# Patient Record
Sex: FEMALE | Race: WHITE | Employment: OTHER | URBAN - METROPOLITAN AREA
[De-identification: names, ages, dates, MRNs, and addresses within clinical notes are randomized per-mention and may not be internally consistent; named-entity substitution may affect disease eponyms.]

---

## 2017-01-03 ENCOUNTER — ANESTHESIA EVENT (OUTPATIENT)
Dept: SURGERY | Age: 82
End: 2017-01-03
Payer: MEDICARE

## 2017-01-04 ENCOUNTER — ANESTHESIA (OUTPATIENT)
Dept: SURGERY | Age: 82
End: 2017-01-04
Payer: MEDICARE

## 2017-01-04 ENCOUNTER — SURGERY (OUTPATIENT)
Age: 82
End: 2017-01-04

## 2017-01-04 PROBLEM — H40.1111 PRIMARY OPEN ANGLE GLAUCOMA OF RIGHT EYE, MILD STAGE: Status: ACTIVE | Noted: 2017-01-04

## 2017-01-04 PROBLEM — H25.811 COMBINED FORMS OF AGE-RELATED CATARACT OF RIGHT EYE: Status: ACTIVE | Noted: 2017-01-04

## 2017-01-04 RX ORDER — MIDAZOLAM HYDROCHLORIDE 1 MG/ML
INJECTION, SOLUTION INTRAMUSCULAR; INTRAVENOUS AS NEEDED
Status: DISCONTINUED | OUTPATIENT
Start: 2017-01-04 | End: 2017-01-04 | Stop reason: HOSPADM

## 2017-01-04 RX ADMIN — GENTAMICIN SULFATE 0.5 INCH: 3 OINTMENT OPHTHALMIC at 09:28

## 2017-01-04 RX ADMIN — LIDOCAINE HYDROCHLORIDE 3 ML: 40 INJECTION, SOLUTION RETROBULBAR; TOPICAL at 09:28

## 2017-01-04 RX ADMIN — VANCOMYCIN HYDROCHLORIDE: 1 INJECTION, POWDER, LYOPHILIZED, FOR SOLUTION INTRAVENOUS at 09:29

## 2017-01-04 RX ADMIN — APRACLONIDINE HYDROCHLORIDE 1 DROP: 10 SOLUTION/ DROPS OPHTHALMIC at 09:28

## 2017-01-04 RX ADMIN — MIDAZOLAM HYDROCHLORIDE 0.5 MG: 1 INJECTION, SOLUTION INTRAMUSCULAR; INTRAVENOUS at 09:20

## 2017-01-04 RX ADMIN — MIDAZOLAM HYDROCHLORIDE 0.5 MG: 1 INJECTION, SOLUTION INTRAMUSCULAR; INTRAVENOUS at 09:18

## 2017-01-04 RX ADMIN — BALANCED SALT SOLUTION 1 BOTTLE: 6.4; .75; .48; .3; 3.9; 1.7 SOLUTION OPHTHALMIC at 09:28

## 2017-01-04 RX ADMIN — ACETYLCHOLINE CHLORIDE 20 MG: KIT at 09:28

## 2017-01-04 RX ADMIN — SODIUM HYALURONATE 0.85 ML: 10 INJECTION INTRAOCULAR at 09:28

## 2017-01-04 RX ADMIN — Medication 1 ML: at 09:28

## 2017-01-04 RX ADMIN — LIDOCAINE HYDROCHLORIDE 0.5 ML: 10 INJECTION, SOLUTION EPIDURAL; INFILTRATION; INTRACAUDAL; PERINEURAL at 09:28

## 2017-01-04 RX ADMIN — PREDNISOLONE ACETATE 1 DROP: 10 SUSPENSION/ DROPS OPHTHALMIC at 09:28

## 2017-01-04 NOTE — ANESTHESIA PREPROCEDURE EVALUATION
Anesthetic History     PONV          Review of Systems / Medical History  Patient summary reviewed, nursing notes reviewed and pertinent labs reviewed    Pulmonary            Asthma (occasional wheezing) : well controlled       Neuro/Psych         Psychiatric history (anxiety)     Cardiovascular    Hypertension              Exercise tolerance: >4 METS     GI/Hepatic/Renal     GERD: well controlled      Hiatal hernia     Endo/Other        Arthritis     Other Findings   Comments: fibromyalgia           Physical Exam    Airway  Mallampati: I  TM Distance: > 6 cm  Neck ROM: normal range of motion   Mouth opening: Normal     Cardiovascular    Rhythm: regular  Rate: normal      Pertinent negatives: No murmur   Dental    Dentition: Full upper dentures and Lower partial plate     Pulmonary  Breath sounds clear to auscultation               Abdominal  GI exam deferred       Other Findings            Anesthetic Plan    ASA: 2  Anesthesia type: MAC          Induction: Intravenous  Anesthetic plan and risks discussed with: Patient

## 2017-01-04 NOTE — ANESTHESIA POSTPROCEDURE EVALUATION
Post-Anesthesia Evaluation and Assessment    Patient: Williams Curling MRN: [id number]  SSN: [id number]    YOB: 1935  Age: 80 y.o. Sex: female       Cardiovascular Function/Vital Signs  Visit Vitals    /68    Pulse 63    Temp 36.3 °C (97.4 °F)    Resp 17    Ht 5' 2\" (1.575 m)    Wt 65 kg (143 lb 3.2 oz)    SpO2 98%    BMI 26.19 kg/m2       Patient is status post MAC anesthesia for Procedure(s):  CATARACT EXTRACTION WITH INTRA OCULAR LENS IMPLANT RIGHT EYE, I-STENT, COMPLEX WITH IRIS HOOKS. Nausea/Vomiting: None    Postoperative hydration reviewed and adequate. Pain:  Pain Scale 1: Numeric (0 - 10) (01/04/17 0957)  Pain Intensity 1: 0 (01/04/17 0957)   Managed    Neurological Status:   Neuro (WDL): Within Defined Limits (01/04/17 0957)   At baseline    Mental Status and Level of Consciousness: Arousable    Pulmonary Status:   O2 Device: Room air (01/04/17 0958)   Adequate oxygenation and airway patent    Complications related to anesthesia: None    Post-anesthesia assessment completed.  No concerns    Signed By: Juan Manuel Elias MD     January 4, 2017

## 2017-01-06 PROBLEM — H25.812 COMBINED FORMS OF AGE-RELATED CATARACT OF LEFT EYE: Status: ACTIVE | Noted: 2017-01-06

## 2017-01-06 PROBLEM — H40.1121 PRIMARY OPEN ANGLE GLAUCOMA OF LEFT EYE, MILD STAGE: Status: ACTIVE | Noted: 2017-01-06

## 2017-01-06 PROBLEM — H25.811 COMBINED FORMS OF AGE-RELATED CATARACT OF RIGHT EYE: Status: RESOLVED | Noted: 2017-01-04 | Resolved: 2017-01-06

## 2017-01-06 PROBLEM — H40.1111 PRIMARY OPEN ANGLE GLAUCOMA OF RIGHT EYE, MILD STAGE: Status: RESOLVED | Noted: 2017-01-04 | Resolved: 2017-01-06

## 2017-01-09 RX ORDER — PREDNISOLONE ACETATE 10 MG/ML
1 SUSPENSION/ DROPS OPHTHALMIC 4 TIMES DAILY
COMMUNITY
End: 2018-01-15

## 2017-01-09 RX ORDER — DICLOFENAC SODIUM 1 MG/ML
1 SOLUTION/ DROPS OPHTHALMIC 4 TIMES DAILY
COMMUNITY
End: 2018-01-15

## 2017-01-09 RX ORDER — MOXIFLOXACIN 5 MG/ML
1 SOLUTION/ DROPS OPHTHALMIC 4 TIMES DAILY
COMMUNITY
Start: 2017-01-08 | End: 2018-01-15

## 2017-01-10 ENCOUNTER — ANESTHESIA EVENT (OUTPATIENT)
Dept: SURGERY | Age: 82
End: 2017-01-10
Payer: MEDICARE

## 2017-01-11 ENCOUNTER — HOSPITAL ENCOUNTER (OUTPATIENT)
Age: 82
Setting detail: OUTPATIENT SURGERY
Discharge: HOME | End: 2017-01-11
Attending: OPHTHALMOLOGY | Admitting: OPHTHALMOLOGY
Payer: MEDICARE

## 2017-01-11 ENCOUNTER — ANESTHESIA (OUTPATIENT)
Dept: SURGERY | Age: 82
End: 2017-01-11
Payer: MEDICARE

## 2017-01-11 VITALS
OXYGEN SATURATION: 97 % | TEMPERATURE: 98.5 F | DIASTOLIC BLOOD PRESSURE: 95 MMHG | WEIGHT: 145 LBS | RESPIRATION RATE: 19 BRPM | HEIGHT: 62 IN | HEART RATE: 62 BPM | SYSTOLIC BLOOD PRESSURE: 122 MMHG | BODY MASS INDEX: 26.68 KG/M2

## 2017-01-11 PROBLEM — H57.03 PERSISTENT MIOSIS: Status: ACTIVE | Noted: 2017-01-11

## 2017-01-11 PROCEDURE — 74011000250 HC RX REV CODE- 250: Performed by: OPHTHALMOLOGY

## 2017-01-11 PROCEDURE — V2632 POST CHMBR INTRAOCULAR LENS: HCPCS | Performed by: OPHTHALMOLOGY

## 2017-01-11 PROCEDURE — 77030018846 HC SOL IRR STRL H20 ICUM -A: Performed by: OPHTHALMOLOGY

## 2017-01-11 PROCEDURE — 76030000000 HC AMB SURG OR TIME 0.5 TO 1: Performed by: OPHTHALMOLOGY

## 2017-01-11 PROCEDURE — L8699 PROSTHETIC IMPLANT NOS: HCPCS | Performed by: OPHTHALMOLOGY

## 2017-01-11 PROCEDURE — 74011250636 HC RX REV CODE- 250/636

## 2017-01-11 PROCEDURE — 76060000061 HC AMB SURG ANES 0.5 TO 1 HR: Performed by: OPHTHALMOLOGY

## 2017-01-11 PROCEDURE — 74011250636 HC RX REV CODE- 250/636: Performed by: OPHTHALMOLOGY

## 2017-01-11 PROCEDURE — 76210000046 HC AMBSU PH II REC FIRST 0.5 HR: Performed by: OPHTHALMOLOGY

## 2017-01-11 DEVICE — LENS IOL POST 1-PC 6X13 23.5 -- ACRYSOF: Type: IMPLANTABLE DEVICE | Site: EYE | Status: FUNCTIONAL

## 2017-01-11 DEVICE — TRABECULAR MICRO-BYPASS STENT SYSTEM - LEFT
Type: IMPLANTABLE DEVICE | Site: EYE | Status: FUNCTIONAL
Brand: ISTENT

## 2017-01-11 RX ORDER — SODIUM CHLORIDE 0.9 % (FLUSH) 0.9 %
5-10 SYRINGE (ML) INJECTION EVERY 8 HOURS
Status: DISCONTINUED | OUTPATIENT
Start: 2017-01-11 | End: 2017-01-11 | Stop reason: HOSPADM

## 2017-01-11 RX ORDER — MIDAZOLAM HYDROCHLORIDE 1 MG/ML
INJECTION, SOLUTION INTRAMUSCULAR; INTRAVENOUS AS NEEDED
Status: DISCONTINUED | OUTPATIENT
Start: 2017-01-11 | End: 2017-01-11 | Stop reason: HOSPADM

## 2017-01-11 RX ORDER — DIPHENHYDRAMINE HYDROCHLORIDE 50 MG/ML
12.5 INJECTION, SOLUTION INTRAMUSCULAR; INTRAVENOUS AS NEEDED
Status: DISCONTINUED | OUTPATIENT
Start: 2017-01-11 | End: 2017-01-11 | Stop reason: HOSPADM

## 2017-01-11 RX ORDER — LIDOCAINE HYDROCHLORIDE 10 MG/ML
0.1 INJECTION, SOLUTION EPIDURAL; INFILTRATION; INTRACAUDAL; PERINEURAL AS NEEDED
Status: DISCONTINUED | OUTPATIENT
Start: 2017-01-11 | End: 2017-01-11 | Stop reason: HOSPADM

## 2017-01-11 RX ORDER — CYCLOPENTOLATE HYDROCHLORIDE 20 MG/ML
1 SOLUTION/ DROPS OPHTHALMIC
Status: COMPLETED | OUTPATIENT
Start: 2017-01-11 | End: 2017-01-11

## 2017-01-11 RX ORDER — PREDNISOLONE ACETATE 10 MG/ML
1 SUSPENSION/ DROPS OPHTHALMIC 2 TIMES DAILY
Status: DISCONTINUED | OUTPATIENT
Start: 2017-01-11 | End: 2017-01-11 | Stop reason: HOSPADM

## 2017-01-11 RX ORDER — GENTAMICIN SULFATE 0.3 %
0.5 OINTMENT (GRAM) OPHTHALMIC (EYE) 3 TIMES DAILY
Status: DISCONTINUED | OUTPATIENT
Start: 2017-01-11 | End: 2017-01-11 | Stop reason: HOSPADM

## 2017-01-11 RX ORDER — SODIUM CHLORIDE 9 MG/ML
25 INJECTION, SOLUTION INTRAVENOUS CONTINUOUS
Status: DISCONTINUED | OUTPATIENT
Start: 2017-01-11 | End: 2017-01-11 | Stop reason: HOSPADM

## 2017-01-11 RX ORDER — SODIUM CHLORIDE 0.9 % (FLUSH) 0.9 %
5-10 SYRINGE (ML) INJECTION AS NEEDED
Status: DISCONTINUED | OUTPATIENT
Start: 2017-01-11 | End: 2017-01-11 | Stop reason: HOSPADM

## 2017-01-11 RX ORDER — FENTANYL CITRATE 50 UG/ML
25 INJECTION, SOLUTION INTRAMUSCULAR; INTRAVENOUS
Status: DISCONTINUED | OUTPATIENT
Start: 2017-01-11 | End: 2017-01-11 | Stop reason: HOSPADM

## 2017-01-11 RX ORDER — SODIUM CHLORIDE, SODIUM LACTATE, POTASSIUM CHLORIDE, CALCIUM CHLORIDE 600; 310; 30; 20 MG/100ML; MG/100ML; MG/100ML; MG/100ML
25 INJECTION, SOLUTION INTRAVENOUS CONTINUOUS
Status: DISCONTINUED | OUTPATIENT
Start: 2017-01-11 | End: 2017-01-11 | Stop reason: HOSPADM

## 2017-01-11 RX ORDER — PHENYLEPHRINE HYDROCHLORIDE 25 MG/ML
1 SOLUTION/ DROPS OPHTHALMIC
Status: COMPLETED | OUTPATIENT
Start: 2017-01-11 | End: 2017-01-11

## 2017-01-11 RX ORDER — LIDOCAINE HYDROCHLORIDE 40 MG/ML
3 INJECTION, SOLUTION RETROBULBAR; TOPICAL ONCE
Status: DISCONTINUED | OUTPATIENT
Start: 2017-01-11 | End: 2017-01-11 | Stop reason: HOSPADM

## 2017-01-11 RX ORDER — PROPARACAINE HYDROCHLORIDE 5 MG/ML
1 SOLUTION/ DROPS OPHTHALMIC
Status: COMPLETED | OUTPATIENT
Start: 2017-01-11 | End: 2017-01-11

## 2017-01-11 RX ORDER — LIDOCAINE HYDROCHLORIDE 10 MG/ML
0.5 INJECTION, SOLUTION EPIDURAL; INFILTRATION; INTRACAUDAL; PERINEURAL ONCE
Status: DISCONTINUED | OUTPATIENT
Start: 2017-01-11 | End: 2017-01-11 | Stop reason: HOSPADM

## 2017-01-11 RX ORDER — DICLOFENAC SODIUM 1 MG/ML
1 SOLUTION/ DROPS OPHTHALMIC
Status: COMPLETED | OUTPATIENT
Start: 2017-01-11 | End: 2017-01-11

## 2017-01-11 RX ORDER — LIDOCAINE HYDROCHLORIDE 10 MG/ML
0.5 INJECTION, SOLUTION EPIDURAL; INFILTRATION; INTRACAUDAL; PERINEURAL ONCE
Status: COMPLETED | OUTPATIENT
Start: 2017-01-11 | End: 2017-01-11

## 2017-01-11 RX ORDER — LIDOCAINE HYDROCHLORIDE 40 MG/ML
3 INJECTION, SOLUTION RETROBULBAR; TOPICAL ONCE
Status: COMPLETED | OUTPATIENT
Start: 2017-01-11 | End: 2017-01-11

## 2017-01-11 RX ORDER — ONDANSETRON 2 MG/ML
4 INJECTION INTRAMUSCULAR; INTRAVENOUS AS NEEDED
Status: DISCONTINUED | OUTPATIENT
Start: 2017-01-11 | End: 2017-01-11 | Stop reason: HOSPADM

## 2017-01-11 RX ADMIN — PROPARACAINE HYDROCHLORIDE 1 DROP: 5 SOLUTION/ DROPS OPHTHALMIC at 09:05

## 2017-01-11 RX ADMIN — SODIUM CHLORIDE 25 ML/HR: 900 INJECTION, SOLUTION INTRAVENOUS at 09:07

## 2017-01-11 RX ADMIN — DICLOFENAC SODIUM 1 DROP: 1 SOLUTION/ DROPS OPHTHALMIC at 09:05

## 2017-01-11 RX ADMIN — DICLOFENAC SODIUM 1 DROP: 1 SOLUTION/ DROPS OPHTHALMIC at 08:59

## 2017-01-11 RX ADMIN — PROPARACAINE HYDROCHLORIDE 1 DROP: 5 SOLUTION/ DROPS OPHTHALMIC at 09:22

## 2017-01-11 RX ADMIN — PHENYLEPHRINE HYDROCHLORIDE 1 DROP: 25 SOLUTION/ DROPS OPHTHALMIC at 08:59

## 2017-01-11 RX ADMIN — CYCLOPENTOLATE HYDROCHLORIDE 1 DROP: 20 SOLUTION/ DROPS OPHTHALMIC at 08:59

## 2017-01-11 RX ADMIN — DICLOFENAC SODIUM 1 DROP: 1 SOLUTION/ DROPS OPHTHALMIC at 09:22

## 2017-01-11 RX ADMIN — DICLOFENAC SODIUM 1 DROP: 1 SOLUTION/ DROPS OPHTHALMIC at 09:17

## 2017-01-11 RX ADMIN — PROPARACAINE HYDROCHLORIDE 1 DROP: 5 SOLUTION/ DROPS OPHTHALMIC at 08:59

## 2017-01-11 RX ADMIN — MIDAZOLAM HYDROCHLORIDE 0.5 MG: 1 INJECTION, SOLUTION INTRAMUSCULAR; INTRAVENOUS at 10:50

## 2017-01-11 RX ADMIN — PHENYLEPHRINE HYDROCHLORIDE 1 DROP: 25 SOLUTION/ DROPS OPHTHALMIC at 09:22

## 2017-01-11 RX ADMIN — CYCLOPENTOLATE HYDROCHLORIDE 1 DROP: 20 SOLUTION/ DROPS OPHTHALMIC at 09:05

## 2017-01-11 RX ADMIN — PROPARACAINE HYDROCHLORIDE 1 DROP: 5 SOLUTION/ DROPS OPHTHALMIC at 09:17

## 2017-01-11 RX ADMIN — CYCLOPENTOLATE HYDROCHLORIDE 1 DROP: 20 SOLUTION/ DROPS OPHTHALMIC at 09:22

## 2017-01-11 RX ADMIN — CYCLOPENTOLATE HYDROCHLORIDE 1 DROP: 20 SOLUTION/ DROPS OPHTHALMIC at 09:17

## 2017-01-11 RX ADMIN — PHENYLEPHRINE HYDROCHLORIDE 1 DROP: 25 SOLUTION/ DROPS OPHTHALMIC at 09:17

## 2017-01-11 RX ADMIN — PROPARACAINE HYDROCHLORIDE 1 DROP: 5 SOLUTION/ DROPS OPHTHALMIC at 09:28

## 2017-01-11 RX ADMIN — PHENYLEPHRINE HYDROCHLORIDE 1 DROP: 25 SOLUTION/ DROPS OPHTHALMIC at 09:05

## 2017-01-11 RX ADMIN — MIDAZOLAM HYDROCHLORIDE 0.5 MG: 1 INJECTION, SOLUTION INTRAMUSCULAR; INTRAVENOUS at 10:21

## 2017-01-11 NOTE — OP NOTES
PREOPERATIVE DIAGNOSES:   1. Primary open angle glaucoma, uncontrolled, left  2. 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 3. Visually impairing combined cataract, left    POSTOPERATIVE DIAGNOSES:   1. Primary open angle glaucoma, uncontrolled, left  2. 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 3. Visually impairing combined cataract, left    OPERATION: I Stent insertion with Complex Kelman phacoemulsification with use of intraoperative iris hooks. Procedure(s):   I STENT / PHACO WITH IOL IMPLANT (COMPLEX/IRIS HOOKS)left   ANESTHESIA: Topical with intravenous sedation    TYPE OF LENS IMPLANT USED:   Implant Name Type Inv. Item Serial No.  Lot No. LRB No. Used Action   LENS DIOPTER [id number] 23.5 --  - [id number]  LENS DIOPTER [id number] 23.5 --  [id number] Actions  Left 1 Implanted   STENT EYE TRABECLAR LALY INVAS -- ISTENT - [id number]   STENT EYE TRABECLAR LALY INVAS -- ISTENT [id number] [employer] [id number] Left 1 Implanted       PHACO TIME: 53 seconds at an average power of 19.2%. Estimated blood loss: None   Complications: None   Specimen removed: None     DESCRIPTION OF PROCEDURE: The patient was brought to the holding area where an IV was begun at a keep open rate. The patient received several instillations of Cyclogyl 2% and Douglas-Synephrine 2.5% and proparacaine 0.5% until best pupillary dilatation was achieved. The patient was connected to cardiovascular monitoring. The patient was then brought back to the operating suite and then prepped and draped in the usual manner for ocular surgery. Cardiovascular monitoring was reestablished. The patient received several instillations of Betadine in the inferior conjunctival cul-de-sac along with 4% Xylocaine. The operating microscope was brought into position and the lid speculum was set into position. Viscoelastic was placed on the cornea and 2 drops of 4% Xylocaine were placed on the eye. A paracentesis was created and 1% preservative-free Xylocaine was instilled into the anterior chamber and Viscoelastic was instilled into the anterior chamber. Four paracentesis sites were created at the 2, 4, 8 and 10 oclock positions of the corneoscleral limbus, where the iris hooks were placed strategically to facilitate pupillary mydriasis. The 2.4mm keratome was utilized to create an incision. The capsulorrhexsis was performed in a circular fashion. The hard nucleus of the lens was hydrodissected away from the capsule using BSS solution. viscoelastic was then instilled into the anterior chamber to protect the corneal endothelium. Phacoemulsification was then carried out followed by irrigation and aspiration. Following this, the sterile lens was removed from the sterile container and inserted into the lens injector. It was inserted into the eye without complications and positioned appropriately on the visual /astigmatic axis . The iris hooks were removed without complications. The Viscoelastic was then removed from the posterior chamber as well as the anterior chamber of the eye, and Miochol was instilled posterior to the iris plane to facilitate pupillary miosis. Viscoelastic was then reinstilled to facilitate visualization of the angle. The microscope was angled to 45 degrees and the patient's head positioned to maximize visibility of the angle. The gonioprism was coupled to the cornea with viscoelastic. The I-stent was carefully inserted into schlemm's canal and checked to be sure it was securely positioned. Once positioning was confirmed the remaining viscoelastic was removed from the eye. The incision site was checked for any leaks. After finding none, the patient received several instillations of Iopidine, pred forte and then followed by instillation of gentamycin ophthalmic ointment.  The lid speculum was removed and the patient was patched with a semi-pressure dressing and shield and was brought to the recovery room in alert and stable and satisfactory postoperative condition. Instructions were given to the patients family for their immediate postoperative care, and the patient is to follow up with me in the office tomorrow.       Danial Sena DO  1/11/2017

## 2017-01-11 NOTE — IP AVS SNAPSHOT
[address] [facility] 
[phone] Patient: Ange Canchola MRN: [id number] OW:[id number] You are allergic to the following Allergen Reactions Cymbalta (Duloxetine) Nausea Only Other (comments) \"within the hour I had a severe headache, and vomiting\" Demerol (Meperidine) Nausea and Vomiting Morphine Nausea and Vomiting Penicillins Other (comments)  
 rash Percocet (Oxycodone-Acetaminophen) Nausea and Vomiting Recent Documentation Height Weight BMI OB Status Smoking Status 1.575 m 64.9 kg 26.16 kg/m2 Hysterectomy Never Smoker Emergency Contacts Name Discharge Info Relation Home Work Mobile Summer Pierce DISCHARGE CAREGIVER [3] Child [2] 21  About your hospitalization You were admitted on:  January 11, 2017 You last received care in the:  [facility] You were discharged on:  January 11, 2017 Unit phone number:  [phone] Why you were hospitalized Your primary diagnosis was:  Primary Open Angle Glaucoma Of Left Eye, Mild Stage Your diagnoses also included:  Combined Forms Of Age-Related Cataract Of Left Eye  
  
  
 
  
  
Providers Seen During Your Hospitalizations Provider Role Specialty Primary office phone Delphine Méndez DO Attending Provider Ophthalmology [phone] Your Primary Care Physician (PCP) Primary Care Physician Office Phone Office Fax OTHER, PHYS ** None ** ** None ** Follow-up Information Follow up With Details Comments Contact Info Tai Lind MD   Patient can only remember the practice name and not the physician Your Appointments Wednesday January 11, 2017 CATARACT EXTRACTION WITH INTRA OCULAR LENS IMPLANT with Delphine Méndez DO  
[facility] AMB SURGERY UNIT (RI OR PRE ASSESSMENT) [facility]  
[phone] Current Discharge Medication List  
  
ASK your doctor about these medications Dose & Instructions Dispensing Information Comments Morning Noon Evening Bedtime  
 albuterol 90 mcg/actuation inhaler Commonly known as:  PROVENTIL HFA, VENTOLIN HFA, PROAIR HFA Your next dose is: Today, Tomorrow Other:  _________ Take  by inhalation as needed for Wheezing. Refills:  0  
     
   
   
   
  
 aspirin delayed-release 81 mg tablet Your next dose is: Today, Tomorrow Other:  _________ Take  by mouth daily. Refills:  0  
     
   
   
   
  
 bimatoprost 0.01 % ophthalmic drops Commonly known as:  LUMIGAN Your next dose is: Today, Tomorrow Other:  _________ Dose:  1 Drop Administer 1 Drop to both eyes every evening. Refills:  0 CeleBREX 200 mg capsule Generic drug:  celecoxib Your next dose is: Today, Tomorrow Other:  _________ Take  by mouth every morning. Refills:  0  
     
   
   
   
  
 CO Q-10 100 mg capsule Generic drug:  co-enzyme Q-10 Your next dose is: Today, Tomorrow Other:  _________ Dose:  100 mg Take 100 mg by mouth daily. Refills:  0  
     
   
   
   
  
 diclofenac 0.1 % ophthalmic solution Commonly known as:  VOLTAREN Your next dose is: Today, Tomorrow Other:  _________ Dose:  1 Drop Administer 1 Drop to right eye four (4) times daily. Refills:  0 DULERA 100-5 mcg/actuation HFA inhaler Generic drug:  mometasone-formoterol Your next dose is: Today, Tomorrow Other:  _________ Dose:  2 Puff Take 2 Puffs by inhalation two (2) times a day. Refills:  0  
     
   
   
   
  
 indapamide 1.25 mg tablet Commonly known as:  Ángel Broach Your next dose is: Today, Tomorrow Other:  _________  Dose:  1.25 mg  
 Take 1.25 mg by mouth daily. Refills:  0 LORazepam 0.5 mg tablet Commonly known as:  ATIVAN Your next dose is: Today, Tomorrow Other:  _________ Dose:  1 mg Take 1 mg by mouth nightly. Refills:  0  
     
   
   
   
  
 meclizine 25 mg tablet Commonly known as:  ANTIVERT Your next dose is: Today, Tomorrow Other:  _________ Dose:  25 mg Take 1 Tab by mouth three (3) times daily as needed for Dizziness. Quantity:  20 Tab Refills:  0  
     
   
   
   
  
 multivitamin tablet Commonly known as:  ONE A DAY Your next dose is: Today, Tomorrow Other:  _________ Dose:  1 Tab Take 1 Tab by mouth daily. Refills:  0  
     
   
   
   
  
 pantoprazole 40 mg tablet Commonly known as:  PROTONIX Your next dose is: Today, Tomorrow Other:  _________ Dose:  40 mg Take 40 mg by mouth nightly. Refills:  0  
     
   
   
   
  
 prednisoLONE acetate 1 % ophthalmic suspension Commonly known as:  PRED FORTE Your next dose is: Today, Tomorrow Other:  _________ Dose:  1 Drop Administer 1 Drop to right eye four (4) times daily. Refills:  0 PROBIOTIC 4X 10-15 mg Tbec Generic drug:  B.infantis-B.ani-B.long-B.bifi Your next dose is: Today, Tomorrow Other:  _________ Take  by mouth daily. Refills:  0  
     
   
   
   
  
 * VIGAMOX 0.5 % ophthalmic solution Generic drug:  moxifloxacin Your next dose is: Today, Tomorrow Other:  _________ Dose:  1 Drop Administer 1 Drop to right eye four (4) times daily. Refills:  0  
     
   
   
   
  
 * VIGAMOX 0.5 % ophthalmic solution Generic drug:  moxifloxacin Your next dose is: Today, Tomorrow Other:  _________ Dose:  1 Drop Administer 1 Drop to left eye four (4) times daily. Refills:  0 * Notice: This list has 2 medication(s) that are the same as other medications prescribed for you. Read the directions carefully, and ask your doctor or other care provider to review them with you. Discharge Instructions Radha Garcia D.O., FORREST 
[facility]. 
[phone] Post-Operative Instructions for I Stent - Cataract Surgery ? Remove your eye shield and bandage at 12 noon the same day as surgery and start your eye drops. THROW AWAY THE GAUZE UNDER THE SHIELD. ? Place the blue eye shield back on for one week while asleep. Use the tape included in your bag. 
       
 
? DO NOT RUB YOUR EYE EVER! DO NOT WEAR EYE MAKEUP FOR 1 WEEK! 
 
 
? You may take a bath today and you can shower starting tomorrow. ? You may resume your previous diet. ? If you use glaucoma drops in the operative eye, stop using them. May continue in non-operative eye as directed by Dr Clarisse Kay. ? Common symptoms after surgery include a scratchy feeling, slight headache, red eye, and/or blurred vision. You may use Advil or Tylenol for any discomfort. ? Avoid strenuous activities and driving until you see Dr. Clarisse Kay tomorrow. Please use care when walking, your depth perception may be altered. ? Bring your bag with your drops to your follow up appointment. Below are Instructions On How To Use Your Eye Drops. ON THE DAY OF SURGERY: 
 
? Use all three eye drops at 12 noon, 4pm, and 8pm.  Wait 10 minutes between drops. THE DAY AFTER SURGERY: 
 
? You will use the drops 4 times a day at 8am, 12 noon, 4pm, and 8pm. 
? Use the drops every day until bottles are empty. Vigamox (tan top) Put 1 drop in at 8am, 12 noon, 4pm, and 8pm. 
 
Pred Acetate (pink top) Put 1 drop in at 8:10am, 12:10pm, 4:10pm, and 8:10pm. Shake before using. Diclofenac Put 1 drop in at 8:20am, 12:20pm, 4:20pm, 8:20pm. 
 
CONTINUE DROPS UNTIL ALL BOTTLES ARE EMPTY! Follow-up appointment tomorrow at Dr. Baldev Alvarado office:___________8:30_________ Please call the office at [phone] if you experience severe pain or nausea. DISCHARGE SUMMARY from Nurse The following personal items collected during your admission are returned to you:  
Dental Appliance: Dental Appliances: None Vision: Visual Aid: None Hearing Aid:   
Jewelry:   
Clothing:   
Other Valuables:   
Valuables sent to safe:   
 
 
PATIENT INSTRUCTIONS: 
 
After general anesthesia or intravenous sedation, for 24 hours or while taking prescription Narcotics: · Someone should be with you for the next 24 hours. · For your own safety, a responsible adult must drive you home. · Limit your activities · Recommended activity: Rest today and no driving until after your appointment with Dr. Georgiana Luong tomorrow. · Do not drive and operate hazardous machinery · Do not make important personal or business decisions · Do  not drink alcoholic beverages · If you have not urinated within 8 hours after discharge, please contact your surgeon on call. Report the following to your surgeon: 
· Excessive pain, swelling, redness or odor of or around the surgical area · Temperature over 100.5 · Any nausea and vomiting ·  
· You will receive a Post Operative Call from one of the Recovery Room Nurses on the day after your surgery to check on you. It is very important for us to know how you are recovering after your surgery. ·  
· You may receive an e-mail or letter in the mail from [place] regarding your experience with us in the Ambulatory Surgery Unit. Your feedback is valuable to us and we appreciate your participation in the survey. ·  
 
·  
· If the above instructions are not adequate, please contact Antoni Hamlin RN, [facility] anesthesia Nurse Manager or our Anesthesiologist, at [phone]. ·  
· We wish you a speedy recovery ? What to do at Home: *  Please give a list of your current medications to your Primary Care Provider. *  Please update this list whenever your medications are discontinued, doses are 
    changed, or new medications (including over-the-counter products) are added. *  Please carry medication information at all times in case of emergency situations. These are general instructions for a healthy lifestyle: No smoking/ No tobacco products/ Avoid exposure to second hand smoke Surgeon General's Warning:  Quitting smoking now greatly reduces serious risk to your health. Obesity, smoking, and sedentary lifestyle greatly increases your risk for illness A healthy diet, regular physical exercise & weight monitoring are important for maintaining a healthy lifestyle You may be retaining fluid if you have a history of heart failure or if you experience any of the following symptoms:  Weight gain of 3 pounds or more overnight or 5 pounds in a week, increased swelling in our hands or feet or shortness of breath while lying flat in bed. Please call your doctor as soon as you notice any of these symptoms; do not wait until your next office visit. Recognize signs and symptoms of STROKE: 
 
F-face looks uneven A-arms unable to move or move even S-speech slurred or non-existent T-time-call 911 as soon as signs and symptoms begin-DO NOT go Back to bed or wait to see if you get better-TIME IS BRAIN. If you have not had your influenza or pneumococcal vaccines, please follow up with your primary care physician. The discharge information has been reviewed with the patient and caregiver. The patient and caregiver verbalized understanding. Discharge Orders None Introducing [facility]! Flores Ornelas introduces [employer] patient portal. Now you can access parts of your medical record, email your doctor's office, and request medication refills online.    
 
1. In your internet browser, go to [email or web address] 2. Click on the First Time User? Click Here link in the Sign In box. You will see the New Member Sign Up page. 3. Enter your [employer] Access Code exactly as it appears below. You will not need to use this code after youve completed the sign-up process. If you do not sign up before the expiration date, you must request a new code. · [employer] Access Code: [email or web address] Expires: 3/29/2017  5:29 AM 
 
4. Enter the last four digits of your Social Security Number (xxxx) and Date of Birth (mm/dd/yyyy) as indicated and click Submit. You will be taken to the next sign-up page. 5. Create a [employer] ID. This will be your [employer] login ID and cannot be changed, so think of one that is secure and easy to remember. 6. Create a [employer] password. You can change your password at any time. 7. Enter your Password Reset Question and Answer. This can be used at a later time if you forget your password. 8. Enter your e-mail address. You will receive e-mail notification when new information is available in [address] 9. Click Sign Up. You can now view and download portions of your medical record. 10. Click the Download Summary menu link to download a portable copy of your medical information. If you have questions, please visit the Frequently Asked Questions section of the [employer] website. Remember, [employer] is NOT to be used for urgent needs. For medical emergencies, dial 911. Now available from your iPhone and Android! General Information Please provide this summary of care documentation to your next provider. Patient Signature:  ____________________________________________________________ Date:  ____________________________________________________________  
  
Orbie Sosa Provider Signature:  ____________________________________________________________ Date:  ____________________________________________________________

## 2017-01-11 NOTE — DISCHARGE INSTRUCTIONS
Nancy Chong D.O., P.CAmbrocio  [facility].  [phone]    Post-Operative Instructions for I Stent - Cataract Surgery     Remove your eye shield and bandage at 12 noon the same day as surgery and start your eye drops. THROW AWAY THE GAUZE UNDER THE SHIELD.  Place the blue eye shield back on for one week while asleep. Use the tape included in your bag.  DO NOT RUB YOUR EYE EVER! DO NOT WEAR EYE MAKEUP FOR 1 WEEK!  You may take a bath today and you can shower starting tomorrow.  You may resume your previous diet.  If you use glaucoma drops in the operative eye, stop using them. May continue in non-operative eye as directed by Dr Lizet Alvarez.  Common symptoms after surgery include a scratchy feeling, slight headache, red eye, and/or blurred vision. You may use Advil or Tylenol for any discomfort.  Avoid strenuous activities and driving until you see Dr. Lizet Alvarez tomorrow. Please use care when walking, your depth perception may be altered.  Bring your bag with your drops to your follow up appointment. Below are Instructions On How To Use Your Eye Drops. ON THE DAY OF SURGERY:     Use all three eye drops at 12 noon, 4pm, and 8pm.  Wait 10 minutes between drops. THE DAY AFTER SURGERY:     You will use the drops 4 times a day at 8am, 12 noon, 4pm, and 8pm.   Use the drops every day until bottles are empty. Vigamox (tan top) Put 1 drop in at 8am, 12 noon, 4pm, and 8pm.    Pred Acetate (pink top) Put 1 drop in at 8:10am, 12:10pm, 4:10pm, and 8:10pm. Shake before using. Diclofenac Put 1 drop in at 8:20am, 12:20pm, 4:20pm, 8:20pm.    CONTINUE DROPS UNTIL ALL BOTTLES ARE EMPTY! Follow-up appointment tomorrow at Dr. Sushila Hennessy office:___________8:30_________    Please call the office at [phone] if you experience severe pain or nausea.        DISCHARGE SUMMARY from Nurse    The following personal items collected during your admission are returned to you:   Dental Appliance: Dental Appliances: None  Vision: Visual Aid: None  Hearing Aid:    Jewelry:    Clothing:    Other Valuables:    Valuables sent to safe:        PATIENT INSTRUCTIONS:    After general anesthesia or intravenous sedation, for 24 hours or while taking prescription Narcotics:  · Someone should be with you for the next 24 hours. · For your own safety, a responsible adult must drive you home. · Limit your activities  · Recommended activity: Rest today and no driving until after your appointment with Dr. Polo Huffman tomorrow. · Do not drive and operate hazardous machinery  · Do not make important personal or business decisions  · Do  not drink alcoholic beverages  · If you have not urinated within 8 hours after discharge, please contact your surgeon on call. Report the following to your surgeon:  · Excessive pain, swelling, redness or odor of or around the surgical area  · Temperature over 100.5  · Any nausea and vomiting   ·   · You will receive a Post Operative Call from one of the Recovery Room Nurses on the day after your surgery to check on you. It is very important for us to know how you are recovering after your surgery. ·   · You may receive an e-mail or letter in the mail from [place] regarding your experience with us in the Ambulatory Surgery Unit. Your feedback is valuable to us and we appreciate your participation in the survey. ·     ·   · If the above instructions are not adequate, please contact Valerie Contreras RN, [facility] Nurse Manager or our Anesthesiologist, at [phone]. ·   · We wish you a speedy recovery ? What to do at Home:      *  Please give a list of your current medications to your Primary Care Provider. *  Please update this list whenever your medications are discontinued, doses are      changed, or new medications (including over-the-counter products) are added.     *  Please carry medication information at all times in case of emergency situations. These are general instructions for a healthy lifestyle:    No smoking/ No tobacco products/ Avoid exposure to second hand smoke    Surgeon General's Warning:  Quitting smoking now greatly reduces serious risk to your health. Obesity, smoking, and sedentary lifestyle greatly increases your risk for illness    A healthy diet, regular physical exercise & weight monitoring are important for maintaining a healthy lifestyle    You may be retaining fluid if you have a history of heart failure or if you experience any of the following symptoms:  Weight gain of 3 pounds or more overnight or 5 pounds in a week, increased swelling in our hands or feet or shortness of breath while lying flat in bed. Please call your doctor as soon as you notice any of these symptoms; do not wait until your next office visit. Recognize signs and symptoms of STROKE:    F-face looks uneven    A-arms unable to move or move even    S-speech slurred or non-existent    T-time-call 911 as soon as signs and symptoms begin-DO NOT go       Back to bed or wait to see if you get better-TIME IS BRAIN. If you have not had your influenza or pneumococcal vaccines, please follow up with your primary care physician. The discharge information has been reviewed with the patient and caregiver. The patient and caregiver verbalized understanding.

## 2017-01-11 NOTE — IP AVS SNAPSHOT
Summary of Care Report The Summary of Care report has been created to help improve care coordination. Users with access to [employer] or [address] (Web-based application) may access additional patient information including the Discharge Summary. If you are not currently a [address] user and need more information, please call the number listed below in the [place] [address] section and ask to be connected with Medical Records. Facility Information Name Address Phone [address] [address] [phone] Patient Information Patient Name Sex NELSON Samuel ([id number]) Female 1935 Discharge Information Admitting Provider Service Area Unit [address] [place] [address],  / [phone] 508 [facility] / [phone] Discharge Provider Discharge Date/Time Discharge Disposition Destination (none) (none) (none) (none) Patient Language Language ENGLISH [13] Problem List as of 2017  Date Reviewed: 1/10/2017 Codes Priority Class Noted - Resolved Abdominal mass ICD-10-CM: R19.00 ICD-9-CM: 789.30   3/15/2012 - Present Essential hypertension, benign (Chronic) ICD-10-CM: I10 
ICD-9-CM: 401.1   3/15/2012 - Present Peritoneal cyst ICD-10-CM: K66.8 ICD-9-CM: 568.89   3/16/2012 - Present Neuropathy ICD-10-CM: G62.9 ICD-9-CM: 355.9   2013 - Present Restless leg syndrome ICD-10-CM: G25.81 ICD-9-CM: 333.94   2013 - Present RESOLVED: Primary open angle glaucoma of right eye, mild stage ICD-10-CM: H40.1111 ICD-9-CM: 365.11, 365.71   2017 - 2017 Overview Signed 2017  8:25 AM by [address] [place] [address],   
   i-stent insertion RESOLVED: Combined forms of age-related cataract of right eye ICD-10-CM: H25.811 ICD-9-CM: 366.19   2017 - 2017 Overview Signed 1/4/2017  8:26 AM by Vicky Woods DO Kpe/iol OD * (Principal)Primary open angle glaucoma of left eye, mild stage ICD-10-CM: X05.2567 ICD-9-CM: 365.11, 365.71   1/6/2017 - Present Overview Signed 1/6/2017 11:57 AM by Vicky Woods DO Patient struggles with compliancy therefore we will insert an Istent at time of KPE IOL OS. Combined forms of age-related cataract of left eye ICD-10-CM: H25.812 ICD-9-CM: 366.19   1/6/2017 - Present Overview Signed 1/6/2017 11:57 AM by Vicky Woods DO  
  KPE IOL OS with Istent You are allergic to the following Allergen Reactions Cymbalta (Duloxetine) Nausea Only Other (comments) \"within the hour I had a severe headache, and vomiting\" Demerol (Meperidine) Nausea and Vomiting Morphine Nausea and Vomiting Penicillins Other (comments)  
 rash Percocet (Oxycodone-Acetaminophen) Nausea and Vomiting Current Discharge Medication List  
  
ASK your doctor about these medications Dose & Instructions Dispensing Information Comments  
 albuterol 90 mcg/actuation inhaler Commonly known as:  PROVENTIL HFA, VENTOLIN HFA, PROAIR HFA Take  by inhalation as needed for Wheezing. Refills:  0  
   
 aspirin delayed-release 81 mg tablet Take  by mouth daily. Refills:  0  
   
 bimatoprost 0.01 % ophthalmic drops Commonly known as:  LUMIGAN Dose:  1 Drop Administer 1 Drop to both eyes every evening. Refills:  0 CeleBREX 200 mg capsule Generic drug:  celecoxib Take  by mouth every morning. Refills:  0  
   
 CO Q-10 100 mg capsule Generic drug:  co-enzyme Q-10 Dose:  100 mg Take 100 mg by mouth daily. Refills:  0  
   
 diclofenac 0.1 % ophthalmic solution Commonly known as:  VOLTAREN Dose:  1 Drop Administer 1 Drop to right eye four (4) times daily. Refills:  0 DULERA 100-5 mcg/actuation HFA inhaler Generic drug:  mometasone-formoterol Dose:  2 Puff Take 2 Puffs by inhalation two (2) times a day. Refills:  0  
   
 indapamide 1.25 mg tablet Commonly known as:  Arch Sniff Dose:  1.25 mg Take 1.25 mg by mouth daily. Refills:  0 LORazepam 0.5 mg tablet Commonly known as:  ATIVAN Dose:  1 mg Take 1 mg by mouth nightly. Refills:  0  
   
 meclizine 25 mg tablet Commonly known as:  ANTIVERT Dose:  25 mg Take 1 Tab by mouth three (3) times daily as needed for Dizziness. Quantity:  20 Tab Refills:  0  
   
 multivitamin tablet Commonly known as:  ONE A DAY Dose:  1 Tab Take 1 Tab by mouth daily. Refills:  0  
   
 pantoprazole 40 mg tablet Commonly known as:  PROTONIX Dose:  40 mg Take 40 mg by mouth nightly. Refills:  0  
   
 prednisoLONE acetate 1 % ophthalmic suspension Commonly known as:  PRED FORTE Dose:  1 Drop Administer 1 Drop to right eye four (4) times daily. Refills:  0 PROBIOTIC 4X 10-15 mg Tbec Generic drug:  B.infantis-B.ani-B.long-B.bifi Take  by mouth daily. Refills:  0  
   
 * VIGAMOX 0.5 % ophthalmic solution Generic drug:  moxifloxacin Dose:  1 Drop Administer 1 Drop to right eye four (4) times daily. Refills:  0  
   
 * VIGAMOX 0.5 % ophthalmic solution Generic drug:  moxifloxacin Dose:  1 Drop Administer 1 Drop to left eye four (4) times daily. Refills:  0  
   
 * Notice: This list has 2 medication(s) that are the same as other medications prescribed for you. Read the directions carefully, and ask your doctor or other care provider to review them with you. Surgery Information ID Date/Time Status Primary Surgeon All Procedures Location [id number] 1/11/2017 1130 Unposted Yoselin Torres, DO CATARACT EXTRACTION WITH INTRA OCULAR LENS IMPLANT LEFT EYE, I-STENT [facility] Follow-up Information Follow up With Details Comments Contact Info Phys Lelo, MD   Patient can only remember the practice name and not the physician Discharge Instructions Robe Juarez D.O., FORREST 
[place] [address]. 
[phone] Post-Operative Instructions for I Stent - Cataract Surgery ? Remove your eye shield and bandage at 12 noon the same day as surgery and start your eye drops. THROW AWAY THE GAUZE UNDER THE SHIELD. ? Place the blue eye shield back on for one week while asleep. Use the tape included in your bag. 
       
 
? DO NOT RUB YOUR EYE EVER! DO NOT WEAR EYE MAKEUP FOR 1 WEEK! 
 
 
? You may take a bath today and you can shower starting tomorrow. ? You may resume your previous diet. ? If you use glaucoma drops in the operative eye, stop using them. May continue in non-operative eye as directed by Dr Flaco Wadsworth. ? Common symptoms after surgery include a scratchy feeling, slight headache, red eye, and/or blurred vision. You may use Advil or Tylenol for any discomfort. ? Avoid strenuous activities and driving until you see Dr. Flaco Wadsworth tomorrow. Please use care when walking, your depth perception may be altered. ? Bring your bag with your drops to your follow up appointment. Below are Instructions On How To Use Your Eye Drops. ON THE DAY OF SURGERY: 
 
? Use all three eye drops at 12 noon, 4pm, and 8pm.  Wait 10 minutes between drops. THE DAY AFTER SURGERY: 
 
? You will use the drops 4 times a day at 8am, 12 noon, 4pm, and 8pm. 
? Use the drops every day until bottles are empty. Vigamox (tan top) Put 1 drop in at 8am, 12 noon, 4pm, and 8pm. 
 
Pred Acetate (pink top) Put 1 drop in at 8:10am, 12:10pm, 4:10pm, and 8:10pm. Shake before using. Diclofenac Put 1 drop in at 8:20am, 12:20pm, 4:20pm, 8:20pm. 
 
CONTINUE DROPS UNTIL ALL BOTTLES ARE EMPTY! Follow-up appointment tomorrow at Dr. Balderas [place] office:___________8:30_________ Please call the office at [phone] if you experience severe pain or nausea. DISCHARGE SUMMARY from Nurse The following personal items collected during your admission are returned to you:  
Dental Appliance: Dental Appliances: None Vision: Visual Aid: None Hearing Aid:   
Jewelry:   
Clothing:   
Other Valuables:   
Valuables sent to safe:   
 
 
PATIENT INSTRUCTIONS: 
 
After general anesthesia or intravenous sedation, for 24 hours or while taking prescription Narcotics: · Someone should be with you for the next 24 hours. · For your own safety, a responsible adult must drive you home. · Limit your activities · Recommended activity: Rest today and no driving until after your appointment with Dr. Augustina Nvaa tomorrow. · Do not drive and operate hazardous machinery · Do not make important personal or business decisions · Do  not drink alcoholic beverages · If you have not urinated within 8 hours after discharge, please contact your surgeon on call. Report the following to your surgeon: 
· Excessive pain, swelling, redness or odor of or around the surgical area · Temperature over 100.5 · Any nausea and vomiting ·  
· You will receive a Post Operative Call from one of the Recovery Room Nurses on the day after your surgery to check on you. It is very important for us to know how you are recovering after your surgery. ·  
· You may receive an e-mail or letter in the mail from [place] regarding your experience with us in the Ambulatory Surgery Unit. Your feedback is valuable to us and we appreciate your participation in the survey. ·  
 
·  
· If the above instructions are not adequate, please contact Jose L Costello RN, [facility] anesthesia Nurse Manager or our Anesthesiologist, at [phone]. ·  
· We wish you a speedy recovery ? What to do at Home: *  Please give a list of your current medications to your Primary Care Provider. *  Please update this list whenever your medications are discontinued, doses are 
    changed, or new medications (including over-the-counter products) are added. *  Please carry medication information at all times in case of emergency situations. These are general instructions for a healthy lifestyle: No smoking/ No tobacco products/ Avoid exposure to second hand smoke Surgeon General's Warning:  Quitting smoking now greatly reduces serious risk to your health. Obesity, smoking, and sedentary lifestyle greatly increases your risk for illness A healthy diet, regular physical exercise & weight monitoring are important for maintaining a healthy lifestyle You may be retaining fluid if you have a history of heart failure or if you experience any of the following symptoms:  Weight gain of 3 pounds or more overnight or 5 pounds in a week, increased swelling in our hands or feet or shortness of breath while lying flat in bed. Please call your doctor as soon as you notice any of these symptoms; do not wait until your next office visit. Recognize signs and symptoms of STROKE: 
 
F-face looks uneven A-arms unable to move or move even S-speech slurred or non-existent T-time-call 911 as soon as signs and symptoms begin-DO NOT go Back to bed or wait to see if you get better-TIME IS BRAIN. If you have not had your influenza or pneumococcal vaccines, please follow up with your primary care physician. The discharge information has been reviewed with the patient and caregiver. The patient and caregiver verbalized understanding. Chart Review Routing History Recipient Method Report Sent By Aparna Mitchell MD  
Fax: [phone] Phone: [phone] Fax Provider Comm Report Alysia Francisco [[id number]] 7/9/2012  8:47 AM 03/16/2012 César Fabian DO Fax: [phone] Phone: [phone] Fax Notes Report Mahnaz Ramos RN [[id number]] 1/4/2017 10:43 AM 1/4/2017

## 2017-01-11 NOTE — ANESTHESIA POSTPROCEDURE EVALUATION
Post-Anesthesia Evaluation and Assessment    Patient: Rufus Galdamez MRN: [id number]  SSN: [id number]    YOB: 1935  Age: 80 y.o. Sex: female       Cardiovascular Function/Vital Signs  Visit Vitals    BP (!) 122/95 (BP 1 Location: Left arm, BP Patient Position: At rest)    Pulse 62    Temp 36.9 °C (98.5 °F)    Resp 19    Ht 5' 2\" (1.575 m)    Wt 65.8 kg (145 lb)    SpO2 97%    BMI 26.52 kg/m2       Patient is status post MAC anesthesia for Procedure(s):  CATARACT EXTRACTION WITH INTRA OCULAR LENS IMPLANT LEFT EYE, I-STENT, complex with iris hooks. Nausea/Vomiting: None    Postoperative hydration reviewed and adequate. Pain:  Pain Scale 1: Numeric (0 - 10) (01/11/17 1057)  Pain Intensity 1: 0 (01/11/17 1057)   Managed    Neurological Status:   Neuro (WDL): Within Defined Limits (01/11/17 0846)   At baseline    Mental Status and Level of Consciousness: Arousable    Pulmonary Status:   O2 Device: Room air (01/11/17 1057)   Adequate oxygenation and airway patent    Complications related to anesthesia: None    Post-anesthesia assessment completed.  No concerns    Signed By: Светлана Bowman MD     January 11, 2017

## 2017-01-11 NOTE — PERIOP NOTES
Pt discharged via wheelchair, accompanied by RN. Pt discharged awake and alert, respirations equal and unlabored, skin warm, dry, and intact. Pt and family members' questions and concerns addressed prior to discharge.

## 2017-08-02 ENCOUNTER — HOSPITAL ENCOUNTER (EMERGENCY)
Age: 82
Discharge: HOME | End: 2017-08-02
Attending: EMERGENCY MEDICINE
Payer: MEDICARE

## 2017-08-02 ENCOUNTER — APPOINTMENT (OUTPATIENT)
Dept: GENERAL RADIOLOGY | Age: 82
End: 2017-08-02
Attending: PHYSICIAN ASSISTANT
Payer: MEDICARE

## 2017-08-02 VITALS
SYSTOLIC BLOOD PRESSURE: 151 MMHG | TEMPERATURE: 98.1 F | HEART RATE: 86 BPM | HEIGHT: 63 IN | BODY MASS INDEX: 25.27 KG/M2 | RESPIRATION RATE: 17 BRPM | WEIGHT: 142.64 LBS | OXYGEN SATURATION: 100 % | DIASTOLIC BLOOD PRESSURE: 88 MMHG

## 2017-08-02 DIAGNOSIS — M17.0 PRIMARY OSTEOARTHRITIS OF BOTH KNEES: ICD-10-CM

## 2017-08-02 DIAGNOSIS — M25.561 ACUTE PAIN OF BOTH KNEES: Primary | ICD-10-CM

## 2017-08-02 DIAGNOSIS — M25.562 ACUTE PAIN OF BOTH KNEES: Primary | ICD-10-CM

## 2017-08-02 LAB
APPEARANCE UR: CLEAR
BACTERIA URNS QL MICRO: NEGATIVE /HPF
BILIRUB UR QL: NEGATIVE
COLOR UR: ABNORMAL
EPITH CASTS URNS QL MICRO: ABNORMAL /LPF
GLUCOSE UR STRIP.AUTO-MCNC: NEGATIVE MG/DL
HGB UR QL STRIP: NEGATIVE
HYALINE CASTS URNS QL MICRO: ABNORMAL /LPF (ref 0–5)
KETONES UR QL STRIP.AUTO: NEGATIVE MG/DL
LEUKOCYTE ESTERASE UR QL STRIP.AUTO: ABNORMAL
NITRITE UR QL STRIP.AUTO: NEGATIVE
PH UR STRIP: 7 [PH] (ref 5–8)
PROT UR STRIP-MCNC: NEGATIVE MG/DL
RBC #/AREA URNS HPF: ABNORMAL /HPF (ref 0–5)
SP GR UR REFRACTOMETRY: 1.01 (ref 1–1.03)
UA: UC IF INDICATED,UAUC: ABNORMAL
UROBILINOGEN UR QL STRIP.AUTO: 0.2 EU/DL (ref 0.2–1)
WBC URNS QL MICRO: ABNORMAL /HPF (ref 0–4)

## 2017-08-02 PROCEDURE — 99283 EMERGENCY DEPT VISIT LOW MDM: CPT

## 2017-08-02 PROCEDURE — 73562 X-RAY EXAM OF KNEE 3: CPT

## 2017-08-02 PROCEDURE — 81001 URINALYSIS AUTO W/SCOPE: CPT | Performed by: PHYSICIAN ASSISTANT

## 2017-08-02 RX ORDER — GABAPENTIN 100 MG/1
100 CAPSULE ORAL
COMMUNITY
End: 2021-05-05 | Stop reason: CLARIF

## 2017-08-02 NOTE — ED PROVIDER NOTES
HPI Comments: Jena Hayward is a 80 y.o. female with PMhx significant for Fibromyalgia and Arthritis who presents ambulatory to the ED with c/o sharp, left knee pain that radiates up the thigh upon standing x earlier today. She notes associated nausea and dizziness and that it was difficult to put pressure on the knee after the episode. Pt states she was diagnosed with Fibromyalgia x 2 months ago and has arthritis in both knees. Pt states her PCP has recommended her to she an arthritis specialist, but she has not followed up. She notes compliance with Meloxicam for her arthritis, but this pain is different from her Fibromyalgia. Of note, she reports her right knee is not bothering her at the moment. Pt denies fever, chills, vomiting or diarrhea. She denies thyroid, liver, or kidney disease. PCP: Joce Park MD    Social history significant for: - Tobacco, - EtOH, - Illicit Drug Use  PMHx: HTN, Hypercholesteremia, GERD  There are no other complaints, changes, or physical findings at this time. The history is provided by the patient. No  was used. Past Medical History:   Diagnosis Date    Adverse effect of anesthesia     combative waking up    Anxiety     Arthritis     Asthma     as of 12/30/16 pt states under control    Diverticulitis Dx 11/2016    Fibromyalgia     GERD (gastroesophageal reflux disease)     Glaucoma     Hiatal hernia     Hypercholesteremia     Hypertension     Ill-defined condition     neurapthy in ble    Nausea & vomiting        Past Surgical History:   Procedure Laterality Date    HX CATARACT REMOVAL Right 01/04/2017    HX HYSTERECTOMY      HX ROTATOR CUFF REPAIR Left 2008    HX TUBAL LIGATION      KS COLONOSCOPY FLX DX W/COLLJ SPEC WHEN PFRMD  11/9/2011         KS EGD TRANSORAL BIOPSY SINGLE/MULTIPLE  11/9/2011              History reviewed. No pertinent family history.     Social History     Social History    Marital status:  Spouse name: N/A    Number of children: N/A    Years of education: N/A     Occupational History    Not on file. Social History Main Topics    Smoking status: Never Smoker    Smokeless tobacco: Never Used    Alcohol use No    Drug use: No    Sexual activity: Not on file     Other Topics Concern    Not on file     Social History Narrative         ALLERGIES: Cymbalta [duloxetine]; Demerol [meperidine]; Morphine; Penicillins; and Percocet [oxycodone-acetaminophen]    Review of Systems   Constitutional: Negative. Negative for chills and fever. HENT: Negative. Negative for congestion, ear pain, rhinorrhea and sore throat. Eyes: Negative. Respiratory: Negative. Negative for cough and shortness of breath. Cardiovascular: Negative. Negative for chest pain, palpitations and leg swelling. Gastrointestinal: Positive for nausea. Negative for abdominal pain, constipation, diarrhea and vomiting. No melena  No hematochezia   Endocrine: Negative for polyuria. Denies thyroid disease   Genitourinary: Negative. Negative for dysuria, frequency and hematuria. Denies kidney disease   Musculoskeletal: Positive for arthralgias (BL knees). Skin: Negative. Neurological: Negative. Negative for dizziness, weakness, light-headedness, numbness and headaches. No tingling   All other systems reviewed and are negative. Vitals:    08/02/17 1536   BP: 151/88   Pulse: 86   Resp: 17   Temp: 98.1 °F (36.7 °C)   SpO2: 100%   Weight: 64.7 kg (142 lb 10.2 oz)   Height: 5' 3\" (1.6 m)            Physical Exam   Constitutional: She is oriented to person, place, and time. She appears well-developed and well-nourished. No distress. HENT:   Head: Normocephalic and atraumatic. Right Ear: External ear normal.   Left Ear: External ear normal.   Nose: Nose normal.   Mouth/Throat: Oropharynx is clear and moist. No oropharyngeal exudate.    Eyes: Conjunctivae and EOM are normal. Pupils are equal, round, and reactive to light. Right eye exhibits no discharge. Left eye exhibits no discharge. No scleral icterus. Neck: Normal range of motion. Neck supple. No tracheal deviation present. Cardiovascular: Normal rate, regular rhythm, normal heart sounds and intact distal pulses. Exam reveals no gallop and no friction rub. No murmur heard. Pulmonary/Chest: Effort normal and breath sounds normal. No respiratory distress. She has no wheezes. She has no rales. She exhibits no tenderness. Abdominal: Soft. Bowel sounds are normal. She exhibits no distension and no mass. There is no tenderness. There is no rebound and no guarding. Musculoskeletal: She exhibits no edema or tenderness. No contusions, no thigh tenderness, no bony tenderness to the hips. Lymphadenopathy:     She has no cervical adenopathy. Neurological: She is alert and oriented to person, place, and time. No cranial nerve deficit. Neurovascularly intact    Skin: Skin is warm and dry. No rash noted. No erythema. Psychiatric: She has a normal mood and affect. Her behavior is normal.   Nursing note and vitals reviewed. MDM  Number of Diagnoses or Management Options  Acute pain of both knees:   Primary osteoarthritis of both knees:   Diagnosis management comments: DDx: Baker's cyst, intrusion, UTI, strain, sprain, arthritis        Amount and/or Complexity of Data Reviewed  Clinical lab tests: ordered and reviewed  Tests in the radiology section of CPT®: ordered and reviewed  Review and summarize past medical records: yes    Patient Progress  Patient progress: stable    ED Course       Procedures     Progress Note:  6:32 PM  The X-rays were reviewed. Pt denies requiring any pains medications at this time and will follow-up with PCP. Pt will receive exercises to do and will continue normal medications.   Written by ASHLIE Banuelos, as dictated by Kandi Saini PA-C.      LABORATORY TESTS:  Recent Results (from the past 12 hour(s))   URINALYSIS W/ REFLEX CULTURE    Collection Time: 08/02/17  4:40 PM   Result Value Ref Range    Color YELLOW/STRAW      Appearance CLEAR CLEAR      Specific gravity 1.012 1.003 - 1.030      pH (UA) 7.0 5.0 - 8.0      Protein NEGATIVE  NEG mg/dL    Glucose NEGATIVE  NEG mg/dL    Ketone NEGATIVE  NEG mg/dL    Bilirubin NEGATIVE  NEG      Blood NEGATIVE  NEG      Urobilinogen 0.2 0.2 - 1.0 EU/dL    Nitrites NEGATIVE  NEG      Leukocyte Esterase SMALL (A) NEG      WBC 0-4 0 - 4 /hpf    RBC 0-5 0 - 5 /hpf    Epithelial cells FEW FEW /lpf    Bacteria NEGATIVE  NEG /hpf    UA:UC IF INDICATED CULTURE NOT INDICATED BY UA RESULT CNI      Hyaline cast 2-5 0 - 5 /lpf       IMAGING RESULTS:  XR KNEE LT 3 V   Final Result   EXAM:  XR KNEE LT 3 V     INDICATION:   pain - no injury.     COMPARISON: None.     FINDINGS: Three views of the left knee demonstrate no fracture or other acute  osseous or articular abnormality. There is no effusion. There are slight  degenerative changes medial compartment.     IMPRESSION:  No acute abnormality.      XR KNEE RT 3 V   Final Result   EXAM:  XR KNEE RT 3 V     INDICATION:   pain - no injury.     COMPARISON: None.     FINDINGS: Three views of the right knee demonstrate no fracture or other acute  osseous or articular abnormality. There is no effusion. There are slight  degenerative changes medial compartment.     IMPRESSION:  No acute abnormality. MEDICATIONS GIVEN:  Medications - No data to display    IMPRESSION:  1. Acute pain of both knees    2. Primary osteoarthritis of both knees        PLAN:  1. Discharge Medication List as of 8/2/2017  6:30 PM        2.    Follow-up Information     Follow up With Details [address] Harish Cottrell MD   [facility]  [phone]      [facility] [place] [facility]  If symptoms worsen [address] [facility]  [phone]        Return to ED if worse     Discharge Note:  6:31 PM  The pt is ready for discharge. The pt's signs, symptoms, diagnosis, and discharge instructions have been discussed and pt has conveyed their understanding. The pt is to follow up as recommended or return to ER should their symptoms worsen. Plan has been discussed and pt is in agreement. This note is prepared by Lennox Fulling, acting as a Scribe for [employer]. Imelda Enamorado PA-C: The scribe's documentation has been prepared under my direction and personally reviewed by me in its entirety. I confirm that the notes above accurately reflects all work, treatment, procedures, and medical decision making performed by me.

## 2017-08-02 NOTE — DISCHARGE INSTRUCTIONS
Knee Arthritis: Care Instructions  Your Care Instructions  Knee arthritis is a breakdown of the cartilage that cushions your knee joint. When the cartilage wears down, your bones rub against each other. This causes pain and stiffness. Knee arthritis tends to get worse with time. Treatment for knee arthritis involves reducing pain, making the leg muscles stronger, and staying at a healthy body weight. The treatment usually does not improve the health of the cartilage, but it can reduce pain and improve how well your knee works. You can take simple measures to protect your knee joints, ease your pain, and help you stay active. Follow-up care is a key part of your treatment and safety. Be sure to make and go to all appointments, and call your doctor if you are having problems. It's also a good idea to know your test results and keep a list of the medicines you take. How can you care for yourself at home? · Know that knee arthritis will cause more pain on some days than on others. · Stay at a healthy weight. Lose weight if you are overweight. When you stand up, the pressure on your knees from every pound of body weight is multiplied four times. So if you lose 10 pounds, you will reduce the pressure on your knees by 40 pounds. · Talk to your doctor or physical therapist about exercises that will help ease joint pain. ¨ Stretch to help prevent stiffness and to prevent injury before you exercise. You may enjoy gentle forms of yoga to help keep your knee joints and muscles flexible. ¨ Walk instead of jog. ¨ Ride a bike. This makes your thigh muscles stronger and takes pressure off your knee. ¨ Wear well-fitting and comfortable shoes. ¨ Exercise in chest-deep water. This can help you exercise longer with less pain. ¨ Avoid exercises that include squatting or kneeling. They can put a lot of strain on your knees.   ¨ Talk to your doctor to make sure that the exercise you do is not making the arthritis worse.  · Do not sit for long periods of time. Try to walk once in a while to keep your knee from getting stiff. · Ask your doctor or physical therapist whether shoe inserts may reduce your arthritis pain. · If you can afford it, get new athletic shoes at least every year. This can help reduce the strain on your knees. · Use a device to help you do everyday activities. ¨ A cane or walking stick can help you keep your balance when you walk. Hold the cane or walking stick in the hand opposite the painful knee. ¨ If you feel like you may fall when you walk, try using crutches or a front-wheeled walker. These can prevent falls that could cause more damage to your knee. ¨ A knee brace may help keep your knee stable and prevent pain. ¨ You also can use other things to make life easier, such as a higher toilet seat and handrails in the bathtub or shower. · Take pain medicines exactly as directed. ¨ Do not wait until you are in severe pain. You will get better results if you take it sooner. ¨ If you are not taking a prescription pain medicine, take an over-the-counter medicine such as acetaminophen (Tylenol), ibuprofen (Advil, Motrin), or naproxen (Aleve). Read and follow all instructions on the label. ¨ Do not take two or more pain medicines at the same time unless the doctor told you to. Many pain medicines have acetaminophen, which is Tylenol. Too much acetaminophen (Tylenol) can be harmful. ¨ Tell your doctor if you take a blood thinner, have diabetes, or have allergies to shellfish. · Ask your doctor if you might benefit from a shot of steroid medicine into your knee. This may provide pain relief for several months. · Many people take the supplements glucosamine and chondroitin for osteoarthritis. Some people feel they help, but the medical research does not show that they work. Talk to your doctor before you take these supplements. When should you call for help?   Call your doctor now or seek immediate medical care if:  · You have sudden swelling, warmth, or pain in your knee. · You have knee pain and a fever or rash. · You have such bad pain that you cannot use your knee. Watch closely for changes in your health, and be sure to contact your doctor if you have any problems. Where can you learn more? Go to http://addison-mary.info/. Enter K278 in the search box to learn more about \"Knee Arthritis: Care Instructions. \"  Current as of: October 31, 2016  Content Version: 11.3  © 1415-0240 [employer]. Care instructions adapted under license by [employer] (which disclaims liability or warranty for this information). If you have questions about a medical condition or this instruction, always ask your healthcare professional. [address] any warranty or liability for your use of this information. Knee Arthritis: Exercises  Your Care Instructions  Here are some examples of exercises for knee arthritis. Start each exercise slowly. Ease off the exercise if you start to have pain. Your doctor or physical therapist will tell you when you can start these exercises and which ones will work best for you. How to do the exercises  Knee flexion with heel slide    1. Lie on your back with your knees bent. 2. Slide your heel back by bending your affected knee as far as you can. Then hook your other foot around your ankle to help pull your heel even farther back. 3. Hold for about 6 seconds, then rest for up to 10 seconds. 4. Repeat 8 to 12 times. 5. Switch legs and repeat steps 1 through 4, even if only one knee is sore. Quad sets    1. Sit with your affected leg straight and supported on the floor or a firm bed. Place a small, rolled-up towel under your knee. Your other leg should be bent, with that foot flat on the floor. 2. Tighten the thigh muscles of your affected leg by pressing the back of your knee down into the towel.   3. Hold for about 6 seconds, then rest for up to 10 seconds. 4. Repeat 8 to 12 times. 5. Switch legs and repeat steps 1 through 4, even if only one knee is sore. Straight-leg raises to the front    1. Lie on your back with your good knee bent so that your foot rests flat on the floor. Your affected leg should be straight. Make sure that your low back has a normal curve. You should be able to slip your hand in between the floor and the small of your back, with your palm touching the floor and your back touching the back of your hand. 2. Tighten the thigh muscles in your affected leg by pressing the back of your knee flat down to the floor. Hold your knee straight. 3. Keeping the thigh muscles tight and your leg straight, lift your affected leg up so that your heel is about 12 inches off the floor. Hold for about 6 seconds, then lower slowly. 4. Relax for up to 10 seconds between repetitions. 5. Repeat 8 to 12 times. 6. Switch legs and repeat steps 1 through 5, even if only one knee is sore. Active knee flexion    1. Lie on your stomach with your knees straight. If your kneecap is uncomfortable, roll up a washcloth and put it under your leg just above your kneecap. 2. Lift the foot of your affected leg by bending the knee so that you bring the foot up toward your buttock. If this motion hurts, try it without bending your knee quite as far. This may help you avoid any painful motion. 3. Slowly move your leg up and down. 4. Repeat 8 to 12 times. 5. Switch legs and repeat steps 1 through 4, even if only one knee is sore. Quadriceps stretch (facedown)    1. Lie flat on your stomach, and rest your face on the floor. 2. Wrap a towel or belt strap around the lower part of your affected leg. Then use the towel or belt strap to slowly pull your heel toward your buttock until you feel a stretch. 3. Hold for about 15 to 30 seconds, then relax your leg against the towel or belt strap. 4. Repeat 2 to 4 times.   5. Switch legs and repeat steps 1 through 4, even if only one knee is sore. Stationary exercise bike    If you do not have a stationary exercise bike at home, you can find one to ride at your local health club or community center. 1. Adjust the height of the bike seat so that your knee is slightly bent when your leg is extended downward. If your knee hurts when the pedal reaches the top, you can raise the seat so that your knee does not bend as much. 2. Start slowly. At first, try to do 5 to 10 minutes of cycling with little to no resistance. Then increase your time and the resistance bit by bit until you can do 20 to 30 minutes without pain. 3. If you start to have pain, rest your knee until your pain gets back to the level that is normal for you. Or cycle for less time or with less effort. Follow-up care is a key part of your treatment and safety. Be sure to make and go to all appointments, and call your doctor if you are having problems. It's also a good idea to know your test results and keep a list of the medicines you take. Where can you learn more? Go to http://addison-mary.info/. Enter C159 in the search box to learn more about \"Knee Arthritis: Exercises. \"  Current as of: March 21, 2017  Content Version: 11.3  © 5937-3314 [employer]. Care instructions adapted under license by [employer] (which disclaims liability or warranty for this information). If you have questions about a medical condition or this instruction, always ask your healthcare professional. [place] [address] any warranty or liability for your use of this information.

## 2017-08-02 NOTE — ED NOTES
Discharge instructions reviewed with patient, copy given by Baron Dhillon. Pt is accomponied by family, denies use of wheelchair.

## 2017-11-07 ENCOUNTER — HOSPITAL ENCOUNTER (OUTPATIENT)
Dept: CT IMAGING | Age: 82
Discharge: HOME | End: 2017-11-07
Attending: FAMILY MEDICINE
Payer: MEDICARE

## 2017-11-07 DIAGNOSIS — R10.9 FLANK PAIN: ICD-10-CM

## 2017-11-07 PROCEDURE — 74176 CT ABD & PELVIS W/O CONTRAST: CPT

## 2017-11-13 ENCOUNTER — HOSPITAL ENCOUNTER (OUTPATIENT)
Dept: MAMMOGRAPHY | Age: 82
Discharge: HOME | End: 2017-11-13
Attending: OBSTETRICS & GYNECOLOGY
Payer: MEDICARE

## 2017-11-13 DIAGNOSIS — Z12.31 VISIT FOR SCREENING MAMMOGRAM: ICD-10-CM

## 2017-11-13 PROCEDURE — 77067 SCR MAMMO BI INCL CAD: CPT

## 2017-11-26 ENCOUNTER — HOSPITAL ENCOUNTER (EMERGENCY)
Age: 82
Discharge: HOME | End: 2017-11-26
Attending: EMERGENCY MEDICINE
Payer: MEDICARE

## 2017-11-26 VITALS
TEMPERATURE: 98 F | RESPIRATION RATE: 16 BRPM | WEIGHT: 143.52 LBS | HEIGHT: 63 IN | BODY MASS INDEX: 25.43 KG/M2 | OXYGEN SATURATION: 99 % | DIASTOLIC BLOOD PRESSURE: 77 MMHG | SYSTOLIC BLOOD PRESSURE: 172 MMHG | HEART RATE: 74 BPM

## 2017-11-26 DIAGNOSIS — N30.00 ACUTE CYSTITIS WITHOUT HEMATURIA: Primary | ICD-10-CM

## 2017-11-26 DIAGNOSIS — R52 BODY ACHES: ICD-10-CM

## 2017-11-26 LAB
ALBUMIN SERPL-MCNC: 3.8 G/DL (ref 3.5–5)
ALBUMIN/GLOB SERPL: 1 {RATIO} (ref 1.1–2.2)
ALP SERPL-CCNC: 92 U/L (ref 45–117)
ALT SERPL-CCNC: 25 U/L (ref 12–78)
ANION GAP SERPL CALC-SCNC: 6 MMOL/L (ref 5–15)
APPEARANCE UR: CLEAR
AST SERPL-CCNC: 23 U/L (ref 15–37)
BACTERIA URNS QL MICRO: NEGATIVE /HPF
BASOPHILS # BLD: 0.1 K/UL (ref 0–0.1)
BASOPHILS NFR BLD: 1 % (ref 0–1)
BILIRUB SERPL-MCNC: 0.3 MG/DL (ref 0.2–1)
BILIRUB UR QL: NEGATIVE
BUN SERPL-MCNC: 14 MG/DL (ref 6–20)
BUN/CREAT SERPL: 15 (ref 12–20)
CALCIUM SERPL-MCNC: 9.2 MG/DL (ref 8.5–10.1)
CHLORIDE SERPL-SCNC: 103 MMOL/L (ref 97–108)
CO2 SERPL-SCNC: 30 MMOL/L (ref 21–32)
COLOR UR: ABNORMAL
CREAT SERPL-MCNC: 0.96 MG/DL (ref 0.55–1.02)
EOSINOPHIL # BLD: 0.3 K/UL (ref 0–0.4)
EOSINOPHIL NFR BLD: 4 % (ref 0–7)
EPITH CASTS URNS QL MICRO: ABNORMAL /LPF
ERYTHROCYTE [DISTWIDTH] IN BLOOD BY AUTOMATED COUNT: 13.4 % (ref 11.5–14.5)
ERYTHROCYTE [SEDIMENTATION RATE] IN BLOOD: 20 MM/HR (ref 0–30)
GLOBULIN SER CALC-MCNC: 3.8 G/DL (ref 2–4)
GLUCOSE SERPL-MCNC: 82 MG/DL (ref 65–100)
GLUCOSE UR STRIP.AUTO-MCNC: NEGATIVE MG/DL
HCT VFR BLD AUTO: 39.3 % (ref 35–47)
HGB BLD-MCNC: 12.7 G/DL (ref 11.5–16)
HGB UR QL STRIP: NEGATIVE
HYALINE CASTS URNS QL MICRO: ABNORMAL /LPF (ref 0–5)
KETONES UR QL STRIP.AUTO: NEGATIVE MG/DL
LEUKOCYTE ESTERASE UR QL STRIP.AUTO: ABNORMAL
LYMPHOCYTES # BLD: 2.4 K/UL (ref 0.8–3.5)
LYMPHOCYTES NFR BLD: 30 % (ref 12–49)
MCH RBC QN AUTO: 28.3 PG (ref 26–34)
MCHC RBC AUTO-ENTMCNC: 32.3 G/DL (ref 30–36.5)
MCV RBC AUTO: 87.7 FL (ref 80–99)
MONOCYTES # BLD: 1 K/UL (ref 0–1)
MONOCYTES NFR BLD: 12 % (ref 5–13)
NEUTS SEG # BLD: 4.4 K/UL (ref 1.8–8)
NEUTS SEG NFR BLD: 53 % (ref 32–75)
NITRITE UR QL STRIP.AUTO: NEGATIVE
PH UR STRIP: 6 [PH] (ref 5–8)
PLATELET # BLD AUTO: 272 K/UL (ref 150–400)
POTASSIUM SERPL-SCNC: 4.1 MMOL/L (ref 3.5–5.1)
PROT SERPL-MCNC: 7.6 G/DL (ref 6.4–8.2)
PROT UR STRIP-MCNC: NEGATIVE MG/DL
RBC # BLD AUTO: 4.48 M/UL (ref 3.8–5.2)
RBC #/AREA URNS HPF: ABNORMAL /HPF (ref 0–5)
RHEUMATOID FACT SERPL-ACNC: <10 IU/ML
SODIUM SERPL-SCNC: 139 MMOL/L (ref 136–145)
SP GR UR REFRACTOMETRY: 1.02 (ref 1–1.03)
UA: UC IF INDICATED,UAUC: ABNORMAL
UROBILINOGEN UR QL STRIP.AUTO: 0.2 EU/DL (ref 0.2–1)
WBC # BLD AUTO: 8.2 K/UL (ref 3.6–11)
WBC URNS QL MICRO: ABNORMAL /HPF (ref 0–4)

## 2017-11-26 PROCEDURE — 86431 RHEUMATOID FACTOR QUANT: CPT | Performed by: PHYSICIAN ASSISTANT

## 2017-11-26 PROCEDURE — 85652 RBC SED RATE AUTOMATED: CPT | Performed by: PHYSICIAN ASSISTANT

## 2017-11-26 PROCEDURE — 80053 COMPREHEN METABOLIC PANEL: CPT | Performed by: PHYSICIAN ASSISTANT

## 2017-11-26 PROCEDURE — 81001 URINALYSIS AUTO W/SCOPE: CPT | Performed by: PHYSICIAN ASSISTANT

## 2017-11-26 PROCEDURE — 86757 RICKETTSIA ANTIBODY: CPT | Performed by: PHYSICIAN ASSISTANT

## 2017-11-26 PROCEDURE — 99283 EMERGENCY DEPT VISIT LOW MDM: CPT

## 2017-11-26 PROCEDURE — 86618 LYME DISEASE ANTIBODY: CPT | Performed by: PHYSICIAN ASSISTANT

## 2017-11-26 PROCEDURE — 85025 COMPLETE CBC W/AUTO DIFF WBC: CPT | Performed by: PHYSICIAN ASSISTANT

## 2017-11-26 PROCEDURE — 87086 URINE CULTURE/COLONY COUNT: CPT | Performed by: PHYSICIAN ASSISTANT

## 2017-11-26 PROCEDURE — 36415 COLL VENOUS BLD VENIPUNCTURE: CPT | Performed by: PHYSICIAN ASSISTANT

## 2017-11-26 RX ORDER — ONDANSETRON 4 MG/1
4 TABLET, FILM COATED ORAL
Qty: 20 TAB | Refills: 0 | Status: SHIPPED | OUTPATIENT
Start: 2017-11-26 | End: 2017-11-26

## 2017-11-26 RX ORDER — CEPHALEXIN 500 MG/1
500 CAPSULE ORAL 3 TIMES DAILY
Qty: 21 CAP | Refills: 0 | Status: SHIPPED | OUTPATIENT
Start: 2017-11-26 | End: 2017-11-26 | Stop reason: CLARIF

## 2017-11-26 RX ORDER — ONDANSETRON 4 MG/1
4 TABLET, FILM COATED ORAL
Qty: 20 TAB | Refills: 0 | Status: SHIPPED | OUTPATIENT
Start: 2017-11-26 | End: 2018-01-15

## 2017-11-26 RX ORDER — CEPHALEXIN 500 MG/1
500 CAPSULE ORAL 3 TIMES DAILY
Qty: 21 CAP | Refills: 0 | Status: SHIPPED | OUTPATIENT
Start: 2017-11-26 | End: 2017-12-03

## 2017-11-26 RX ORDER — OXYCODONE AND ACETAMINOPHEN 5; 325 MG/1; MG/1
1 TABLET ORAL
Qty: 12 TAB | Refills: 0 | Status: SHIPPED | OUTPATIENT
Start: 2017-11-26 | End: 2018-01-15

## 2017-11-26 NOTE — DISCHARGE INSTRUCTIONS

## 2017-11-28 LAB
B BURGDOR IGG+IGM SER-ACNC: <0.91 ISR (ref 0–0.9)
BACTERIA SPEC CULT: NORMAL
CC UR VC: NORMAL
R RICKETTSI IGG SER QL IA: NEGATIVE
SERVICE CMNT-IMP: NORMAL

## 2017-11-30 ENCOUNTER — OFFICE VISIT (OUTPATIENT)
Dept: SURGERY | Age: 82
End: 2017-11-30

## 2017-11-30 VITALS
OXYGEN SATURATION: 98 % | HEIGHT: 63 IN | SYSTOLIC BLOOD PRESSURE: 135 MMHG | WEIGHT: 143 LBS | HEART RATE: 68 BPM | DIASTOLIC BLOOD PRESSURE: 83 MMHG | BODY MASS INDEX: 25.34 KG/M2

## 2017-11-30 DIAGNOSIS — K80.20 GALLSTONES: Primary | ICD-10-CM

## 2017-11-30 NOTE — MR AVS SNAPSHOT
Visit Information Date & Time Provider Department Dept. Phone Encounter #  
 11/30/2017  2:20 PM Lanning Krabbe, MD [facility] [address] [phone] [id number] Upcoming Health Maintenance Date Due DTaP/Tdap/Td series (1 - Tdap) 3/25/1956 ZOSTER VACCINE AGE 60> 1/25/1995 GLAUCOMA SCREENING Q2Y 3/25/2000 OSTEOPOROSIS SCREENING (DEXA) 3/25/2000 Pneumococcal 65+ Low/Medium Risk (1 of 2 - PCV13) 3/25/2000 MEDICARE YEARLY EXAM 3/25/2000 Influenza Age 5 to Adult 8/1/2017 Allergies as of 11/30/2017  Review Complete On: 11/30/2017 By: Marcelino Figueredo LPN Severity Noted Reaction Type Reactions Cymbalta [Duloxetine]  01/04/2017    Nausea Only, Other (comments) \"within the hour I had a severe headache, and vomiting\" Demerol [Meperidine]  03/07/2012   Systemic Nausea and Vomiting Morphine  03/07/2012   Systemic Nausea and Vomiting Penicillins  11/08/2011    Other (comments)  
 rash Percocet [Oxycodone-acetaminophen]  03/07/2012   Systemic Nausea and Vomiting Current Immunizations  Never Reviewed No immunizations on file. Not reviewed this visit Vitals BP Pulse Height(growth percentile) Weight(growth percentile) SpO2 BMI  
 135/83 (BP 1 Location: Right arm, BP Patient Position: Sitting) 68 5' 3\" (1.6 m) 143 lb (64.9 kg) 98% 25.33 kg/m2 OB Status Smoking Status Hysterectomy Never Smoker Vitals History BMI and BSA Data Body Mass Index Body Surface Area  
 25.33 kg/m 2 1.7 m 2 Preferred Pharmacy Pharmacy Name Phone [facility] [address], [address] [place] [address] Avenue [phone] Your Updated Medication List  
  
   
This list is accurate as of: 11/30/17  3:51 PM.  Always use your most recent med list.  
  
  
  
  
 albuterol 90 mcg/actuation inhaler Commonly known as:  PROVENTIL HFA, VENTOLIN HFA, PROAIR HFA  
 Take  by inhalation as needed for Wheezing. aspirin delayed-release 81 mg tablet Take  by mouth daily. bimatoprost 0.01 % ophthalmic drops Commonly known as:  LUMIGAN Administer 1 Drop to both eyes every evening. CeleBREX 200 mg capsule Generic drug:  celecoxib Take  by mouth every morning. cephALEXin 500 mg capsule Commonly known as:  Delwyn Pay Take 1 Cap by mouth three (3) times daily for 7 days. CO Q-10 100 mg capsule Generic drug:  co-enzyme Q-10 Take 100 mg by mouth daily. diclofenac 0.1 % ophthalmic solution Commonly known as:  VOLTAREN Administer 1 Drop to right eye four (4) times daily. DULERA 100-5 mcg/actuation HFA inhaler Generic drug:  mometasone-formoterol Take 2 Puffs by inhalation two (2) times a day.  
  
 gabapentin 100 mg capsule Commonly known as:  NEURONTIN Take 100 mg by mouth three (3) times daily. indapamide 1.25 mg tablet Commonly known as:  Lova Side Take 1.25 mg by mouth daily. LORazepam 0.5 mg tablet Commonly known as:  ATIVAN Take 1 mg by mouth nightly. meclizine 25 mg tablet Commonly known as:  ANTIVERT Take 1 Tab by mouth three (3) times daily as needed for Dizziness. multivitamin tablet Commonly known as:  ONE A DAY Take 1 Tab by mouth daily. * ondansetron hcl 4 mg tablet Commonly known as:  ZOFRAN (AS HYDROCHLORIDE) Take 1 Tab by mouth every eight (8) hours as needed for Nausea. * ondansetron hcl 4 mg tablet Commonly known as:  ZOFRAN (AS HYDROCHLORIDE) Take 1 Tab by mouth every eight (8) hours as needed for Nausea. oxyCODONE-acetaminophen 5-325 mg per tablet Commonly known as:  PERCOCET Take 1 Tab by mouth every six (6) hours as needed for Pain. Max Daily Amount: 4 Tabs. pantoprazole 40 mg tablet Commonly known as:  PROTONIX Take 40 mg by mouth nightly. prednisoLONE acetate 1 % ophthalmic suspension Commonly known as:  PRED FORTE Administer 1 Drop to right eye four (4) times daily. PROBIOTIC 4X 10-15 mg Tbec Generic drug:  B.infantis-B.ani-B.long-B.bifi Take  by mouth daily. * VIGAMOX 0.5 % ophthalmic solution Generic drug:  moxifloxacin Administer 1 Drop to right eye four (4) times daily. * VIGAMOX 0.5 % ophthalmic solution Generic drug:  moxifloxacin Administer 1 Drop to left eye four (4) times daily. * Notice: This list has 4 medication(s) that are the same as other medications prescribed for you. Read the directions carefully, and ask your doctor or other care provider to review them with you. Introducing [facility]! [employer] introduces [email or web address] patient portal. Now you can access parts of your medical record, email your doctor's office, and request medication refills online. 1. In your internet browser, go to [email or web address] 2. Click on the First Time User? Click Here link in the Sign In box. You will see the New Member Sign Up page. 3. Enter your [email or web address] Access Code exactly as it appears below. You will not need to use this code after youve completed the sign-up process. If you do not sign up before the expiration date, you must request a new code. · [email or web address] Access Code: [email or web address] Expires: 2/4/2018  4:02 PM 
 
4. Enter the last four digits of your Social Security Number (xxxx) and Date of Birth (mm/dd/yyyy) as indicated and click Submit. You will be taken to the next sign-up page. 5. Create a [employer] ID. This will be your [email or web address] login ID and cannot be changed, so think of one that is secure and easy to remember. 6. Create a [email or web address] password. You can change your password at any time. 7. Enter your Password Reset Question and Answer. This can be used at a later time if you forget your password. 8. Enter your e-mail address. You will receive e-mail notification when new information is available in [address] 9. Click Sign Up. You can now view and download portions of your medical record. 10. Click the Download Summary menu link to download a portable copy of your medical information. If you have questions, please visit the Frequently Asked Questions section of the [employer] website. Remember, [employer] is NOT to be used for urgent needs. For medical emergencies, dial 911. Now available from your iPhone and Android! Please provide this summary of care documentation to your next provider. Your primary care clinician is listed as Mariella Romeo. If you have any questions after today's visit, please call [phone].

## 2017-12-04 PROBLEM — K80.20 GALLSTONES: Status: ACTIVE | Noted: 2017-12-04

## 2018-01-15 ENCOUNTER — APPOINTMENT (OUTPATIENT)
Dept: CT IMAGING | Age: 83
End: 2018-01-15
Attending: EMERGENCY MEDICINE
Payer: MEDICARE

## 2018-01-15 ENCOUNTER — APPOINTMENT (OUTPATIENT)
Dept: GENERAL RADIOLOGY | Age: 83
End: 2018-01-15
Attending: INTERNAL MEDICINE
Payer: MEDICARE

## 2018-01-15 ENCOUNTER — HOSPITAL ENCOUNTER (OUTPATIENT)
Age: 83
Setting detail: OBSERVATION
Discharge: HOME | End: 2018-01-16
Attending: EMERGENCY MEDICINE | Admitting: INTERNAL MEDICINE
Payer: MEDICARE

## 2018-01-15 ENCOUNTER — APPOINTMENT (OUTPATIENT)
Dept: MRI IMAGING | Age: 83
End: 2018-01-15
Attending: INTERNAL MEDICINE
Payer: MEDICARE

## 2018-01-15 DIAGNOSIS — H40.1121 PRIMARY OPEN ANGLE GLAUCOMA OF LEFT EYE, MILD STAGE: ICD-10-CM

## 2018-01-15 DIAGNOSIS — G45.8 OTHER SPECIFIED TRANSIENT CEREBRAL ISCHEMIAS: ICD-10-CM

## 2018-01-15 DIAGNOSIS — I95.1 ORTHOSTATIC HYPOTENSION: ICD-10-CM

## 2018-01-15 DIAGNOSIS — G45.1 HEMISPHERIC CAROTID ARTERY SYNDROME: ICD-10-CM

## 2018-01-15 DIAGNOSIS — I10 ESSENTIAL HYPERTENSION, BENIGN: Chronic | ICD-10-CM

## 2018-01-15 DIAGNOSIS — R42 DIZZINESS: Primary | ICD-10-CM

## 2018-01-15 DIAGNOSIS — R55 NEAR SYNCOPE: ICD-10-CM

## 2018-01-15 DIAGNOSIS — K57.92 DIVERTICULITIS OF INTESTINE WITHOUT PERFORATION OR ABSCESS WITHOUT BLEEDING, UNSPECIFIED PART OF INTESTINAL TRACT: ICD-10-CM

## 2018-01-15 DIAGNOSIS — R20.2 PARESTHESIA OF RIGHT ARM AND LEG: ICD-10-CM

## 2018-01-15 PROBLEM — R53.1 WEAKNESS: Status: ACTIVE | Noted: 2018-01-15

## 2018-01-15 PROBLEM — G45.9 TIA (TRANSIENT ISCHEMIC ATTACK): Status: ACTIVE | Noted: 2018-01-15

## 2018-01-15 LAB
ALBUMIN SERPL-MCNC: 3.2 G/DL (ref 3.5–5)
ALBUMIN/GLOB SERPL: 1 {RATIO} (ref 1.1–2.2)
ALP SERPL-CCNC: 64 U/L (ref 45–117)
ALT SERPL-CCNC: 26 U/L (ref 12–78)
ANION GAP SERPL CALC-SCNC: 5 MMOL/L (ref 5–15)
APPEARANCE UR: CLEAR
AST SERPL-CCNC: 27 U/L (ref 15–37)
ATRIAL RATE: 59 BPM
BACTERIA URNS QL MICRO: NEGATIVE /HPF
BASOPHILS # BLD: 0 K/UL (ref 0–0.1)
BASOPHILS NFR BLD: 1 % (ref 0–1)
BILIRUB SERPL-MCNC: 0.3 MG/DL (ref 0.2–1)
BILIRUB UR QL: NEGATIVE
BUN SERPL-MCNC: 13 MG/DL (ref 6–20)
BUN/CREAT SERPL: 14 (ref 12–20)
CALCIUM SERPL-MCNC: 8.6 MG/DL (ref 8.5–10.1)
CALCULATED R AXIS, ECG10: 14 DEGREES
CALCULATED T AXIS, ECG11: 34 DEGREES
CHLORIDE SERPL-SCNC: 103 MMOL/L (ref 97–108)
CK SERPL-CCNC: 99 U/L (ref 26–192)
CO2 SERPL-SCNC: 31 MMOL/L (ref 21–32)
COLOR UR: NORMAL
CREAT SERPL-MCNC: 0.91 MG/DL (ref 0.55–1.02)
DIAGNOSIS, 93000: NORMAL
EOSINOPHIL # BLD: 0.2 K/UL (ref 0–0.4)
EOSINOPHIL NFR BLD: 2 % (ref 0–7)
EPITH CASTS URNS QL MICRO: NORMAL /LPF
ERYTHROCYTE [DISTWIDTH] IN BLOOD BY AUTOMATED COUNT: 13.7 % (ref 11.5–14.5)
GLOBULIN SER CALC-MCNC: 3.3 G/DL (ref 2–4)
GLUCOSE SERPL-MCNC: 100 MG/DL (ref 65–100)
GLUCOSE UR STRIP.AUTO-MCNC: NEGATIVE MG/DL
HCT VFR BLD AUTO: 39.2 % (ref 35–47)
HGB BLD-MCNC: 12.3 G/DL (ref 11.5–16)
HGB UR QL STRIP: NEGATIVE
HYALINE CASTS URNS QL MICRO: NORMAL /LPF (ref 0–5)
KETONES UR QL STRIP.AUTO: NEGATIVE MG/DL
LEUKOCYTE ESTERASE UR QL STRIP.AUTO: NEGATIVE
LYMPHOCYTES # BLD: 2.7 K/UL (ref 0.8–3.5)
LYMPHOCYTES NFR BLD: 37 % (ref 12–49)
MCH RBC QN AUTO: 27.3 PG (ref 26–34)
MCHC RBC AUTO-ENTMCNC: 31.4 G/DL (ref 30–36.5)
MCV RBC AUTO: 86.9 FL (ref 80–99)
MONOCYTES # BLD: 0.9 K/UL (ref 0–1)
MONOCYTES NFR BLD: 12 % (ref 5–13)
NEUTS SEG # BLD: 3.6 K/UL (ref 1.8–8)
NEUTS SEG NFR BLD: 48 % (ref 32–75)
NITRITE UR QL STRIP.AUTO: NEGATIVE
PH UR STRIP: 6 [PH] (ref 5–8)
PLATELET # BLD AUTO: 247 K/UL (ref 150–400)
POTASSIUM SERPL-SCNC: 3.5 MMOL/L (ref 3.5–5.1)
PROT SERPL-MCNC: 6.5 G/DL (ref 6.4–8.2)
PROT UR STRIP-MCNC: NEGATIVE MG/DL
Q-T INTERVAL, ECG07: 400 MS
QRS DURATION, ECG06: 78 MS
QTC CALCULATION (BEZET), ECG08: 392 MS
RBC # BLD AUTO: 4.51 M/UL (ref 3.8–5.2)
RBC #/AREA URNS HPF: NORMAL /HPF (ref 0–5)
SODIUM SERPL-SCNC: 139 MMOL/L (ref 136–145)
SP GR UR REFRACTOMETRY: 1.02 (ref 1–1.03)
TROPONIN I SERPL-MCNC: <0.04 NG/ML
TSH SERPL DL<=0.05 MIU/L-ACNC: 1.96 UIU/ML (ref 0.36–3.74)
UA: UC IF INDICATED,UAUC: NORMAL
UROBILINOGEN UR QL STRIP.AUTO: 0.2 EU/DL (ref 0.2–1)
VENTRICULAR RATE, ECG03: 58 BPM
WBC # BLD AUTO: 7.3 K/UL (ref 3.6–11)
WBC URNS QL MICRO: NORMAL /HPF (ref 0–4)

## 2018-01-15 PROCEDURE — 84443 ASSAY THYROID STIM HORMONE: CPT | Performed by: EMERGENCY MEDICINE

## 2018-01-15 PROCEDURE — G8996 SWALLOW CURRENT STATUS: HCPCS | Performed by: PHYSICAL THERAPIST

## 2018-01-15 PROCEDURE — 85025 COMPLETE CBC W/AUTO DIFF WBC: CPT | Performed by: EMERGENCY MEDICINE

## 2018-01-15 PROCEDURE — 74011250636 HC RX REV CODE- 250/636: Performed by: EMERGENCY MEDICINE

## 2018-01-15 PROCEDURE — 70544 MR ANGIOGRAPHY HEAD W/O DYE: CPT

## 2018-01-15 PROCEDURE — 74011250637 HC RX REV CODE- 250/637: Performed by: GENERAL ACUTE CARE HOSPITAL

## 2018-01-15 PROCEDURE — 92610 EVALUATE SWALLOWING FUNCTION: CPT

## 2018-01-15 PROCEDURE — 70551 MRI BRAIN STEM W/O DYE: CPT

## 2018-01-15 PROCEDURE — 74011250636 HC RX REV CODE- 250/636: Performed by: INTERNAL MEDICINE

## 2018-01-15 PROCEDURE — 65660000000 HC RM CCU STEPDOWN

## 2018-01-15 PROCEDURE — 36415 COLL VENOUS BLD VENIPUNCTURE: CPT | Performed by: EMERGENCY MEDICINE

## 2018-01-15 PROCEDURE — G8997 SWALLOW GOAL STATUS: HCPCS | Performed by: PHYSICAL THERAPIST

## 2018-01-15 PROCEDURE — 70450 CT HEAD/BRAIN W/O DYE: CPT

## 2018-01-15 PROCEDURE — 84484 ASSAY OF TROPONIN QUANT: CPT | Performed by: EMERGENCY MEDICINE

## 2018-01-15 PROCEDURE — 74011250637 HC RX REV CODE- 250/637: Performed by: INTERNAL MEDICINE

## 2018-01-15 PROCEDURE — 70547 MR ANGIOGRAPHY NECK W/O DYE: CPT

## 2018-01-15 PROCEDURE — 93306 TTE W/DOPPLER COMPLETE: CPT

## 2018-01-15 PROCEDURE — 71045 X-RAY EXAM CHEST 1 VIEW: CPT

## 2018-01-15 PROCEDURE — 96374 THER/PROPH/DIAG INJ IV PUSH: CPT

## 2018-01-15 PROCEDURE — 80053 COMPREHEN METABOLIC PANEL: CPT | Performed by: EMERGENCY MEDICINE

## 2018-01-15 PROCEDURE — 93005 ELECTROCARDIOGRAM TRACING: CPT

## 2018-01-15 PROCEDURE — 82550 ASSAY OF CK (CPK): CPT | Performed by: EMERGENCY MEDICINE

## 2018-01-15 PROCEDURE — 96361 HYDRATE IV INFUSION ADD-ON: CPT

## 2018-01-15 PROCEDURE — 96372 THER/PROPH/DIAG INJ SC/IM: CPT

## 2018-01-15 PROCEDURE — G8998 SWALLOW D/C STATUS: HCPCS | Performed by: PHYSICAL THERAPIST

## 2018-01-15 PROCEDURE — 99284 EMERGENCY DEPT VISIT MOD MDM: CPT

## 2018-01-15 PROCEDURE — 81001 URINALYSIS AUTO W/SCOPE: CPT | Performed by: EMERGENCY MEDICINE

## 2018-01-15 PROCEDURE — 65390000012 HC CONDITION CODE 44 OBSERVATION

## 2018-01-15 PROCEDURE — 96375 TX/PRO/DX INJ NEW DRUG ADDON: CPT

## 2018-01-15 RX ORDER — PREDNISONE 5 MG/1
5-15 TABLET ORAL SEE ADMIN INSTRUCTIONS
Status: ON HOLD | COMMUNITY
End: 2021-05-06

## 2018-01-15 RX ORDER — METRONIDAZOLE 250 MG/1
500 TABLET ORAL EVERY 8 HOURS
Status: DISCONTINUED | OUTPATIENT
Start: 2018-01-15 | End: 2018-01-16 | Stop reason: HOSPADM

## 2018-01-15 RX ORDER — ONDANSETRON 2 MG/ML
4 INJECTION INTRAMUSCULAR; INTRAVENOUS
Status: COMPLETED | OUTPATIENT
Start: 2018-01-15 | End: 2018-01-15

## 2018-01-15 RX ORDER — ONDANSETRON 2 MG/ML
4 INJECTION INTRAMUSCULAR; INTRAVENOUS
Status: DISCONTINUED | OUTPATIENT
Start: 2018-01-15 | End: 2018-01-16 | Stop reason: HOSPADM

## 2018-01-15 RX ORDER — HYDRALAZINE HYDROCHLORIDE 20 MG/ML
10 INJECTION INTRAMUSCULAR; INTRAVENOUS
Status: DISCONTINUED | OUTPATIENT
Start: 2018-01-15 | End: 2018-01-16 | Stop reason: HOSPADM

## 2018-01-15 RX ORDER — FLUTICASONE FUROATE AND VILANTEROL 100; 25 UG/1; UG/1
POWDER RESPIRATORY (INHALATION) DAILY
Status: DISCONTINUED | OUTPATIENT
Start: 2018-01-15 | End: 2018-01-16 | Stop reason: HOSPADM

## 2018-01-15 RX ORDER — ACETAMINOPHEN 650 MG/1
650 SUPPOSITORY RECTAL
Status: DISCONTINUED | OUTPATIENT
Start: 2018-01-15 | End: 2018-01-16 | Stop reason: HOSPADM

## 2018-01-15 RX ORDER — LISINOPRIL 20 MG/1
20 TABLET ORAL
COMMUNITY
End: 2021-05-05 | Stop reason: CLARIF

## 2018-01-15 RX ORDER — CIPROFLOXACIN 500 MG/1
1000 TABLET ORAL 2 TIMES DAILY
COMMUNITY
End: 2019-01-06

## 2018-01-15 RX ORDER — KETOROLAC TROMETHAMINE 30 MG/ML
15 INJECTION, SOLUTION INTRAMUSCULAR; INTRAVENOUS
Status: COMPLETED | OUTPATIENT
Start: 2018-01-15 | End: 2018-01-15

## 2018-01-15 RX ORDER — LATANOPROST 50 UG/ML
1 SOLUTION/ DROPS OPHTHALMIC
Status: DISCONTINUED | OUTPATIENT
Start: 2018-01-15 | End: 2018-01-16 | Stop reason: HOSPADM

## 2018-01-15 RX ORDER — INDAPAMIDE 2.5 MG/1
1.25 TABLET, FILM COATED ORAL DAILY
Status: DISCONTINUED | OUTPATIENT
Start: 2018-01-15 | End: 2018-01-15

## 2018-01-15 RX ORDER — ALBUTEROL SULFATE 90 UG/1
2 AEROSOL, METERED RESPIRATORY (INHALATION)
Status: DISCONTINUED | OUTPATIENT
Start: 2018-01-15 | End: 2018-01-16 | Stop reason: HOSPADM

## 2018-01-15 RX ORDER — PREDNISONE 5 MG/1
7.5 TABLET ORAL
Status: DISCONTINUED | OUTPATIENT
Start: 2018-01-15 | End: 2018-01-16 | Stop reason: HOSPADM

## 2018-01-15 RX ORDER — CIPROFLOXACIN 500 MG/1
500 TABLET ORAL EVERY 12 HOURS
Status: DISCONTINUED | OUTPATIENT
Start: 2018-01-15 | End: 2018-01-16 | Stop reason: HOSPADM

## 2018-01-15 RX ORDER — SODIUM CHLORIDE 0.9 % (FLUSH) 0.9 %
5-10 SYRINGE (ML) INJECTION EVERY 8 HOURS
Status: DISCONTINUED | OUTPATIENT
Start: 2018-01-15 | End: 2018-01-16 | Stop reason: HOSPADM

## 2018-01-15 RX ORDER — METRONIDAZOLE 500 MG/1
500 TABLET ORAL 2 TIMES DAILY
COMMUNITY
End: 2019-01-06

## 2018-01-15 RX ORDER — SODIUM CHLORIDE 0.9 % (FLUSH) 0.9 %
5-10 SYRINGE (ML) INJECTION AS NEEDED
Status: DISCONTINUED | OUTPATIENT
Start: 2018-01-15 | End: 2018-01-16 | Stop reason: HOSPADM

## 2018-01-15 RX ORDER — ACETAMINOPHEN 325 MG/1
650 TABLET ORAL
Status: DISCONTINUED | OUTPATIENT
Start: 2018-01-15 | End: 2018-01-16 | Stop reason: HOSPADM

## 2018-01-15 RX ORDER — ASPIRIN 81 MG/1
81 TABLET ORAL DAILY
Status: DISCONTINUED | OUTPATIENT
Start: 2018-01-15 | End: 2018-01-16 | Stop reason: HOSPADM

## 2018-01-15 RX ORDER — LISINOPRIL 20 MG/1
20 TABLET ORAL
Status: DISCONTINUED | OUTPATIENT
Start: 2018-01-15 | End: 2018-01-16 | Stop reason: HOSPADM

## 2018-01-15 RX ORDER — DIPHENHYDRAMINE HCL 25 MG
25 CAPSULE ORAL ONCE
Status: COMPLETED | OUTPATIENT
Start: 2018-01-15 | End: 2018-01-15

## 2018-01-15 RX ORDER — LORAZEPAM 1 MG/1
1 TABLET ORAL
Status: DISCONTINUED | OUTPATIENT
Start: 2018-01-15 | End: 2018-01-16 | Stop reason: HOSPADM

## 2018-01-15 RX ORDER — HEPARIN SODIUM 5000 [USP'U]/ML
5000 INJECTION, SOLUTION INTRAVENOUS; SUBCUTANEOUS EVERY 12 HOURS
Status: DISCONTINUED | OUTPATIENT
Start: 2018-01-15 | End: 2018-01-16 | Stop reason: HOSPADM

## 2018-01-15 RX ADMIN — CIPROFLOXACIN HYDROCHLORIDE 500 MG: 500 TABLET, FILM COATED ORAL at 12:26

## 2018-01-15 RX ADMIN — METRONIDAZOLE 500 MG: 250 TABLET ORAL at 10:09

## 2018-01-15 RX ADMIN — METRONIDAZOLE 500 MG: 250 TABLET ORAL at 20:01

## 2018-01-15 RX ADMIN — DIPHENHYDRAMINE HYDROCHLORIDE 25 MG: 25 CAPSULE ORAL at 18:21

## 2018-01-15 RX ADMIN — Medication 10 ML: at 22:12

## 2018-01-15 RX ADMIN — SODIUM CHLORIDE 1000 ML: 900 INJECTION, SOLUTION INTRAVENOUS at 08:36

## 2018-01-15 RX ADMIN — CIPROFLOXACIN HYDROCHLORIDE 500 MG: 500 TABLET, FILM COATED ORAL at 22:08

## 2018-01-15 RX ADMIN — ACETAMINOPHEN 650 MG: 325 TABLET ORAL at 18:34

## 2018-01-15 RX ADMIN — ASPIRIN 81 MG: 81 TABLET, COATED ORAL at 12:26

## 2018-01-15 RX ADMIN — ONDANSETRON HYDROCHLORIDE 4 MG: 2 INJECTION, SOLUTION INTRAMUSCULAR; INTRAVENOUS at 08:31

## 2018-01-15 RX ADMIN — HEPARIN SODIUM 5000 UNITS: 5000 INJECTION, SOLUTION INTRAVENOUS; SUBCUTANEOUS at 12:27

## 2018-01-15 RX ADMIN — LISINOPRIL 20 MG: 20 TABLET ORAL at 21:59

## 2018-01-15 RX ADMIN — LORAZEPAM 1 MG: 1 TABLET ORAL at 21:59

## 2018-01-15 RX ADMIN — KETOROLAC TROMETHAMINE 15 MG: 30 INJECTION, SOLUTION INTRAMUSCULAR at 08:31

## 2018-01-15 NOTE — ED NOTES
Assumed care from EMS. Patient states that she woke up this morning around 6 am feeling like that she was going to pass out. Patient states she did not pass out, but has a really bad headache and feels very nauseous and wants to vomit.

## 2018-01-15 NOTE — CONSULTS
IP CONSULT TO NEUROLOGY  Consult performed by: Parveen Wallace  Consult ordered by: Doug Esposito CONSULT    NAME Elzbieta Elise AGE 80 y.o. MRN [id number]  1935     REQUESTING PHYSICIAN: Keli Peres MD      CHIEF COMPLAINT: numbness     This is a 80 y.o. female who lives alone. She is admitted after waking up this morning with light headiness and sensory loss of the right leg that have mostly resolve. She has no blurred vision or other cranial nerve finding and has no weakness or difficulty with ambulation. She continues to complain of a sense of unwellness. MRI was reviewed and discussed with her. ASSESSMENT AND PLAN   1. Transient ischemic attack   Risk stratification  Lipids, carotid dopplers, A1C, cardiac echo and TSH   Continue aspirin    2. Glaucoma  Continue lumigan    3. Hypertension  Continue lisinopril      ALLERGIES:  Cymbalta [duloxetine]; Demerol [meperidine]; Morphine; Penicillins; and Percocet [oxycodone-acetaminophen]     Current Facility-Administered Medications   Medication Dose Route Frequency    aspirin delayed-release tablet 81 mg  81 mg Oral DAILY    fluticasone-vilanterol (BREO ELLIPTA) 100mcg-25mcg/puff   Inhalation DAILY    albuterol (PROVENTIL HFA, VENTOLIN HFA, PROAIR HFA) inhaler 2 Puff  2 Puff Inhalation Q4H PRN    multivitamin, tx-iron-ca-min (THERA-M w/ IRON) tablet 1 Tab  1 Tab Oral DAILY    LORazepam (ATIVAN) tablet 1 mg  1 mg Oral QHS    indapamide (LOZOL) tablet 1.25 mg  1.25 mg Oral DAILY    predniSONE (DELTASONE) tablet 7.5 mg  7.5 mg Oral DAILY WITH BREAKFAST    ciprofloxacin HCl (CIPRO) tablet 500 mg  500 mg Oral Q12H    metroNIDAZOLE (FLAGYL) tablet 500 mg  500 mg Oral Q8H    . PHARMACY TO SUBSTITUTE PER PROTOCOL    Per Protocol    hydrALAZINE (APRESOLINE) 20 mg/mL injection 10 mg  10 mg IntraVENous Q6H PRN    sodium chloride (NS) flush 5-10 mL  5-10 mL IntraVENous Q8H    sodium chloride (NS) flush 5-10 mL  5-10 mL IntraVENous PRN    acetaminophen (TYLENOL) tablet 650 mg  650 mg Oral Q4H PRN    Or    acetaminophen (TYLENOL) solution 650 mg  650 mg Per NG tube Q4H PRN    Or    acetaminophen (TYLENOL) suppository 650 mg  650 mg Rectal Q4H PRN    ondansetron (ZOFRAN) injection 4 mg  4 mg IntraVENous Q6H PRN    heparin (porcine) injection 5,000 Units  5,000 Units SubCUTAneous Q12H     Current Outpatient Prescriptions   Medication Sig    ciprofloxacin HCl (CIPRO) 500 mg tablet Take 1,000 mg by mouth two (2) times a day.  metroNIDAZOLE (FLAGYL) 500 mg tablet Take 500 mg by mouth two (2) times a day.  lisinopril (PRINIVIL, ZESTRIL) 20 mg tablet Take 20 mg by mouth nightly.  predniSONE (STERAPRED) 5 mg dose pack Take 5-15 mg by mouth See Admin Instructions. See administration instruction per 5mg dose pack    CHOLECALCIFEROL, VITAMIN D3, PO Take 1 Tab by mouth daily.  ARNICA EX by Apply Externally route daily as needed (Lower Extremity Cramps).  gabapentin (NEURONTIN) 100 mg capsule Take 100 mg by mouth nightly.  bimatoprost (LUMIGAN) 0.01 % ophthalmic drops Administer 1 Drop to right eye every evening.  multivitamin (ONE A DAY) tablet Take 1 Tab by mouth daily.  aspirin delayed-release 81 mg tablet Take 81 mg by mouth daily.  lorazepam (ATIVAN) 0.5 mg tablet Take 0.5 mg by mouth nightly.  co-enzyme Q-10 (CO Q-10) 100 mg capsule Take 100 mg by mouth daily.  mometasone-formoterol (DULERA) 100-5 mcg/Actuation HFAA HFA inhaler Take 2 Puffs by inhalation two (2) times a day.  albuterol (PROVENTIL HFA, VENTOLIN HFA, PROAIR HFA) 90 mcg/actuation inhaler Take 1 Puff by inhalation as needed for Wheezing.        Past Medical History:   Diagnosis Date    Adverse effect of anesthesia     combative waking up    Anxiety     Arthritis     Asthma     as of 12/30/16 pt states under control    Diverticulitis Dx 11/2016    Fibromyalgia     GERD (gastroesophageal reflux disease)     Glaucoma     Hiatal hernia     Hypercholesteremia     Hypertension     Ill-defined condition     neurapthy in ble    Nausea & vomiting        Social History   Substance Use Topics    Smoking status: Never Smoker    Smokeless tobacco: Never Used    Alcohol use No     Family History  No stroke  No seizure        Visit Vitals    /65    Pulse 65    Temp 97.8 °F (36.6 °C)    Resp 16    Ht 5' 3\" (1.6 m)    Wt 138 lb (62.6 kg)    SpO2 97%    BMI 24.45 kg/m2      General: Well developed, well nourished. Patient in no apparent distress   Head: Normocephalic, atraumatic, anicteric sclera   Neck Normal ROM, No thyromegally   Lungs:  Clear to auscultation bilaterally, No wheezes or rubs   Cardiac: Regular rate and rhythm with no murmurs. Abd: Bowel sounds were audible. No tenderness on palpation   Ext: No pedal edema   Skin: Supple no rash     NeurologicExam:  Mental Status: Alert and oriented to person place and time   Speech: Fluent no aphasia or dysarthria. Cranial Nerves:  II - XII Inact   Motor:  Full and symmetric strength of upper and lower proximal and distal muscles. Normal bulk and tone. Reflexes:   Deep tendon reflexes 2+/4 and symmetric. Sensory:   Symmetric and intact with no perceived deficits modalities involving small or large fibers. Gait:  Gait is balanced and fluid with normal arm swing. Tremor:   No tremor noted. Cerebellar:  Coordination intact. Neurovascular: No carotid bruits. No JVD       REVIEWED IMAGING:    MRI :    Results from Hospital Encounter encounter on 06/16/16   MRI IAC W WO CONT   Narrative **Final Report**      ICD Codes / Adm. Diagnosis: 389.16  780.4 / Sensorineural hearing loss, as    Dizziness and giddiness  Examination:  MR Jason Kaplan CON  - [id number] - Jun 16 2016  5:38PM  Accession No:  [id number]  Reason:  Asymmetric SNHL (sensorineural hearing loss), Dizziness      REPORT:  EXAM:  MR IACS W WO CON    INDICATION:  Asymmetric SNHL (sensorineural hearing loss), Dizziness    COMPARISON STUDY: CT head of 4/1/2016    STUDY PARAMETERS:     Sagittal and axial T1-weighted; axial FLAIR, T2-weighted, diffusion   weighted, and gradient echo images; multiplanar postcontrast T1 images of   the brain and posterior fossa, including thin section images of the   posterior fossa following the IV injection of 6 cc Gadavist,  axial   gradient-echo images of the IACs, high resolution    FINDINGS:    The ventricles are normal in size and are midline. Moderate scattered and   confluent foci of T2 hyperintensity in the cerebral white matter are likely   due to intracranial microangiopathy. There are a few chronic lacunar   infarcts in the left frontal white matter. . There is no acute infarction or   hemorrhage. No areas of abnormal enhancement are identified. The internal auditory canals and adjacent structures appear normal. Normal   flow-voids are seen in the vessels at the skull base. There is mucosal   thickening in the ethmoid air cells bilaterally, with subtotal   opacification. A few small mucus retention cysts are seen in the right   maxillary antrum. The paranasal sinuses are otherwise clear. IMPRESSION:  1. 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 2. No abnormalities of the posterior fossa or IACs. Kelsie Polanco            Signing/Reading Doctor: Rosamaria Leiva ([id number])    Approved: Rosamaria Leiva ([id number])  Jun 17 2016  8:41AM                                   CT:    Results from Hospital Encounter encounter on 01/15/18   CT HEAD WO CONT   Narrative EXAM:  CT HEAD WO CONT  INDICATION:  Headache, patient awoke at 0 600 with feeling \"like I was going to  pass out\" and now has headache. Brought by EMS to the ED. Nauseated. COMPARISON: CTA head 4/1/16 and 6/16/16  FINDINGS:  The ventricular size and configuration are within normal limits.    Multifocal and some confluent decreased attenuation cerebral white matter  consistent with chronic small vessel type ischemic changes as previously noted. No evidence of intracranial hemorrhage, acute infarct, mass or abnormal  extra-axial fluid collections. Minimal mucosal thickening ethmoid sinuses. Impression IMPRESSION:   1. Chronic small vessel ischemic changes. 2. No acute intracranial abnormality demonstrated.           REVIEWED LABS:  Lab Results   Component Value Date/Time    WBC 7.3 01/15/2018 07:58 AM    HCT 39.2 01/15/2018 07:58 AM    HGB 12.3 01/15/2018 07:58 AM    PLATELET 429 74/39/0965 07:58 AM     Lab Results   Component Value Date/Time    Sodium 139 01/15/2018 07:58 AM    Potassium 3.5 01/15/2018 07:58 AM    Chloride 103 01/15/2018 07:58 AM    CO2 31 01/15/2018 07:58 AM    Glucose 100 01/15/2018 07:58 AM    BUN 13 01/15/2018 07:58 AM    Creatinine 0.91 01/15/2018 07:58 AM    Calcium 8.6 01/15/2018 07:58 AM     Lab Results   Component Value Date/Time    Vitamin B12 1352 08/30/2013 12:11 PM    Folate >19.9 08/30/2013 12:11 PM       Lab Results   Component Value Date/Time    Hemoglobin A1c 5.8 08/30/2013 12:11 PM

## 2018-01-15 NOTE — IP AVS SNAPSHOT
[address] [facility] [address] [facility] 
[phone] Patient: Anamika Ravi MRN: [id number] FSF:[id number] About your hospitalization You were admitted on:  January 15, 2018 You last received care in the:  [facility] 3 NEUROSCIENCE TELEMETRY You were discharged on:  January 16, 2018 Why you were hospitalized Your primary diagnosis was:  Not on File Your diagnoses also included:  Tia (Transient Ischemic Attack), Weakness Follow-up Information Follow up With Details Comments Contact Info Gerber Jarquin MD  Cardiology, As needed [address] [facility] 
[phone] Roda Schlatter, NP On 3/22/2018 9;00am  Neurology follow-up  Please arrive 15min early and bring a photo ID, insurance card and a list of medications [address] [facility] [address] [facility] 
[phone] Del Spurling, MD On 1/26/2018 11;30am  hospital follow-up [address] [facility] 
[phone] Discharge Orders None A check lily indicates which time of day the medication should be taken. My Medications START taking these medications Instructions Each Dose to Equal  
 Morning Noon Evening Bedtime  
 pravastatin 10 mg tablet Commonly known as:  PRAVACHOL Your last dose was: Your next dose is: Take 1 Tab by mouth nightly. 10 mg CHANGE how you take these medications Instructions Each Dose to Equal  
 Morning Noon Evening Bedtime  
 aspirin delayed-release 81 mg tablet What changed:  how much to take Your last dose was: Your next dose is: Take 2 Tabs by mouth daily. 162 mg CONTINUE taking these medications Instructions Each Dose to Equal  
 Morning Noon Evening Bedtime  
 albuterol 90 mcg/actuation inhaler Commonly known as:  PROVENTIL HFA, VENTOLIN HFA, PROAIR HFA Your last dose was: Your next dose is: Take 1 Puff by inhalation as needed for Wheezing. 1 Puff Ameena Schwab Your last dose was: Your next dose is:    
   
   
 by Apply Externally route daily as needed (Lower Extremity Cramps). bimatoprost 0.01 % ophthalmic drops Commonly known as:  LUMIGAN Your last dose was: Your next dose is:    
   
   
 Administer 1 Drop to right eye every evening. 1 Drop CHOLECALCIFEROL (VITAMIN D3) PO Your last dose was: Your next dose is: Take 1 Tab by mouth daily. 1 Tab  
    
   
   
   
  
 ciprofloxacin HCl 500 mg tablet Commonly known as:  CIPRO Your last dose was: Your next dose is: Take 1,000 mg by mouth two (2) times a day. 1000 mg  
    
   
   
   
  
 CO Q-10 100 mg capsule Generic drug:  co-enzyme Q-10 Your last dose was: Your next dose is: Take 100 mg by mouth daily. 100 mg DULERA 100-5 mcg/actuation HFA inhaler Generic drug:  mometasone-formoterol Your last dose was: Your next dose is: Take 2 Puffs by inhalation two (2) times a day. 2 Puff  
    
   
   
   
  
 gabapentin 100 mg capsule Commonly known as:  NEURONTIN Your last dose was: Your next dose is: Take 100 mg by mouth nightly. 100 mg  
    
   
   
   
  
 lisinopril 20 mg tablet Commonly known as:  Dellis Rumble Your last dose was: Your next dose is: Take 20 mg by mouth nightly. 20 mg LORazepam 0.5 mg tablet Commonly known as:  ATIVAN Your last dose was: Your next dose is: Take 0.5 mg by mouth nightly. 0.5 mg  
    
   
   
   
  
 metroNIDAZOLE 500 mg tablet Commonly known as:  FLAGYL Your last dose was: Your next dose is: Take 500 mg by mouth two (2) times a day. 500 mg  
    
   
   
   
  
 multivitamin tablet Commonly known as:  ONE A DAY Your last dose was: Your next dose is: Take 1 Tab by mouth daily. 1 Tab  
    
   
   
   
  
 predniSONE 5 mg dose pack Commonly known as:  STERAPRED Your last dose was: Your next dose is: Take 5-15 mg by mouth See Admin Instructions. See administration instruction per 5mg dose pack 5-15 mg Where to Get Your Medications Information on where to get these meds will be given to you by the nurse or doctor. ! Ask your nurse or doctor about these medications  
  aspirin delayed-release 81 mg tablet  
 pravastatin 10 mg tablet Discharge Instructions HOSPITALIST DISCHARGE INSTRUCTIONS 
 
NAME: Héctor Pendleton :  1935 MRN:  [id number] Date/Time:  2018 9:47 AM 
 
ADMIT DATE: 1/15/2018 DISCHARGE DATE: 2018 DISCHARGE DIAGNOSIS: 
Pre-Syncope MEDICATIONS: 
· It is important that you take the medication exactly as they are prescribed. · Keep your medication in the bottles provided by the pharmacist and keep a list of the medication names, dosages, and times to be taken in your wallet. · Do not take other medications without consulting your doctor. Pain Management: per above medications What to do at [facility] Recommended diet:  Low fat, Low cholesterol Recommended activity: Activity as tolerated If you have questions regarding the hospital related prescriptions or hospital related issues please call Jesus Haddad at . If you experience any of the following symptoms then please call your primary care physician or return to the emergency room if you cannot get hold of your doctor: Fever, chills, nausea, vomiting, diarrhea, change in mentation, falling, bleeding, shortness of breath Information obtained by : 
I understand that if any problems occur once I am at home I am to contact my physician. I understand and acknowledge receipt of the instructions indicated above. Physician's or R.N.'s Signature                                                                  Date/Time Patient or Representative Signature                                                          Date/Time Team Robot Announcement We are excited to announce that we are making your provider's discharge notes available to you in Team Robot. You will see these notes when they are completed and signed by the physician that discharged you from your recent hospital stay. If you have any questions or concerns about any information you see in Team Robot, please call the Health Information Department where you were seen or reach out to your Primary Care Provider for more information about your plan of care. Introducing [address]! [facility] introduces Team Robot patient portal. Now you can access parts of your medical record, email your doctor's office, and request medication refills online. 1. In your internet browser, go to [email or web address] 2. Click on the First Time User? Click Here link in the Sign In box. You will see the New Member Sign Up page. 3. Enter your Team Robot Access Code exactly as it appears below. You will not need to use this code after youve completed the sign-up process. If you do not sign up before the expiration date, you must request a new code. · Tapestry Access Code: [email or web address] Expires: 2/4/2018  4:02 PM 
 
4. Enter the last four digits of your Social Security Number (xxxx) and Date of Birth (mm/dd/yyyy) as indicated and click Submit. You will be taken to the next sign-up page. 5. Create a [employer] ID. This will be your Tapestry login ID and cannot be changed, so think of one that is secure and easy to remember. 6. Create a Tapestry password. You can change your password at any time. 7. Enter your Password Reset Question and Answer. This can be used at a later time if you forget your password. 8. Enter your e-mail address. You will receive e-mail notification when new information is available in [address] 9. Click Sign Up. You can now view and download portions of your medical record. 10. Click the Download Summary menu link to download a portable copy of your medical information. If you have questions, please visit the Frequently Asked Questions section of the Tapestry website. Remember, Tapestry is NOT to be used for urgent needs. For medical emergencies, dial 911. Now available from your iPhone and Android! Providers Seen During Your Hospitalization Provider Specialty Primary office phone Aline Fleischer, MD Emergency Medicine [phone] Tara Aguilar MD Internal Medicine [phone] Your Primary Care Physician (PCP) Primary Care Physician Office Phone Office Fax Edmar Flor [phone] You are allergic to the following Allergen Reactions Cymbalta (Duloxetine) Nausea Only Other (comments) \"within the hour I had a severe headache, and vomiting\" Demerol (Meperidine) Nausea and Vomiting Morphine Nausea and Vomiting Penicillins Other (comments)  
 rash Percocet (Oxycodone-Acetaminophen) Nausea and Vomiting Recent Documentation Height Weight Breastfeeding? BMI OB Status Smoking Status 1.6 m 62.6 kg Unknown 24.45 kg/m2 Hysterectomy Never Smoker Emergency Contacts Name Discharge Info Relation Home Work Mobile Summer Pierce DISCHARGE CAREGIVER [3] Child [2] 21  Patient Belongings The following personal items are in your possession at time of discharge: 
  Dental Appliances: Uppers  Visual Aid: Glasses      Home Medications: None   Jewelry: None  Clothing: Pajamas, Other (comment), Shirt, Socks, Robe (boots)    Other Valuables: Cell Phone  Personal Items Sent to Safe: none Please provide this summary of care documentation to your next provider. Signatures-by signing, you are acknowledging that this After Visit Summary has been reviewed with you and you have received a copy. Patient Signature:  ____________________________________________________________ Date:  ____________________________________________________________  
  
[facility] Provider Signature:  ____________________________________________________________ Date:  ____________________________________________________________

## 2018-01-15 NOTE — H&P
Hospitalist Admission Note    NAME: Senia Shannon   :  1935   MRN:  [id number]     Date/Time:  1/15/2018 9:16 AM    Patient PCP: Latrice Cesar MD  ________________________________________________________________________    My assessment of this patient's clinical condition and my plan of care is as follows. Assessment / Plan:  dizziness vrs TIA (transient ischemic attack) vrs orthostatic hypotension                        Ct  Chronic small vessel ischemic changes. 2. No acute intracranial abnormality demonstrated. · Observe in tele bed   ·  asa for now  · MRI head  mra/brain neck Will await neurology input   · Echo   · Neurology consultation   · PT OT to see her           Hypertension  Cont home med  use hydralazine prn  asthma cont home inhalers   hypercholesteremia, on no meds  Check lipids     fibromyalgia, cont steroids @ 7.5 every day per pcp    Diverticulitis mild ttp left quadrant   complete cipro/flagyl   Gi to see pt want o see Dr. Kenyon Back   If worsen will get ct     glaucoma, cont eye drops           Code Status: FULL CODE  Surrogate Decision Maker:DTR      DVT Prophylaxis: Heparin  GI Prophylaxis: not indicated     Baseline: ambulatory                 Subjective:   CHIEF COMPLAINT: dizziness/weakness    HISTORY OF PRESENT ILLNESS:     Senia Shannon is a 80 y.o.  female with PMHx significant for hypercholesteremia, HTN, fibromyalgia, diverticulitis, and glaucoma, and PSHx of hysterectomy, tubal ligation, and cataract removal,who is now been treated for diverticulitis with cipro/flagyl by gi. Now  presents per EMS to the ED with cc of feeling dizzy and lightheaded since waking up this morning  No loc, no falls, no c/p or sob. +associated nausea and diffuse frontal HA . Pt notes she is on prednisone for fibromyalgia. She denies history of CVA. Pt  on baby ASA. She specifically denies vision changes, vomiting, diarrhea, CP, SOB, weakness, speech difficulty, or syncope.    no uti sx.     We were asked to admit for work up and evaluation of the above problems. Past Medical History:   Diagnosis Date    Adverse effect of anesthesia     combative waking up    Anxiety     Arthritis     Asthma     as of 12/30/16 pt states under control    Diverticulitis Dx 11/2016    Fibromyalgia     GERD (gastroesophageal reflux disease)     Glaucoma     Hiatal hernia     Hypercholesteremia     Hypertension     Ill-defined condition     neurapthy in ble    Nausea & vomiting         Past Surgical History:   Procedure Laterality Date    HX CATARACT REMOVAL Right 01/04/2017    HX HYSTERECTOMY      HX ROTATOR CUFF REPAIR Left 2008    HX TUBAL LIGATION      TN COLONOSCOPY FLX DX W/COLLJ SPEC WHEN PFRMD  11/9/2011         TN EGD TRANSORAL BIOPSY SINGLE/MULTIPLE  11/9/2011            Social History   Substance Use Topics    Smoking status: Never Smoker    Smokeless tobacco: Never Used    Alcohol use No        History reviewed. No pertinent family history. Allergies   Allergen Reactions    Cymbalta [Duloxetine] Nausea Only and Other (comments)     \"within the hour I had a severe headache, and vomiting\"      Demerol [Meperidine] Nausea and Vomiting    Morphine Nausea and Vomiting    Penicillins Other (comments)     rash    Percocet [Oxycodone-Acetaminophen] Nausea and Vomiting        Prior to Admission medications    Medication Sig Start Date End Date Taking? Authorizing Provider   oxyCODONE-acetaminophen (PERCOCET) 5-325 mg per tablet Take 1 Tab by mouth every six (6) hours as needed for Pain. Max Daily Amount: 4 Tabs. 11/26/17   KENYA Marie   ondansetron hcl (ZOFRAN, AS HYDROCHLORIDE,) 4 mg tablet Take 1 Tab by mouth every eight (8) hours as needed for Nausea. 11/26/17   KENYA Marie   ondansetron hcl (ZOFRAN, AS HYDROCHLORIDE,) 4 mg tablet Take 1 Tab by mouth every eight (8) hours as needed for Nausea.  11/26/17   KENYA Marie   gabapentin (NEURONTIN) 100 mg capsule Take 100 mg by mouth three (3) times daily. Tai Lind MD   diclofenac (VOLTAREN) 0.1 % ophthalmic solution Administer 1 Drop to right eye four (4) times daily. Historical Provider   prednisoLONE acetate (PRED FORTE) 1 % ophthalmic suspension Administer 1 Drop to right eye four (4) times daily. Historical Provider   moxifloxacin (VIGAMOX) 0.5 % ophthalmic solution Administer 1 Drop to left eye four (4) times daily. 1/8/17   Historical Provider   moxifloxacin (VIGAMOX) 0.5 % ophthalmic solution Administer 1 Drop to right eye four (4) times daily. 1/1/17   Historical Provider   bimatoprost (LUMIGAN) 0.01 % ophthalmic drops Administer 1 Drop to both eyes every evening. Historical Provider   multivitamin (ONE A DAY) tablet Take 1 Tab by mouth daily. Historical Provider   B.infantis-B.ani-B.long-B.bifi (PROBIOTIC 4X) 10-15 mg TbEC Take  by mouth daily. Historical Provider   pantoprazole (PROTONIX) 40 mg tablet Take 40 mg by mouth nightly. Historical Provider   celecoxib (CELEBREX) 200 mg capsule Take  by mouth every morning. Historical Provider   aspirin delayed-release 81 mg tablet Take  by mouth daily. Historical Provider   albuterol (PROVENTIL HFA, VENTOLIN HFA, PROAIR HFA) 90 mcg/actuation inhaler Take  by inhalation as needed for Wheezing. Historical Provider   meclizine (ANTIVERT) 25 mg tablet Take 1 Tab by mouth three (3) times daily as needed for Dizziness. 4/1/16   Nola Regan MD   indapamide (LOZOL) 1.25 mg tablet Take 1.25 mg by mouth daily. Historical Provider   lorazepam (ATIVAN) 0.5 mg tablet Take 1 mg by mouth nightly. Historical Provider   co-enzyme Q-10 (CO Q-10) 100 mg capsule Take 100 mg by mouth daily. Historical Provider   mometasone-formoterol (DULERA) 100-5 mcg/Actuation HFAA HFA inhaler Take 2 Puffs by inhalation two (2) times a day.     Historical Provider       REVIEW OF SYSTEMS:     I am not able to complete the review of systems because: The patient is intubated and sedated    The patient has altered mental status due to his acute medical problems    The patient has baseline aphasia from prior stroke(s)    The patient has baseline dementia and is not reliable historian    The patient is in acute medical distress and unable to provide information           Total of 12 systems reviewed as follows:       POSITIVE= underlined text  Negative = text not underlined  General:  fever, chills, sweats, generalized weakness, weight loss/gain,      loss of appetite   Eyes:    blurred vision, eye pain, loss of vision, double vision  ENT:    rhinorrhea, pharyngitis   Respiratory:   cough, sputum production, SOB, GALAN, wheezing, pleuritic pain   Cardiology:   chest pain, palpitations, orthopnea, PND, edema, syncope   Gastrointestinal:  abdominal pain , N/V, diarrhea, dysphagia, constipation, bleeding   Genitourinary:  frequency, urgency, dysuria, hematuria, incontinence   Muskuloskeletal :  arthralgia, myalgia, back pain  Hematology:  easy bruising, nose or gum bleeding, lymphadenopathy   Dermatological: rash, ulceration, pruritis, color change / jaundice  Endocrine:   hot flashes or polydipsia   Neurological:  headache, dizziness, confusion, focal weakness, paresthesia,     Speech difficulties, memory loss, gait difficulty  Psychological: Feelings of anxiety, depression, agitation    Objective:   VITALS:    Visit Vitals    BP (!) 183/99 (BP 1 Location: Right arm, BP Patient Position: Standing)    Pulse 80    Temp 97.8 °F (36.6 °C)    Resp 20    Ht 5' 3\" (1.6 m)    Wt 62.6 kg (138 lb)    SpO2 99%    BMI 24.45 kg/m2       PHYSICAL EXAM:    General:    Alert, cooperative, no distress, appears stated age. HEENT: Atraumatic, anicteric sclerae, pink conjunctivae     No oral ulcers, mucosa moist, throat clear, dentition fair  Neck:  Supple, symmetrical,  thyroid: non tender  Lungs:   Clear to auscultation bilaterally. No Wheezing or Rhonchi.  No rales.  Chest wall:  No tenderness  No Accessory muscle use. Heart:   Regular  rhythm,  No  murmur   No edema  Abdomen:   Soft, tender llq. No rt no guarding Not distended. Bowel sounds normal  Extremities: No cyanosis. No clubbing,      Skin turgor normal, Capillary refill normal, Radial dial pulse 2+  Skin:     Not pale. Not Jaundiced  No rashes   Psych:  Good insight. Not depressed. Not anxious or agitated. Neurologic: EOMs intact. No facial asymmetry. No aphasia or slurred speech. Symmetrical strength, Sensation grossly intact. Alert and oriented X 4.     _______________________________________________________________________  Care Plan discussed with:    Comments   Patient x    Family  x    RN     Care Manager                    Consultant:  x    _______________________________________________________________________  Expected  Disposition:   Home with Family x   HH/PT/OT/RN x   SNF/LTC x   JOHN    ________________________________________________________________________  TOTAL TIME:  61 Minutes    Critical Care Provided     Minutes non procedure based      Comments    x Reviewed previous records   >50% of visit spent in counseling and coordination of care x Discussion with patient and/or family and questions answered       ________________________________________________________________________  Signed: Shama Torres MD    Procedures: see electronic medical records for all procedures/Xrays and details which were not copied into this note but were reviewed prior to creation of Plan.     LAB DATA REVIEWED:    Recent Results (from the past 24 hour(s))   EKG, 12 LEAD, INITIAL    Collection Time: 01/15/18  7:51 AM   Result Value Ref Range    Ventricular Rate 58 BPM    Atrial Rate 59 BPM    QRS Duration 78 ms    Q-T Interval 400 ms    QTC Calculation (Bezet) 392 ms    Calculated R Axis 14 degrees    Calculated T Axis 34 degrees    Diagnosis       Junctional rhythm  Abnormal ECG  When compared with ECG of 01-APR-2016 08:46,  Junctional rhythm has replaced Sinus rhythm  Criteria for Inferior infarct are no longer present  ST elevation now present in Inferior leads  QT has shortened     CBC WITH AUTOMATED DIFF    Collection Time: 01/15/18  7:58 AM   Result Value Ref Range    WBC 7.3 3.6 - 11.0 K/uL    RBC 4.51 3.80 - 5.20 M/uL    HGB 12.3 11.5 - 16.0 g/dL    HCT 39.2 35.0 - 47.0 %    MCV 86.9 80.0 - 99.0 FL    MCH 27.3 26.0 - 34.0 PG    MCHC 31.4 30.0 - 36.5 g/dL    RDW 13.7 11.5 - 14.5 %    PLATELET 616 995 - 210 K/uL    NEUTROPHILS 48 32 - 75 %    LYMPHOCYTES 37 12 - 49 %    MONOCYTES 12 5 - 13 %    EOSINOPHILS 2 0 - 7 %    BASOPHILS 1 0 - 1 %    ABS. NEUTROPHILS 3.6 1.8 - 8.0 K/UL    ABS. LYMPHOCYTES 2.7 0.8 - 3.5 K/UL    ABS. MONOCYTES 0.9 0.0 - 1.0 K/UL    ABS. EOSINOPHILS 0.2 0.0 - 0.4 K/UL    ABS. BASOPHILS 0.0 0.0 - 0.1 K/UL   METABOLIC PANEL, COMPREHENSIVE    Collection Time: 01/15/18  7:58 AM   Result Value Ref Range    Sodium 139 136 - 145 mmol/L    Potassium 3.5 3.5 - 5.1 mmol/L    Chloride 103 97 - 108 mmol/L    CO2 31 21 - 32 mmol/L    Anion gap 5 5 - 15 mmol/L    Glucose 100 65 - 100 mg/dL    BUN 13 6 - 20 MG/DL    Creatinine 0.91 0.55 - 1.02 MG/DL    BUN/Creatinine ratio 14 12 - 20      GFR est AA >60 >60 ml/min/1.73m2    GFR est non-AA 59 (L) >60 ml/min/1.73m2    Calcium 8.6 8.5 - 10.1 MG/DL    Bilirubin, total 0.3 0.2 - 1.0 MG/DL    ALT (SGPT) 26 12 - 78 U/L    AST (SGOT) 27 15 - 37 U/L    Alk.  phosphatase 64 45 - 117 U/L    Protein, total 6.5 6.4 - 8.2 g/dL    Albumin 3.2 (L) 3.5 - 5.0 g/dL    Globulin 3.3 2.0 - 4.0 g/dL    A-G Ratio 1.0 (L) 1.1 - 2.2     CK W/ REFLX CKMB    Collection Time: 01/15/18  7:58 AM   Result Value Ref Range    CK 99 26 - 192 U/L   TROPONIN I    Collection Time: 01/15/18  7:58 AM   Result Value Ref Range    Troponin-I, Qt. <0.04 <0.05 ng/mL   URINALYSIS W/ REFLEX CULTURE    Collection Time: 01/15/18  8:37 AM   Result Value Ref Range    Color YELLOW/STRAW      Appearance CLEAR CLEAR      Specific gravity 1.017 1.003 - 1.030      pH (UA) 6.0 5.0 - 8.0      Protein NEGATIVE  NEG mg/dL    Glucose NEGATIVE  NEG mg/dL    Ketone NEGATIVE  NEG mg/dL    Bilirubin NEGATIVE  NEG      Blood NEGATIVE  NEG      Urobilinogen 0.2 0.2 - 1.0 EU/dL    Nitrites NEGATIVE  NEG      Leukocyte Esterase NEGATIVE  NEG      WBC 0-4 0 - 4 /hpf    RBC 0-5 0 - 5 /hpf    Epithelial cells FEW FEW /lpf    Bacteria NEGATIVE  NEG /hpf    UA:UC IF INDICATED CULTURE NOT INDICATED BY UA RESULT CNI      Hyaline cast 0-2 0 - 5 /lpf

## 2018-01-15 NOTE — ED NOTES
Informed Dr. Kory Jason of patient not being able to feel her right leg when Tech was placing EKG  Leads on patients right leg.

## 2018-01-15 NOTE — CONSULTS
Gastroenterology Consult   Raul Campbell  1935  [id number]    Referring Physician:    Consult Date: 1/15/2018     Subjective:     Chief Complaint: diverticulitis    History of Present Illness: Raul Campbell is a 80 y.o. female who is seen in consultation for LLQ pain seen a week ago in office and treated with Cipro/Flagyl and is much better with no abd pain,N/V/F/C/M/H. Last colonoscopy 2011. Yaakov Gerber Past Medical History:   Diagnosis Date    Adverse effect of anesthesia     combative waking up    Anxiety     Arthritis     Asthma     as of 12/30/16 pt states under control    Diverticulitis Dx 11/2016    Fibromyalgia     GERD (gastroesophageal reflux disease)     Glaucoma     Hiatal hernia     Hypercholesteremia     Hypertension     Ill-defined condition     neurapthy in ble    Nausea & vomiting      Past Surgical History:   Procedure Laterality Date    HX CATARACT REMOVAL Right 01/04/2017    HX HYSTERECTOMY      HX ROTATOR CUFF REPAIR Left 2008    HX TUBAL LIGATION      DC COLONOSCOPY FLX DX W/COLLJ SPEC WHEN PFRMD  11/9/2011         DC EGD TRANSORAL BIOPSY SINGLE/MULTIPLE  11/9/2011           History reviewed. No pertinent family history. Social History   Substance Use Topics    Smoking status: Never Smoker    Smokeless tobacco: Never Used    Alcohol use No      Allergies   Allergen Reactions    Cymbalta [Duloxetine] Nausea Only and Other (comments)     \"within the hour I had a severe headache, and vomiting\"      Demerol [Meperidine] Nausea and Vomiting    Morphine Nausea and Vomiting    Penicillins Other (comments)     rash    Percocet [Oxycodone-Acetaminophen] Nausea and Vomiting     Current Facility-Administered Medications   Medication Dose Route Frequency    aspirin delayed-release tablet 81 mg  81 mg Oral DAILY    . PHARMACY TO SUBSTITUTE PER PROTOCOL    Per Protocol    albuterol (PROVENTIL HFA, VENTOLIN HFA, PROAIR HFA) inhaler 2 Puff  2 Puff Inhalation Q4H PRN    . PHARMACY TO SUBSTITUTE PER PROTOCOL    Per Protocol    LORazepam (ATIVAN) tablet 1 mg  1 mg Oral QHS    indapamide (LOZOL) tablet 1.25 mg  1.25 mg Oral DAILY    predniSONE (DELTASONE) tablet 7.5 mg  7.5 mg Oral DAILY WITH BREAKFAST    ciprofloxacin HCl (CIPRO) tablet 500 mg  500 mg Oral Q12H    metroNIDAZOLE (FLAGYL) tablet 500 mg  500 mg Oral Q8H    . PHARMACY TO SUBSTITUTE PER PROTOCOL    Per Protocol    hydrALAZINE (APRESOLINE) 20 mg/mL injection 10 mg  10 mg IntraVENous Q6H PRN    sodium chloride (NS) flush 5-10 mL  5-10 mL IntraVENous Q8H    sodium chloride (NS) flush 5-10 mL  5-10 mL IntraVENous PRN    acetaminophen (TYLENOL) tablet 650 mg  650 mg Oral Q4H PRN    Or    acetaminophen (TYLENOL) solution 650 mg  650 mg Per NG tube Q4H PRN    Or    acetaminophen (TYLENOL) suppository 650 mg  650 mg Rectal Q4H PRN    ondansetron (ZOFRAN) injection 4 mg  4 mg IntraVENous Q6H PRN    heparin (porcine) injection 5,000 Units  5,000 Units SubCUTAneous Q12H     Current Outpatient Prescriptions   Medication Sig    ciprofloxacin HCl (CIPRO) 500 mg tablet Take 500 mg by mouth two (2) times a day.  metroNIDAZOLE (FLAGYL) 500 mg tablet Take 500 mg by mouth two (2) times a day.  lisinopril (PRINIVIL, ZESTRIL) 20 mg tablet Take 20 mg by mouth nightly.  predniSONE (STERAPRED) 5 mg dose pack Take 5-15 mg by mouth See Admin Instructions. See administration instruction per 5mg dose pack    CHOLECALCIFEROL, VITAMIN D3, PO Take 1 Tab by mouth daily.  ARNICA EX by Apply Externally route daily as needed (Lower Extremity Cramps).  gabapentin (NEURONTIN) 100 mg capsule Take 100 mg by mouth nightly.  bimatoprost (LUMIGAN) 0.01 % ophthalmic drops Administer 1 Drop to right eye every evening.  multivitamin (ONE A DAY) tablet Take 1 Tab by mouth daily.  aspirin delayed-release 81 mg tablet Take 81 mg by mouth daily.  lorazepam (ATIVAN) 0.5 mg tablet Take 0.5 mg by mouth nightly.     co-enzyme Q-10 (CO Q-10) 100 mg capsule Take 100 mg by mouth daily.  mometasone-formoterol (DULERA) 100-5 mcg/Actuation HFAA HFA inhaler Take 2 Puffs by inhalation two (2) times a day.  albuterol (PROVENTIL HFA, VENTOLIN HFA, PROAIR HFA) 90 mcg/actuation inhaler Take 1 Puff by inhalation as needed for Wheezing. Review of Systems:  A detailed 10 organ review of systems is obtained with pertinent positives as listed in the History of Present Illness and Past Medical History. All others are negative. Objective:     Physical Exam:  Visit Vitals    /77    Pulse 66    Temp 97.8 °F (36.6 °C)    Resp 16    Ht 5' 3\" (1.6 m)    Wt 62.6 kg (138 lb)    SpO2 97%    BMI 24.45 kg/m2        Skin:  Extremities and face reveal no rashes. No messina erythema. No telangiectasias on the chest wall. HEENT: Sclerae anicteric. Extra-occular muscles are intact. No oral ulcers. No abnormal pigmentation of the lips. The neck is supple. Cardiovascular: Regular rate and rhythm. No murmurs, gallops, or rubs. PMI nondisplaced. Carotids without bruits. Respiratory:  Comfortable breathing with no accessory muscle use. Clear breath sounds with no wheezes, rales, or rhonchi. GI:  Abdomen nondistended, soft, and nontender. Normal active bowel sounds. No enlargement of the liver or spleen. No masses palpable. Rectal:  Deferred  Musculoskeletal:  No pitting edema of the lower legs. Extremities have good range of motion. No costovertebral tenderness. Neurological:  Gross memory appears intact. Patient is alert and oriented. Psychiatric:  Mood appears appropriate with judgement intact. Lymphatic:  No cervical or supraclavicular adenopathy.     Laboratory:    Recent Results (from the past 24 hour(s))   EKG, 12 LEAD, INITIAL    Collection Time: 01/15/18  7:51 AM   Result Value Ref Range    Ventricular Rate 58 BPM    Atrial Rate 59 BPM    QRS Duration 78 ms    Q-T Interval 400 ms    QTC Calculation (Bezet) 392 ms    Calculated R Axis 14 degrees Calculated T Axis 34 degrees    Diagnosis       Junctional rhythm  Abnormal ECG  When compared with ECG of 01-APR-2016 08:46,  Junctional rhythm has replaced Sinus rhythm  Criteria for Inferior infarct are no longer present  ST elevation now present in Inferior leads  QT has shortened     CBC WITH AUTOMATED DIFF    Collection Time: 01/15/18  7:58 AM   Result Value Ref Range    WBC 7.3 3.6 - 11.0 K/uL    RBC 4.51 3.80 - 5.20 M/uL    HGB 12.3 11.5 - 16.0 g/dL    HCT 39.2 35.0 - 47.0 %    MCV 86.9 80.0 - 99.0 FL    MCH 27.3 26.0 - 34.0 PG    MCHC 31.4 30.0 - 36.5 g/dL    RDW 13.7 11.5 - 14.5 %    PLATELET 334 672 - 062 K/uL    NEUTROPHILS 48 32 - 75 %    LYMPHOCYTES 37 12 - 49 %    MONOCYTES 12 5 - 13 %    EOSINOPHILS 2 0 - 7 %    BASOPHILS 1 0 - 1 %    ABS. NEUTROPHILS 3.6 1.8 - 8.0 K/UL    ABS. LYMPHOCYTES 2.7 0.8 - 3.5 K/UL    ABS. MONOCYTES 0.9 0.0 - 1.0 K/UL    ABS. EOSINOPHILS 0.2 0.0 - 0.4 K/UL    ABS. BASOPHILS 0.0 0.0 - 0.1 K/UL   METABOLIC PANEL, COMPREHENSIVE    Collection Time: 01/15/18  7:58 AM   Result Value Ref Range    Sodium 139 136 - 145 mmol/L    Potassium 3.5 3.5 - 5.1 mmol/L    Chloride 103 97 - 108 mmol/L    CO2 31 21 - 32 mmol/L    Anion gap 5 5 - 15 mmol/L    Glucose 100 65 - 100 mg/dL    BUN 13 6 - 20 MG/DL    Creatinine 0.91 0.55 - 1.02 MG/DL    BUN/Creatinine ratio 14 12 - 20      GFR est AA >60 >60 ml/min/1.73m2    GFR est non-AA 59 (L) >60 ml/min/1.73m2    Calcium 8.6 8.5 - 10.1 MG/DL    Bilirubin, total 0.3 0.2 - 1.0 MG/DL    ALT (SGPT) 26 12 - 78 U/L    AST (SGOT) 27 15 - 37 U/L    Alk.  phosphatase 64 45 - 117 U/L    Protein, total 6.5 6.4 - 8.2 g/dL    Albumin 3.2 (L) 3.5 - 5.0 g/dL    Globulin 3.3 2.0 - 4.0 g/dL    A-G Ratio 1.0 (L) 1.1 - 2.2     CK W/ REFLX CKMB    Collection Time: 01/15/18  7:58 AM   Result Value Ref Range    CK 99 26 - 192 U/L   TROPONIN I    Collection Time: 01/15/18  7:58 AM   Result Value Ref Range    Troponin-I, Qt. <0.04 <0.05 ng/mL   URINALYSIS W/ REFLEX CULTURE    Collection Time: 01/15/18  8:37 AM   Result Value Ref Range    Color YELLOW/STRAW      Appearance CLEAR CLEAR      Specific gravity 1.017 1.003 - 1.030      pH (UA) 6.0 5.0 - 8.0      Protein NEGATIVE  NEG mg/dL    Glucose NEGATIVE  NEG mg/dL    Ketone NEGATIVE  NEG mg/dL    Bilirubin NEGATIVE  NEG      Blood NEGATIVE  NEG      Urobilinogen 0.2 0.2 - 1.0 EU/dL    Nitrites NEGATIVE  NEG      Leukocyte Esterase NEGATIVE  NEG      WBC 0-4 0 - 4 /hpf    RBC 0-5 0 - 5 /hpf    Epithelial cells FEW FEW /lpf    Bacteria NEGATIVE  NEG /hpf    UA:UC IF INDICATED CULTURE NOT INDICATED BY UA RESULT CNI      Hyaline cast 0-2 0 - 5 /lpf   TSH 3RD GENERATION    Collection Time: 01/15/18  8:37 AM   Result Value Ref Range    TSH 1.96 0.36 - 3.74 uIU/mL         Assessment/Plan:     Active Problems:    TIA (transient ischemic attack) (1/15/2018)      Weakness (1/15/2018)         Imp: diverticulitis  Plan: complete 3 more days cipro/Flagyl.   Thanks

## 2018-01-15 NOTE — PROGRESS NOTES
Pt is an 79 y/o  female who was admitted with a diagnosis of TIA, weakness. CM met with pt re: discharge planning. CM introduced self, role. Pt verbalized understanding. Pt verified demographic information on chart. Pt's PCP is Dr. David Saucedo who the pt last saw about a month ago. Pt lives alone in a 1 story senior apartment with no steps to enter. Pt reports she is independent in ADL/IADL needs to include driving. Pt's family available to assist with transportation at discharge. Pt has not utilized home health or SNF services prior to admission. She has access to a rolling walker and a cane. Pt has supportive family to assist as needed. Pt stated she does not have further questions or needs at this time. CM to continue to offer support, discharge planning as needed. Care Management Interventions  PCP Verified by CM: Yes  Last Visit to PCP: 12/15/17  Mode of Transport at Discharge: Other (see comment) (Pt family available to transport at discharge)  Transition of Care Consult (CM Consult): Discharge Planning (Pt is independent in ADL/IADL needs at baseline.  Pt has not utilized home health or SNF services prior to admission)  Discharge Durable Medical Equipment: No (Pt has access to a RW, cane)  Physical Therapy Consult: Yes  Occupational Therapy Consult: Yes  Speech Therapy Consult: Yes  Current Support Network: Own Home, Lives Alone (Pt lives in a 1 story senior apartment with no steps to enter)  Confirm Follow Up Transport: Self (Pt does drive, but has supportive family to assist with transportation as needed)  Plan discussed with Pt/Family/Caregiver: Yes  Discharge Location  Discharge Placement:  (TBD)    Dylan Treviño, NALINI  [address]  [phone]

## 2018-01-15 NOTE — ED NOTES
Dr. Marck Cruz notified about patients Orthostatic Vital signs. Patient was dizzy from sitting to standing.

## 2018-01-15 NOTE — PROGRESS NOTES
Pharmacy Clarification of the Prior to Admission Medication Regimen Retrospective to the Admission Medication Reconciliation    The patient was  interviewed regarding clarification of the prior to admission medication regimen and was questioned regarding use of any other inhalers, topical products, over the counter medications, herbal medications, vitamin products or ophthalmic/nasal/otic medication use. Information Obtained From: Rx Query and Patient    Recommendations/Findings: The following amendments were made to the patient's active medication list on file at [facility]:     1) Additions:    ARNICA EX   CHOLECALCIFEROL, VITAMIN D3, PO   ciprofloxacin HCl (CIPRO) 500 mg tablet   lisinopril (PRINIVIL, ZESTRIL) 20 mg tablet   metroNIDAZOLE (FLAGYL) 500 mg tablet   predniSONE (STERAPRED) 5 mg dose pack    2) Removals:    Probiotic   Celecoxib    Diclofenac 0.1% ophthalmic solution   Indapamide   Meclizine   Moxifloxacin 0.5% ophthalmic solution   Ondansetron    Oxycodone-APAP 5-325 mg   Pantoprazole   Prednisolone Acetate 1% ophthalmic suspension    3) Changes:   bimatoprost (LUMIGAN) 0.01 % ophthalmic drops (Old regimen: 1 drop to both eyes QHS /New regimen: 1 drop to right eye QHS)   albuterol (PROVENTIL HFA, VENTOLIN HFA, PROAIR HFA) 90 mcg/actuation inhaler (Old regimen: Take by inhalation PRN wheezing /New regimen: Take 1 puff  by inhalation PRN wheezing)   aspirin delayed-release 81 mg tablet (Old regimen: Take by mouth daily /New regimen: Take 81 mg by mouth daily)   gabapentin (NEURONTIN) 100 mg capsule (Old regimen: Take 100 mg TID /New regimen: Take 100 mg QHS)   lorazepam (ATIVAN) 0.5 mg tablet (Old regimen: Take 1 mg (2 tabs) QHS /New regimen: Take 0.5 mg (1 tab) QHS)    4) Pertinent Pharmacy Findings:   ciprofloxacin HCl (CIPRO) 500 mg tablet: Patient started a 7 day regimen on 01/09/2018. Patient has completed 5 days and 1 dose of therapy as of 01/14/2018.  Antibiotic Indication: Diverticulitis    metroNIDAZOLE (FLAGYL) 500 mg tablet: Patient started a 7 day regimen on 01/09/2018. Patient has completed 5 days and 1 dose of therapy as of 01/14/2018. Antibiotic Indication: Diverticulitis    predniSONE (STERAPRED) 5 mg dose pack: Patient started a 30 day regimen on 12/13/2017. Patient stated this is a taper and is currently taking 7.5 mg daily. Patient stated to decrease to 5 mg for 7 days starting 01/20/2018. PTA medication list was corrected to the following:     Prior to Admission Medications   Prescriptions Last Dose Informant Patient Reported? Taking? ARNICA EX 1/13/2018 at Unknown time Self Yes Yes   Sig: by Apply Externally route daily as needed (Lower Extremity Cramps). CHOLECALCIFEROL, VITAMIN D3, PO 1/13/2018 at Unknown time Self Yes Yes   Sig: Take 1 Tab by mouth daily. albuterol (PROVENTIL HFA, VENTOLIN HFA, PROAIR HFA) 90 mcg/actuation inhaler Not Taking at Unknown time Self Yes No   Sig: Take 1 Puff by inhalation as needed for Wheezing. aspirin delayed-release 81 mg tablet 1/14/2018 at Unknown time Self Yes Yes   Sig: Take 81 mg by mouth daily. bimatoprost (LUMIGAN) 0.01 % ophthalmic drops 1/14/2018 at Unknown time Self Yes Yes   Sig: Administer 1 Drop to right eye every evening. ciprofloxacin HCl (CIPRO) 500 mg tablet 1/14/2018 at Unknown time Self Yes Yes   Sig: Take 1000 mg by mouth two (2) times a day. co-enzyme Q-10 (CO Q-10) 100 mg capsule 1/14/2018 at Unknown time Self Yes Yes   Sig: Take 100 mg by mouth daily. gabapentin (NEURONTIN) 100 mg capsule 1/14/2018 at Unknown time Self Yes Yes   Sig: Take 100 mg by mouth nightly. lisinopril (PRINIVIL, ZESTRIL) 20 mg tablet 1/14/2018 at Unknown time Self Yes Yes   Sig: Take 20 mg by mouth nightly. lorazepam (ATIVAN) 0.5 mg tablet 1/14/2018 at Unknown time Self Yes Yes   Sig: Take 0.5 mg by mouth nightly.    metroNIDAZOLE (FLAGYL) 500 mg tablet 1/14/2018 at Unknown time Self Yes Yes   Sig: Take 500 mg by mouth two (2) times a day. mometasone-formoterol (DULERA) 100-5 mcg/Actuation HFAA HFA inhaler 1/14/2018 at Unknown time Self Yes Yes   Sig: Take 2 Puffs by inhalation two (2) times a day. multivitamin (ONE A DAY) tablet 1/11/2018 at Unknown time Self Yes Yes   Sig: Take 1 Tab by mouth daily. predniSONE (STERAPRED) 5 mg dose pack 1/14/2018 at Unknown time Self Yes Yes   Sig: Take 5-15 mg by mouth See Admin Instructions.  See administration instruction per 5mg dose pack      Facility-Administered Medications: None          Thank you,  Narayan Vogt CPhT  Medication History Pharmacy Technician

## 2018-01-15 NOTE — ROUTINE PROCESS
TRANSFER - OUT REPORT:    Verbal report given to Sabrina CONTRERAS (name) on Anamika Ravi  being transferred to Neuro Tele (unit) for routine progression of care       Report consisted of patients Situation, Background, Assessment and   Recommendations(SBAR). Information from the following report(s) SBAR, Kardex, ED Summary, Intake/Output, MAR, Recent Results and Med Rec Status was reviewed with the receiving nurse. Lines:   Peripheral IV 01/15/18 Left Antecubital (Active)   Site Assessment Clean, dry, & intact 1/15/2018  7:38 AM   Phlebitis Assessment 0 1/15/2018  7:38 AM   Infiltration Assessment 0 1/15/2018  7:38 AM   Dressing Status Clean, dry, & intact 1/15/2018  7:38 AM   Dressing Type Transparent;Tape 1/15/2018  7:38 AM        Opportunity for questions and clarification was provided.       Patient transported with:   Patient transport

## 2018-01-15 NOTE — PROGRESS NOTES
Speech Pathology bedside swallow evaluation/discharge  Patient: [place] Nina (89 y.o. female)  Date: 1/15/2018  Primary Diagnosis: TIA (transient ischemic attack)  Weakness        Precautions:        ASSESSMENT :  Based on the objective data described below, the patient presents with an intact oropharyngeal swallow. Patient with timely/complete mastication, timely swallow initiation, functional hyolaryngeal elevation/excursion, and no overt s/s aspiration observed. Patient reported globus sensation with bread and history of reflux, suspect esophageal dysphagia. Will follow for further evaluation pending MRI results. Addendum: MRI completed and patient with no acute infarct. Skilled therapy provided by a speech-language pathologist is not indicated at this time. PLAN :  Recommendations:  --Regular/thin liquid diet  --Straws ok  --Meds per patient preference  --SLP to follow for additional evaluation pending MRI results  Discharge Recommendations: To Be Determined     SUBJECTIVE:   Patient stated Oh I love her re: SLP student giving patient applesauce. Patient alert, appropriate, cooperative. Oriented x4. Loquacious.     OBJECTIVE:     Past Medical History:   Diagnosis Date    Adverse effect of anesthesia     combative waking up    Anxiety     Arthritis     Asthma     as of 12/30/16 pt states under control    Diverticulitis Dx 11/2016    Fibromyalgia     GERD (gastroesophageal reflux disease)     Glaucoma     Hiatal hernia     Hypercholesteremia     Hypertension     Ill-defined condition     neurapthy in ble    Nausea & vomiting      Past Surgical History:   Procedure Laterality Date    HX CATARACT REMOVAL Right 01/04/2017    HX HYSTERECTOMY      HX ROTATOR CUFF REPAIR Left 2008    HX TUBAL LIGATION      DC COLONOSCOPY FLX DX W/COLLJ SPEC WHEN PFRMD  11/9/2011         DC EGD TRANSORAL BIOPSY SINGLE/MULTIPLE  11/9/2011          Prior Level of Function/Home Situation:      Diet prior to admission: regular/thin  Current Diet:  Regular/thin   Cognitive and Communication Status:  Neurologic State: Alert, Appropriate for age  Orientation Level: Oriented X4  Cognition: Follows commands, Appropriate for age attention/concentration  Perception: Appears intact  Perseveration: No perseveration noted  Safety/Judgement: Awareness of environment  Oral Assessment:  Oral Assessment  Labial: No impairment  Dentition: Upper dentures; Natural;Partials (comment)  Oral Hygiene: moist oral mucosa  Lingual: No impairment  Velum: No impairment  Mandible: No impairment  P.O. Trials:  Patient Position: upright in bed  Vocal quality prior to P.O.: No impairment  Consistency Presented: Ice chips; Thin liquid;Puree; Solid  How Presented: Self-fed/presented;Cup/sip;Cup/gulp; Spoon;Straw;Successive swallows     Bolus Acceptance: No impairment  Bolus Formation/Control: No impairment     Propulsion: No impairment  Oral Residue: None  Initiation of Swallow: No impairment  Laryngeal Elevation: Functional  Aspiration Signs/Symptoms: None  Pharyngeal Phase Characteristics: No impairment, issues, or problems   Effective Modifications: None  Cues for Modifications: None       Oral Phase Severity: No impairment  Pharyngeal Phase Severity : No impairment  NOMS:   The NOMS functional outcome measure was used to quantify this patient's level of swallowing impairment. Based on the NOMS, the patient was determined to be at level 7 for swallow function     G Codes: In compliance with CMSs Claims Based Outcome Reporting, the following G-code set was chosen for this patient based the use of the NOMS functional outcome to quantify this patient's level of swallowing impairment. Using the NOMS, the patient was determined to be at level 7 for swallow function which correlates with the CH= 0% level of severity.     Based on the objective assessment provided within this note, the current, goal, and discharge g-codes are as follows:    Swallow Swallowing:   Swallow Current Status CH= 0%   Swallow Goal Status CH= 0%   Swallow D/C Status CH= 0%      NOMS Swallowing Levels:  Level 1 (CN): NPO  Level 2 (CM): NPO but takes consistency in therapy  Level 3 (CL): Takes less than 50% of nutrition p.o. and continues with nonoral feedings; and/or safe with mod cues; and/or max diet restriction  Level 4 (CK): Safe swallow but needs mod cues; and/or mod diet restriction; and/or still requires some nonoral feeding/supplements  Level 5 (CJ): Safe swallow with min diet restriction; and/or needs min cues  Level 6 (CI): Independent with p.o.; rare cues; usually self cues; may need to avoid some foods or needs extra time  Level 7 ([address] [place] [address]): Independent for all p.o.  LESLIE. (2003). National Outcomes Measurement System (NOMS): Adult Speech-Language Pathology User's Guide. Pain:  Pain Scale 1: Numeric (0 - 10)  Pain Intensity 1: 8  Pain Location 1: Head  After treatment:   [] Patient left in no apparent distress sitting up in chair  [x] Patient left in no apparent distress in bed  [x] Call bell left within reach  [x] Nursing notified  [] Caregiver present  [] Bed alarm activated    COMMUNICATION/EDUCATION:   The patients plan of care including findings, recommendations, and recommended diet changes were discussed with: Registered Nurse. Patient was educated regarding Her deficit(s) of functional swallow as this relates to Her diagnosis of ?CVA. She demonstrated Good understanding as evidenced by verbalizing understanding. [x] Patient/family have participated as able and agree with findings and recommendations. [] Patient is unable to participate in plan of care at this time.     Thank you for this referral.  Corey Elise, SLP  Time Calculation: 20 mins

## 2018-01-16 VITALS
OXYGEN SATURATION: 97 % | HEART RATE: 96 BPM | TEMPERATURE: 97.4 F | SYSTOLIC BLOOD PRESSURE: 121 MMHG | BODY MASS INDEX: 24.45 KG/M2 | DIASTOLIC BLOOD PRESSURE: 54 MMHG | HEIGHT: 63 IN | WEIGHT: 138 LBS | RESPIRATION RATE: 16 BRPM

## 2018-01-16 LAB
ANION GAP SERPL CALC-SCNC: 5 MMOL/L (ref 5–15)
BASOPHILS # BLD: 0 K/UL (ref 0–0.1)
BASOPHILS NFR BLD: 1 % (ref 0–1)
BUN SERPL-MCNC: 9 MG/DL (ref 6–20)
BUN/CREAT SERPL: 12 (ref 12–20)
CALCIUM SERPL-MCNC: 8.8 MG/DL (ref 8.5–10.1)
CHLORIDE SERPL-SCNC: 106 MMOL/L (ref 97–108)
CHOLEST SERPL-MCNC: 191 MG/DL
CO2 SERPL-SCNC: 29 MMOL/L (ref 21–32)
CREAT SERPL-MCNC: 0.77 MG/DL (ref 0.55–1.02)
EOSINOPHIL # BLD: 0.2 K/UL (ref 0–0.4)
EOSINOPHIL NFR BLD: 3 % (ref 0–7)
ERYTHROCYTE [DISTWIDTH] IN BLOOD BY AUTOMATED COUNT: 13.6 % (ref 11.5–14.5)
EST. AVERAGE GLUCOSE BLD GHB EST-MCNC: 117 MG/DL
GLUCOSE SERPL-MCNC: 87 MG/DL (ref 65–100)
HBA1C MFR BLD: 5.7 % (ref 4.2–6.3)
HCT VFR BLD AUTO: 37.1 % (ref 35–47)
HDLC SERPL-MCNC: 60 MG/DL
HDLC SERPL: 3.2 {RATIO} (ref 0–5)
HGB BLD-MCNC: 11.6 G/DL (ref 11.5–16)
LDLC SERPL CALC-MCNC: 95.6 MG/DL (ref 0–100)
LIPID PROFILE,FLP: ABNORMAL
LYMPHOCYTES # BLD: 2 K/UL (ref 0.8–3.5)
LYMPHOCYTES NFR BLD: 34 % (ref 12–49)
MCH RBC QN AUTO: 26.8 PG (ref 26–34)
MCHC RBC AUTO-ENTMCNC: 31.3 G/DL (ref 30–36.5)
MCV RBC AUTO: 85.7 FL (ref 80–99)
MONOCYTES # BLD: 0.6 K/UL (ref 0–1)
MONOCYTES NFR BLD: 11 % (ref 5–13)
NEUTS SEG # BLD: 3.1 K/UL (ref 1.8–8)
NEUTS SEG NFR BLD: 51 % (ref 32–75)
PLATELET # BLD AUTO: 240 K/UL (ref 150–400)
POTASSIUM SERPL-SCNC: 4.2 MMOL/L (ref 3.5–5.1)
RBC # BLD AUTO: 4.33 M/UL (ref 3.8–5.2)
SODIUM SERPL-SCNC: 140 MMOL/L (ref 136–145)
TRIGL SERPL-MCNC: 177 MG/DL (ref ?–150)
VIT B12 SERPL-MCNC: 744 PG/ML (ref 211–911)
VLDLC SERPL CALC-MCNC: 35.4 MG/DL
WBC # BLD AUTO: 5.9 K/UL (ref 3.6–11)

## 2018-01-16 PROCEDURE — 82607 VITAMIN B-12: CPT | Performed by: INTERNAL MEDICINE

## 2018-01-16 PROCEDURE — 83036 HEMOGLOBIN GLYCOSYLATED A1C: CPT | Performed by: INTERNAL MEDICINE

## 2018-01-16 PROCEDURE — G8989 SELF CARE D/C STATUS: HCPCS | Performed by: PHYSICAL THERAPIST

## 2018-01-16 PROCEDURE — G8987 SELF CARE CURRENT STATUS: HCPCS

## 2018-01-16 PROCEDURE — 80061 LIPID PANEL: CPT | Performed by: INTERNAL MEDICINE

## 2018-01-16 PROCEDURE — G8998 SWALLOW D/C STATUS: HCPCS | Performed by: PHYSICAL THERAPIST

## 2018-01-16 PROCEDURE — 36415 COLL VENOUS BLD VENIPUNCTURE: CPT | Performed by: INTERNAL MEDICINE

## 2018-01-16 PROCEDURE — G8997 SWALLOW GOAL STATUS: HCPCS | Performed by: PHYSICAL THERAPIST

## 2018-01-16 PROCEDURE — 97165 OT EVAL LOW COMPLEX 30 MIN: CPT

## 2018-01-16 PROCEDURE — 80048 BASIC METABOLIC PNL TOTAL CA: CPT | Performed by: INTERNAL MEDICINE

## 2018-01-16 PROCEDURE — 97530 THERAPEUTIC ACTIVITIES: CPT | Performed by: PHYSICAL THERAPIST

## 2018-01-16 PROCEDURE — 97161 PT EVAL LOW COMPLEX 20 MIN: CPT | Performed by: PHYSICAL THERAPIST

## 2018-01-16 PROCEDURE — 74011636637 HC RX REV CODE- 636/637: Performed by: INTERNAL MEDICINE

## 2018-01-16 PROCEDURE — 85025 COMPLETE CBC W/AUTO DIFF WBC: CPT | Performed by: INTERNAL MEDICINE

## 2018-01-16 PROCEDURE — 99218 HC RM OBSERVATION: CPT

## 2018-01-16 PROCEDURE — G8979 MOBILITY GOAL STATUS: HCPCS | Performed by: PHYSICAL THERAPIST

## 2018-01-16 PROCEDURE — G8988 SELF CARE GOAL STATUS: HCPCS | Performed by: PHYSICAL THERAPIST

## 2018-01-16 PROCEDURE — G8996 SWALLOW CURRENT STATUS: HCPCS | Performed by: PHYSICAL THERAPIST

## 2018-01-16 PROCEDURE — G8978 MOBILITY CURRENT STATUS: HCPCS | Performed by: PHYSICAL THERAPIST

## 2018-01-16 PROCEDURE — 65390000012 HC CONDITION CODE 44 OBSERVATION

## 2018-01-16 PROCEDURE — 74011250637 HC RX REV CODE- 250/637: Performed by: INTERNAL MEDICINE

## 2018-01-16 PROCEDURE — G8980 MOBILITY D/C STATUS: HCPCS | Performed by: PHYSICAL THERAPIST

## 2018-01-16 RX ORDER — PRAVASTATIN SODIUM 10 MG/1
10 TABLET ORAL
Qty: 30 TAB | Refills: 0 | Status: SHIPPED | OUTPATIENT
Start: 2018-01-16 | End: 2021-05-05 | Stop reason: CLARIF

## 2018-01-16 RX ORDER — ASPIRIN 81 MG/1
162 TABLET ORAL DAILY
Qty: 60 TAB | Refills: 0 | Status: SHIPPED
Start: 2018-01-16 | End: 2021-05-05 | Stop reason: CLARIF

## 2018-01-16 RX ADMIN — CIPROFLOXACIN HYDROCHLORIDE 500 MG: 500 TABLET, FILM COATED ORAL at 09:01

## 2018-01-16 RX ADMIN — PREDNISONE 7.5 MG: 5 TABLET ORAL at 08:48

## 2018-01-16 RX ADMIN — METRONIDAZOLE 500 MG: 250 TABLET ORAL at 09:01

## 2018-01-16 RX ADMIN — ASPIRIN 81 MG: 81 TABLET, COATED ORAL at 08:48

## 2018-01-16 NOTE — PROGRESS NOTES
Occupational Therapy neurological EVALUATION with discharge  Patient: Mahi Hendrix (71 y.o. female)  Date: 1/16/2018  Primary Diagnosis: TIA (transient ischemic attack)  Weakness  TIA (transient ischemic attack)  Weakness        Precautions:        ASSESSMENT:  Based on the objective data described below, the patient presents at her baseline and she was indepednet in all areas prior and driving. Orthostatics were taken on pt and her BP did drop when she stood up but as she was up, her BP increased. Pt was educated on moving slower, standing up slow, when she wakes in the am, to sit on EOb for a short while and to not jump up quick after she has been sitting. Pt understood and knew that she moves too fast and her daughter that was present in eval agreed that pt moves too quickly. Pt at this time is independent in all her transfers, ADLs and has no OT needs. Recommend that pt return home with assist from family and no further OT needed at discharge. .  Further skilled acute occupational therapy is not indicated at this time. Discharge Recommendations: None  Further Equipment Recommendations for Discharge: none     SUBJECTIVE:   Patient stated I have do need to move slower. I have never been slow at anything.     OBJECTIVE DATA SUMMARY:   HISTORY:   Past Medical History:   Diagnosis Date    Adverse effect of anesthesia     combative waking up    Anxiety     Arthritis     Asthma     as of 12/30/16 pt states under control    Diverticulitis Dx 11/2016    Fibromyalgia     GERD (gastroesophageal reflux disease)     Glaucoma     Hiatal hernia     Hypercholesteremia     Hypertension     Ill-defined condition     neurapthy in ble    Nausea & vomiting      Past Surgical History:   Procedure Laterality Date    HX CATARACT REMOVAL Right 01/04/2017    HX HYSTERECTOMY      HX ROTATOR CUFF REPAIR Left 2008    HX TUBAL LIGATION      CA COLONOSCOPY FLX DX W/COLLJ SPEC WHEN PFRMD  11/9/2011         CA EGD TRANSORAL BIOPSY SINGLE/MULTIPLE  11/9/2011            Prior Level of Function/Environment/Context: pt lives alone and was independent in all areas prior  Occupations in which the patient is/was successful, what are the barriers preventing that success: none   Expanded or extensive additional review of patient history:     Home Situation  Home Environment: Apartment  # Steps to Enter: 0  One/Two Story Residence: One story  Living Alone: Yes  Support Systems: Family member(s)  Patient Expects to be Discharged to[de-identified] Apartment  Current DME Used/Available at Home: Grab bars, Walker, rolling, Raised toilet seat  Tub or Shower Type: Tub/Shower combination  [x]  Right hand dominant   []  Left hand dominant    EXAMINATION OF PERFORMANCE DEFICITS:  Cognitive/Behavioral Status:  Neurologic State: Alert  Orientation Level: Oriented X4  Cognition: Follows commands             Skin: in good health     Edema: none     Hearing: Auditory  Auditory Impairment: None    Vision/Perceptual:                 intact                    Range of Motion:    AROM: Within functional limits                         Strength:    Strength: Within functional limits                Coordination:  Coordination: Within functional limits  Fine Motor Skills-Upper: Left Intact; Right Intact    Gross Motor Skills-Upper: Left Intact; Right Intact    Tone & Sensation:    Tone: Normal  Sensation: Intact                      Balance:  Sitting: Intact  Standing: Impaired  Standing - Static: Good; Unsupported  Standing - Dynamic : Good    Functional Mobility and Transfers for ADLs:  Bed Mobility:   independent    Transfers:  Sit to Stand: Independent  Functional Transfers  Toilet Transfer : Independent    ADL Assessment:  Feeding: Independent    Oral Facial Hygiene/Grooming: Independent    Bathing: Independent    Upper Body Dressing: Independent    Lower Body Dressing: Independent    Toileting: Independent           Functional Measure:   Barthel Index:    Bathing: 5  Bladder: 10  Bowels: 10  Groomin  Dressing: 10  Feeding: 10  Mobility: 15  Stairs: 10  Toilet Use: 10  Transfer (Bed to Chair and Back): 15  Total: 100       Barthel and G-code impairment scale:  Percentage of impairment CH  0% CI  1-19% CJ  20-39% CK  40-59% CL  60-79% CM  80-99% CN  100%   Barthel Score 0-100 100 99-80 79-60 59-40 20-39 1-19   0   Barthel Score 0-20 20 17-19 13-16 9-12 5-8 1-4 0      The Barthel ADL Index: Guidelines  1. The index should be used as a record of what a patient does, not as a record of what a patient could do. 2. The main aim is to establish degree of independence from any help, physical or verbal, however minor and for whatever reason. 3. The need for supervision renders the patient not independent. 4. A patient's performance should be established using the best available evidence. Asking the patient, friends/relatives and nurses are the usual sources, but direct observation and common sense are also important. However direct testing is not needed. 5. Usually the patient's performance over the preceding 24-48 hours is important, but occasionally longer periods will be relevant. 6. Middle categories imply that the patient supplies over 50 per cent of the effort. 7. Use of aids to be independent is allowed. Hermila Anderson., Barthel, DAmbrocioW. (8405). Functional evaluation: the Barthel Index. [facility] (14)2. JENNIE Randall, Mandy Harper., Nomi Zofia., [facility], [address] [place] [address] (). Measuring the change indisability after inpatient rehabilitation; comparison of the responsiveness of the Barthel Index and Functional Callahan Measure. Journal of Neurology, Neurosurgery, and Psychiatry, 66(4), 699-122. Lia Kay, N.J.A, Radha Rojas  W.J.M, & Levi Mckeon M.A. (2004.) Assessment of post-stroke quality of life in cost-effectiveness studies: The usefulness of the Barthel Index and the EuroQoL-5D. Quality of Life Research, 13, 338-38           G codes:   In compliance with CMSs Claims Based Outcome Reporting, the following G-code set was chosen for this patient based on their primary functional limitation being treated: The outcome measure chosen to determine the severity of the functional limitation was the Barthel with a score of 100/100 which was correlated with the impairment scale. ? Self Care:     - CURRENT STATUS: CH - 0% impaired, limited or restricted    - GOAL STATUS: CH - 0% impaired, limited or restricted    - D/C STATUS:  CH - 0% impaired, limited or restricted      Occupational Therapy Evaluation Charge Determination   History Examination Decision-Making   LOW Complexity : Brief history review  LOW Complexity : 1-3 performance deficits relating to physical, cognitive , or psychosocial skils that result in activity limitations and / or participation restrictions  LOW Complexity : No comorbidities that affect functional and no verbal or physical assistance needed to complete eval tasks       Based on the above components, the patient evaluation is determined to be of the following complexity level: LOW     Pain:  Pain Scale 1: Numeric (0 - 10)  Pain Intensity 1: 0              Activity Tolerance:   vss  Please refer to the flowsheet for vital signs taken during this treatment. After treatment:   [x]  Patient left in no apparent distress sitting up in chair  []  Patient left in no apparent distress in bed  [x]  Call bell left within reach  [x]  Nursing notified  [x]  Caregiver present  []  Bed alarm activated    COMMUNICATION/EDUCATION:   Findings and recommendations were discussed with: Physical Therapist, Registered Nurse and family    [x]      Home safety education was provided and the patient/caregiver indicated understanding. [x]      Patient/family have participated as able and agree with findings and recommendations. []      Patient is unable to participate in plan of care at this time.     Thank you for this referral.  Aleksandra Falcon Mayra Roland, OT  Time Calculation: 20 mins

## 2018-01-16 NOTE — ED NOTES
Patient asked if her face was swollen and red. Nurses assessed patient and patients face was not swollen or red. Patient was not wheezing or having any issues with swallowing. Dr. Rolan Moon informed of patient's possible allergic reaction and per Dr. Rolan Moon told to watch patient. This RN given a cold wash cloth.

## 2018-01-16 NOTE — DISCHARGE INSTRUCTIONS
HOSPITALIST DISCHARGE INSTRUCTIONS    NAME: Aiden Jimenez   :  1935   MRN:  [id number]     Date/Time:  2018 9:47 AM    ADMIT DATE: 1/15/2018     DISCHARGE DATE: 2018     DISCHARGE DIAGNOSIS:  Pre-Syncope    MEDICATIONS:  · It is important that you take the medication exactly as they are prescribed. · Keep your medication in the bottles provided by the pharmacist and keep a list of the medication names, dosages, and times to be taken in your wallet. · Do not take other medications without consulting your doctor. Pain Management: per above medications    What to do at Home    Recommended diet:  Low fat, Low cholesterol    Recommended activity: Activity as tolerated    If you have questions regarding the hospital related prescriptions or hospital related issues please call [facility] petra Schultz at . If you experience any of the following symptoms then please call your primary care physician or return to the emergency room if you cannot get hold of your doctor:  Fever, chills, nausea, vomiting, diarrhea, change in mentation, falling, bleeding, shortness of breath        Information obtained by :  I understand that if any problems occur once I am at home I am to contact my physician. I understand and acknowledge receipt of the instructions indicated above.                                                                                                                                            Physician's or R.N.'s Signature                                                                  Date/Time                                                                                                                                              Patient or Representative Signature                                                          Date/Time

## 2018-01-16 NOTE — PROGRESS NOTES
* No surgery found *  * No surgery found *  Bedside and Verbal shift change report given to [address] (oncoming nurse) by Maryjane Pablo RN (offgoing nurse). Report included the following information SBAR, Kardex, Recent Results, Med Rec Status and Cardiac Rhythm SR. Zone Phone:   [phone]      Significant changes during shift:  Admission, pt refusing some medications, does not like to take generic medications.          Patient Information    Brayan Rico  80 y.o.  1/15/2018  7:25 AM by Kushal Breaux MD. 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    Primary open angle glaucoma of left eye, mild stage 01/06/2017    Combined forms of age-related cataract of left eye 01/06/2017    Neuropathy 08/30/2013    Restless leg syndrome 08/30/2013    Peritoneal cyst 03/16/2012    Abdominal mass 03/15/2012    Essential hypertension, benign 03/15/2012     Past Medical History:   Diagnosis Date    Adverse effect of anesthesia     combative waking up    Anxiety     Arthritis     Asthma     as of 12/30/16 pt states under control    Diverticulitis Dx 11/2016    Fibromyalgia     GERD (gastroesophageal reflux disease)     Glaucoma     Hiatal hernia     Hypercholesteremia     Hypertension     Ill-defined condition     neurapthy in ble    Nausea & vomiting          Core Measures:    CVA: Yes Yes  CHF:No No  PNA:No No    Post Op Surgical (If Applicable):     Number times ambulated in hallway past shift:  0  Number of times OOB to chair past shift:   0  NG Tube: No  Incentive Spirometer: No  Drains: No   Volume  0  Dressing Present:  No  Flatus:  No    Activity Status:    OOB to Chair No  Ambulated this shift Yes   Bed Rest No    Supplemental O2: (If Applicable)    NC No  NRB No  Venti-mask No  On 0 Liters/min      LINES AND DRAINS:    Central Line? No   PICC LINE? No   Urinary Catheter? No   DVT prophylaxis:    DVT prophylaxis Med- No  DVT prophylaxis SCD or ANDREA- No     Wounds: (If Applicable)    Wounds- No    Location 0    Patient Safety:    Falls Score Total Score: 1  Safety Level_______  Bed Alarm On? Yes  Sitter?  No    Plan for upcoming shift: Safety, neurochecks        Discharge Plan: Yes when medically stable    Active Consults:  IP CONSULT TO NEUROLOGY  IP CONSULT TO NEUROLOGY  IP CONSULT TO GASTROENTEROLOGY  IP CONSULT TO HOSPITALIST

## 2018-01-16 NOTE — PHYSICIAN ADVISORY
Short Stay Review       Pt Name:  Maria M Campbell   MR#  [id number]   CSN#   [id number]   [address] [place] [address],[address]  @ [facility]   Hospitalization date  1/15/2018  7:25 AM   Current Attending Physician  Ann-Marie Posada MD     A discharge order has been placed for this episode of hospital care for Ms. Maria M Campbell; since this hospital stay is less than two midnights, I reviewed Ms. Eliza Lopez chart. Ms. Eliza Lopez healthcare insurance/benefit include:  Payor: [place] / Plan: [facility] / Product Type: Medicare /     Utilization Review related clinical summary: This pt was hospitalized and evaluated for Acute CVA; that is ruled out.    Pt remains stable clinicailly     On the basis of chart review, this patient's hospitalization status         Should be changed to [address] MD MPH FACP   Physician Advisor    [address]   Utilization Review, Care Management         CSN:  [id number]   NICOLÁS:   [id number]  Admitted on :  1/15/2018   Discharge order

## 2018-01-16 NOTE — DISCHARGE SUMMARY
Hospitalist Discharge Summary     Patient ID:  Lindsey Ohara  [id number]  9 y.o.  1935    PCP on record: Ervin Grey MD    Admit date: 1/15/2018  Discharge date and time: 1/16/2018      DISCHARGE DIAGNOSIS:  Pre-Syncope    CONSULTATIONS:  IP CONSULT TO NEUROLOGY  IP CONSULT TO NEUROLOGY  IP CONSULT TO GASTROENTEROLOGY  IP CONSULT TO HOSPITALIST    Excerpted HPI from H&P of Agustina Tapia MD:  Lindsey Ohara is a 80 y.o.  female with PMHx significant for hypercholesteremia, HTN, fibromyalgia, diverticulitis, and glaucoma, and PSHx of hysterectomy, tubal ligation, and cataract removal,who is now been treated for diverticulitis with cipro/flagyl by gi. Now  presents per [facility] the ED with cc of feeling dizzy and lightheaded since waking up this morning  No loc, no falls, no c/p or sob. +associated nausea and diffuse frontal HA . Pt notes she is on prednisone for fibromyalgia. She denies history of CVA. Pt  on baby ASA. She specifically denies vision changes, vomiting, diarrhea, CP, SOB, weakness, speech difficulty, or syncope.   no uti sx.      We were asked to admit for work up and evaluation of the above problems. ______________________________________________________________________  DISCHARGE SUMMARY/HOSPITAL COURSE:  for full details see H&P, daily progress notes, labs, consult notes. Presyncope  ASA increased to 162mg, started low dose statins  Symptoms doesn't sound like TIA, CVA workup negative  TSH ok  No arrhythmia on tele  Pt recommended event monitor if reoccurrence.  As this may be vasovagal. Will give cardiologist info upon discharge  BP stable    Hypertension  Cont home med  Bp stable    Asthma  Cont home inhalers     Fibromyalgia, cont steroids @ 7.5 every day per pcp     Diverticulitis mild ttp left quadrant   complete cipro/flagyl       glaucoma, cont eye drops          _______________________________________________________________________  Patient seen and examined by me on discharge day. Pertinent Findings:  Gen:    Not in distress  Chest: Clear lungs  CVS:   Regular rhythm. No edema  Abd:  Soft, not distended, not tender  Neuro:  Alert, non focal  _______________________________________________________________________  DISCHARGE MEDICATIONS:   Current Discharge Medication List      START taking these medications    Details   pravastatin (PRAVACHOL) 10 mg tablet Take 1 Tab by mouth nightly. Qty: 30 Tab, Refills: 0         CONTINUE these medications which have CHANGED    Details   aspirin delayed-release 81 mg tablet Take 2 Tabs by mouth daily. Qty: 60 Tab, Refills: 0         CONTINUE these medications which have NOT CHANGED    Details   ciprofloxacin HCl (CIPRO) 500 mg tablet Take 1,000 mg by mouth two (2) times a day. metroNIDAZOLE (FLAGYL) 500 mg tablet Take 500 mg by mouth two (2) times a day. lisinopril (PRINIVIL, ZESTRIL) 20 mg tablet Take 20 mg by mouth nightly. predniSONE (STERAPRED) 5 mg dose pack Take 5-15 mg by mouth See Admin Instructions. See administration instruction per 5mg dose pack      CHOLECALCIFEROL, VITAMIN D3, PO Take 1 Tab by mouth daily. ARNICA EX by Apply Externally route daily as needed (Lower Extremity Cramps). gabapentin (NEURONTIN) 100 mg capsule Take 100 mg by mouth nightly. bimatoprost (LUMIGAN) 0.01 % ophthalmic drops Administer 1 Drop to right eye every evening.      multivitamin (ONE A DAY) tablet Take 1 Tab by mouth daily. lorazepam (ATIVAN) 0.5 mg tablet Take 0.5 mg by mouth nightly. co-enzyme Q-10 (CO Q-10) 100 mg capsule Take 100 mg by mouth daily. mometasone-formoterol (DULERA) 100-5 mcg/Actuation HFAA HFA inhaler Take 2 Puffs by inhalation two (2) times a day. albuterol (PROVENTIL HFA, VENTOLIN HFA, PROAIR HFA) 90 mcg/actuation inhaler Take 1 Puff by inhalation as needed for Wheezing.              My Recommended Diet, Activity, Wound Care, and follow-up labs are listed in the patient's Discharge Insturctions which I have personally completed and reviewed.     ______________________________________________________________________    Risk of deterioration: Low    Condition at Discharge:  Stable  ______________________________________________________________________    Disposition  Home with family, no needs  ______________________________________________________________________    Care Plan discussed with:   Patient, Family, RN    ______________________________________________________________________    Code Status: Full Code  ______________________________________________________________________      Follow up with:   PCP : Heber Kevin MD  Follow-up Information     Follow up With Details Comments Contact Info    Heber Kevin MD Schedule an appointment as soon as possible for a visit in 1 week  [id number] [place] [address]      Jossy Kwok MD  Cardiology, As needed [address] [facility]  [phone]                Total time in minutes spent coordinating this discharge (includes going over instructions, follow-up, prescriptions, and preparing report for sign off to her PCP) :  35 minutes    Signed:  Candy Lerma MD

## 2018-01-16 NOTE — PROGRESS NOTES
CM received update from ELVA Hermosillo nurse that pt has been down graded to Observation. CM printed the Code 44 and provided pt with the 2 Observation letters and pt understood. F/u appointments made and documented on the discharge paperwork. Pt's daughter will transport pt home by car. Care Management Interventions  PCP Verified by CM: Yes  Last Visit to PCP: 12/15/17  Mode of Transport at Discharge:  Other (see comment) (pt's daughter can transport by car)  [facility]ospital [facility]  Discharge Durable Medical Equipment: No  Physical Therapy Consult: Yes  Occupational Therapy Consult: Yes  Speech Therapy Consult: Yes  Current Support Network: Own Home, Lives Alone (Pt lives in a 1 story senior apartment with no steps to enter)  Confirm Follow Up Transport: Family  Plan discussed with Pt/Family/Caregiver: Yes  Discharge Location  Discharge Placement: [address], [address]

## 2018-01-16 NOTE — PROGRESS NOTES
TRANSFER - IN REPORT:    Verbal report received from Aleyda(name) on Rebeca Johnson  being received from ER(unit) for routine progression of care      Report consisted of patients Situation, Background, Assessment and   Recommendations(SBAR). Information from the following report(s) SBAR, Kardex, Recent Results, Med Rec Status and Cardiac Rhythm SR was reviewed with the receiving nurse. Opportunity for questions and clarification was provided. Assessment completed upon patients arrival to unit and care assumed.

## 2018-01-16 NOTE — PROGRESS NOTES
physical Therapy EVALUATION/DISCHARGE  Patient: Jessica Ewing (27 y.o. female)  Date: 1/16/2018  Primary Diagnosis: TIA (transient ischemic attack)  Weakness  TIA (transient ischemic attack)  Weakness    ASSESSMENT :  Based on the objective data described below, the patient presents with independence with functional mobility. Gait is steady and patient demonstrates good standing balance. Patient demonstrating mild orthostatic hypotension with standing initially but BP stabilized with time. Patient with c/o mild dizziness initially but this also resolved. Patient educated on self-assessment with positional changes and slow positional changes- patient indicates understanding of discussion. Patient is at her functional baseline of independence. She has no PT needs at this. PLAN :  Discharge Recommendations: None  Further Equipment Recommendations for Discharge: has appropriate equipment     SUBJECTIVE:   Patient stated I'm feeling fine now.     OBJECTIVE DATA SUMMARY:   HISTORY:    Past Medical History:   Diagnosis Date    Adverse effect of anesthesia     combative waking up    Anxiety     Arthritis     Asthma     as of 12/30/16 pt states under control    Diverticulitis Dx 11/2016    Fibromyalgia     GERD (gastroesophageal reflux disease)     Glaucoma     Hiatal hernia     Hypercholesteremia     Hypertension     Ill-defined condition     neurapthy in ble    Nausea & vomiting      Past Surgical History:   Procedure Laterality Date    HX CATARACT REMOVAL Right 01/04/2017    HX HYSTERECTOMY      HX ROTATOR CUFF REPAIR Left 2008    HX TUBAL LIGATION      GA COLONOSCOPY FLX DX W/COLLJ SPEC WHEN PFRMD  11/9/2011         GA EGD TRANSORAL BIOPSY SINGLE/MULTIPLE  11/9/2011          Prior Level of Function/Home Situation: patient reports complete independence with all mobility and ADLs    Home Situation  Home Environment: Apartment  # Steps to Enter: 0  One/Two Story Residence: One story  Living Alone: Yes  Support Systems: Family member(s)  Patient Expects to be Discharged to[de-identified] Apartment  Current DME Used/Available at Home: Grab bars, Walker, rolling, Raised toilet seat  Tub or Shower Type: Tub/Shower combination    EXAMINATION/PRESENTATION/DECISION MAKING:   Critical Behavior:  Neurologic State: Alert  Orientation Level: Oriented X4  Cognition: Follows commands  Safety/Judgement: Awareness of environment  Hearing: Auditory  Auditory Impairment: None    Range Of Motion:  AROM: Within functional limits                       Strength:    Strength: Within functional limits                    Tone & Sensation:   Tone: Normal              Sensation: Intact               Coordination:  Coordination: Within functional limits  Vision:      Functional Mobility:  Bed Mobility:      deferred; upon arrival patient sitting in bedside chair        Transfers:  Sit to Stand: Independent  Stand to Sit: Independent                       Balance:   Sitting: Intact  Standing: Impaired  Standing - Static: Good; Unsupported  Standing - Dynamic : Good  Ambulation/Gait Training:  Distance (ft): 150 Feet (ft)  Assistive Device: Gait belt  Ambulation - Level of Assistance: Independent     Gait Description (WDL):  (WFL)  Gait Abnormalities:  (mild in-toeing noted on L)              Speed/Aspen: Pace decreased (<100 feet/min)         Gait is steady with no LOB noted                   Functional Measure:    Elder Mobility Scale    20/20         EMS and G-code impairment scale:  Percentage of impairment CH  0% CI  1-19% CJ  20-39% CK  40-59% CL  60-79% CM  80-99% CN  100%   EMS Score 0-20 20 17-19 13-16 9-12 5-8 1-4 0      Scores under 10 - generally these patients are dependent in mobility maneuvers; require help with  basic ADL, such as transfers, toileting and dressing.     Scores between 10 - 13 - generally these patients are borderline in terms of safe mobility and  independence in ADL i.e. they require some help with some mobility maneuvers. Scores over 14 - Generally these patients are able to perform mobility maneuvers alone and safely  and are independent in basic ADL. G codes: In compliance with CMSs Claims Based Outcome Reporting, the following G-code set was chosen for this patient based on their primary functional limitation being treated: The outcome measure chosen to determine the severity of the functional limitation was the Elderly Mobility scale with a score of 20/20 which was correlated with the impairment scale. ? Mobility - Walking and Moving Around:     - CURRENT STATUS: CH - 0% impaired, limited or restricted    - GOAL STATUS: CH - 0% impaired, limited or restricted    - D/C STATUS:  CH - 0% impaired, limited or restricted            Pain:  Pain Scale 1: Numeric (0 - 10)  Pain Intensity 1: 0     Activity Tolerance:   Mild, brief orthostatic hypotension in standing which resolved with time in standing  Please refer to the flowsheet for vital signs taken during this treatment. After treatment:   [x]   Patient left in no apparent distress sitting up in chair  []   Patient left in no apparent distress in bed  [x]   Call bell left within reach  [x]   Nursing notified  [x]   Caregiver present  []   Bed alarm activated    COMMUNICATION/EDUCATION:   Communication/Collaboration:  [x]   Fall prevention education was provided and the patient/caregiver indicated understanding. [x]   Patient/family have participated as able and agree with findings and recommendations. []   Patient is unable to participate in plan of care at this time.   Findings and recommendations were discussed with: Occupational Therapist, Registered Nurse and     Thank you for this referral.  Nathalia Olmos, PT   Time Calculation: 19 mins

## 2018-01-16 NOTE — PROGRESS NOTES
A Neuro f/u is scheduled with Rosie Reyna NP for March 22 at 9;00am    A PCP f/u is scheduled with Dr Concha Funez for Jan 26 at 11;30am

## 2018-01-16 NOTE — PROGRESS NOTES
[facility] Stroke Education [employer] and Stroke Education provided to patient and the following topics were discussed    1. Patients personal risk factors for stroke are hypertension    2. Warning signs of Stroke:        * Sudden numbness or weakness of the face, arm or leg, especially on one side of          The body            * Sudden confusion, trouble speaking or understanding        * Sudden trouble seeing in one or both eyes        * Sudden trouble walking, dizziness, loss of balance or coordination        * Sudden severe headache with no known cause      3. Importance of activation Emergency Medical Services ( 9-1-1 ) immediately if                       experience any warning signs of stroke. 4. Be sure and schedule a follow-up appointment with your primary care doctor or any                  specialists as instructed. 5. You must take medicine every day to treat your risk factors for stroke. Be sure to take your medicines exactly as your doctor tells you: no more, no less. Know what your medicines are for , what they do. Anti-thrombotics /anticoagulants can help prevent strokes. You are taking the following medicine(s)  aspirin     6. Smoking and second-hand smoke greatly increase your risk of stroke, cardiovascular disease and death.  Smoking history never

## 2018-01-16 NOTE — PROGRESS NOTES
Discharge instructions reviewed with patient and daughter. Follow-up appointments reviewed at this time, opportunity for questions and clarification offered as well. No questions asked at this time.

## 2018-01-16 NOTE — PROGRESS NOTES
* No surgery found *  * No surgery found *  Bedside and Verbal shift change report given to Stewart Savannah Deal (oncoming nurse) by Ava Fabian RN (offgoing nurse). Report included the following information SBAR, Kardex, Recent Results, Med Rec Status and Cardiac Rhythm SR. Zone Phone:   [phone]      Significant changes during shift:  Admission        Patient Information    Dago Toscano  80 y.o.  1/15/2018  7:25 AM by Elise Drew MD. 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    Primary open angle glaucoma of left eye, mild stage 01/06/2017    Combined forms of age-related cataract of left eye 01/06/2017    Neuropathy 08/30/2013    Restless leg syndrome 08/30/2013    Peritoneal cyst 03/16/2012    Abdominal mass 03/15/2012    Essential hypertension, benign 03/15/2012     Past Medical History:   Diagnosis Date    Adverse effect of anesthesia     combative waking up    Anxiety     Arthritis     Asthma     as of 12/30/16 pt states under control    Diverticulitis Dx 11/2016    Fibromyalgia     GERD (gastroesophageal reflux disease)     Glaucoma     Hiatal hernia     Hypercholesteremia     Hypertension     Ill-defined condition     neurapthy in ble    Nausea & vomiting          Core Measures:    CVA: Yes Yes  CHF:No No  PNA:No No    Post Op Surgical (If Applicable):     Number times ambulated in hallway past shift:  0  Number of times OOB to chair past shift:   0  NG Tube: No  Incentive Spirometer: No  Drains: No   Volume  0  Dressing Present:  No  Flatus:  No    Activity Status:    OOB to Chair No  Ambulated this shift Yes   Bed Rest No    Supplemental O2: (If Applicable)    NC No  NRB No  Venti-mask No  On 0 Liters/min      LINES AND DRAINS:    Central Line? No   PICC LINE? No   Urinary Catheter?  No   DVT prophylaxis:    DVT prophylaxis Med- No  DVT prophylaxis SCD or ANDREA- No     Wounds: (If Applicable)    Wounds- No    Location 0    Patient Safety:    Falls Score Total Score: 1  Safety Level_______  Bed Alarm On? Yes  Sitter?  No    Plan for upcoming shift: Safety, neurochecks        Discharge Plan: Yes when medically stable    Active Consults:  IP CONSULT TO NEUROLOGY  IP CONSULT TO NEUROLOGY  IP CONSULT TO GASTROENTEROLOGY  IP CONSULT TO HOSPITALIST

## 2018-02-02 ENCOUNTER — HOSPITAL ENCOUNTER (OUTPATIENT)
Dept: CT IMAGING | Age: 83
Discharge: HOME | End: 2018-02-02
Attending: INTERNAL MEDICINE
Payer: MEDICARE

## 2018-02-02 DIAGNOSIS — R10.32 PAIN, ABDOMINAL, LLQ: ICD-10-CM

## 2018-02-02 PROCEDURE — 74011000255 HC RX REV CODE- 255: Performed by: INTERNAL MEDICINE

## 2018-02-02 PROCEDURE — 74011636320 HC RX REV CODE- 636/320: Performed by: INTERNAL MEDICINE

## 2018-02-02 PROCEDURE — 74011250636 HC RX REV CODE- 250/636: Performed by: INTERNAL MEDICINE

## 2018-02-02 PROCEDURE — 74177 CT ABD & PELVIS W/CONTRAST: CPT

## 2018-02-02 RX ORDER — BARIUM SULFATE 20 MG/ML
900 SUSPENSION ORAL
Status: COMPLETED | OUTPATIENT
Start: 2018-02-02 | End: 2018-02-02

## 2018-02-02 RX ORDER — SODIUM CHLORIDE 0.9 % (FLUSH) 0.9 %
10 SYRINGE (ML) INJECTION
Status: COMPLETED | OUTPATIENT
Start: 2018-02-02 | End: 2018-02-02

## 2018-02-02 RX ORDER — SODIUM CHLORIDE 9 MG/ML
50 INJECTION, SOLUTION INTRAVENOUS
Status: COMPLETED | OUTPATIENT
Start: 2018-02-02 | End: 2018-02-02

## 2018-02-02 RX ADMIN — IOPAMIDOL 100 ML: 755 INJECTION, SOLUTION INTRAVENOUS at 13:24

## 2018-02-02 RX ADMIN — Medication 10 ML: at 13:24

## 2018-02-02 RX ADMIN — BARIUM SULFATE 900 ML: 21 SUSPENSION ORAL at 13:24

## 2018-02-02 RX ADMIN — SODIUM CHLORIDE 50 ML/HR: 900 INJECTION, SOLUTION INTRAVENOUS at 13:24

## 2018-05-14 ENCOUNTER — HOSPITAL ENCOUNTER (EMERGENCY)
Age: 83
Discharge: HOME | End: 2018-05-14
Attending: EMERGENCY MEDICINE | Admitting: EMERGENCY MEDICINE
Payer: MEDICARE

## 2018-05-14 ENCOUNTER — APPOINTMENT (OUTPATIENT)
Dept: CT IMAGING | Age: 83
End: 2018-05-14
Attending: EMERGENCY MEDICINE
Payer: MEDICARE

## 2018-05-14 VITALS
DIASTOLIC BLOOD PRESSURE: 60 MMHG | OXYGEN SATURATION: 95 % | HEART RATE: 69 BPM | WEIGHT: 143 LBS | TEMPERATURE: 98.1 F | HEIGHT: 63 IN | BODY MASS INDEX: 25.34 KG/M2 | RESPIRATION RATE: 18 BRPM | SYSTOLIC BLOOD PRESSURE: 150 MMHG

## 2018-05-14 DIAGNOSIS — K57.92 DIVERTICULITIS: Primary | ICD-10-CM

## 2018-05-14 LAB
ALBUMIN SERPL-MCNC: 3.4 G/DL (ref 3.5–5)
ALBUMIN/GLOB SERPL: 1 {RATIO} (ref 1.1–2.2)
ALP SERPL-CCNC: 66 U/L (ref 45–117)
ALT SERPL-CCNC: 26 U/L (ref 12–78)
ANION GAP SERPL CALC-SCNC: 6 MMOL/L (ref 5–15)
APPEARANCE UR: CLEAR
AST SERPL-CCNC: 25 U/L (ref 15–37)
ATRIAL RATE: 68 BPM
BACTERIA URNS QL MICRO: NEGATIVE /HPF
BASOPHILS # BLD: 0.1 K/UL (ref 0–0.1)
BASOPHILS NFR BLD: 1 % (ref 0–1)
BILIRUB SERPL-MCNC: 0.4 MG/DL (ref 0.2–1)
BILIRUB UR QL: NEGATIVE
BUN SERPL-MCNC: 15 MG/DL (ref 6–20)
BUN/CREAT SERPL: 15 (ref 12–20)
CALCIUM SERPL-MCNC: 9.1 MG/DL (ref 8.5–10.1)
CALCULATED P AXIS, ECG09: 53 DEGREES
CALCULATED R AXIS, ECG10: 14 DEGREES
CALCULATED T AXIS, ECG11: 57 DEGREES
CHLORIDE SERPL-SCNC: 103 MMOL/L (ref 97–108)
CO2 SERPL-SCNC: 30 MMOL/L (ref 21–32)
COLOR UR: ABNORMAL
CREAT SERPL-MCNC: 1 MG/DL (ref 0.55–1.02)
DIAGNOSIS, 93000: NORMAL
DIFFERENTIAL METHOD BLD: ABNORMAL
EOSINOPHIL # BLD: 0.1 K/UL (ref 0–0.4)
EOSINOPHIL NFR BLD: 1 % (ref 0–7)
EPITH CASTS URNS QL MICRO: ABNORMAL /LPF
ERYTHROCYTE [DISTWIDTH] IN BLOOD BY AUTOMATED COUNT: 12.9 % (ref 11.5–14.5)
GLOBULIN SER CALC-MCNC: 3.3 G/DL (ref 2–4)
GLUCOSE SERPL-MCNC: 114 MG/DL (ref 65–100)
GLUCOSE UR STRIP.AUTO-MCNC: NEGATIVE MG/DL
HCT VFR BLD AUTO: 38.8 % (ref 35–47)
HGB BLD-MCNC: 13 G/DL (ref 11.5–16)
HGB UR QL STRIP: NEGATIVE
IMM GRANULOCYTES # BLD: 0 K/UL (ref 0–0.04)
IMM GRANULOCYTES NFR BLD AUTO: 0 % (ref 0–0.5)
INR PPP: 1 (ref 0.9–1.1)
KETONES UR QL STRIP.AUTO: NEGATIVE MG/DL
LACTATE SERPL-SCNC: 1.9 MMOL/L (ref 0.4–2)
LEUKOCYTE ESTERASE UR QL STRIP.AUTO: ABNORMAL
LIPASE SERPL-CCNC: 138 U/L (ref 73–393)
LYMPHOCYTES # BLD: 1.7 K/UL (ref 0.8–3.5)
LYMPHOCYTES NFR BLD: 18 % (ref 12–49)
MCH RBC QN AUTO: 29 PG (ref 26–34)
MCHC RBC AUTO-ENTMCNC: 33.5 G/DL (ref 30–36.5)
MCV RBC AUTO: 86.4 FL (ref 80–99)
MONOCYTES # BLD: 1.1 K/UL (ref 0–1)
MONOCYTES NFR BLD: 11 % (ref 5–13)
NEUTS SEG # BLD: 6.7 K/UL (ref 1.8–8)
NEUTS SEG NFR BLD: 69 % (ref 32–75)
NITRITE UR QL STRIP.AUTO: NEGATIVE
NRBC # BLD: 0 K/UL (ref 0–0.01)
NRBC BLD-RTO: 0 PER 100 WBC
P-R INTERVAL, ECG05: 166 MS
PH UR STRIP: 7.5 [PH] (ref 5–8)
PLATELET # BLD AUTO: 208 K/UL (ref 150–400)
PMV BLD AUTO: 9.8 FL (ref 8.9–12.9)
POTASSIUM SERPL-SCNC: 3.2 MMOL/L (ref 3.5–5.1)
PROT SERPL-MCNC: 6.7 G/DL (ref 6.4–8.2)
PROT UR STRIP-MCNC: NEGATIVE MG/DL
PROTHROMBIN TIME: 10.2 SEC (ref 9–11.1)
Q-T INTERVAL, ECG07: 392 MS
QRS DURATION, ECG06: 92 MS
QTC CALCULATION (BEZET), ECG08: 416 MS
RBC # BLD AUTO: 4.49 M/UL (ref 3.8–5.2)
RBC #/AREA URNS HPF: ABNORMAL /HPF (ref 0–5)
SODIUM SERPL-SCNC: 139 MMOL/L (ref 136–145)
SP GR UR REFRACTOMETRY: 1.03 (ref 1–1.03)
TROPONIN I SERPL-MCNC: <0.04 NG/ML
UA: UC IF INDICATED,UAUC: ABNORMAL
UROBILINOGEN UR QL STRIP.AUTO: 0.2 EU/DL (ref 0.2–1)
VENTRICULAR RATE, ECG03: 68 BPM
WBC # BLD AUTO: 9.7 K/UL (ref 3.6–11)
WBC URNS QL MICRO: ABNORMAL /HPF (ref 0–4)

## 2018-05-14 PROCEDURE — 84484 ASSAY OF TROPONIN QUANT: CPT | Performed by: EMERGENCY MEDICINE

## 2018-05-14 PROCEDURE — 74011250636 HC RX REV CODE- 250/636: Performed by: EMERGENCY MEDICINE

## 2018-05-14 PROCEDURE — 83690 ASSAY OF LIPASE: CPT | Performed by: EMERGENCY MEDICINE

## 2018-05-14 PROCEDURE — 96374 THER/PROPH/DIAG INJ IV PUSH: CPT

## 2018-05-14 PROCEDURE — 36415 COLL VENOUS BLD VENIPUNCTURE: CPT | Performed by: EMERGENCY MEDICINE

## 2018-05-14 PROCEDURE — 74011636320 HC RX REV CODE- 636/320: Performed by: EMERGENCY MEDICINE

## 2018-05-14 PROCEDURE — 74177 CT ABD & PELVIS W/CONTRAST: CPT

## 2018-05-14 PROCEDURE — 96361 HYDRATE IV INFUSION ADD-ON: CPT

## 2018-05-14 PROCEDURE — 74011250637 HC RX REV CODE- 250/637: Performed by: EMERGENCY MEDICINE

## 2018-05-14 PROCEDURE — 93005 ELECTROCARDIOGRAM TRACING: CPT

## 2018-05-14 PROCEDURE — 81001 URINALYSIS AUTO W/SCOPE: CPT | Performed by: EMERGENCY MEDICINE

## 2018-05-14 PROCEDURE — 85025 COMPLETE CBC W/AUTO DIFF WBC: CPT | Performed by: EMERGENCY MEDICINE

## 2018-05-14 PROCEDURE — 99284 EMERGENCY DEPT VISIT MOD MDM: CPT

## 2018-05-14 PROCEDURE — 83605 ASSAY OF LACTIC ACID: CPT | Performed by: EMERGENCY MEDICINE

## 2018-05-14 PROCEDURE — 80053 COMPREHEN METABOLIC PANEL: CPT | Performed by: EMERGENCY MEDICINE

## 2018-05-14 PROCEDURE — 85610 PROTHROMBIN TIME: CPT | Performed by: EMERGENCY MEDICINE

## 2018-05-14 RX ORDER — SODIUM CHLORIDE 9 MG/ML
50 INJECTION, SOLUTION INTRAVENOUS
Status: COMPLETED | OUTPATIENT
Start: 2018-05-14 | End: 2018-05-14

## 2018-05-14 RX ORDER — ONDANSETRON 2 MG/ML
4 INJECTION INTRAMUSCULAR; INTRAVENOUS ONCE
Status: COMPLETED | OUTPATIENT
Start: 2018-05-14 | End: 2018-05-14

## 2018-05-14 RX ORDER — CIPROFLOXACIN 500 MG/1
500 TABLET ORAL EVERY 12 HOURS
Status: DISCONTINUED | OUTPATIENT
Start: 2018-05-14 | End: 2018-05-14

## 2018-05-14 RX ORDER — METRONIDAZOLE 250 MG/1
500 TABLET ORAL ONCE
Status: COMPLETED | OUTPATIENT
Start: 2018-05-14 | End: 2018-05-14

## 2018-05-14 RX ORDER — SODIUM CHLORIDE 0.9 % (FLUSH) 0.9 %
10 SYRINGE (ML) INJECTION
Status: COMPLETED | OUTPATIENT
Start: 2018-05-14 | End: 2018-05-14

## 2018-05-14 RX ORDER — CIPROFLOXACIN 500 MG/1
500 TABLET ORAL 2 TIMES DAILY
Qty: 20 TAB | Refills: 0 | Status: SHIPPED | OUTPATIENT
Start: 2018-05-14 | End: 2018-05-24

## 2018-05-14 RX ORDER — FENTANYL CITRATE 50 UG/ML
25 INJECTION, SOLUTION INTRAMUSCULAR; INTRAVENOUS ONCE
Status: DISCONTINUED | OUTPATIENT
Start: 2018-05-14 | End: 2018-05-14 | Stop reason: HOSPADM

## 2018-05-14 RX ORDER — METRONIDAZOLE 500 MG/1
500 TABLET ORAL 3 TIMES DAILY
Qty: 30 TAB | Refills: 0 | Status: SHIPPED | OUTPATIENT
Start: 2018-05-14 | End: 2018-05-24

## 2018-05-14 RX ORDER — CIPROFLOXACIN 500 MG/1
250 TABLET ORAL EVERY 12 HOURS
Status: DISCONTINUED | OUTPATIENT
Start: 2018-05-14 | End: 2018-05-14 | Stop reason: HOSPADM

## 2018-05-14 RX ADMIN — CIPROFLOXACIN HYDROCHLORIDE 250 MG: 500 TABLET, FILM COATED ORAL at 09:44

## 2018-05-14 RX ADMIN — SODIUM CHLORIDE 500 ML: 900 INJECTION, SOLUTION INTRAVENOUS at 07:53

## 2018-05-14 RX ADMIN — Medication 10 ML: at 08:50

## 2018-05-14 RX ADMIN — METRONIDAZOLE 500 MG: 250 TABLET ORAL at 09:44

## 2018-05-14 RX ADMIN — SODIUM CHLORIDE 50 ML/HR: 900 INJECTION, SOLUTION INTRAVENOUS at 08:50

## 2018-05-14 RX ADMIN — ONDANSETRON HYDROCHLORIDE 4 MG: 2 INJECTION, SOLUTION INTRAMUSCULAR; INTRAVENOUS at 07:53

## 2018-05-14 RX ADMIN — IOPAMIDOL 100 ML: 755 INJECTION, SOLUTION INTRAVENOUS at 08:50

## 2018-05-14 NOTE — DISCHARGE INSTRUCTIONS
Diverticulitis: Care Instructions  Your Care Instructions    Diverticulitis occurs when pouches form in the wall of the colon and become inflamed or infected. It can be very painful. Doctors aren't sure what causes diverticulitis. There is no proof that foods such as nuts, seeds, or berries cause it or make it worse. A low-fiber diet may cause the colon to work harder to push stool forward. Pouches may form because of this extra work. It may be hard to think about healthy eating while you're in pain. But as you recover, you might think about how you can use healthy eating for overall better health. Healthy eating may help you avoid future attacks. Follow-up care is a key part of your treatment and safety. Be sure to make and go to all appointments, and call your doctor if you are having problems. It's also a good idea to know your test results and keep a list of the medicines you take. How can you care for yourself at home? · Drink plenty of fluids, enough so that your urine is light yellow or clear like water. If you have kidney, heart, or liver disease and have to limit fluids, talk with your doctor before you increase the amount of fluids you drink. · Stick to liquids or a bland diet (plain rice, bananas, dry toast or crackers, applesauce) until you are feeling better. Then you can return to regular foods and gradually increase the amount of fiber in your diet. · Use a heating pad set on low on your belly to relieve mild cramps and pain. · Get extra rest until you are feeling better. · Be safe with medicines. Read and follow all instructions on the label. ¨ If the doctor gave you a prescription medicine for pain, take it as prescribed. ¨ If you are not taking a prescription pain medicine, ask your doctor if you can take an over-the-counter medicine. · If your doctor prescribed antibiotics, take them as directed. Do not stop taking them just because you feel better.  You need to take the full course of antibiotics. To prevent future attacks of diverticulitis  · Avoid constipation:  ¨ Include fruits, vegetables, beans, and whole grains in your diet each day. These foods are high in fiber. ¨ Drink plenty of fluids, enough so that your urine is light yellow or clear like water. If you have kidney, heart, or liver disease and have to limit fluids, talk with your doctor before you increase the amount of fluids you drink. ¨ Get some exercise every day. Build up slowly to 30 to 60 minutes a day on 5 or more days of the week. ¨ Take a fiber supplement, such as Citrucel or Metamucil, every day if needed. Read and follow all instructions on the label. ¨ Schedule time each day for a bowel movement. Having a daily routine may help. Take your time and do not strain when having a bowel movement. When should you call for help? Call your doctor now or seek immediate medical care if:  ? · You have a fever. ? · You are vomiting. ? · You have new or worse belly pain. ? · You cannot pass stools or gas. ? Watch closely for changes in your health, and be sure to contact your doctor if you have any problems. Where can you learn more? Go to http://addison-mary.info/. Enter H901 in the search box to learn more about \"Diverticulitis: Care Instructions. \"  Current as of: May 12, 2017  Content Version: 11.4  © 5295-8528 [employer]. Care instructions adapted under license by [employer] (which disclaims liability or warranty for this information). If you have questions about a medical condition or this instruction, always ask your healthcare professional. [place] [address] any warranty or liability for your use of this information.

## 2018-05-14 NOTE — ED NOTES
Report received from [facility]. LINDA, Sheldon, ED Summary, MAR and Recent Results was discussed.     Alee Burnham RN

## 2018-05-14 NOTE — ED NOTES
Patient discharged by Dr. Florecita Milian. Patient provided with discharge instructions Rx and instructions on follow up care. Patient out of ED ambulatory accompanied by family.

## 2018-05-14 NOTE — ED PROVIDER NOTES
EMERGENCY DEPARTMENT HISTORY AND PHYSICAL EXAM      Date: 5/14/2018  Patient Name: Flori De La Rosa    History of Presenting Illness     Chief Complaint   Patient presents with    Abdominal Pain       History Provided By: Patient and Patient's Daughter    HPI: Flori De La Rosa, 80 y.o. female with PMHx significant for diverticulitis, HTN, fibromyalgia, CAD, presents via private vehicle to the ED with cc of abdominal pain. Patient was feeling well last night prior to bed and awoke this morning at approximately 4 AM with sudden onset LLQ pain, vomit x 2 (non bloody/non bilious), and difficulty moving bowels (pain while bearing down). Patient states she did not take anything for pain, was initially 10/10 but now 4/10 unless being palpated which makes it much worse, non radiating, predominately in the LLQ. Had 2 BMs since awaking - while painful/difficult they were non bloody/non mucoid. Ate multiple meats (steak, shrimp, and chicken) at a bbq yesterday afternoon around 4pm but no one else in family is known to be sick at this time. Chief Complaint: abdminal pain  Duration: 4 Hours  Timing:  Acute  Location: LLQ abdomen  Quality: Stabbing  Severity: 4 out of 10  Modifying Factors: Worse with palpation  Associated Symptoms: nausea and vomiting    There are no other complaints, changes, or physical findings at this time.     PCP: Daquan Olmos MD    Current Facility-Administered Medications   Medication Dose Route Frequency Provider Last Rate Last Dose    fentaNYL citrate (PF) injection 25 mcg  25 mcg IntraVENous ONCE Steffi Green MD        0.9% sodium chloride infusion  50 mL/hr IntraVENous RENEA Agee MD        sodium chloride (NS) flush 10 mL  10 mL IntraVENous RAD Terrance Agee MD        iopamidol (ISOVUE-370) 76 % injection 100 mL  100 mL IntraVENous RAD ONCE Alma Coker MD         Current Outpatient Prescriptions   Medication Sig Dispense Refill    aspirin delayed-release 81 mg tablet Take 2 Tabs by mouth daily. 60 Tab 0    pravastatin (PRAVACHOL) 10 mg tablet Take 1 Tab by mouth nightly. 30 Tab 0    ciprofloxacin HCl (CIPRO) 500 mg tablet Take 1,000 mg by mouth two (2) times a day.  metroNIDAZOLE (FLAGYL) 500 mg tablet Take 500 mg by mouth two (2) times a day.  lisinopril (PRINIVIL, ZESTRIL) 20 mg tablet Take 20 mg by mouth nightly.  predniSONE (STERAPRED) 5 mg dose pack Take 5-15 mg by mouth See Admin Instructions. See administration instruction per 5mg dose pack      CHOLECALCIFEROL, VITAMIN D3, PO Take 1 Tab by mouth daily.  ARNICA EX by Apply Externally route daily as needed (Lower Extremity Cramps).  gabapentin (NEURONTIN) 100 mg capsule Take 100 mg by mouth nightly.  bimatoprost (LUMIGAN) 0.01 % ophthalmic drops Administer 1 Drop to right eye every evening.  multivitamin (ONE A DAY) tablet Take 1 Tab by mouth daily.  albuterol (PROVENTIL HFA, VENTOLIN HFA, PROAIR HFA) 90 mcg/actuation inhaler Take 1 Puff by inhalation as needed for Wheezing.  lorazepam (ATIVAN) 0.5 mg tablet Take 0.5 mg by mouth nightly.  co-enzyme Q-10 (CO Q-10) 100 mg capsule Take 100 mg by mouth daily.  mometasone-formoterol (DULERA) 100-5 mcg/Actuation HFAA HFA inhaler Take 2 Puffs by inhalation two (2) times a day.          Past History     Past Medical History:  Past Medical History:   Diagnosis Date    Adverse effect of anesthesia     combative waking up    Anxiety     Arthritis     Asthma     as of 12/30/16 pt states under control    Diverticulitis Dx 11/2016    Fibromyalgia     GERD (gastroesophageal reflux disease)     Glaucoma     Hiatal hernia     Hypercholesteremia     Hypertension     Ill-defined condition     neurapthy in ble    Nausea & vomiting        Past Surgical History:  Past Surgical History:   Procedure Laterality Date    HX CATARACT REMOVAL Right 01/04/2017    HX HYSTERECTOMY      HX ROTATOR CUFF REPAIR Left 2008    HX TUBAL LIGATION      DE COLONOSCOPY FLX DX W/COLLJ SPEC WHEN PFRMD  11/9/2011         DE EGD TRANSORAL BIOPSY SINGLE/MULTIPLE  11/9/2011            Family History:  No family history on file. Social History:  Social History   Substance Use Topics    Smoking status: Never Smoker    Smokeless tobacco: Never Used    Alcohol use No       Allergies: Allergies   Allergen Reactions    Cymbalta [Duloxetine] Nausea Only and Other (comments)     \"within the hour I had a severe headache, and vomiting\"      Demerol [Meperidine] Nausea and Vomiting    Morphine Nausea and Vomiting    Penicillins Other (comments)     rash    Percocet [Oxycodone-Acetaminophen] Nausea and Vomiting         Review of Systems   Review of Systems   Constitutional: Negative for activity change, chills and fever. HENT: Negative for congestion and sore throat. Eyes: Negative for pain and redness. Respiratory: Negative for cough, chest tightness and shortness of breath. Cardiovascular: Negative for chest pain, palpitations and leg swelling. Gastrointestinal: Positive for abdominal pain (Predominately LLQ), constipation, nausea and vomiting. Negative for abdominal distention, anal bleeding, blood in stool, diarrhea and rectal pain. Genitourinary: Negative for difficulty urinating, dysuria, flank pain, frequency, hematuria, urgency and vaginal bleeding. Musculoskeletal: Negative for back pain and neck pain. Skin: Negative for rash. Neurological: Negative for syncope, light-headedness and headaches. Psychiatric/Behavioral: Negative for confusion. All other systems reviewed and are negative. Physical Exam   Physical Exam   Constitutional: She is oriented to person, place, and time. She appears well-developed and well-nourished. No distress. HENT:   Head: Normocephalic. Nose: Nose normal.   Mouth/Throat: Oropharynx is clear and moist. No oropharyngeal exudate.    Eyes: Conjunctivae are normal. Pupils are equal, round, and reactive to light. No scleral icterus. Neck: Normal range of motion. Neck supple. No JVD present. No tracheal deviation present. No thyromegaly present. Cardiovascular: Normal rate, regular rhythm and intact distal pulses. Exam reveals no gallop and no friction rub. No murmur heard. Pulmonary/Chest: Effort normal and breath sounds normal. No stridor. No respiratory distress. She has no wheezes. She has no rales. Abdominal: Soft. Bowel sounds are normal. She exhibits no distension and no mass. There is tenderness (LLQ tenderness to deep palpation). There is no rebound and no guarding. Musculoskeletal: Normal range of motion. She exhibits no edema. Lymphadenopathy:     She has no cervical adenopathy. Neurological: She is alert and oriented to person, place, and time. No cranial nerve deficit. She exhibits normal muscle tone. Coordination normal.   Skin: Skin is warm and dry. No rash noted. She is not diaphoretic. No erythema. Psychiatric: She has a normal mood and affect. Her behavior is normal.   Nursing note and vitals reviewed.         Diagnostic Study Results     Labs -     Recent Results (from the past 12 hour(s))   EKG, 12 LEAD, INITIAL    Collection Time: 05/14/18  7:17 AM   Result Value Ref Range    Ventricular Rate 68 BPM    Atrial Rate 68 BPM    P-R Interval 166 ms    QRS Duration 92 ms    Q-T Interval 392 ms    QTC Calculation (Bezet) 416 ms    Calculated P Axis 53 degrees    Calculated R Axis 14 degrees    Calculated T Axis 57 degrees    Diagnosis       Normal sinus rhythm  Normal ECG  When compared with ECG of 15-LUIS FELIPE-2018 07:51,  Previous ECG has undetermined rhythm, needs review     CBC WITH AUTOMATED DIFF    Collection Time: 05/14/18  7:30 AM   Result Value Ref Range    WBC 9.7 3.6 - 11.0 K/uL    RBC 4.49 3.80 - 5.20 M/uL    HGB 13.0 11.5 - 16.0 g/dL    HCT 38.8 35.0 - 47.0 %    MCV 86.4 80.0 - 99.0 FL    MCH 29.0 26.0 - 34.0 PG    MCHC 33.5 30.0 - 36.5 g/dL    RDW 12.9 11.5 - 14.5 % PLATELET 845 025 - 755 K/uL    MPV 9.8 8.9 - 12.9 FL    NRBC 0.0 0  WBC    ABSOLUTE NRBC 0.00 0.00 - 0.01 K/uL    NEUTROPHILS 69 32 - 75 %    LYMPHOCYTES 18 12 - 49 %    MONOCYTES 11 5 - 13 %    EOSINOPHILS 1 0 - 7 %    BASOPHILS 1 0 - 1 %    IMMATURE GRANULOCYTES 0 0.0 - 0.5 %    ABS. NEUTROPHILS 6.7 1.8 - 8.0 K/UL    ABS. LYMPHOCYTES 1.7 0.8 - 3.5 K/UL    ABS. MONOCYTES 1.1 (H) 0.0 - 1.0 K/UL    ABS. EOSINOPHILS 0.1 0.0 - 0.4 K/UL    ABS. BASOPHILS 0.1 0.0 - 0.1 K/UL    ABS. IMM. GRANS. 0.0 0.00 - 0.04 K/UL    DF AUTOMATED     METABOLIC PANEL, COMPREHENSIVE    Collection Time: 05/14/18  7:30 AM   Result Value Ref Range    Sodium 139 136 - 145 mmol/L    Potassium 3.2 (L) 3.5 - 5.1 mmol/L    Chloride 103 97 - 108 mmol/L    CO2 30 21 - 32 mmol/L    Anion gap 6 5 - 15 mmol/L    Glucose 114 (H) 65 - 100 mg/dL    BUN 15 6 - 20 MG/DL    Creatinine 1.00 0.55 - 1.02 MG/DL    BUN/Creatinine ratio 15 12 - 20      GFR est AA >60 >60 ml/min/1.73m2    GFR est non-AA 53 (L) >60 ml/min/1.73m2    Calcium 9.1 8.5 - 10.1 MG/DL    Bilirubin, total 0.4 0.2 - 1.0 MG/DL    ALT (SGPT) 26 12 - 78 U/L    AST (SGOT) 25 15 - 37 U/L    Alk.  phosphatase 66 45 - 117 U/L    Protein, total 6.7 6.4 - 8.2 g/dL    Albumin 3.4 (L) 3.5 - 5.0 g/dL    Globulin 3.3 2.0 - 4.0 g/dL    A-G Ratio 1.0 (L) 1.1 - 2.2     PROTHROMBIN TIME + INR    Collection Time: 05/14/18  7:30 AM   Result Value Ref Range    INR 1.0 0.9 - 1.1      Prothrombin time 10.2 9.0 - 11.1 sec   TROPONIN I    Collection Time: 05/14/18  7:30 AM   Result Value Ref Range    Troponin-I, Qt. <0.04 <0.05 ng/mL   LACTIC ACID    Collection Time: 05/14/18  7:30 AM   Result Value Ref Range    Lactic acid 1.9 0.4 - 2.0 MMOL/L   LIPASE    Collection Time: 05/14/18  7:30 AM   Result Value Ref Range    Lipase 138 73 - 393 U/L       Radiologic Studies -   CT ABD PELV W CONT    (Results Pending)     CT Results  (Last 48 hours)    None        CXR Results  (Last 48 hours)    None Medical Decision Making   I am the first provider for this patient. I reviewed the vital signs, available nursing notes, past medical history, past surgical history, family history and social history. Vital Signs-Reviewed the patient's vital signs. Patient Vitals for the past 12 hrs:   Temp Pulse Resp BP SpO2   05/14/18 0700 98.1 °F (36.7 °C) 69 18 151/67 95 %       Pulse Oximetry Analysis - 95% on RA    Cardiac Monitor:   Rate: 70s bpm  Rhythm: Normal Sinus Rhythm      EKG interpretation: (Preliminary)  Rhythm: normal sinus rhythm; and regular . Rate (approx.): 68; Axis: normal; MN interval: normal; QRS interval: normal ; ST/T wave: normal; Other findings: normal.  Written by Cuauhtemoc Rajput    Records Reviewed: Old Medical Records, Previous Radiology Studies and Previous Laboratory Studies    Provider Notes (Medical Decision Making): This is an 80year old female with the above history and physical exam who presents to the ER with sudden onset abdominal pain. Signs and symptoms are consistent with potential diverticulitis give patient's relatively recent history of diverticulosis/litis, recent difficulty with BMs, constipation, and tenderness to LLQ. Concern at this time is potential for small perforation vs. Abscess formation thus will gain Abd/pelvis CT with IV contrast to evaluate for both. Low suspicion of ovarian torsion given the reduction in pain, TTP, and location being more LLQ higher above the pelvic brim - as well as patient with prior tubal ligation/old age. No fevers, chills, or SIRS criteria met since labs have returned thus no indication at this time for IV abx. DDx: diverticulosis, diverticulitis, SBO, percy abscess, appendicitis, Atypical ACS. ED Course:   Initial assessment performed. The patients presenting problems have been discussed, and they are in agreement with the care plan formulated and outlined with them.   I have encouraged them to ask questions as they arise throughout their visit. 8:38 AM  Labs returned only significant for minor elevation of WBC, CT abd/pelvis still pending. Patient's vital signs remain unchanged. 9:43 AM  Imaging consistent with diverticulitis without perforation or abscess. Will provide flagyl/ciproflox, FU information with GI, and discharge instructions. I have discussed with the patient at length in regards to her diagnosis, return precautions, medication and follow up information. She verbalized understanding and repeated instructions back to me    Disposition:  Discharge; diverticulitis    PLAN:  1. Current Discharge Medication List        2. Follow-up Information     None        Return to ED if worse     Diagnosis     Clinical Impression: No diagnosis found.

## 2018-05-14 NOTE — ED TRIAGE NOTES
Pt to ed via medics with c/o lower abd pain. Per medics, pt has had lower abd pain since this morning.

## 2018-08-31 ENCOUNTER — APPOINTMENT (OUTPATIENT)
Dept: CT IMAGING | Age: 83
End: 2018-08-31
Attending: EMERGENCY MEDICINE
Payer: MEDICARE

## 2018-08-31 ENCOUNTER — HOSPITAL ENCOUNTER (EMERGENCY)
Age: 83
Discharge: HOME | End: 2018-08-31
Attending: EMERGENCY MEDICINE
Payer: MEDICARE

## 2018-08-31 VITALS
TEMPERATURE: 98.2 F | OXYGEN SATURATION: 98 % | DIASTOLIC BLOOD PRESSURE: 70 MMHG | HEART RATE: 68 BPM | SYSTOLIC BLOOD PRESSURE: 151 MMHG | RESPIRATION RATE: 14 BRPM

## 2018-08-31 DIAGNOSIS — R25.1 EPISODE OF SHAKING: Primary | ICD-10-CM

## 2018-08-31 DIAGNOSIS — R61 DIAPHORESIS: ICD-10-CM

## 2018-08-31 LAB
ALBUMIN SERPL-MCNC: 3.7 G/DL (ref 3.5–5)
ALBUMIN/GLOB SERPL: 1.2 {RATIO} (ref 1.1–2.2)
ALP SERPL-CCNC: 57 U/L (ref 45–117)
ALT SERPL-CCNC: 27 U/L (ref 12–78)
ANION GAP SERPL CALC-SCNC: 7 MMOL/L (ref 5–15)
APPEARANCE UR: CLEAR
AST SERPL-CCNC: 28 U/L (ref 15–37)
BACTERIA URNS QL MICRO: NEGATIVE /HPF
BASOPHILS # BLD: 0.1 K/UL (ref 0–0.1)
BASOPHILS NFR BLD: 1 % (ref 0–1)
BILIRUB SERPL-MCNC: 0.2 MG/DL (ref 0.2–1)
BILIRUB UR QL: NEGATIVE
BUN SERPL-MCNC: 14 MG/DL (ref 6–20)
BUN/CREAT SERPL: 18 (ref 12–20)
CALCIUM SERPL-MCNC: 9.1 MG/DL (ref 8.5–10.1)
CHLORIDE SERPL-SCNC: 101 MMOL/L (ref 97–108)
CK SERPL-CCNC: 119 U/L (ref 26–192)
CO2 SERPL-SCNC: 31 MMOL/L (ref 21–32)
COLOR UR: NORMAL
CREAT SERPL-MCNC: 0.8 MG/DL (ref 0.55–1.02)
DIFFERENTIAL METHOD BLD: ABNORMAL
EOSINOPHIL # BLD: 0.1 K/UL (ref 0–0.4)
EOSINOPHIL NFR BLD: 1 % (ref 0–7)
EPITH CASTS URNS QL MICRO: NORMAL /LPF
ERYTHROCYTE [DISTWIDTH] IN BLOOD BY AUTOMATED COUNT: 13.2 % (ref 11.5–14.5)
GLOBULIN SER CALC-MCNC: 3.2 G/DL (ref 2–4)
GLUCOSE SERPL-MCNC: 84 MG/DL (ref 65–100)
GLUCOSE UR STRIP.AUTO-MCNC: NEGATIVE MG/DL
HCT VFR BLD AUTO: 40.2 % (ref 35–47)
HGB BLD-MCNC: 13.2 G/DL (ref 11.5–16)
HGB UR QL STRIP: NEGATIVE
HYALINE CASTS URNS QL MICRO: NORMAL /LPF (ref 0–5)
IMM GRANULOCYTES # BLD: 0 K/UL (ref 0–0.04)
IMM GRANULOCYTES NFR BLD AUTO: 1 % (ref 0–0.5)
KETONES UR QL STRIP.AUTO: NEGATIVE MG/DL
LEUKOCYTE ESTERASE UR QL STRIP.AUTO: NEGATIVE
LYMPHOCYTES # BLD: 1.9 K/UL (ref 0.8–3.5)
LYMPHOCYTES NFR BLD: 23 % (ref 12–49)
MCH RBC QN AUTO: 29.4 PG (ref 26–34)
MCHC RBC AUTO-ENTMCNC: 32.8 G/DL (ref 30–36.5)
MCV RBC AUTO: 89.5 FL (ref 80–99)
MONOCYTES # BLD: 0.9 K/UL (ref 0–1)
MONOCYTES NFR BLD: 11 % (ref 5–13)
NEUTS SEG # BLD: 5.3 K/UL (ref 1.8–8)
NEUTS SEG NFR BLD: 64 % (ref 32–75)
NITRITE UR QL STRIP.AUTO: NEGATIVE
NRBC # BLD: 0 K/UL (ref 0–0.01)
NRBC BLD-RTO: 0 PER 100 WBC
PH UR STRIP: 7 [PH] (ref 5–8)
PLATELET # BLD AUTO: 252 K/UL (ref 150–400)
PMV BLD AUTO: 9.7 FL (ref 8.9–12.9)
POTASSIUM SERPL-SCNC: 3.4 MMOL/L (ref 3.5–5.1)
PROT SERPL-MCNC: 6.9 G/DL (ref 6.4–8.2)
PROT UR STRIP-MCNC: NEGATIVE MG/DL
RBC # BLD AUTO: 4.49 M/UL (ref 3.8–5.2)
RBC #/AREA URNS HPF: NORMAL /HPF (ref 0–5)
SODIUM SERPL-SCNC: 139 MMOL/L (ref 136–145)
SP GR UR REFRACTOMETRY: 1.01 (ref 1–1.03)
TROPONIN I SERPL-MCNC: <0.05 NG/ML
TSH SERPL DL<=0.05 MIU/L-ACNC: 1.8 UIU/ML (ref 0.36–3.74)
UA: UC IF INDICATED,UAUC: NORMAL
UROBILINOGEN UR QL STRIP.AUTO: 0.2 EU/DL (ref 0.2–1)
WBC # BLD AUTO: 8.2 K/UL (ref 3.6–11)
WBC URNS QL MICRO: NORMAL /HPF (ref 0–4)

## 2018-08-31 PROCEDURE — 99285 EMERGENCY DEPT VISIT HI MDM: CPT

## 2018-08-31 PROCEDURE — 84484 ASSAY OF TROPONIN QUANT: CPT | Performed by: EMERGENCY MEDICINE

## 2018-08-31 PROCEDURE — 81001 URINALYSIS AUTO W/SCOPE: CPT | Performed by: EMERGENCY MEDICINE

## 2018-08-31 PROCEDURE — 36415 COLL VENOUS BLD VENIPUNCTURE: CPT | Performed by: EMERGENCY MEDICINE

## 2018-08-31 PROCEDURE — 80053 COMPREHEN METABOLIC PANEL: CPT | Performed by: EMERGENCY MEDICINE

## 2018-08-31 PROCEDURE — 85025 COMPLETE CBC W/AUTO DIFF WBC: CPT | Performed by: EMERGENCY MEDICINE

## 2018-08-31 PROCEDURE — 93005 ELECTROCARDIOGRAM TRACING: CPT

## 2018-08-31 PROCEDURE — 70450 CT HEAD/BRAIN W/O DYE: CPT

## 2018-08-31 PROCEDURE — 84443 ASSAY THYROID STIM HORMONE: CPT | Performed by: EMERGENCY MEDICINE

## 2018-08-31 PROCEDURE — 82550 ASSAY OF CK (CPK): CPT | Performed by: EMERGENCY MEDICINE

## 2018-09-01 LAB
ATRIAL RATE: 63 BPM
CALCULATED P AXIS, ECG09: 44 DEGREES
CALCULATED R AXIS, ECG10: 4 DEGREES
CALCULATED T AXIS, ECG11: 31 DEGREES
DIAGNOSIS, 93000: NORMAL
P-R INTERVAL, ECG05: 172 MS
Q-T INTERVAL, ECG07: 412 MS
QRS DURATION, ECG06: 82 MS
QTC CALCULATION (BEZET), ECG08: 421 MS
VENTRICULAR RATE, ECG03: 63 BPM

## 2018-09-01 NOTE — ED PROVIDER NOTES
EMERGENCY DEPARTMENT HISTORY AND PHYSICAL EXAM    Date: 8/31/2018  Patient Name: Joycelyn Sandoval    History of Presenting Illness     Chief Complaint   Patient presents with   [facility]     & had same problems x 10 days ago & some dizzineass noted       History Provided By: Patient, Patient's Daughter and EMS    HPI: Joycelyn Sandoval is a 80 y.o. female, pmhx HCL, HTN, who presents via EMS to the ED c/o an episode of diaphoresis and shaking PTA. Pt states the episode started after she had eaten dinner. She had the onset of diaphoresis, then reports \"feeling shaky and weak all over. \" Pt states her sxs subsided after lasting about 10 minutes. She also notes eating something after the onset, which helped the sxs. Pt reports a similar episode x10 days ago, that also onset after dinner. She reports calling her PCP's office PTA who recommended the pt to be evaluated at the ED. The pt additionally reports checking her blood pressure and oxygen saturation at home during both episodes, notign they were normal. Pt's blood sugar was noted to be 88 en route by EMS. Pt endorses compliance with eating throughout the day. Of note, the pt is currently on 0.5 mg Prednisone per her Rheumatoid Arthritis Doctor, which she takes in the morning. She denies any dizziness upon positional changes. Pt specifically denies any fever, congestion, cough, shortness of breath, chest pain, abdominal pain, nausea, vomiting, diarrhea, dysuria, or urinary frequency. PCP: Leanna López MD    PMHx: Significant for HTN, HCL, Arthritis, anxiety   PSHx: Significant for EGD, Colonoscopy, tubal ligation, hysterectomy, Orthopedic   Social Hx: -tobacco, -EtOH, -Illicit Drugs     There are no other complaints, changes, or physical findings at this time. Current Outpatient Prescriptions   Medication Sig Dispense Refill    aspirin delayed-release 81 mg tablet Take 2 Tabs by mouth daily.  60 Tab 0    pravastatin (PRAVACHOL) 10 mg tablet Take 1 Tab by mouth nightly. 30 Tab 0    ciprofloxacin HCl (CIPRO) 500 mg tablet Take 1,000 mg by mouth two (2) times a day.  metroNIDAZOLE (FLAGYL) 500 mg tablet Take 500 mg by mouth two (2) times a day.  lisinopril (PRINIVIL, ZESTRIL) 20 mg tablet Take 20 mg by mouth nightly.  predniSONE (STERAPRED) 5 mg dose pack Take 5-15 mg by mouth See Admin Instructions. See administration instruction per 5mg dose pack      CHOLECALCIFEROL, VITAMIN D3, PO Take 1 Tab by mouth daily.  ARNICA EX by Apply Externally route daily as needed (Lower Extremity Cramps).  gabapentin (NEURONTIN) 100 mg capsule Take 100 mg by mouth nightly.  bimatoprost (LUMIGAN) 0.01 % ophthalmic drops Administer 1 Drop to right eye every evening.  multivitamin (ONE A DAY) tablet Take 1 Tab by mouth daily.  albuterol (PROVENTIL HFA, VENTOLIN HFA, PROAIR HFA) 90 mcg/actuation inhaler Take 1 Puff by inhalation as needed for Wheezing.  lorazepam (ATIVAN) 0.5 mg tablet Take 0.5 mg by mouth nightly.  co-enzyme Q-10 (CO Q-10) 100 mg capsule Take 100 mg by mouth daily.  mometasone-formoterol (DULERA) 100-5 mcg/Actuation HFAA HFA inhaler Take 2 Puffs by inhalation two (2) times a day.          Past History     Past Medical History:  Past Medical History:   Diagnosis Date    Adverse effect of anesthesia     combative waking up    Anxiety     Arthritis     Asthma     as of 12/30/16 pt states under control    Diverticulitis Dx 11/2016    Fibromyalgia     GERD (gastroesophageal reflux disease)     Glaucoma     Hiatal hernia     Hypercholesteremia     Hypertension     Ill-defined condition     neurapthy in ble    Nausea & vomiting        Past Surgical History:  Past Surgical History:   Procedure Laterality Date    HX CATARACT REMOVAL Right 01/04/2017    HX HYSTERECTOMY      HX ROTATOR CUFF REPAIR Left 2008    HX TUBAL LIGATION      AR COLONOSCOPY FLX DX W/COLLJ SPEC WHEN PFRMD  11/9/2011         AR EGD TRANSORAL BIOPSY SINGLE/MULTIPLE  11/9/2011            Family History:  No family history on file. Social History:  Social History   Substance Use Topics    Smoking status: Never Smoker    Smokeless tobacco: Never Used    Alcohol use No       Allergies: Allergies   Allergen Reactions    Cymbalta [Duloxetine] Nausea Only and Other (comments)     \"within the hour I had a severe headache, and vomiting\"      Demerol [Meperidine] Nausea and Vomiting    Morphine Nausea and Vomiting    Penicillins Other (comments)     rash    Percocet [Oxycodone-Acetaminophen] Nausea and Vomiting         Review of Systems   Review of Systems   Constitutional: Positive for diaphoresis. Negative for fever. Eyes: Negative. Respiratory: Negative. Negative for shortness of breath. Cardiovascular: Negative for chest pain. Gastrointestinal: Negative for abdominal pain, nausea and vomiting. Endocrine: Negative. Genitourinary: Negative. Negative for difficulty urinating, dysuria and hematuria. Musculoskeletal: Negative. Skin: Negative. Allergic/Immunologic: Negative. Neurological: Positive for tremors and weakness. Psychiatric/Behavioral: Negative for suicidal ideas. All other systems reviewed and are negative. Physical Exam   Physical Exam   Constitutional: She is oriented to person, place, and time. She appears well-developed and well-nourished. No distress. HENT:   Head: Normocephalic and atraumatic. Nose: Nose normal.   Eyes: Conjunctivae and EOM are normal. No scleral icterus. Neck: Normal range of motion. No tracheal deviation present. Cardiovascular: Normal rate, regular rhythm, normal heart sounds and intact distal pulses. Exam reveals no friction rub. No murmur heard. Pulmonary/Chest: Effort normal and breath sounds normal. No stridor. No respiratory distress. She has no wheezes. She has no rales. Abdominal: Soft. Bowel sounds are normal. She exhibits no distension.  There is no tenderness. There is no rebound. Musculoskeletal: Normal range of motion. She exhibits no tenderness. Neurological: She is alert and oriented to person, place, and time. She has normal strength. She is not disoriented. She displays no tremor. No cranial nerve deficit or sensory deficit. She displays a negative Romberg sign. Coordination and gait normal. GCS eye subscore is 4. GCS verbal subscore is 5. GCS motor subscore is 6. Skin: Skin is warm and dry. No rash noted. She is not diaphoretic. Psychiatric: She has a normal mood and affect. Her speech is normal and behavior is normal. Judgment and thought content normal. Cognition and memory are normal.   Nursing note and vitals reviewed.         Diagnostic Study Results     Labs -     Recent Results (from the past 12 hour(s))   EKG, 12 LEAD, INITIAL    Collection Time: 08/31/18  8:17 PM   Result Value Ref Range    Ventricular Rate 63 BPM    Atrial Rate 63 BPM    P-R Interval 172 ms    QRS Duration 82 ms    Q-T Interval 412 ms    QTC Calculation (Bezet) 421 ms    Calculated P Axis 44 degrees    Calculated R Axis 4 degrees    Calculated T Axis 31 degrees    Diagnosis       Normal sinus rhythm  Normal ECG  When compared with ECG of 14-MAY-2018 07:17,  No significant change was found     URINALYSIS W/ REFLEX CULTURE    Collection Time: 08/31/18  8:38 PM   Result Value Ref Range    Color YELLOW/STRAW      Appearance CLEAR CLEAR      Specific gravity 1.008 1.003 - 1.030      pH (UA) 7.0 5.0 - 8.0      Protein NEGATIVE  NEG mg/dL    Glucose NEGATIVE  NEG mg/dL    Ketone NEGATIVE  NEG mg/dL    Bilirubin NEGATIVE  NEG      Blood NEGATIVE  NEG      Urobilinogen 0.2 0.2 - 1.0 EU/dL    Nitrites NEGATIVE  NEG      Leukocyte Esterase NEGATIVE  NEG      WBC 0-4 0 - 4 /hpf    RBC 0-5 0 - 5 /hpf    Epithelial cells FEW FEW /lpf    Bacteria NEGATIVE  NEG /hpf    UA:UC IF INDICATED CULTURE NOT INDICATED BY UA RESULT CNI      Hyaline cast 0-2 0 - 5 /lpf   CBC WITH AUTOMATED DIFF Collection Time: 08/31/18  8:38 PM   Result Value Ref Range    WBC 8.2 3.6 - 11.0 K/uL    RBC 4.49 3.80 - 5.20 M/uL    HGB 13.2 11.5 - 16.0 g/dL    HCT 40.2 35.0 - 47.0 %    MCV 89.5 80.0 - 99.0 FL    MCH 29.4 26.0 - 34.0 PG    MCHC 32.8 30.0 - 36.5 g/dL    RDW 13.2 11.5 - 14.5 %    PLATELET 262 819 - 187 K/uL    MPV 9.7 8.9 - 12.9 FL    NRBC 0.0 0  WBC    ABSOLUTE NRBC 0.00 0.00 - 0.01 K/uL    NEUTROPHILS 64 32 - 75 %    LYMPHOCYTES 23 12 - 49 %    MONOCYTES 11 5 - 13 %    EOSINOPHILS 1 0 - 7 %    BASOPHILS 1 0 - 1 %    IMMATURE GRANULOCYTES 1 (H) 0.0 - 0.5 %    ABS. NEUTROPHILS 5.3 1.8 - 8.0 K/UL    ABS. LYMPHOCYTES 1.9 0.8 - 3.5 K/UL    ABS. MONOCYTES 0.9 0.0 - 1.0 K/UL    ABS. EOSINOPHILS 0.1 0.0 - 0.4 K/UL    ABS. BASOPHILS 0.1 0.0 - 0.1 K/UL    ABS. IMM. GRANS. 0.0 0.00 - 0.04 K/UL    DF AUTOMATED     METABOLIC PANEL, COMPREHENSIVE    Collection Time: 08/31/18  8:38 PM   Result Value Ref Range    Sodium 139 136 - 145 mmol/L    Potassium 3.4 (L) 3.5 - 5.1 mmol/L    Chloride 101 97 - 108 mmol/L    CO2 31 21 - 32 mmol/L    Anion gap 7 5 - 15 mmol/L    Glucose 84 65 - 100 mg/dL    BUN 14 6 - 20 MG/DL    Creatinine 0.80 0.55 - 1.02 MG/DL    BUN/Creatinine ratio 18 12 - 20      GFR est AA >60 >60 ml/min/1.73m2    GFR est non-AA >60 >60 ml/min/1.73m2    Calcium 9.1 8.5 - 10.1 MG/DL    Bilirubin, total 0.2 0.2 - 1.0 MG/DL    ALT (SGPT) 27 12 - 78 U/L    AST (SGOT) 28 15 - 37 U/L    Alk.  phosphatase 57 45 - 117 U/L    Protein, total 6.9 6.4 - 8.2 g/dL    Albumin 3.7 3.5 - 5.0 g/dL    Globulin 3.2 2.0 - 4.0 g/dL    A-G Ratio 1.2 1.1 - 2.2     TSH 3RD GENERATION    Collection Time: 08/31/18  8:38 PM   Result Value Ref Range    TSH 1.80 0.36 - 3.74 uIU/mL   CK W/ REFLX CKMB    Collection Time: 08/31/18  8:38 PM   Result Value Ref Range     26 - 192 U/L   TROPONIN I    Collection Time: 08/31/18  8:38 PM   Result Value Ref Range    Troponin-I, Qt. <0.05 <0.05 ng/mL       Radiologic Studies -   CT HEAD WO CONT   Final Result        CT Results  (Last 48 hours)               08/31/18 2306  CT HEAD WO CONT Final result    Impression:  Impression:    1. No evidence of acute infarct or intracranial hemorrhage. 2. Periventricular white matter disease is likely secondary to chronic small   vessel ischemic changes. Narrative: Indication:  Vertigo, episodic, peripheral; transient shaking, dizziness        Comparison: CT 1/15/2018       Findings: 5 mm axial images were obtained from the skull base through the   vertex. CT dose reduction was achieved through the use of a standardized protocol   tailored for this examination and automatic exposure control for dose   modulation. The ventricles and cortical sulci are prominent, compatible with age related   volume loss. There is no evidence of intracranial hemorrhage, mass, mass effect,   or acute infarct. There is periventricular white matter disease. No extra-axial   fluid collections are seen. The visualized paranasal sinuses and mastoid air   cells are clear. The orbital structures are unremarkable. No osseous   abnormalities are seen. Medical Decision Making   I am the first provider for this patient. I reviewed the vital signs, available nursing notes, past medical history, past surgical history, family history and social history. Vital Signs-Reviewed the patient's vital signs.   Patient Vitals for the past 12 hrs:   Temp Pulse Resp BP SpO2   08/31/18 2245 - 68 14 151/70 98 %   08/31/18 2100 - 67 18 143/71 98 %   08/31/18 2050 - 67 16 149/76 99 %   08/31/18 2048 - 63 14 154/70 99 %   08/31/18 2047 - 64 17 148/64 98 %   08/31/18 2028 98.2 °F (36.8 °C) 89 18 161/78 97 %       Pulse Oximetry Analysis - 98% on RA    Cardiac Monitor:   Rate: 64 bpm  Rhythm: Normal Sinus Rhythm      Records Reviewed: Nursing Notes, Old Medical Records, Ambulance Run Sheet, Previous Radiology Studies and Previous Laboratory Studies    Provider Notes (Medical Decision Making):     DDX:  Arrhythmia, uti, anemia, hypoglycemia    Plan:  Labs, ua, ekg, head ct    Impression:  Transient episode of diaphoresis    ED Course:   Initial assessment performed. The patients presenting problems have been discussed, and they are in agreement with the care plan formulated and outlined with them. I have encouraged them to ask questions as they arise throughout their visit. I reviewed our electronic medical record system for any past medical records that were available that may contribute to the patients current condition, the nursing notes and and vital signs from today's visit    Nursing notes will be reviewed as they become available in realtime while the pt has been in the ED. Ama Marley MD    I personally reviewed pt's imaging. Official read by radiology listed above. Ama Marley MD    11:32 PM  Progress note:  Pt noted to be feeling better, no further episodes of sx since arriving to ED, ready for discharge. Discussed lab and imaging findings with pt and/or family, specifically noting chronic small vessel ischemic disease. Pt will follow up as instructed. All questions have been answered, pt voiced understanding and agreement with plan. If narcotics were prescribed, pt was advised not to drive or operate heavy machinery. If abx were prescribed, pt advised that diarrhea and rash are possible side effects of the medications. Specific return precautions provided in addition to instructions for pt to return to the ED immediately should sx worsen at any time. Ama Marley MD      Critical Care Time:     none    PLAN:  1. Discharge Medication List as of 8/31/2018 11:35 PM        2.    Follow-up Information     Follow up With Details Comments Contact Info    Severo Fujita, MD Schedule an appointment as soon as possible for a visit in 2 days  [address]  [address] [place] [address]      Kaipl Carr MD Schedule an appointment as soon as possible for a visit in 2 days to discuss a holter monitor [address]  [id number]  [phone] [address] [phone]          Return to ED if worse     Disposition:    11:34 PM   The patient's results have been reviewed with family and/or caregiver. They verbally convey their understanding and agreement of the patient's signs, symptoms, diagnosis, treatment and prognosis and additionally agree to follow up as recommended in the discharge instructions or to return to the Emergency Room should the patient's condition change prior to their follow-up appointment. The family and/or caregiver verbally agrees with the care-plan and all of their questions have been answered. The discharge instructions have also been provided to the them with educational information regarding the patient's diagnosis as well a list of reasons why the patient would want to return to the ER prior to their follow-up appointment should their condition change. Ike Cox MD        Diagnosis     Clinical Impression:   1. Episode of shaking    2. Diaphoresis        Attestations: This note is prepared by Vita Larios, acting as Scribe for MD Ike Rodriguez MD : The scribe's documentation has been prepared under my direction and personally reviewed by me in its entirety. I confirm that the note above accurately reflects all work, treatment, procedures, and medical decision making performed by me. This note will not be viewable in [address]

## 2018-09-01 NOTE — DISCHARGE INSTRUCTIONS
Abnormal Sweating: Care Instructions  Your Care Instructions    Sweating is your body's way of cooling down and getting rid of some chemicals. But some people have a condition that makes them sweat too much. It can affect any part of your body, especially the head, armpits, hands, and feet. Sometimes the sweat mixes with bacteria on your skin and causes armpits and feet to smell bad. It can be upsetting to have sweat drip from your face and palms or to have smelly feet and shoes. Some people seem to be born with this condition, while some others may sweat too much because of anxiety. You may be able to reduce the amount you sweat by lowering stress in your life. Some people find that antiperspirants help, and you can take steps at home that will help with smelly feet. If you still have too much sweating, your doctor may recommend other treatments. Follow-up care is a key part of your treatment and safety. Be sure to make and go to all appointments, and call your doctor if you are having problems. It's also a good idea to know your test results and keep a list of the medicines you take. How can you care for yourself at home? · If your doctor prescribed medicine, use it as directed. Call your doctor if you have any problems with your medicine. You will get more details on the specific medicines your doctor prescribes. · Bathe 1 or 2 times a day with soap and water. Do not scrub your skin too much, because that can irritate it. Dry your skin well after bathing. · Use a deodorant with antiperspirant. It might help to put it on at night before bed. · Wear clothing made of material that lets your skin breathe. Cotton, wool, silk, and linen are good choices. For exercising, wear material that removes (tessy) the moisture from your skin. · Keep an extra shirt at work or in a school locker. · Attach pads (underarm or dress shields) to the armpit area of clothing to absorb sweat.  You can buy these pads in sports or clothing stores. · Let your shoes dry out for a day after wearing them. If possible, set them in a place where the sun will shine on them. That will help kill the bacteria that cause the smell. · Change your socks at least 1 time a day. Wash your socks after each wearing. · Use foot powder or talc in your shoes and socks and on your feet. Put inserts in your shoes to absorb some of the sweat. Go barefoot for a while each day to let your feet dry out. · Limit hot drinks, such as coffee and tea, which make you sweat more. When should you call for help? Watch closely for changes in your health, and be sure to contact your doctor if:    · You continue to sweat too much, and it bothers you. Where can you learn more? Go to http://addison-mary.info/. Enter P166 in the search box to learn more about \"Abnormal Sweating: Care Instructions. \"  Current as of: November 20, 2017  Content Version: 11.7  © 0494-8329 [employer]. Care instructions adapted under license by [employer] (which disclaims liability or warranty for this information). If you have questions about a medical condition or this instruction, always ask your healthcare professional. [address] any warranty or liability for your use of this information.

## 2019-01-02 ENCOUNTER — HOSPITAL ENCOUNTER (OUTPATIENT)
Dept: MAMMOGRAPHY | Age: 84
Discharge: HOME | End: 2019-01-02
Attending: OBSTETRICS & GYNECOLOGY
Payer: MEDICARE

## 2019-01-02 DIAGNOSIS — Z12.39 SCREENING BREAST EXAMINATION: ICD-10-CM

## 2019-01-02 PROCEDURE — 77067 SCR MAMMO BI INCL CAD: CPT

## 2019-06-08 ENCOUNTER — APPOINTMENT (OUTPATIENT)
Dept: CT IMAGING | Age: 84
End: 2019-06-08
Attending: EMERGENCY MEDICINE
Payer: MEDICARE

## 2019-06-08 ENCOUNTER — HOSPITAL ENCOUNTER (EMERGENCY)
Age: 84
Discharge: HOME | End: 2019-06-08
Attending: EMERGENCY MEDICINE
Payer: MEDICARE

## 2019-06-08 VITALS
HEART RATE: 54 BPM | SYSTOLIC BLOOD PRESSURE: 137 MMHG | DIASTOLIC BLOOD PRESSURE: 89 MMHG | TEMPERATURE: 98 F | RESPIRATION RATE: 16 BRPM | OXYGEN SATURATION: 100 % | BODY MASS INDEX: 24.72 KG/M2 | WEIGHT: 139.55 LBS

## 2019-06-08 DIAGNOSIS — K29.70 GASTRITIS WITHOUT BLEEDING, UNSPECIFIED CHRONICITY, UNSPECIFIED GASTRITIS TYPE: ICD-10-CM

## 2019-06-08 DIAGNOSIS — R10.9 FLANK PAIN: Primary | ICD-10-CM

## 2019-06-08 DIAGNOSIS — K44.9 HIATAL HERNIA: ICD-10-CM

## 2019-06-08 LAB
ALBUMIN SERPL-MCNC: 4 G/DL (ref 3.5–5)
ALBUMIN/GLOB SERPL: 1.1 {RATIO} (ref 1.1–2.2)
ALP SERPL-CCNC: 72 U/L (ref 45–117)
ALT SERPL-CCNC: 29 U/L (ref 12–78)
ANION GAP SERPL CALC-SCNC: 5 MMOL/L (ref 5–15)
APPEARANCE UR: CLEAR
AST SERPL-CCNC: 31 U/L (ref 15–37)
BACTERIA URNS QL MICRO: NEGATIVE /HPF
BASOPHILS # BLD: 0.1 K/UL (ref 0–0.1)
BASOPHILS NFR BLD: 1 % (ref 0–1)
BILIRUB SERPL-MCNC: 0.5 MG/DL (ref 0.2–1)
BILIRUB UR QL: NEGATIVE
BUN SERPL-MCNC: 16 MG/DL (ref 6–20)
BUN/CREAT SERPL: 17 (ref 12–20)
CALCIUM SERPL-MCNC: 9.3 MG/DL (ref 8.5–10.1)
CHLORIDE SERPL-SCNC: 102 MMOL/L (ref 97–108)
CO2 SERPL-SCNC: 31 MMOL/L (ref 21–32)
COLOR UR: ABNORMAL
COMMENT, HOLDF: NORMAL
CREAT SERPL-MCNC: 0.96 MG/DL (ref 0.55–1.02)
DIFFERENTIAL METHOD BLD: NORMAL
EOSINOPHIL # BLD: 0.2 K/UL (ref 0–0.4)
EOSINOPHIL NFR BLD: 2 % (ref 0–7)
EPITH CASTS URNS QL MICRO: ABNORMAL /LPF
ERYTHROCYTE [DISTWIDTH] IN BLOOD BY AUTOMATED COUNT: 12.8 % (ref 11.5–14.5)
GLOBULIN SER CALC-MCNC: 3.7 G/DL (ref 2–4)
GLUCOSE SERPL-MCNC: 91 MG/DL (ref 65–100)
GLUCOSE UR STRIP.AUTO-MCNC: NEGATIVE MG/DL
HCT VFR BLD AUTO: 43.4 % (ref 35–47)
HGB BLD-MCNC: 14.3 G/DL (ref 11.5–16)
HGB UR QL STRIP: NEGATIVE
HYALINE CASTS URNS QL MICRO: ABNORMAL /LPF (ref 0–5)
IMM GRANULOCYTES # BLD AUTO: 0 K/UL (ref 0–0.04)
IMM GRANULOCYTES NFR BLD AUTO: 0 % (ref 0–0.5)
KETONES UR QL STRIP.AUTO: NEGATIVE MG/DL
LEUKOCYTE ESTERASE UR QL STRIP.AUTO: ABNORMAL
LIPASE SERPL-CCNC: 141 U/L (ref 73–393)
LYMPHOCYTES # BLD: 1.9 K/UL (ref 0.8–3.5)
LYMPHOCYTES NFR BLD: 26 % (ref 12–49)
MCH RBC QN AUTO: 28.5 PG (ref 26–34)
MCHC RBC AUTO-ENTMCNC: 32.9 G/DL (ref 30–36.5)
MCV RBC AUTO: 86.6 FL (ref 80–99)
MONOCYTES # BLD: 0.8 K/UL (ref 0–1)
MONOCYTES NFR BLD: 10 % (ref 5–13)
NEUTS SEG # BLD: 4.4 K/UL (ref 1.8–8)
NEUTS SEG NFR BLD: 61 % (ref 32–75)
NITRITE UR QL STRIP.AUTO: NEGATIVE
NRBC # BLD: 0 K/UL (ref 0–0.01)
NRBC BLD-RTO: 0 PER 100 WBC
PH UR STRIP: 7 [PH] (ref 5–8)
PLATELET # BLD AUTO: 292 K/UL (ref 150–400)
PMV BLD AUTO: 10.1 FL (ref 8.9–12.9)
POTASSIUM SERPL-SCNC: 3.4 MMOL/L (ref 3.5–5.1)
PROT SERPL-MCNC: 7.7 G/DL (ref 6.4–8.2)
PROT UR STRIP-MCNC: NEGATIVE MG/DL
RBC # BLD AUTO: 5.01 M/UL (ref 3.8–5.2)
RBC #/AREA URNS HPF: ABNORMAL /HPF (ref 0–5)
SAMPLES BEING HELD,HOLD: NORMAL
SODIUM SERPL-SCNC: 138 MMOL/L (ref 136–145)
SP GR UR REFRACTOMETRY: 1.01 (ref 1–1.03)
TROPONIN I SERPL-MCNC: <0.05 NG/ML
UA: UC IF INDICATED,UAUC: ABNORMAL
UROBILINOGEN UR QL STRIP.AUTO: 0.2 EU/DL (ref 0.2–1)
WBC # BLD AUTO: 7.3 K/UL (ref 3.6–11)
WBC URNS QL MICRO: ABNORMAL /HPF (ref 0–4)

## 2019-06-08 PROCEDURE — 81001 URINALYSIS AUTO W/SCOPE: CPT

## 2019-06-08 PROCEDURE — 99284 EMERGENCY DEPT VISIT MOD MDM: CPT

## 2019-06-08 PROCEDURE — 74176 CT ABD & PELVIS W/O CONTRAST: CPT

## 2019-06-08 PROCEDURE — 80053 COMPREHEN METABOLIC PANEL: CPT

## 2019-06-08 PROCEDURE — 93005 ELECTROCARDIOGRAM TRACING: CPT

## 2019-06-08 PROCEDURE — 36415 COLL VENOUS BLD VENIPUNCTURE: CPT

## 2019-06-08 PROCEDURE — 85025 COMPLETE CBC W/AUTO DIFF WBC: CPT

## 2019-06-08 PROCEDURE — 83690 ASSAY OF LIPASE: CPT

## 2019-06-08 PROCEDURE — 74011250636 HC RX REV CODE- 250/636: Performed by: EMERGENCY MEDICINE

## 2019-06-08 PROCEDURE — 84484 ASSAY OF TROPONIN QUANT: CPT

## 2019-06-08 RX ORDER — FAMOTIDINE 20 MG/1
20 TABLET, FILM COATED ORAL 2 TIMES DAILY
Qty: 20 TAB | Refills: 0 | Status: SHIPPED | OUTPATIENT
Start: 2019-06-08 | End: 2021-05-05 | Stop reason: CLARIF

## 2019-06-08 RX ORDER — MORPHINE SULFATE 2 MG/ML
1 INJECTION, SOLUTION INTRAMUSCULAR; INTRAVENOUS
Status: DISCONTINUED | OUTPATIENT
Start: 2019-06-08 | End: 2019-06-08 | Stop reason: HOSPADM

## 2019-06-08 RX ORDER — ALUMINA, MAGNESIA, AND SIMETHICONE 2400; 2400; 240 MG/30ML; MG/30ML; MG/30ML
10 SUSPENSION ORAL
Qty: 335 ML | Refills: 0 | Status: SHIPPED | OUTPATIENT
Start: 2019-06-08 | End: 2021-05-05 | Stop reason: CLARIF

## 2019-06-08 NOTE — ED NOTES
Assumed care from triage. Patient comes to the ED complaining of right flank pain that started three days ago. Patient states that she tried to doing ice/heat, Tylenol, and resting but nothing was working. Patient states that she is nauseous, but did not take her medication this morning.

## 2019-06-08 NOTE — ED PROVIDER NOTES
EMERGENCY DEPARTMENT HISTORY AND PHYSICAL EXAM      Date: 6/8/2019  Patient Name: Kimber Kay    History of Presenting Illness     Chief Complaint   Patient presents with    Back Pain     back pain in upper right side of back for 3 days. pt was told 6 years ago she had gallstones        History Provided By: Patient    HPI: Kimber Kay, 80 y.o. female with PMHx as noted below presents the emergency department with chief complaint of flank pain and epigastric abdominal pain. Patient notes she is been having intermittent epigastric abdominal pain now for some time but noted the right flank pain began 3 days ago. Patient states the pain is a sharp stabbing sensation that is constant and without specific relieving or exacerbating factors. She is getting no relief with home Tylenol. Otherwise has no nausea, vomiting, fevers, chills, dysuria, chest pain, shortness of breath. PCP: Breezy Bella MD    Current Facility-Administered Medications   Medication Dose Route Frequency Provider Last Rate Last Dose    maalox/viscous lidocaine (COV GI COCKTAIL)  40 mL Oral Yolanda Choi MD        morphine injection 1 mg  1 mg IntraVENous NOW Katharina Ceballos MD         Current Outpatient Medications   Medication Sig Dispense Refill    famotidine (PEPCID) 20 mg tablet Take 1 Tab by mouth two (2) times a day. 20 Tab 0    aluminum & magnesium hydroxide-simethicone (MAALOX MAXIMUM STRENGTH) 400-400-40 mg/5 mL suspension Take 10 mL by mouth every six (6) hours as needed for Indigestion. 335 mL 0    aspirin delayed-release 81 mg tablet Take 2 Tabs by mouth daily. 60 Tab 0    pravastatin (PRAVACHOL) 10 mg tablet Take 1 Tab by mouth nightly. 30 Tab 0    lisinopril (PRINIVIL, ZESTRIL) 20 mg tablet Take 20 mg by mouth nightly.  predniSONE (STERAPRED) 5 mg dose pack Take 5-15 mg by mouth See Admin Instructions.  See administration instruction per 5mg dose pack      CHOLECALCIFEROL, VITAMIN D3, PO Take 1 Tab by mouth daily.  ARNICA EX by Apply Externally route daily as needed (Lower Extremity Cramps).  gabapentin (NEURONTIN) 100 mg capsule Take 100 mg by mouth nightly.  bimatoprost (LUMIGAN) 0.01 % ophthalmic drops Administer 1 Drop to right eye every evening.  multivitamin (ONE A DAY) tablet Take 1 Tab by mouth daily.  albuterol (PROVENTIL HFA, VENTOLIN HFA, PROAIR HFA) 90 mcg/actuation inhaler Take 1 Puff by inhalation as needed for Wheezing.  lorazepam (ATIVAN) 0.5 mg tablet Take 0.5 mg by mouth nightly.  co-enzyme Q-10 (CO Q-10) 100 mg capsule Take 100 mg by mouth daily.  mometasone-formoterol (DULERA) 100-5 mcg/Actuation HFAA HFA inhaler Take 2 Puffs by inhalation two (2) times a day. Past History     Past Medical History:  Past Medical History:   Diagnosis Date    Adverse effect of anesthesia     combative waking up    Anxiety     Arthritis     Asthma     as of 12/30/16 pt states under control    Diverticulitis Dx 11/2016    Fibromyalgia     GERD (gastroesophageal reflux disease)     Glaucoma     Hiatal hernia     Hypercholesteremia     Hypertension     Ill-defined condition     neurapthy in ble    Nausea & vomiting        Past Surgical History:  Past Surgical History:   Procedure Laterality Date    HX CATARACT REMOVAL Right 01/04/2017    HX HYSTERECTOMY      HX ROTATOR CUFF REPAIR Left 2008    HX TUBAL LIGATION      IA COLONOSCOPY FLX DX W/COLLJ SPEC WHEN PFRMD  11/9/2011         IA EGD TRANSORAL BIOPSY SINGLE/MULTIPLE  11/9/2011            Family History:  History reviewed. No pertinent family history. Social History:  Social History     Tobacco Use    Smoking status: Never Smoker    Smokeless tobacco: Never Used   Substance Use Topics    Alcohol use: No    Drug use: No       Allergies:   Allergies   Allergen Reactions    Cymbalta [Duloxetine] Nausea Only and Other (comments)     \"within the hour I had a severe headache, and vomiting\"      Demerol [Meperidine] Nausea and Vomiting    Morphine Nausea and Vomiting    Penicillins Other (comments)     rash    Percocet [Oxycodone-Acetaminophen] Nausea and Vomiting         Review of Systems   Review of Systems  Constitutional: Negative for fever, chills, and fatigue. HENT: Negative for congestion, sore throat, rhinorrhea, sneezing and neck stiffness   Eyes: Negative for discharge and redness. Respiratory: Negative for  shortness of breath, wheezing   Cardiovascular: Negative for chest pain, palpitations   Gastrointestinal: Negative for nausea, vomiting, , constipation, diarrhea and blood in stool. Positive for abdominal and flank pain  Genitourinary: Negative for dysuria, hematuria, flank pain, decreased urine volume, discharge,   Musculoskeletal: Negative for myalgias or joint pain . Skin: Negative for rash or lesions . Neurological: Negative weakness, light-headedness, numbness and headaches. Physical Exam   Physical Exam    GENERAL: alert and oriented, no acute distress  EYES: PEERL, No injection, discharge or icterus. ENT: Mucous membranes pink and moist.  NECK: Supple  LUNGS: Airway patent. Non-labored respirations. Breath sounds clear with good air entry bilaterally. HEART: Regular rate and rhythm. No peripheral edema  ABDOMEN: Non-distended, minimal epigastric tenderness,  without guarding or rebound.   SKIN:  warm, dry  MSK/EXTREMITIES: Without swelling, tenderness or deformity, symmetric with normal ROM  NEUROLOGICAL: Alert, oriented      Diagnostic Study Results     Labs -     Recent Results (from the past 12 hour(s))   CBC WITH AUTOMATED DIFF    Collection Time: 06/08/19 10:38 AM   Result Value Ref Range    WBC 7.3 3.6 - 11.0 K/uL    RBC 5.01 3.80 - 5.20 M/uL    HGB 14.3 11.5 - 16.0 g/dL    HCT 43.4 35.0 - 47.0 %    MCV 86.6 80.0 - 99.0 FL    MCH 28.5 26.0 - 34.0 PG    MCHC 32.9 30.0 - 36.5 g/dL    RDW 12.8 11.5 - 14.5 %    PLATELET 526 041 - 207 K/uL    MPV 10.1 8.9 - 12.9 FL NRBC 0.0 0  WBC    ABSOLUTE NRBC 0.00 0.00 - 0.01 K/uL    NEUTROPHILS 61 32 - 75 %    LYMPHOCYTES 26 12 - 49 %    MONOCYTES 10 5 - 13 %    EOSINOPHILS 2 0 - 7 %    BASOPHILS 1 0 - 1 %    IMMATURE GRANULOCYTES 0 0.0 - 0.5 %    ABS. NEUTROPHILS 4.4 1.8 - 8.0 K/UL    ABS. LYMPHOCYTES 1.9 0.8 - 3.5 K/UL    ABS. MONOCYTES 0.8 0.0 - 1.0 K/UL    ABS. EOSINOPHILS 0.2 0.0 - 0.4 K/UL    ABS. BASOPHILS 0.1 0.0 - 0.1 K/UL    ABS. IMM. GRANS. 0.0 0.00 - 0.04 K/UL    DF AUTOMATED     METABOLIC PANEL, COMPREHENSIVE    Collection Time: 06/08/19 10:38 AM   Result Value Ref Range    Sodium 138 136 - 145 mmol/L    Potassium 3.4 (L) 3.5 - 5.1 mmol/L    Chloride 102 97 - 108 mmol/L    CO2 31 21 - 32 mmol/L    Anion gap 5 5 - 15 mmol/L    Glucose 91 65 - 100 mg/dL    BUN 16 6 - 20 MG/DL    Creatinine 0.96 0.55 - 1.02 MG/DL    BUN/Creatinine ratio 17 12 - 20      GFR est AA >60 >60 ml/min/1.73m2    GFR est non-AA 55 (L) >60 ml/min/1.73m2    Calcium 9.3 8.5 - 10.1 MG/DL    Bilirubin, total 0.5 0.2 - 1.0 MG/DL    ALT (SGPT) 29 12 - 78 U/L    AST (SGOT) 31 15 - 37 U/L    Alk. phosphatase 72 45 - 117 U/L    Protein, total 7.7 6.4 - 8.2 g/dL    Albumin 4.0 3.5 - 5.0 g/dL    Globulin 3.7 2.0 - 4.0 g/dL    A-G Ratio 1.1 1.1 - 2.2     LIPASE    Collection Time: 06/08/19 10:38 AM   Result Value Ref Range    Lipase 141 73 - 393 U/L   TROPONIN I    Collection Time: 06/08/19 10:38 AM   Result Value Ref Range    Troponin-I, Qt. <0.05 <0.05 ng/mL   SAMPLES BEING HELD    Collection Time: 06/08/19 10:38 AM   Result Value Ref Range    SAMPLES BEING HELD BLUE     COMMENT        Add-on orders for these samples will be processed based on acceptable specimen integrity and analyte stability, which may vary by analyte.    URINALYSIS W/ REFLEX CULTURE    Collection Time: 06/08/19 11:19 AM   Result Value Ref Range    Color YELLOW/STRAW      Appearance CLEAR CLEAR      Specific gravity 1.011 1.003 - 1.030      pH (UA) 7.0 5.0 - 8.0      Protein NEGATIVE NEG mg/dL    Glucose NEGATIVE  NEG mg/dL    Ketone NEGATIVE  NEG mg/dL    Bilirubin NEGATIVE  NEG      Blood NEGATIVE  NEG      Urobilinogen 0.2 0.2 - 1.0 EU/dL    Nitrites NEGATIVE  NEG      Leukocyte Esterase SMALL (A) NEG      WBC 0-4 0 - 4 /hpf    RBC 0-5 0 - 5 /hpf    Epithelial cells FEW FEW /lpf    Bacteria NEGATIVE  NEG /hpf    UA:UC IF INDICATED CULTURE NOT INDICATED BY UA RESULT CNI      Hyaline cast 0-2 0 - 5 /lpf       Radiologic Studies -   CT ABD PELV WO CONT   Final Result   IMPRESSION:      No acute intracranial process. Moderate hiatal hernia. Clonic diverticula without evidence of diverticulitis. Degenerative changes of the lumbar spine with canal and foraminal stenoses. CT Results  (Last 48 hours)               06/08/19 1056  CT ABD PELV WO CONT Final result    Impression:  IMPRESSION:       No acute intracranial process. Moderate hiatal hernia. Clonic diverticula without evidence of diverticulitis. Degenerative changes of the lumbar spine with canal and foraminal stenoses. Narrative:  EXAM: CT ABD PELV WO CONT       INDICATION: right flank and epigastric pain       COMPARISON:       CONTRAST:  None. TECHNIQUE:    Thin axial images were obtained through the abdomen and pelvis. Coronal and   sagittal reconstructions were generated. Oral contrast was not administered. CT   dose reduction was achieved through use of a standardized protocol tailored for   this examination and automatic exposure control for dose modulation. The absence of intravenous contrast material reduces the sensitivity for   evaluation of the solid parenchymal organs of the abdomen. FINDINGS:    LUNG BASES: Clear. INCIDENTALLY IMAGED HEART AND MEDIASTINUM: Moderate paraesophageal type hiatal   hernia. LIVER: Hepatic cysts. No evidence of malignant hepatic mass. GALLBLADDER: Cholelithiasis is unchanged. No acute cholecystitis or CBD   obstruction. SPLEEN: No mass. PANCREAS: Mild atrophy is unchanged. No inflammation. ADRENALS: Unremarkable. KIDNEYS:  Right renal angiomyolipoma, unchanged. Left renal simple cysts . STOMACH: Nondistended. SMALL BOWEL: No dilatation or wall thickening. COLON: Diverticulosis. Fecal stasis. .   APPENDIX: Unremarkable. PERITONEUM: No ascites or pneumoperitoneum. Fat and mesentery containing   umbilical hernia. RETROPERITONEUM: Aortic atherosclerotic change. REPRODUCTIVE ORGANS: Uterus is been resected. No adnexal mass. URINARY BLADDER: No mass or calculus. BONES: Lumbar spine dextroscoliosis. Lumbar canal stenosis at L4-5. Foraminal   stenoses at L1-L2. . ADDITIONAL COMMENTS: No lymphadenopathy. Mild diffuse muscle   atrophy for age. No hernia. CXR Results  (Last 48 hours)    None            Medical Decision Making   I am the first provider for this patient. I reviewed the vital signs, available nursing notes, past medical history, past surgical history, family history and social history. Vital Signs-Reviewed the patient's vital signs. Patient Vitals for the past 12 hrs:   Temp Pulse Resp BP SpO2   06/08/19 1143 -- (!) 54 16 137/89 100 %   06/08/19 1111 98 °F (36.7 °C) 75 16 158/85 95 %   06/08/19 0924 97.8 °F (36.6 °C) 80 18 167/83 96 %         Records Reviewed: Nursing Notes and Old Medical Records    Provider Notes (Medical Decision Making): On presentation, the patient is well appearing, in no acute distress with normal vital signs. Based on my history and exam the differential diagnosis for this patient includes obstructed ureteral stone, pancreatitis, gastritis, cholecystitis, ACS. CT scan of the abdomen pelvis demonstrated hiatal hernia with an otherwise reassuring work-up. Will prescribe home antacids. Patient already has appointment with gastroenterology later this month. ED Course:   Initial assessment performed.  The patients presenting problems have been discussed, and they are in agreement with the care plan formulated and outlined with them. I have encouraged them to ask questions as they arise throughout their visit. PROGRESS NOTE:  The patient has been re-evaluated and is ready for discharge. Reviewed available results with patient and have counseled them on diagnosis and care plan. They have expressed understanding, and all their questions have been answered. They agree with plan and agree to have pt F/U as recommended, or return to the ED if their sxs worsen. Discharge instructions have been provided and explained to them, along with reasons to have pt return to the ED. The patient is amenable to discharge so will discharge pt at this time    Natividad Mcclure MD        Disposition:  home    PLAN:  1. Discharge Medication List as of 6/8/2019 11:52 AM      START taking these medications    Details   famotidine (PEPCID) 20 mg tablet Take 1 Tab by mouth two (2) times a day., Normal, Disp-20 Tab, R-0      aluminum & magnesium hydroxide-simethicone (MAALOX MAXIMUM STRENGTH) 400-400-40 mg/5 mL suspension Take 10 mL by mouth every six (6) hours as needed for Indigestion. , Normal, Disp-335 mL, R-0         CONTINUE these medications which have NOT CHANGED    Details   aspirin delayed-release 81 mg tablet Take 2 Tabs by mouth daily. , No Print, Disp-60 Tab, R-0      pravastatin (PRAVACHOL) 10 mg tablet Take 1 Tab by mouth nightly. , Print, Disp-30 Tab, R-0      lisinopril (PRINIVIL, ZESTRIL) 20 mg tablet Take 20 mg by mouth nightly., Historical Med      predniSONE (STERAPRED) 5 mg dose pack Take 5-15 mg by mouth See Admin Instructions. See administration instruction per 5mg dose pack, Historical Med      CHOLECALCIFEROL, VITAMIN D3, PO Take 1 Tab by mouth daily. , Historical Med      ARNICA EX by Apply Externally route daily as needed (Lower Extremity Cramps). , Historical Med      gabapentin (NEURONTIN) 100 mg capsule Take 100 mg by mouth nightly., Historical Med      bimatoprost (LUMIGAN) 0.01 % ophthalmic drops Administer 1 Drop to right eye every evening., Historical Med      multivitamin (ONE A DAY) tablet Take 1 Tab by mouth daily. , Historical Med      albuterol (PROVENTIL HFA, VENTOLIN HFA, PROAIR HFA) 90 mcg/actuation inhaler Take 1 Puff by inhalation as needed for Wheezing., Historical Med      lorazepam (ATIVAN) 0.5 mg tablet Take 0.5 mg by mouth nightly., Historical Med      co-enzyme Q-10 (CO Q-10) 100 mg capsule Take 100 mg by mouth daily. , Historical Med      mometasone-formoterol (DULERA) 100-5 mcg/Actuation HFAA HFA inhaler Take 2 Puffs by inhalation two (2) times a day., Historical Med           2. Follow-up Information     Follow up With Specialties Details Why Contact Info    Cassandra Gomez MD [facility] Schedule an appointment as soon as possible for a visit in 2 days  [address] [place] [address].[phone] [facility]  Schedule an appointment as soon as possible for a visit in 2 days  [address] [place] [address] [phone]        Return to ED if worse     Diagnosis     Clinical Impression:   1. Flank pain    2. Gastritis without bleeding, unspecified chronicity, unspecified gastritis type    3.  Hiatal hernia

## 2019-06-08 NOTE — DISCHARGE INSTRUCTIONS
Patient Education        Abdominal Pain: Care Instructions  Your Care Instructions    Abdominal pain has many possible causes. Some aren't serious and get better on their own in a few days. Others need more testing and treatment. If your pain continues or gets worse, you need to be rechecked and may need more tests to find out what is wrong. You may need surgery to correct the problem. Don't ignore new symptoms, such as fever, nausea and vomiting, urination problems, pain that gets worse, and dizziness. These may be signs of a more serious problem. Your doctor may have recommended a follow-up visit in the next 8 to 12 hours. If you are not getting better, you may need more tests or treatment. The doctor has checked you carefully, but problems can develop later. If you notice any problems or new symptoms, get medical treatment right away. Follow-up care is a key part of your treatment and safety. Be sure to make and go to all appointments, and call your doctor if you are having problems. It's also a good idea to know your test results and keep a list of the medicines you take. How can you care for yourself at home? · Rest until you feel better. · To prevent dehydration, drink plenty of fluids, enough so that your urine is light yellow or clear like water. Choose water and other caffeine-free clear liquids until you feel better. If you have kidney, heart, or liver disease and have to limit fluids, talk with your doctor before you increase the amount of fluids you drink. · If your stomach is upset, eat mild foods, such as rice, dry toast or crackers, bananas, and applesauce. Try eating several small meals instead of two or three large ones. · Wait until 48 hours after all symptoms have gone away before you have spicy foods, alcohol, and drinks that contain caffeine. · Do not eat foods that are high in fat. · Avoid anti-inflammatory medicines such as aspirin, ibuprofen (Advil, Motrin), and naproxen (Aleve). These can cause stomach upset. Talk to your doctor if you take daily aspirin for another health problem. When should you call for help? Call 911 anytime you think you may need emergency care. For example, call if:    · You passed out (lost consciousness).     · You pass maroon or very bloody stools.     · You vomit blood or what looks like coffee grounds.     · You have new, severe belly pain.    Call your doctor now or seek immediate medical care if:    · Your pain gets worse, especially if it becomes focused in one area of your belly.     · You have a new or higher fever.     · Your stools are black and look like tar, or they have streaks of blood.     · You have unexpected vaginal bleeding.     · You have symptoms of a urinary tract infection. These may include:  ? Pain when you urinate. ? Urinating more often than usual.  ? Blood in your urine.     · You are dizzy or lightheaded, or you feel like you may faint.    Watch closely for changes in your health, and be sure to contact your doctor if:    · You are not getting better after 1 day (24 hours). Where can you learn more? Go to http://[employer] Managementmary.info/. Enter I991 in the search box to learn more about \"Abdominal Pain: Care Instructions. \"  Current as of: September 23, 2018  Content Version: 11.9  © 8832-2326 [employer]. Care instructions adapted under license by "[employer] (which disclaims liability or warranty for this information). If you have questions about a medical condition or this instruction, always ask your healthcare professional. [place] [address] any warranty or liability for your use of this information. Patient Education        Gastritis: Care Instructions  Your Care Instructions    Gastritis is a sore and upset stomach. It happens when something irritates the stomach lining. Many things can cause it.  These include an infection such as the flu or something you ate or drank. Medicines or a sore on the lining of the stomach (ulcer) also can cause it. Your belly may bloat and ache. You may belch, vomit, and feel sick to your stomach. You should be able to relieve the problem by taking medicine. And it may help to change your diet. If gastritis lasts, your doctor may prescribe medicine. Follow-up care is a key part of your treatment and safety. Be sure to make and go to all appointments, and call your doctor if you are having problems. It's also a good idea to know your test results and keep a list of the medicines you take. How can you care for yourself at home? · If your doctor prescribed antibiotics, take them as directed. Do not stop taking them just because you feel better. You need to take the full course of antibiotics. · Be safe with medicines. If your doctor prescribed medicine to decrease stomach acid, take it as directed. Call your doctor if you think you are having a problem with your medicine. · Do not take any other medicine, including over-the-counter pain relievers, without talking to your doctor first.  · If your doctor recommends over-the-counter medicine to reduce stomach acid, such as Pepcid AC, Prilosec, Tagamet HB, or Zantac 75, follow the directions on the label. · Drink plenty of fluids (enough so that your urine is light yellow or clear like water) to prevent dehydration. Choose water and other caffeine-free clear liquids. If you have kidney, heart, or liver disease and have to limit fluids, talk with your doctor before you increase the amount of fluids you drink. · Limit how much alcohol you drink. · Avoid coffee, tea, cola drinks, chocolate, and other foods with caffeine. They increase stomach acid. When should you call for help? Call 911 anytime you think you may need emergency care.  For example, call if:    · You vomit blood or what looks like coffee grounds.     · You pass maroon or very bloody stools.    Call your doctor now or seek immediate medical care if:    · You start breathing fast and have not produced urine in the last 8 hours.     · You cannot keep fluids down.    Watch closely for changes in your health, and be sure to contact your doctor if:    · You do not get better as expected. Where can you learn more? Go to http://addison-mary.info/. Enter [id number] in the search box to learn more about \"Gastritis: Care Instructions. \"  Current as of: March 27, 2018  Content Version: 11.9  © 8211-5563 [email or web address]. Care instructions adapted under license by [employer] (which disclaims liability or warranty for this information). If you have questions about a medical condition or this instruction, always ask your healthcare professional. [address] any warranty or liability for your use of this information.

## 2019-06-09 LAB
ATRIAL RATE: 53 BPM
CALCULATED P AXIS, ECG09: 78 DEGREES
CALCULATED R AXIS, ECG10: 0 DEGREES
CALCULATED T AXIS, ECG11: 34 DEGREES
DIAGNOSIS, 93000: NORMAL
P-R INTERVAL, ECG05: 176 MS
Q-T INTERVAL, ECG07: 404 MS
QRS DURATION, ECG06: 82 MS
QTC CALCULATION (BEZET), ECG08: 379 MS
VENTRICULAR RATE, ECG03: 53 BPM

## 2019-06-20 ENCOUNTER — HOSPITAL ENCOUNTER (OUTPATIENT)
Dept: GENERAL RADIOLOGY | Age: 84
Discharge: HOME | End: 2019-06-20
Payer: MEDICARE

## 2019-06-20 DIAGNOSIS — K59.00 CONSTIPATION: ICD-10-CM

## 2019-06-20 PROCEDURE — 74018 RADEX ABDOMEN 1 VIEW: CPT

## 2019-08-20 ENCOUNTER — ANESTHESIA (OUTPATIENT)
Dept: ENDOSCOPY | Age: 84
End: 2019-08-20
Payer: MEDICARE

## 2019-08-20 ENCOUNTER — HOSPITAL ENCOUNTER (OUTPATIENT)
Age: 84
Setting detail: OUTPATIENT SURGERY
Discharge: HOME | End: 2019-08-20
Attending: INTERNAL MEDICINE | Admitting: INTERNAL MEDICINE
Payer: MEDICARE

## 2019-08-20 ENCOUNTER — ANESTHESIA EVENT (OUTPATIENT)
Dept: ENDOSCOPY | Age: 84
End: 2019-08-20
Payer: MEDICARE

## 2019-08-20 VITALS
TEMPERATURE: 98 F | BODY MASS INDEX: 24.84 KG/M2 | WEIGHT: 140.19 LBS | HEIGHT: 63 IN | SYSTOLIC BLOOD PRESSURE: 153 MMHG | OXYGEN SATURATION: 96 % | RESPIRATION RATE: 21 BRPM | DIASTOLIC BLOOD PRESSURE: 79 MMHG | HEART RATE: 58 BPM

## 2019-08-20 PROCEDURE — 74011250636 HC RX REV CODE- 250/636: Performed by: INTERNAL MEDICINE

## 2019-08-20 PROCEDURE — 76040000019: Performed by: INTERNAL MEDICINE

## 2019-08-20 PROCEDURE — 77030019988 HC FCPS ENDOSC DISP BSC -B: Performed by: INTERNAL MEDICINE

## 2019-08-20 PROCEDURE — 88305 TISSUE EXAM BY PATHOLOGIST: CPT

## 2019-08-20 PROCEDURE — 76060000031 HC ANESTHESIA FIRST 0.5 HR: Performed by: INTERNAL MEDICINE

## 2019-08-20 PROCEDURE — 74011250636 HC RX REV CODE- 250/636: Performed by: ANESTHESIOLOGY

## 2019-08-20 RX ORDER — LIDOCAINE HYDROCHLORIDE 20 MG/ML
INJECTION, SOLUTION EPIDURAL; INFILTRATION; INTRACAUDAL; PERINEURAL AS NEEDED
Status: DISCONTINUED | OUTPATIENT
Start: 2019-08-20 | End: 2019-08-20 | Stop reason: HOSPADM

## 2019-08-20 RX ORDER — MIDAZOLAM HYDROCHLORIDE 1 MG/ML
1-3 INJECTION, SOLUTION INTRAMUSCULAR; INTRAVENOUS
Status: DISCONTINUED | OUTPATIENT
Start: 2019-08-20 | End: 2019-08-20 | Stop reason: HOSPADM

## 2019-08-20 RX ORDER — SUCRALFATE 1 G/10ML
1 SUSPENSION ORAL 4 TIMES DAILY
Qty: 1200 ML | Refills: 1 | Status: SHIPPED | OUTPATIENT
Start: 2019-08-20 | End: 2019-09-19

## 2019-08-20 RX ORDER — DEXTROMETHORPHAN/PSEUDOEPHED 2.5-7.5/.8
1.2 DROPS ORAL
Status: DISCONTINUED | OUTPATIENT
Start: 2019-08-20 | End: 2019-08-20 | Stop reason: HOSPADM

## 2019-08-20 RX ORDER — EPINEPHRINE 0.1 MG/ML
1 INJECTION INTRACARDIAC; INTRAVENOUS
Status: DISCONTINUED | OUTPATIENT
Start: 2019-08-20 | End: 2019-08-20 | Stop reason: SDUPTHER

## 2019-08-20 RX ORDER — DEXTROMETHORPHAN/PSEUDOEPHED 2.5-7.5/.8
1.2 DROPS ORAL
Status: DISCONTINUED | OUTPATIENT
Start: 2019-08-20 | End: 2019-08-20 | Stop reason: SDUPTHER

## 2019-08-20 RX ORDER — SUCRALFATE 1 G/10ML
1 SUSPENSION ORAL 4 TIMES DAILY
Qty: 400 ML | Refills: 1 | Status: SHIPPED | OUTPATIENT
Start: 2019-08-20 | End: 2019-08-30

## 2019-08-20 RX ORDER — PROPOFOL 10 MG/ML
INJECTION, EMULSION INTRAVENOUS AS NEEDED
Status: DISCONTINUED | OUTPATIENT
Start: 2019-08-20 | End: 2019-08-20 | Stop reason: HOSPADM

## 2019-08-20 RX ORDER — FLUMAZENIL 0.1 MG/ML
0.2 INJECTION INTRAVENOUS
Status: DISCONTINUED | OUTPATIENT
Start: 2019-08-20 | End: 2019-08-20 | Stop reason: SDUPTHER

## 2019-08-20 RX ORDER — SODIUM CHLORIDE 0.9 % (FLUSH) 0.9 %
5-40 SYRINGE (ML) INJECTION AS NEEDED
Status: DISCONTINUED | OUTPATIENT
Start: 2019-08-20 | End: 2019-08-20 | Stop reason: SDUPTHER

## 2019-08-20 RX ORDER — EPINEPHRINE 0.1 MG/ML
1 INJECTION INTRACARDIAC; INTRAVENOUS
Status: DISCONTINUED | OUTPATIENT
Start: 2019-08-20 | End: 2019-08-20 | Stop reason: HOSPADM

## 2019-08-20 RX ORDER — ATROPINE SULFATE 0.1 MG/ML
0.5 INJECTION INTRAVENOUS
Status: DISCONTINUED | OUTPATIENT
Start: 2019-08-20 | End: 2019-08-20 | Stop reason: SDUPTHER

## 2019-08-20 RX ORDER — SODIUM CHLORIDE 0.9 % (FLUSH) 0.9 %
5-40 SYRINGE (ML) INJECTION AS NEEDED
Status: DISCONTINUED | OUTPATIENT
Start: 2019-08-20 | End: 2019-08-20 | Stop reason: HOSPADM

## 2019-08-20 RX ORDER — FLUMAZENIL 0.1 MG/ML
0.2 INJECTION INTRAVENOUS
Status: DISCONTINUED | OUTPATIENT
Start: 2019-08-20 | End: 2019-08-20 | Stop reason: HOSPADM

## 2019-08-20 RX ORDER — SODIUM CHLORIDE 0.9 % (FLUSH) 0.9 %
5-40 SYRINGE (ML) INJECTION EVERY 8 HOURS
Status: DISCONTINUED | OUTPATIENT
Start: 2019-08-20 | End: 2019-08-20 | Stop reason: HOSPADM

## 2019-08-20 RX ORDER — NALOXONE HYDROCHLORIDE 0.4 MG/ML
0.4 INJECTION, SOLUTION INTRAMUSCULAR; INTRAVENOUS; SUBCUTANEOUS
Status: DISCONTINUED | OUTPATIENT
Start: 2019-08-20 | End: 2019-08-20 | Stop reason: SDUPTHER

## 2019-08-20 RX ORDER — SODIUM CHLORIDE 9 MG/ML
25 INJECTION, SOLUTION INTRAVENOUS CONTINUOUS
Status: DISCONTINUED | OUTPATIENT
Start: 2019-08-20 | End: 2019-08-20 | Stop reason: HOSPADM

## 2019-08-20 RX ORDER — NALOXONE HYDROCHLORIDE 0.4 MG/ML
0.4 INJECTION, SOLUTION INTRAMUSCULAR; INTRAVENOUS; SUBCUTANEOUS
Status: DISCONTINUED | OUTPATIENT
Start: 2019-08-20 | End: 2019-08-20 | Stop reason: HOSPADM

## 2019-08-20 RX ORDER — SODIUM CHLORIDE 0.9 % (FLUSH) 0.9 %
5-40 SYRINGE (ML) INJECTION EVERY 8 HOURS
Status: DISCONTINUED | OUTPATIENT
Start: 2019-08-20 | End: 2019-08-20 | Stop reason: SDUPTHER

## 2019-08-20 RX ORDER — ATROPINE SULFATE 0.1 MG/ML
0.5 INJECTION INTRAVENOUS
Status: DISCONTINUED | OUTPATIENT
Start: 2019-08-20 | End: 2019-08-20 | Stop reason: HOSPADM

## 2019-08-20 RX ADMIN — LIDOCAINE HYDROCHLORIDE 80 MG: 20 INJECTION, SOLUTION EPIDURAL; INFILTRATION; INTRACAUDAL; PERINEURAL at 12:09

## 2019-08-20 RX ADMIN — PROPOFOL 100 MG: 10 INJECTION, EMULSION INTRAVENOUS at 12:22

## 2019-08-20 RX ADMIN — SODIUM CHLORIDE 25 ML/HR: 900 INJECTION, SOLUTION INTRAVENOUS at 11:59

## 2019-08-20 NOTE — ANESTHESIA POSTPROCEDURE EVALUATION
Procedure(s):  ESOPHAGOGASTRODUODENOSCOPY (EGD)  ESOPHAGOGASTRODUODENAL (EGD) BIOPSY. total IV anesthesia    Anesthesia Post Evaluation        Patient location during evaluation: PACU  Note status: Adequate. Level of consciousness: responsive to verbal stimuli and sleepy but conscious  Pain management: satisfactory to patient  Airway patency: patent  Anesthetic complications: no  Cardiovascular status: acceptable  Respiratory status: acceptable  Hydration status: acceptable  Comments: +Post-Anesthesia Evaluation and Assessment    Patient: Mile Calabrese MRN: [id number]  SSN: [id number]   YOB: 1935  Age: 80 y.o. Sex: female      Cardiovascular Function/Vital Signs    /75   Pulse (!) 54   Temp 36.7 °C (98 °F)   Resp 17   Ht 5' 3\" (1.6 m)   Wt 63.6 kg (140 lb 3 oz)   SpO2 97%   Breastfeeding? No   BMI 24.83 kg/m²     Patient is status post Procedure(s):  ESOPHAGOGASTRODUODENOSCOPY (EGD)  ESOPHAGOGASTRODUODENAL (EGD) BIOPSY. Nausea/Vomiting: Controlled. Postoperative hydration reviewed and adequate. Pain:  Pain Scale 1: Numeric (0 - 10) (08/20/19 1232)  Pain Intensity 1: 0 (08/20/19 1232)   Managed. Neurological Status: At baseline. Mental Status and Level of Consciousness: Arousable. Pulmonary Status:   O2 Device: Room air (08/20/19 1244)   Adequate oxygenation and airway patent. Complications related to anesthesia: None    Post-anesthesia assessment completed. No concerns. Signed By: Benjie Wadsworth MD    8/20/2019  Post anesthesia nausea and vomiting:  controlled      Vitals Value Taken Time   /79 8/20/2019 12:52 PM   Temp 36.7 °C (98 °F) 8/20/2019 12:32 PM   Pulse 58 8/20/2019 12:53 PM   Resp 24 8/20/2019 12:53 PM   SpO2 96 % 8/20/2019 12:53 PM   Vitals shown include unvalidated device data.

## 2019-08-20 NOTE — ANESTHESIA PREPROCEDURE EVALUATION
Anesthetic History     PONV (no problems with colonoscopy)          Review of Systems / Medical History  Patient summary reviewed, nursing notes reviewed and pertinent labs reviewed    Pulmonary            Asthma (occasional wheezing) : well controlled       Neuro/Psych         Psychiatric history (anxiety)  Pertinent negatives: TIA: pt denies. CVA: pt denies. Comments: Peripheral Neuropathy  Restless Legs Syndrome Cardiovascular    Hypertension              Exercise tolerance: >4 METS  Comments: Pt denies hrt probs or chest pain   GI/Hepatic/Renal     GERD: well controlled      Hiatal hernia    Comments: Epigastric Pain  Cholelithiasis  Nausea  Constipation Endo/Other        Arthritis     Other Findings   Comments: Fibromyalgia on chart, pt denies.   Pt reports diagnosis changed to polymyalgia rheumatica    Glaucoma         Physical Exam    Airway  Mallampati: I  TM Distance: > 6 cm  Neck ROM: normal range of motion   Mouth opening: Normal     Cardiovascular    Rhythm: regular  Rate: normal      Pertinent negatives: No murmur   Dental    Dentition: Full upper dentures and Lower partial plate     Pulmonary  Breath sounds clear to auscultation               Abdominal  GI exam deferred       Other Findings            Anesthetic Plan    ASA: 2  Anesthesia type: total IV anesthesia          Induction: Intravenous  Anesthetic plan and risks discussed with: Patient

## 2019-08-20 NOTE — DISCHARGE INSTRUCTIONS
Navneet Wilkinson  [id number]  1935    EGD DISCHARGE INSTRUCTIONS  Discomfort:  Sore throat- throat lozenges or warm salt water gargle  redness at IV site- apply warm compress to area; if redness or soreness persist- contact your physician  Gaseous discomfort- walking, belching will help relieve any discomfort  You may not operate a vehicle for 12 hours  You may not engage in an occupation involving machinery or appliances for rest of today  You may not drink alcoholic beverages for at least 12 hours  Avoid making any critical decisions for at least 24 hour  DIET  You may have minimal sips at this time-- do not eat or drink for two hours. You may eat and drink after you wake up  You may resume your regular diet - however -  remember your colon is empty and a heavy meal will produce gas. Avoid these foods:  vegetables, fried / greasy foods, carbonated drinks    MEDICATIONS:  See attached    ACTIVITY  You may resume your normal daily activities until tomorrow AM;  Spend the remainder of the day resting -  avoid any strenuous activity. CALL M.D. ANY SIGN OF   Increasing pain, nausea, vomiting  Abdominal distension (swelling)  New increased bleeding (oral or rectal)  Fever (chills)  Pain in chest area  Bloody discharge from nose or mouth  Shortness of breath    You may not take any Advil, Aspirin, Ibuprofen, Motrin, Aleve, or Goodys for 10 days, ONLY  Tylenol as needed for pain. IMPRESSION:  -- mild redness in the stomach and lower esophagus  -- also, a medium sized hiatal hernia was seen, which is when the very top of the stomach can slide up an inch or two into the lower chest. This is a common finding in patients with reflux, and you also had some erosions or scrapes within the hernia, so it likely is causing some symptoms.      Follow-up Instructions:   Call Dr. Dottie Mayorga for the results of procedure / biopsy in 7-10 days   Telephone # [phone]  Appointment in 1-2 months, please call to schedule    Stephanie Olson Ilene Jackson MD

## 2019-08-20 NOTE — PROGRESS NOTES
Anesthesia reports 100mg Propofol, 80mg Lidocaine and 350mL NS given during procedure. Received report from anesthesia staff on vital signs and status of patient.

## 2019-08-20 NOTE — H&P
Date of Surgery Update:  Rakel Carrasco was seen and examined. History and physical has been reviewed. The patient has been examined.  There have been no significant clinical changes since the completion of the originally dated History and Physical.    Signed By: Lorra Epley, MD     August 20, 2019 12:05 PM

## 2019-08-20 NOTE — PROCEDURES
NAME:  Walter Keys   :   1935   MRN:   [id number]     Date/Time:  2019 12:06 PM    Esophagogastroduodenoscopy (EGD) Procedure Note    Procedure: Esophagogastroduodenoscopy with biopsy    Indication: epigastric pain  Pre-operative Diagnosis: see indication above  Post-operative Diagnosis: see findings below  :  Melony Marquis MD  Referring Provider:   Negro Vega MD    Exam:  Airway: clear, no airway problems anticipated  Heart: RRR, without gallops or rubs  Lungs: clear bilaterally without wheezes, crackles, or rhonchi  Abdomen: soft, nontender, nondistended, bowel sounds present  Mental Status: awake, alert and oriented to person, place and time     Anethesia/Sedation:  MAC anesthesia Propofol  Procedure Details   After informed consent was obtained for the procedure, with all risks and benefits of procedure explained the patient was taken to the endoscopy suite and placed in the left lateral decubitus position. Following sequential administration of sedation as per above, the [id number] gastroscope was inserted into the mouth and advanced under direct vision to third portion of the duodenum. A careful inspection was made as the gastroscope was withdrawn, including a retroflexed view of the proximal stomach; findings and interventions are described below. Findings:   OROPHARYNX: Cords and pyriform recesses normal.   ESOPHAGUS:   -- The proximal and mid esophagus is normal.  -- distal portions are mildly erythematous along the Z-Line at 33 cm from central incisors. Biopsied. STOMACH:   -- The fundus on antegrade and retroflex views reveals a medium sized 3-4 cm sliding hiatal hernia, with diaphragmatic hiatus at 37 cm from the central incisors. -- superficial Segundo ulcers within hernia sac  -- The body, antrum, and pylorus are erythematous, biopsied.    DUODENUM: The bulb, second and third portions are normal.    Therapies:     biopsy of esophagus  biopsy of stomach body, antrum    Specimens: gastric, distal esophagus    EBL:  None. Complications:   None; patient tolerated the procedure well.            Impression:  -- medium-sized hiatal hernia with associated Segundo erosions  -- mild gastritis, biopsied  -- mild reflux esophagitis, biopsied    Recommendations:  -- continue H2B  -- add sucralfate for symptoms  -- await pathology  -- follow up in a few months in office    Discharge disposition:  Home in the company of  when able to ambulate    Aldo Roque MD

## 2019-08-20 NOTE — ROUTINE PROCESS
Hazel Mili  1935  [id number]    Situation:  Verbal report received from: DIANE Miller  Procedure: Procedure(s):  ESOPHAGOGASTRODUODENOSCOPY (EGD)  ESOPHAGOGASTRODUODENAL (EGD) BIOPSY    Background:    Preoperative diagnosis: EPIGASTRIC PAIN, CHOLELITHIASIS WITHOUT CHOLECYSTITIS, NAUSEA, CONSTIPATION  Postoperative diagnosis: Hiatal hernia, Segundo Erosions, gastritis, esophagitis    :  Dr. Catrachito Mcnair  Assistant(s): Endoscopy Technician-1: Melissa Ragland  Endoscopy RN-1: Dhiraj Mejia RN    Specimens:   ID Type Source Tests Collected by Time Destination   1 : Gastric bx Preservative Gastric  Jason Meyer MD 8/20/2019 1214 Pathology   2 : Distal esophageal bx Preservative Esophagus, Distal  Jason Meyer MD 8/20/2019 1215 Pathology     H. Pylori  no    Assessment:  Intra-procedure medications     Anesthesia gave intra-procedure sedation and medications, see anesthesia flow sheet yes    Intravenous fluids: NS@ KVO     Vital signs stable     Abdominal assessment: round and soft     Recommendation:  Discharge patient per MD order.   Family or Friend Cassie Roque  Permission to share finding with family or friend yes

## 2020-06-11 ENCOUNTER — HOSPITAL ENCOUNTER (EMERGENCY)
Age: 85
Discharge: HOME | End: 2020-06-11
Attending: EMERGENCY MEDICINE
Payer: MEDICARE

## 2020-06-11 VITALS
DIASTOLIC BLOOD PRESSURE: 70 MMHG | WEIGHT: 138 LBS | HEIGHT: 62 IN | SYSTOLIC BLOOD PRESSURE: 158 MMHG | OXYGEN SATURATION: 96 % | RESPIRATION RATE: 18 BRPM | HEART RATE: 60 BPM | TEMPERATURE: 98 F | BODY MASS INDEX: 25.4 KG/M2

## 2020-06-11 DIAGNOSIS — H65.05 RECURRENT ACUTE SEROUS OTITIS MEDIA OF LEFT EAR: Primary | ICD-10-CM

## 2020-06-11 DIAGNOSIS — R42 VERTIGO: ICD-10-CM

## 2020-06-11 LAB
ALBUMIN SERPL-MCNC: 3.6 G/DL (ref 3.5–5)
ALBUMIN/GLOB SERPL: 1 {RATIO} (ref 1.1–2.2)
ALP SERPL-CCNC: 62 U/L (ref 45–117)
ALT SERPL-CCNC: 30 U/L (ref 12–78)
ANION GAP SERPL CALC-SCNC: 6 MMOL/L (ref 5–15)
APPEARANCE UR: CLEAR
AST SERPL-CCNC: 33 U/L (ref 15–37)
ATRIAL RATE: 49 BPM
BACTERIA URNS QL MICRO: NEGATIVE /HPF
BASOPHILS # BLD: 0.1 K/UL (ref 0–0.1)
BASOPHILS NFR BLD: 1 % (ref 0–1)
BILIRUB SERPL-MCNC: 0.4 MG/DL (ref 0.2–1)
BILIRUB UR QL: NEGATIVE
BUN SERPL-MCNC: 21 MG/DL (ref 6–20)
BUN/CREAT SERPL: 19 (ref 12–20)
CALCIUM SERPL-MCNC: 9.3 MG/DL (ref 8.5–10.1)
CALCULATED P AXIS, ECG09: 48 DEGREES
CALCULATED R AXIS, ECG10: -2 DEGREES
CALCULATED T AXIS, ECG11: 29 DEGREES
CHLORIDE SERPL-SCNC: 103 MMOL/L (ref 97–108)
CO2 SERPL-SCNC: 29 MMOL/L (ref 21–32)
COLOR UR: ABNORMAL
CREAT SERPL-MCNC: 1.08 MG/DL (ref 0.55–1.02)
DIAGNOSIS, 93000: NORMAL
DIFFERENTIAL METHOD BLD: NORMAL
EOSINOPHIL # BLD: 0.3 K/UL (ref 0–0.4)
EOSINOPHIL NFR BLD: 4 % (ref 0–7)
EPITH CASTS URNS QL MICRO: ABNORMAL /LPF
ERYTHROCYTE [DISTWIDTH] IN BLOOD BY AUTOMATED COUNT: 13.1 % (ref 11.5–14.5)
GLOBULIN SER CALC-MCNC: 3.5 G/DL (ref 2–4)
GLUCOSE SERPL-MCNC: 114 MG/DL (ref 65–100)
GLUCOSE UR STRIP.AUTO-MCNC: NEGATIVE MG/DL
HCT VFR BLD AUTO: 42 % (ref 35–47)
HGB BLD-MCNC: 13.6 G/DL (ref 11.5–16)
HGB UR QL STRIP: NEGATIVE
HYALINE CASTS URNS QL MICRO: ABNORMAL /LPF (ref 0–5)
IMM GRANULOCYTES # BLD AUTO: 0 K/UL (ref 0–0.04)
IMM GRANULOCYTES NFR BLD AUTO: 0 % (ref 0–0.5)
KETONES UR QL STRIP.AUTO: NEGATIVE MG/DL
LEUKOCYTE ESTERASE UR QL STRIP.AUTO: ABNORMAL
LYMPHOCYTES # BLD: 3.2 K/UL (ref 0.8–3.5)
LYMPHOCYTES NFR BLD: 42 % (ref 12–49)
MCH RBC QN AUTO: 28.2 PG (ref 26–34)
MCHC RBC AUTO-ENTMCNC: 32.4 G/DL (ref 30–36.5)
MCV RBC AUTO: 87.1 FL (ref 80–99)
MONOCYTES # BLD: 0.8 K/UL (ref 0–1)
MONOCYTES NFR BLD: 11 % (ref 5–13)
NEUTS SEG # BLD: 3.1 K/UL (ref 1.8–8)
NEUTS SEG NFR BLD: 42 % (ref 32–75)
NITRITE UR QL STRIP.AUTO: NEGATIVE
NRBC # BLD: 0 K/UL (ref 0–0.01)
NRBC BLD-RTO: 0 PER 100 WBC
P-R INTERVAL, ECG05: 174 MS
PH UR STRIP: 7.5 [PH] (ref 5–8)
PLATELET # BLD AUTO: 241 K/UL (ref 150–400)
PMV BLD AUTO: 9.7 FL (ref 8.9–12.9)
POTASSIUM SERPL-SCNC: 3.2 MMOL/L (ref 3.5–5.1)
PROT SERPL-MCNC: 7.1 G/DL (ref 6.4–8.2)
PROT UR STRIP-MCNC: NEGATIVE MG/DL
Q-T INTERVAL, ECG07: 458 MS
QRS DURATION, ECG06: 90 MS
QTC CALCULATION (BEZET), ECG08: 413 MS
RBC # BLD AUTO: 4.82 M/UL (ref 3.8–5.2)
RBC #/AREA URNS HPF: ABNORMAL /HPF (ref 0–5)
SODIUM SERPL-SCNC: 138 MMOL/L (ref 136–145)
SP GR UR REFRACTOMETRY: 1.01 (ref 1–1.03)
TROPONIN I SERPL-MCNC: <0.05 NG/ML
UA: UC IF INDICATED,UAUC: ABNORMAL
UROBILINOGEN UR QL STRIP.AUTO: 1 EU/DL (ref 0.2–1)
VENTRICULAR RATE, ECG03: 49 BPM
WBC # BLD AUTO: 7.5 K/UL (ref 3.6–11)
WBC URNS QL MICRO: ABNORMAL /HPF (ref 0–4)

## 2020-06-11 PROCEDURE — 96374 THER/PROPH/DIAG INJ IV PUSH: CPT

## 2020-06-11 PROCEDURE — 81001 URINALYSIS AUTO W/SCOPE: CPT

## 2020-06-11 PROCEDURE — 85025 COMPLETE CBC W/AUTO DIFF WBC: CPT

## 2020-06-11 PROCEDURE — 36415 COLL VENOUS BLD VENIPUNCTURE: CPT

## 2020-06-11 PROCEDURE — 74011250636 HC RX REV CODE- 250/636: Performed by: EMERGENCY MEDICINE

## 2020-06-11 PROCEDURE — 99285 EMERGENCY DEPT VISIT HI MDM: CPT

## 2020-06-11 PROCEDURE — 84484 ASSAY OF TROPONIN QUANT: CPT

## 2020-06-11 PROCEDURE — 93005 ELECTROCARDIOGRAM TRACING: CPT

## 2020-06-11 PROCEDURE — 74011250637 HC RX REV CODE- 250/637: Performed by: EMERGENCY MEDICINE

## 2020-06-11 PROCEDURE — 80053 COMPREHEN METABOLIC PANEL: CPT

## 2020-06-11 RX ORDER — ONDANSETRON 2 MG/ML
4 INJECTION INTRAMUSCULAR; INTRAVENOUS
Status: COMPLETED | OUTPATIENT
Start: 2020-06-11 | End: 2020-06-11

## 2020-06-11 RX ORDER — MECLIZINE HCL 12.5 MG 12.5 MG/1
25 TABLET ORAL
Status: COMPLETED | OUTPATIENT
Start: 2020-06-11 | End: 2020-06-11

## 2020-06-11 RX ORDER — CIPROFLOXACIN 500 MG/1
500 TABLET ORAL
Status: COMPLETED | OUTPATIENT
Start: 2020-06-11 | End: 2020-06-11

## 2020-06-11 RX ORDER — CIPROFLOXACIN 500 MG/1
500 TABLET ORAL 2 TIMES DAILY
Qty: 14 TAB | Refills: 0 | Status: SHIPPED | OUTPATIENT
Start: 2020-06-11 | End: 2020-06-18

## 2020-06-11 RX ORDER — MECLIZINE HYDROCHLORIDE 25 MG/1
25 TABLET ORAL
Qty: 20 TAB | Refills: 0 | Status: SHIPPED | OUTPATIENT
Start: 2020-06-11 | End: 2022-08-01 | Stop reason: CLARIF

## 2020-06-11 RX ADMIN — ONDANSETRON 4 MG: 2 INJECTION INTRAMUSCULAR; INTRAVENOUS at 08:55

## 2020-06-11 RX ADMIN — CIPROFLOXACIN HYDROCHLORIDE 500 MG: 500 TABLET, FILM COATED ORAL at 09:26

## 2020-06-11 RX ADMIN — MECLIZINE 25 MG: 12.5 TABLET ORAL at 09:26

## 2020-06-11 RX ADMIN — SODIUM CHLORIDE 500 ML: 900 INJECTION, SOLUTION INTRAVENOUS at 10:13

## 2020-06-11 NOTE — DISCHARGE INSTRUCTIONS
You were seen for dizziness and vertigo. I recommended meclizine and resting in a quiet environment until better. Avoid driving, heights, operating machinery until better. Call or Return to ED if symptoms persist or worsen or you develop new neurologic symptoms. If meclizine does not improve your symptoms, follow up with [facility]:    [address] [facility], [address] [id number]  [phone]    Vestibular Rehab therapists  José Miguel Vargas, PT  Yana Sauceda, PT    Patient Education        Middle Ear Fluid: Care Instructions  Your Care Instructions     Fluid often builds up inside the ear during a cold or allergies. Usually the fluid drains away, but sometimes a small tube in the ear, called the eustachian tube, stays blocked for months. Symptoms of fluid buildup may include:  · Popping, ringing, or a feeling of fullness or pressure in the ear. · Trouble hearing. · Balance problems and dizziness. In most cases, you can treat yourself at home. Follow-up care is a key part of your treatment and safety. Be sure to make and go to all appointments, and call your doctor if you are having problems. It's also a good idea to know your test results and keep a list of the medicines you take. How can you care for yourself at home? · In most cases, the fluid clears up within a few months without treatment. You may need more tests if the fluid does not clear up after 3 months. · If your doctor prescribed antibiotics, take them as directed. Do not stop taking them just because you feel better. You need to take the full course of antibiotics. When should you call for help? Call your doctor now or seek immediate medical care if:  · You have symptoms of infection, such as:  ? Increased pain, swelling, warmth, or redness. ? Pus draining from the area. ? A fever. Watch closely for changes in your health, and be sure to contact your doctor if:  · You notice changes in hearing.   · You do not get better as expected. Where can you learn more? Go to http://addison-mary.info/  Enter D614 in the search box to learn more about \"Middle Ear Fluid: Care Instructions. \"  Current as of: July 29, 2019               Content Version: 12.5  © 0581-8258 [employer], [employer]orporated. Care instructions adapted under license by [employer] (which disclaims liability or warranty for this information). If you have questions about a medical condition or this instruction, always ask your healthcare professional. [address] any warranty or liability for your use of this information.

## 2020-06-11 NOTE — ED PROVIDER NOTES
EMERGENCY DEPARTMENT HISTORY AND PHYSICAL EXAM      Date: 6/11/2020  Patient Name: Brittany Mccurdy  Patient Age and Sex: 80 y.o. female     History of Presenting Illness     Chief Complaint   Patient presents with    Fatigue     pt c/o generalized weakness x3 weeks since being started on abx for ear infection.  Nausea    Dizziness     pt took 12.5mg meclizine last pm without relief. History Provided By: Patient    HPI: Brittany Mccurdy is an 70-year-old female with a history of vertigo presenting for dizziness. Patient states that this morning she woke up and felt very unstable and dizzy like she was going to fall over. States that she lives alone in her apartment so got very scared and concerned so called EMS. It is associated with nausea and vomiting. States that she has been having generalized fatigue over the past 3 weeks. About 2 weeks ago was diagnosed with a ear infection and prescribed something that was a distant cousin of penicillin and took that with no improvement. Patient states that she is had intermittent vertigo for some time now. Saw ENT who had her go to vestibular rehab which did help. Patient denies any headache, numbness, weakness, fevers. There are no other complaints, changes, or physical findings at this time. PCP: Delmar Mandujano MD    No current facility-administered medications on file prior to encounter. Current Outpatient Medications on File Prior to Encounter   Medication Sig Dispense Refill    famotidine (PEPCID) 20 mg tablet Take 1 Tab by mouth two (2) times a day. 20 Tab 0    aluminum & magnesium hydroxide-simethicone (MAALOX MAXIMUM STRENGTH) 400-400-40 mg/5 mL suspension Take 10 mL by mouth every six (6) hours as needed for Indigestion. 335 mL 0    aspirin delayed-release 81 mg tablet Take 2 Tabs by mouth daily. 60 Tab 0    pravastatin (PRAVACHOL) 10 mg tablet Take 1 Tab by mouth nightly.  30 Tab 0    lisinopril (PRINIVIL, ZESTRIL) 20 mg tablet Take 20 mg by mouth nightly.  predniSONE (STERAPRED) 5 mg dose pack Take 5-15 mg by mouth See Admin Instructions. See administration instruction per 5mg dose pack      CHOLECALCIFEROL, VITAMIN D3, PO Take 1 Tab by mouth daily.  ARNICA EX by Apply Externally route daily as needed (Lower Extremity Cramps).  gabapentin (NEURONTIN) 100 mg capsule Take 100 mg by mouth nightly.  bimatoprost (LUMIGAN) 0.01 % ophthalmic drops Administer 1 Drop to right eye every evening.  multivitamin (ONE A DAY) tablet Take 1 Tab by mouth daily.  albuterol (PROVENTIL HFA, VENTOLIN HFA, PROAIR HFA) 90 mcg/actuation inhaler Take 1 Puff by inhalation as needed for Wheezing.  lorazepam (ATIVAN) 0.5 mg tablet Take 0.5 mg by mouth nightly.  co-enzyme Q-10 (CO Q-10) 100 mg capsule Take 100 mg by mouth daily.  mometasone-formoterol (DULERA) 100-5 mcg/Actuation HFAA HFA inhaler Take 2 Puffs by inhalation two (2) times a day. Past History     Past Medical History:  Past Medical History:   Diagnosis Date    Adverse effect of anesthesia     combative waking up    Anxiety     Arthritis     Asthma     as of 12/30/16 pt states under control    Diverticulitis Dx 11/2016    Fibromyalgia     GERD (gastroesophageal reflux disease)     Glaucoma     Hiatal hernia     Hypercholesteremia     Hypertension     Ill-defined condition     neurapthy in ble    Nausea & vomiting        Past Surgical History:  Past Surgical History:   Procedure Laterality Date    HX CATARACT REMOVAL Right 01/04/2017    HX HYSTERECTOMY      HX ROTATOR CUFF REPAIR Left 2008    HX TUBAL LIGATION      TN COLONOSCOPY FLX DX W/COLLJ SPEC WHEN PFRMD  11/9/2011         TN EGD TRANSORAL BIOPSY SINGLE/MULTIPLE  11/9/2011         UPPER GI ENDOSCOPY,BIOPSY  8/20/2019            Family History:  No family history on file.     Social History:  Social History     Tobacco Use    Smoking status: Never Smoker    Smokeless tobacco: Never Used   Substance Use Topics    Alcohol use: No    Drug use: No       Allergies: Allergies   Allergen Reactions    Cymbalta [Duloxetine] Nausea Only and Other (comments)     \"within the hour I had a severe headache, and vomiting\"      Demerol [Meperidine] Nausea and Vomiting    Morphine Nausea and Vomiting    Penicillins Other (comments)     rash    Percocet [Oxycodone-Acetaminophen] Nausea and Vomiting         Review of Systems   Review of Systems   Constitutional: Negative for chills and fever. Respiratory: Negative for cough and shortness of breath. Cardiovascular: Negative for chest pain. Gastrointestinal: Negative for abdominal pain, constipation, diarrhea, nausea and vomiting. Genitourinary: Negative for dysuria, frequency and hematuria. Musculoskeletal: Positive for gait problem. Neurological: Positive for dizziness. Negative for weakness and numbness. All other systems reviewed and are negative. Physical Exam   Physical Exam  Vitals signs and nursing note reviewed. Constitutional:       Appearance: She is well-developed. HENT:      Head: Normocephalic. Right Ear: Tympanic membrane normal.      Ears:      Comments: Patient's left ear does have some serous fluid behind the tympanic membrane may be some yellowish discharge. Nose: Nose normal.      Mouth/Throat:      Mouth: Mucous membranes are moist.   Eyes:      Pupils: Pupils are equal, round, and reactive to light. Neck:      Musculoskeletal: Normal range of motion. Cardiovascular:      Rate and Rhythm: Normal rate and regular rhythm. Pulmonary:      Effort: Pulmonary effort is normal.      Breath sounds: Normal breath sounds. Abdominal:      General: There is no distension. Palpations: Abdomen is soft. Tenderness: There is no abdominal tenderness. Skin:     General: Skin is warm and dry. Neurological:      General: No focal deficit present.       Mental Status: She is alert and oriented to person, place, and time. Mental status is at baseline. Cranial Nerves: No cranial nerve deficit. Comments: CN 2-12 intact, 5/5 strength in all 4 extremities, Finger to nose intact, heel-to-shin intact   Psychiatric:         Mood and Affect: Mood normal.          Diagnostic Study Results     Labs -     Recent Results (from the past 12 hour(s))   METABOLIC PANEL, COMPREHENSIVE    Collection Time: 06/11/20  8:52 AM   Result Value Ref Range    Sodium 138 136 - 145 mmol/L    Potassium 3.2 (L) 3.5 - 5.1 mmol/L    Chloride 103 97 - 108 mmol/L    CO2 29 21 - 32 mmol/L    Anion gap 6 5 - 15 mmol/L    Glucose 114 (H) 65 - 100 mg/dL    BUN 21 (H) 6 - 20 MG/DL    Creatinine 1.08 (H) 0.55 - 1.02 MG/DL    BUN/Creatinine ratio 19 12 - 20      GFR est AA 58 (L) >60 ml/min/1.73m2    GFR est non-AA 48 (L) >60 ml/min/1.73m2    Calcium 9.3 8.5 - 10.1 MG/DL    Bilirubin, total 0.4 0.2 - 1.0 MG/DL    ALT (SGPT) 30 12 - 78 U/L    AST (SGOT) 33 15 - 37 U/L    Alk. phosphatase 62 45 - 117 U/L    Protein, total 7.1 6.4 - 8.2 g/dL    Albumin 3.6 3.5 - 5.0 g/dL    Globulin 3.5 2.0 - 4.0 g/dL    A-G Ratio 1.0 (L) 1.1 - 2.2     CBC WITH AUTOMATED DIFF    Collection Time: 06/11/20  8:52 AM   Result Value Ref Range    WBC 7.5 3.6 - 11.0 K/uL    RBC 4.82 3.80 - 5.20 M/uL    HGB 13.6 11.5 - 16.0 g/dL    HCT 42.0 35.0 - 47.0 %    MCV 87.1 80.0 - 99.0 FL    MCH 28.2 26.0 - 34.0 PG    MCHC 32.4 30.0 - 36.5 g/dL    RDW 13.1 11.5 - 14.5 %    PLATELET 149 139 - 825 K/uL    MPV 9.7 8.9 - 12.9 FL    NRBC 0.0 0  WBC    ABSOLUTE NRBC 0.00 0.00 - 0.01 K/uL    NEUTROPHILS 42 32 - 75 %    LYMPHOCYTES 42 12 - 49 %    MONOCYTES 11 5 - 13 %    EOSINOPHILS 4 0 - 7 %    BASOPHILS 1 0 - 1 %    IMMATURE GRANULOCYTES 0 0.0 - 0.5 %    ABS. NEUTROPHILS 3.1 1.8 - 8.0 K/UL    ABS. LYMPHOCYTES 3.2 0.8 - 3.5 K/UL    ABS. MONOCYTES 0.8 0.0 - 1.0 K/UL    ABS. EOSINOPHILS 0.3 0.0 - 0.4 K/UL    ABS. BASOPHILS 0.1 0.0 - 0.1 K/UL    ABS. IMM.  GRANS. 0.0 0.00 - 0.04 K/UL    DF AUTOMATED     URINALYSIS W/ REFLEX CULTURE    Collection Time: 06/11/20  8:52 AM   Result Value Ref Range    Color YELLOW/STRAW      Appearance CLEAR CLEAR      Specific gravity 1.013 1.003 - 1.030      pH (UA) 7.5 5.0 - 8.0      Protein Negative NEG mg/dL    Glucose Negative NEG mg/dL    Ketone Negative NEG mg/dL    Bilirubin Negative NEG      Blood Negative NEG      Urobilinogen 1.0 0.2 - 1.0 EU/dL    Nitrites Negative NEG      Leukocyte Esterase TRACE (A) NEG      WBC 0-4 0 - 4 /hpf    RBC 0-5 0 - 5 /hpf    Epithelial cells FEW FEW /lpf    Bacteria Negative NEG /hpf    UA:UC IF INDICATED CULTURE NOT INDICATED BY UA RESULT CNI      Hyaline cast 0-2 0 - 5 /lpf   EKG, 12 LEAD, INITIAL    Collection Time: 06/11/20  9:16 AM   Result Value Ref Range    Ventricular Rate 49 BPM    Atrial Rate 49 BPM    P-R Interval 174 ms    QRS Duration 90 ms    Q-T Interval 458 ms    QTC Calculation (Bezet) 413 ms    Calculated P Axis 48 degrees    Calculated R Axis -2 degrees    Calculated T Axis 29 degrees    Diagnosis       Sinus bradycardia  Inferior infarct (cited on or before 11-JUN-2020)  When compared with ECG of 08-JUN-2019 10:22,  No significant change was found         Radiologic Studies -   No orders to display     CT Results  (Last 48 hours)    None        CXR Results  (Last 48 hours)    None            Medical Decision Making   I am the first provider for this patient. I reviewed the vital signs, available nursing notes, past medical history, past surgical history, family history and social history. Vital Signs-Reviewed the patient's vital signs.   Patient Vitals for the past 12 hrs:   Temp Pulse Resp BP SpO2   06/11/20 1002 -- 66 23 -- 95 %   06/11/20 1000 -- 60 14 160/68 97 %   06/11/20 0958 -- 62 22 160/72 96 %   06/11/20 0955 -- (!) 53 15 145/63 95 %   06/11/20 0839 97.9 °F (36.6 °C) (!) 54 18 156/69 94 %       Records Reviewed: Nursing Notes and Old Medical Records    Provider Notes (Medical Decision Making):   Patient presents with isolated vertigo. Most likely peripheral vertigo rather than Central vertigo. DDx: BPPV, acute labyrinthitis, vestibular neuritis, Meniere's dx, otitis media, acoustic neuroma, medication toxicity (aminoglycosides, loop diuretics, aspirin). Unlikely central cause of vertigo such as posterior mass or stroke as there are no associated red flag symptoms including diplopia, dysmetria, dysarthria, ataxia, unilateral numbness or weakness. Patient does have a serous and possibly purulent drainage of her left inner ear concerning for otitis media that has not healed yet. Kanchan Moscoso  results have been reviewed with her. She has been counseled regarding her diagnosis. She verbally conveys understanding and agreement of the signs, symptoms, diagnosis, treatment with Meclizine and prognosis and additionally agrees to follow up as recommended with Vestibular Rehab or Neurology PRN. ED Course:   Initial assessment performed. The patients presenting problems have been discussed, and they are in agreement with the care plan formulated and outlined with them. I have encouraged them to ask questions as they arise throughout their visit. ED Course as of Jun 11 1045   Thu Jun 11, 2020   5456 EKG interpreted by me. Shows sinus bradycardia with a heart rate of 49. No ST elevations or depressions concerning for ischemia. [JS]   8160 Patient was not orthostatic. She was able to get up and walk around the room without needing any assistance. [JS]      ED Course User Index  [JS] Lindsay Cordero MD     Critical Care Time:   0    Disposition:  Discharge Note:  The patient has been re-evaluated and is ready for discharge. Reviewed available results with patient. Counseled patient on diagnosis and care plan. Patient has expressed understanding, and all questions have been answered.  Patient agrees with plan and agrees to follow up as recommended, or to return to the ED if their symptoms worsen. Discharge instructions have been provided and explained to the patient, along with reasons to return to the ED. PLAN:  Current Discharge Medication List      START taking these medications    Details   ciprofloxacin HCl (Cipro) 500 mg tablet Take 1 Tab by mouth two (2) times a day for 7 days. Qty: 14 Tab, Refills: 0      meclizine (ANTIVERT) 25 mg tablet Take 1 Tab by mouth three (3) times daily as needed for Dizziness. Qty: 20 Tab, Refills: 0           2. Follow-up Information     Follow up With Specialties Details Why Contact Info    ENT and vestibular rehab            3. Return to ED if worse     Diagnosis     Clinical Impression:   1. Recurrent acute serous otitis media of left ear    2. Vertigo        Attestations:    Prashant Brown M.D. Please note that this dictation was completed with [employer], the computer voice recognition software. Quite often unanticipated grammatical, syntax, homophones, and other interpretive errors are inadvertently transcribed by the computer software. Please disregard these errors. Please excuse any errors that have escaped final proofreading. Thank you.

## 2020-06-12 ENCOUNTER — PATIENT OUTREACH (OUTPATIENT)
Dept: CARDIOLOGY CLINIC | Age: 85
End: 2020-06-12

## 2020-06-12 NOTE — PROGRESS NOTES
Patient contacted regarding recent discharge and COVID-19 risk. Discussed COVID-19 related testing which was not done at this time. Test results were not done. Patient informed of results, if available? no    Care Transition Nurse/ Ambulatory Care Manager contacted the patient by telephone to perform post discharge assessment. Verified name and  with patient as identifiers. Patient has following risk factors of: asthma and immunocompromised. CTN/ACM reviewed discharge instructions, medical action plan and red flags related to discharge diagnosis. Reviewed and educated them on any new and changed medications related to discharge diagnosis. Advised obtaining a 90-day supply of all daily and as-needed medications. Education provided regarding infection prevention, and signs and symptoms of COVID-19 and when to seek medical attention with patient who verbalized understanding. Discussed exposure protocols and quarantine from [address] you at higher risk for severe illness  and given an opportunity for questions and concerns. The patient agrees to contact the COVID-19 hotline [phone] or PCP office for questions related to their healthcare. CTN/ACM provided contact information for future reference. From CDC: Are you at higher risk for severe illness?  Wash your hands often.  Avoid close contact (6 feet, which is about two arm lengths) with people who are sick.  Put distance between yourself and other people if COVID-19 is spreading in your community.  Clean and disinfect frequently touched surfaces.  Avoid all cruise travel and non-essential air travel.  Call your healthcare professional if you have concerns about COVID-19 and your underlying condition or if you are sick.     For more information on steps you can take to protect yourself, see CDC's How to Protect Yourself      Patient/family/caregiver given information for Jozef Garrett and agrees to enroll no  Patient's preferred e-mail:  n/a  Patient's preferred phone number: n/a  Based on Loop alert triggers, patient will be contacted by nurse care manager for worsening symptoms. Plan for follow-up call in 7-14 days based on severity of symptoms and risk factors.

## 2020-06-26 ENCOUNTER — PATIENT OUTREACH (OUTPATIENT)
Dept: CARDIOLOGY CLINIC | Age: 85
End: 2020-06-26

## 2020-06-26 NOTE — PROGRESS NOTES
Patient resolved from Transition of Care episode on 6/26/20  Discussed COVID-19 related testing which was not done at this time. Test results were not done. Patient informed of results, if available? no     Patient/family has been provided the following resources and education related to COVID-19:                         Signs, symptoms and red flags related to COVID-19            CDC exposure and quarantine guidelines            [facility] exposure contact - [phone]            Contact for their local Department of Health                 Patient currently reports that the following symptoms have improved:  no new symptoms. No further outreach scheduled with this CTN/ACM/LPN/HC/ MA. Episode of Care resolved. Patient has this CTN/ACM/LPN/HC/MA contact information if future needs arise.

## 2020-11-18 NOTE — ED NOTES
Patient lying on stretcher in no apparent distress. Reports new onset of sharp pain originating in bilat posterior knees and radiating to her thighs. Has a history of fibromyalgia and arthritis, no longer taking celebrex. No

## 2021-01-18 ENCOUNTER — TRANSCRIBE ORDER (OUTPATIENT)
Dept: SCHEDULING | Age: 86
End: 2021-01-18

## 2021-01-18 DIAGNOSIS — Z12.31 VISIT FOR SCREENING MAMMOGRAM: Primary | ICD-10-CM

## 2021-03-17 ENCOUNTER — TRANSCRIBE ORDER (OUTPATIENT)
Dept: SCHEDULING | Age: 86
End: 2021-03-17

## 2021-03-17 DIAGNOSIS — S46.011A TRAUMATIC COMPLETE TEAR OF RIGHT ROTATOR CUFF: Primary | ICD-10-CM

## 2021-04-20 ENCOUNTER — HOSPITAL ENCOUNTER (OUTPATIENT)
Dept: MRI IMAGING | Age: 86
Discharge: HOME | End: 2021-04-20
Attending: ORTHOPAEDIC SURGERY
Payer: MEDICARE

## 2021-04-20 DIAGNOSIS — S46.011A TRAUMATIC COMPLETE TEAR OF RIGHT ROTATOR CUFF: ICD-10-CM

## 2021-04-20 PROCEDURE — 73221 MRI JOINT UPR EXTREM W/O DYE: CPT

## 2021-04-27 ENCOUNTER — APPOINTMENT (OUTPATIENT)
Dept: GENERAL RADIOLOGY | Age: 86
End: 2021-04-27
Attending: EMERGENCY MEDICINE
Payer: MEDICARE

## 2021-04-27 ENCOUNTER — HOSPITAL ENCOUNTER (EMERGENCY)
Age: 86
Discharge: HOME | End: 2021-04-27
Attending: EMERGENCY MEDICINE
Payer: MEDICARE

## 2021-04-27 VITALS
BODY MASS INDEX: 24.45 KG/M2 | TEMPERATURE: 98.4 F | DIASTOLIC BLOOD PRESSURE: 57 MMHG | OXYGEN SATURATION: 99 % | WEIGHT: 138 LBS | RESPIRATION RATE: 18 BRPM | HEIGHT: 63 IN | SYSTOLIC BLOOD PRESSURE: 154 MMHG | HEART RATE: 66 BPM

## 2021-04-27 DIAGNOSIS — S82.042A CLOSED DISPLACED COMMINUTED FRACTURE OF LEFT PATELLA, INITIAL ENCOUNTER: Primary | ICD-10-CM

## 2021-04-27 PROCEDURE — 99284 EMERGENCY DEPT VISIT MOD MDM: CPT

## 2021-04-27 PROCEDURE — 74011250637 HC RX REV CODE- 250/637: Performed by: EMERGENCY MEDICINE

## 2021-04-27 PROCEDURE — 73562 X-RAY EXAM OF KNEE 3: CPT

## 2021-04-27 PROCEDURE — 73080 X-RAY EXAM OF ELBOW: CPT

## 2021-04-27 RX ORDER — ACETAMINOPHEN 500 MG
1000 TABLET ORAL
Status: COMPLETED | OUTPATIENT
Start: 2021-04-27 | End: 2021-04-27

## 2021-04-27 RX ADMIN — ACETAMINOPHEN 1000 MG: 500 TABLET ORAL at 16:42

## 2021-04-27 NOTE — ED PROVIDER NOTES
EMERGENCY DEPARTMENT HISTORY AND PHYSICAL EXAM      Date: 4/27/2021  Patient Name: Rosie Kendrick    Please note that this dictation was completed with [employer], the computer voice recognition software. Quite often unanticipated grammatical, syntax, homophones, and other interpretive errors are inadvertently transcribed by the computer software. Please disregard these errors. Please excuse any errors that have escaped final proofreading. History of Presenting Illness     Chief Complaint   Patient presents with    Fall     at home glf; slipped left knee swollen; unable to bend her left knee.  Elbow Pain     left elbow       History Provided By: Patient     HPI: Rosie Kendrick, 80 y.o. female, who lives independently presenting the emergency department for ground-level fall. She slipped and fell and hit her left knee. No syncope. Also landed on her left elbow. She reports minimal left elbow pain, no gross deformity, but there is a deformity of the left knee. Rates her pain is 0 at this time, only has pain with movement or palpation. PCP: Dean Hennessy MD    No current facility-administered medications on file prior to encounter. Current Outpatient Medications on File Prior to Encounter   Medication Sig Dispense Refill    meclizine (ANTIVERT) 25 mg tablet Take 1 Tab by mouth three (3) times daily as needed for Dizziness. 20 Tab 0    famotidine (PEPCID) 20 mg tablet Take 1 Tab by mouth two (2) times a day. 20 Tab 0    aluminum & magnesium hydroxide-simethicone (MAALOX MAXIMUM STRENGTH) 400-400-40 mg/5 mL suspension Take 10 mL by mouth every six (6) hours as needed for Indigestion. 335 mL 0    aspirin delayed-release 81 mg tablet Take 2 Tabs by mouth daily. 60 Tab 0    pravastatin (PRAVACHOL) 10 mg tablet Take 1 Tab by mouth nightly. 30 Tab 0    lisinopril (PRINIVIL, ZESTRIL) 20 mg tablet Take 20 mg by mouth nightly.       predniSONE (STERAPRED) 5 mg dose pack Take 5-15 mg by mouth See Admin Instructions. See administration instruction per 5mg dose pack      CHOLECALCIFEROL, VITAMIN D3, PO Take 1 Tab by mouth daily.  ARNICA EX by Apply Externally route daily as needed (Lower Extremity Cramps).  gabapentin (NEURONTIN) 100 mg capsule Take 100 mg by mouth nightly.  bimatoprost (LUMIGAN) 0.01 % ophthalmic drops Administer 1 Drop to right eye every evening.  multivitamin (ONE A DAY) tablet Take 1 Tab by mouth daily.  albuterol (PROVENTIL HFA, VENTOLIN HFA, PROAIR HFA) 90 mcg/actuation inhaler Take 1 Puff by inhalation as needed for Wheezing.  lorazepam (ATIVAN) 0.5 mg tablet Take 0.5 mg by mouth nightly.  co-enzyme Q-10 (CO Q-10) 100 mg capsule Take 100 mg by mouth daily.  mometasone-formoterol (DULERA) 100-5 mcg/Actuation HFAA HFA inhaler Take 2 Puffs by inhalation two (2) times a day. Past History     Past Medical History:  Past Medical History:   Diagnosis Date    Adverse effect of anesthesia     combative waking up    Anxiety     Arthritis     Asthma     as of 12/30/16 pt states under control    Diverticulitis Dx 11/2016    Fibromyalgia     GERD (gastroesophageal reflux disease)     Glaucoma     Hiatal hernia     Hypercholesteremia     Hypertension     Ill-defined condition     neurapthy in ble    Nausea & vomiting        Past Surgical History:  Past Surgical History:   Procedure Laterality Date    HX CATARACT REMOVAL Right 01/04/2017    HX HYSTERECTOMY      HX ROTATOR CUFF REPAIR Left 2008    HX TUBAL LIGATION      VA COLONOSCOPY FLX DX W/COLLJ SPEC WHEN PFRMD  11/9/2011         VA EGD TRANSORAL BIOPSY SINGLE/MULTIPLE  11/9/2011         UPPER GI ENDOSCOPY,BIOPSY  8/20/2019            Family History:  No family history on file. Social History:  Social History     Tobacco Use    Smoking status: Never Smoker    Smokeless tobacco: Never Used   Substance Use Topics    Alcohol use: No    Drug use: No       Allergies:   Allergies Allergen Reactions    Cymbalta [Duloxetine] Nausea Only and Other (comments)     \"within the hour I had a severe headache, and vomiting\"      Demerol [Meperidine] Nausea and Vomiting    Morphine Nausea and Vomiting    Penicillins Other (comments)     rash    Percocet [Oxycodone-Acetaminophen] Nausea and Vomiting         Review of Systems   Review of Systems   Constitutional: Negative for chills and fever. HENT: Negative for congestion and sore throat. Eyes: Negative for visual disturbance. Respiratory: Negative for cough and shortness of breath. Cardiovascular: Negative for chest pain and leg swelling. Gastrointestinal: Negative for abdominal pain, blood in stool, diarrhea and nausea. Endocrine: Negative for polyuria. Genitourinary: Negative for dysuria, flank pain, vaginal bleeding and vaginal discharge. Musculoskeletal: Positive for arthralgias and joint swelling. Negative for myalgias. Skin: Negative for rash. Allergic/Immunologic: Negative for immunocompromised state. Neurological: Negative for weakness and headaches. Psychiatric/Behavioral: Negative for confusion. Physical Exam   Physical Exam  Vitals signs and nursing note reviewed. Constitutional:       Appearance: She is well-developed. HENT:      Head: Normocephalic and atraumatic. Eyes:      General:         Right eye: No discharge. Left eye: No discharge. Conjunctiva/sclera: Conjunctivae normal.      Pupils: Pupils are equal, round, and reactive to light. Neck:      Musculoskeletal: Normal range of motion and neck supple. Trachea: No tracheal deviation. Cardiovascular:      Rate and Rhythm: Normal rate and regular rhythm. Heart sounds: Normal heart sounds. No murmur. Pulmonary:      Effort: Pulmonary effort is normal. No respiratory distress. Breath sounds: Normal breath sounds. No wheezing or rales.    Abdominal:      General: Bowel sounds are normal.      Palpations: Abdomen is soft. Tenderness: There is no abdominal tenderness. There is no guarding or rebound. Musculoskeletal:         General: Tenderness and deformity present. Comments: Large effusion over the left knee. There is crepitus and deformity to the left patella. Neurovascularly intact distally. No tenderness to palpation over the femur, no tenderness palpation of the tibia. Skin:     General: Skin is warm and dry. Findings: No erythema or rash. Neurological:      Mental Status: She is alert and oriented to person, place, and time. Psychiatric:         Behavior: Behavior normal.         Diagnostic Study Results     Labs -   No results found for this or any previous visit (from the past 12 hour(s)). Radiologic Studies -   XR ELBOW LT MIN 3 V   Final Result   No acute abnormality. XR KNEE LT 3 V   Final Result   Acute angulated mildly comminuted left patellar fracture with soft   tissue swelling and small to moderate effusion. . Degenerative change. CT Results  (Last 48 hours)    None        CXR Results  (Last 48 hours)    None            Medical Decision Making   I am the first provider for this patient. I reviewed the vital signs, available nursing notes, past medical history, past surgical history, family history and social history. Vital Signs-Reviewed the patient's vital signs. No data found. Records Reviewed:   Nursing notes, Prior visits no no    Provider Notes (Medical Decision Making):   Displaced left patellar fracture, comminuted. She is in independent living. I do not think that she could tolerate crutches. Will place in a knee immobilizer, will discuss with orthopedics if there is any reason for operative intervention, I doubt given the significant swelling. She may need to be hospitalized though for inpatient rehab placement given that she lives in independent living at this time    ED Course:   Initial assessment performed.  The patients presenting problems have been discussed, and they are in agreement with the care plan formulated and outlined with them. I have encouraged them to ask questions as they arise throughout their visit. Critical Care Time:   none    Disposition:    DISCHARGE NOTE  Patients results have been reviewed with them. Patient and/or family have verbally conveyed their understanding and agreement of the patient's signs, symptoms, diagnosis, treatment and prognosis and additionally agree to follow up as recommended or return to the Emergency Room should their condition change or have any new concerns prior to their follow-up appointment. Patient verbally agrees with the care-plan and verbally conveys that all of their questions have been answered. Discharge instructions have also been provided to the patient with some educational information regarding their diagnosis as well a list of reasons why they would want to return to the ER prior to their follow-up appointment should their condition change. PLAN:  1. Discharge Medication List as of 4/27/2021  5:46 PM        2. Follow-up Information     Follow up With Specialties Details Why Contact Info    Blaze Leslie MD Orthopedic Surgery Schedule an appointment as soon as possible for a visit   [address]  [address] [facility] [address]  [address] [address] [phone]      [facility] [place] EM[facility]ENC[facility] DE[facility] Emergency Medicine  If symptoms worsen [address] [facility] [address]   [address] [facility] [address]    [address]  This is your home health agency. [address] [facility]  [phone]          Return to ED if worse     Diagnosis     Clinical Impression:   1. Closed displaced comminuted fracture of left patella, initial encounter        Attestations:   This note was completed by Niecy Jain DO

## 2021-04-27 NOTE — PROGRESS NOTES
5:48 PM  Pt has ambulated with RW with RN with steady gait. Attending MD feels discharge from ED with f/u with ortho is appropriate. Pt in agreement with [facility] PT/OT home evaluation. CM has sent referral via [employer] to [facility]. Pt's daughter to transport pt home. No further concerns voiced by pt. Original note:  CM notified by attending MD that pt will need a RW. CM met with pt at bedside to discuss. Pt lives alone in a one level independent living apartment at [facility]. Pt reports that she doesn't normally have any mobility issues, will sometimes use a cane. Denies frequent falls. Pt states that she has a rolling walker at home, but does not use it. She states that it was previously her daughter's. Anticipating pt's daughter transporting pt home. Pt voices having support in community. Per MD, anticipating need for [facility] PT/OT home evaluation at d/c to ensure safe return home. Pt has no HH hx, has no preference on [address] [place] [address].        Yanet Duque [facility] [address] [facility]

## 2021-04-27 NOTE — CONSULTS
Pt known to DR Carly Ram had fall with patella fracture  Dx in ed with xray    Visit Vitals  BP (!) 154/57   Pulse 66   Temp 98.4 °F (36.9 °C)   Resp 18   Ht 5' 3\" (1.6 m)   Wt 62.6 kg (138 lb)   SpO2 99%   BMI 24.45 kg/m²     Knee moderate effusion  Classic defect  Unable to extend leg    wbat in long knee immobilizer  Follow up with DR Carly Ram for likely repair  Ice and elevate

## 2021-04-27 NOTE — ED TRIAGE NOTES
Patient arrives to the emergency department via EMS with a chief complaint of GLF, denies hitting head or LOC. Pain to L knee and L elbow. L knee is swollen.

## 2021-04-27 NOTE — ED NOTES
Pt resting comfortably in stretcher, denied pain unless she tries to move left leg. NAD, call bell in reach.

## 2021-04-27 NOTE — ED NOTES
Dr. Cristal Aceves reviewed discharge instructions with the patient. The patient verbalized understanding. All questions and concerns were addressed. The patient was taken out of the department in a wheelchair and is discharged ambulatory in the care of family members with instructions and prescriptions in hand. Pt is alert and oriented x 4. Respirations are clear and unlabored.

## 2021-05-05 ENCOUNTER — ANESTHESIA EVENT (OUTPATIENT)
Dept: SURGERY | Age: 86
End: 2021-05-05
Payer: MEDICARE

## 2021-05-05 RX ORDER — DORZOLAMIDE HCL 20 MG/ML
1 SOLUTION/ DROPS OPHTHALMIC 2 TIMES DAILY
COMMUNITY

## 2021-05-05 RX ORDER — INDAPAMIDE 1.25 MG/1
1.25 TABLET, FILM COATED ORAL DAILY
COMMUNITY

## 2021-05-05 RX ORDER — METOPROLOL TARTRATE 25 MG/1
25 TABLET, FILM COATED ORAL DAILY
COMMUNITY

## 2021-05-05 RX ORDER — PANTOPRAZOLE SODIUM 40 MG/1
40 TABLET, DELAYED RELEASE ORAL DAILY
COMMUNITY

## 2021-05-05 RX ORDER — MONTELUKAST SODIUM 10 MG/1
10 TABLET ORAL DAILY
COMMUNITY

## 2021-05-05 NOTE — PERIOP NOTES
[facility]  Preoperative Instructions        Surgery Date 5/6/21          Time of Arrival 0745    1. On the day of your surgery, please report to the Surgical Services Registration Desk and sign in at your designated time. The Surgery Center is located to the right of the Emergency Room. 2. You must have someone with you to drive you home. You should not drive a car for 24 hours following surgery. Please make arrangements for a friend or family member to stay with you for the first 24 hours after your surgery. 3. Do not have anything to eat or drink (including water, gum, mints, coffee, juice) after midnight ?5/5/21? Windy Carlyle ? This may not apply to medications prescribed by your physician. ?(Please note below the special instructions with medications to take the morning of your procedure.)    4. We recommend you do not drink any alcoholic beverages for 24 hours before and after your surgery. 5. Contact your surgeons office for instructions on the following medications: non-steroidal anti-inflammatory drugs (i.e. Advil, Aleve), vitamins, and supplements. (Some surgeons will want you to stop these medications prior to surgery and others may allow you to take them)  **If you are currently taking Plavix, Coumadin, Aspirin and/or other blood-thinning agents, contact your surgeon for instructions. ** Your surgeon will partner with the physician prescribing these medications to determine if it is safe to stop or if you need to continue taking. Please do not stop taking these medications without instructions from your surgeon    6. Wear comfortable clothes. Wear glasses instead of contacts. Do not bring any money or jewelry. Please bring picture ID, insurance card, and any prearranged co-payment or hospital payment. Do not wear make-up, particularly mascara the morning of your surgery. Do not wear nail polish, particularly if you are having foot /hand surgery.   Wear your hair loose or down, no ponytails, buns, edyta pins or clips. All body piercings must be removed. Please shower with antibacterial soap for three consecutive days before and on the morning of surgery, but do not apply any lotions, powders or deodorants after the shower on the day of surgery. Please use a fresh towels after each shower. Please sleep in clean clothes and change bed linens the night before surgery. Please do not shave for 48 hours prior to surgery. Shaving of the face is acceptable. 7. You should understand that if you do not follow these instructions your surgery may be cancelled. If your physical condition changes (I.e. fever, cold or flu) please contact your surgeon as soon as possible. 8. It is important that you be on time. If a situation occurs where you may be late, please call [phone] (OR Holding Area). 9. If you have any questions and or problems, please call [phone] (Pre-admission Testing). 10. Your surgery time may be subject to change. You will receive a phone call the evening prior if your time changes. 11.  If having outpatient surgery, you must have someone to drive you here, stay with you during the duration of your stay, and to drive you home at time of discharge. Special Instructions: use inhaler in the morning and bring with you to the hospital    TAKE ALL MEDICATIONS DAY OF SURGERY EXCEPT:  Vitamins/supplements    I understand a pre-operative phone call will be made to verify my surgery time. In the event that I am not available, I give permission for a message to be left on my answering service and/or with another person?   yes         ___________________      __________   _________    (Signature of Patient)             (Witness)                (Date and Time)

## 2021-05-05 NOTE — ADVANCED PRACTICE NURSE
EKG from 6/11/2020 reviewed with Sabra Elise anesthesiologist and compared with previous EKG from 6/8/19. Planned procedure, PMHx, functional status, echocardiogram from 1/15/18 reviewed.  Pt ok to proceed with planned procedure per Dr. Danilo Dumont

## 2021-05-06 ENCOUNTER — ANESTHESIA (OUTPATIENT)
Dept: SURGERY | Age: 86
End: 2021-05-06
Payer: MEDICARE

## 2021-05-06 ENCOUNTER — HOSPITAL ENCOUNTER (OUTPATIENT)
Age: 86
Setting detail: OBSERVATION
Discharge: HOME HEALTH | End: 2021-05-07
Attending: ORTHOPAEDIC SURGERY | Admitting: ORTHOPAEDIC SURGERY
Payer: MEDICARE

## 2021-05-06 DIAGNOSIS — S82.092S OTHER CLOSED FRACTURE OF LEFT PATELLA, SEQUELA: Primary | ICD-10-CM

## 2021-05-06 PROBLEM — S82.002A LEFT PATELLA FRACTURE: Status: ACTIVE | Noted: 2021-05-06

## 2021-05-06 PROCEDURE — 77030008684 HC TU ET CUF COVD -B: Performed by: NURSE ANESTHETIST, CERTIFIED REGISTERED

## 2021-05-06 PROCEDURE — 77030002974 HC SUT PLN J&J -A: Performed by: ORTHOPAEDIC SURGERY

## 2021-05-06 PROCEDURE — 76210000016 HC OR PH I REC 1 TO 1.5 HR: Performed by: ORTHOPAEDIC SURGERY

## 2021-05-06 PROCEDURE — 74011000250 HC RX REV CODE- 250: Performed by: ORTHOPAEDIC SURGERY

## 2021-05-06 PROCEDURE — C1713 ANCHOR/SCREW BN/BN,TIS/BN: HCPCS | Performed by: ORTHOPAEDIC SURGERY

## 2021-05-06 PROCEDURE — 74011250636 HC RX REV CODE- 250/636: Performed by: ANESTHESIOLOGY

## 2021-05-06 PROCEDURE — 77030013837 HC NERV BLK KT BBMI -B: Performed by: ANESTHESIOLOGY

## 2021-05-06 PROCEDURE — 77030022704 HC SUT VLOC COVD -B: Performed by: ORTHOPAEDIC SURGERY

## 2021-05-06 PROCEDURE — 74011250636 HC RX REV CODE- 250/636: Performed by: NURSE ANESTHETIST, CERTIFIED REGISTERED

## 2021-05-06 PROCEDURE — 76060000033 HC ANESTHESIA 1 TO 1.5 HR: Performed by: ORTHOPAEDIC SURGERY

## 2021-05-06 PROCEDURE — L1830 KO IMMOB CANVAS LONG PRE OTS: HCPCS | Performed by: ORTHOPAEDIC SURGERY

## 2021-05-06 PROCEDURE — 77030041074 HC DRSG AG OPTIFRM MDII -A: Performed by: ORTHOPAEDIC SURGERY

## 2021-05-06 PROCEDURE — 74011250636 HC RX REV CODE- 250/636: Performed by: ORTHOPAEDIC SURGERY

## 2021-05-06 PROCEDURE — 99218 HC RM OBSERVATION: CPT

## 2021-05-06 PROCEDURE — 74011250637 HC RX REV CODE- 250/637: Performed by: ORTHOPAEDIC SURGERY

## 2021-05-06 PROCEDURE — 77030026438 HC STYL ET INTUB CARD -A: Performed by: NURSE ANESTHETIST, CERTIFIED REGISTERED

## 2021-05-06 PROCEDURE — 77030000032 HC CUF TRNQT ZIMM -B: Performed by: ORTHOPAEDIC SURGERY

## 2021-05-06 PROCEDURE — 77030008462 HC STPLR SKN PROX J&J -A: Performed by: ORTHOPAEDIC SURGERY

## 2021-05-06 PROCEDURE — 74011000258 HC RX REV CODE- 258: Performed by: ORTHOPAEDIC SURGERY

## 2021-05-06 PROCEDURE — 77030040361 HC SLV COMPR DVT MDII -B: Performed by: ORTHOPAEDIC SURGERY

## 2021-05-06 PROCEDURE — 77030031139 HC SUT VCRL2 J&J -A: Performed by: ORTHOPAEDIC SURGERY

## 2021-05-06 PROCEDURE — 2709999900 HC NON-CHARGEABLE SUPPLY: Performed by: ORTHOPAEDIC SURGERY

## 2021-05-06 PROCEDURE — 77010033678 HC OXYGEN DAILY

## 2021-05-06 PROCEDURE — 74011000250 HC RX REV CODE- 250: Performed by: NURSE ANESTHETIST, CERTIFIED REGISTERED

## 2021-05-06 PROCEDURE — 77030002922 HC SUT FBRWRE ARTH -B: Performed by: ORTHOPAEDIC SURGERY

## 2021-05-06 PROCEDURE — 76010000149 HC OR TIME 1 TO 1.5 HR: Performed by: ORTHOPAEDIC SURGERY

## 2021-05-06 DEVICE — K WIRE FIX L150MM DIA2MM S STL W/ TRCR PNT: Type: IMPLANTABLE DEVICE | Site: KNEE | Status: FUNCTIONAL

## 2021-05-06 RX ORDER — DEXAMETHASONE SODIUM PHOSPHATE 4 MG/ML
INJECTION, SOLUTION INTRA-ARTICULAR; INTRALESIONAL; INTRAMUSCULAR; INTRAVENOUS; SOFT TISSUE AS NEEDED
Status: DISCONTINUED | OUTPATIENT
Start: 2021-05-06 | End: 2021-05-06 | Stop reason: HOSPADM

## 2021-05-06 RX ORDER — BRIMONIDINE TARTRATE 2 MG/ML
1 SOLUTION/ DROPS OPHTHALMIC 2 TIMES DAILY
Status: DISCONTINUED | OUTPATIENT
Start: 2021-05-06 | End: 2021-05-07 | Stop reason: HOSPADM

## 2021-05-06 RX ORDER — SODIUM CHLORIDE, SODIUM LACTATE, POTASSIUM CHLORIDE, CALCIUM CHLORIDE 600; 310; 30; 20 MG/100ML; MG/100ML; MG/100ML; MG/100ML
25 INJECTION, SOLUTION INTRAVENOUS CONTINUOUS
Status: DISCONTINUED | OUTPATIENT
Start: 2021-05-06 | End: 2021-05-06 | Stop reason: HOSPADM

## 2021-05-06 RX ORDER — FENTANYL CITRATE 50 UG/ML
25 INJECTION, SOLUTION INTRAMUSCULAR; INTRAVENOUS
Status: DISCONTINUED | OUTPATIENT
Start: 2021-05-06 | End: 2021-05-06 | Stop reason: HOSPADM

## 2021-05-06 RX ORDER — ONDANSETRON 2 MG/ML
4 INJECTION INTRAMUSCULAR; INTRAVENOUS
Status: DISCONTINUED | OUTPATIENT
Start: 2021-05-06 | End: 2021-05-07 | Stop reason: HOSPADM

## 2021-05-06 RX ORDER — LORAZEPAM 1 MG/1
0.5 TABLET ORAL
Status: DISCONTINUED | OUTPATIENT
Start: 2021-05-06 | End: 2021-05-07 | Stop reason: HOSPADM

## 2021-05-06 RX ORDER — SODIUM CHLORIDE 9 MG/ML
INJECTION, SOLUTION INTRAVENOUS
Status: COMPLETED | OUTPATIENT
Start: 2021-05-06 | End: 2021-05-06

## 2021-05-06 RX ORDER — PANTOPRAZOLE SODIUM 40 MG/1
40 TABLET, DELAYED RELEASE ORAL DAILY
Status: DISCONTINUED | OUTPATIENT
Start: 2021-05-06 | End: 2021-05-07 | Stop reason: HOSPADM

## 2021-05-06 RX ORDER — THERA TABS 400 MCG
1 TAB ORAL DAILY
Status: DISCONTINUED | OUTPATIENT
Start: 2021-05-06 | End: 2021-05-07 | Stop reason: HOSPADM

## 2021-05-06 RX ORDER — IPRATROPIUM BROMIDE AND ALBUTEROL SULFATE 2.5; .5 MG/3ML; MG/3ML
3 SOLUTION RESPIRATORY (INHALATION)
Status: DISCONTINUED | OUTPATIENT
Start: 2021-05-06 | End: 2021-05-07 | Stop reason: HOSPADM

## 2021-05-06 RX ORDER — ROPIVACAINE HYDROCHLORIDE 5 MG/ML
INJECTION, SOLUTION EPIDURAL; INFILTRATION; PERINEURAL AS NEEDED
Status: DISCONTINUED | OUTPATIENT
Start: 2021-05-06 | End: 2021-05-06 | Stop reason: HOSPADM

## 2021-05-06 RX ORDER — PROPOFOL 10 MG/ML
INJECTION, EMULSION INTRAVENOUS
Status: DISCONTINUED | OUTPATIENT
Start: 2021-05-06 | End: 2021-05-06 | Stop reason: HOSPADM

## 2021-05-06 RX ORDER — GLYCOPYRROLATE 0.2 MG/ML
INJECTION INTRAMUSCULAR; INTRAVENOUS AS NEEDED
Status: DISCONTINUED | OUTPATIENT
Start: 2021-05-06 | End: 2021-05-06 | Stop reason: HOSPADM

## 2021-05-06 RX ORDER — TRAMADOL HYDROCHLORIDE 50 MG/1
50 TABLET ORAL
Status: DISCONTINUED | OUTPATIENT
Start: 2021-05-06 | End: 2021-05-07 | Stop reason: HOSPADM

## 2021-05-06 RX ORDER — FENTANYL CITRATE 50 UG/ML
INJECTION, SOLUTION INTRAMUSCULAR; INTRAVENOUS AS NEEDED
Status: DISCONTINUED | OUTPATIENT
Start: 2021-05-06 | End: 2021-05-06 | Stop reason: HOSPADM

## 2021-05-06 RX ORDER — FENTANYL CITRATE 50 UG/ML
50 INJECTION, SOLUTION INTRAMUSCULAR; INTRAVENOUS AS NEEDED
Status: DISCONTINUED | OUTPATIENT
Start: 2021-05-06 | End: 2021-05-06 | Stop reason: HOSPADM

## 2021-05-06 RX ORDER — MONTELUKAST SODIUM 10 MG/1
10 TABLET ORAL DAILY
Status: DISCONTINUED | OUTPATIENT
Start: 2021-05-06 | End: 2021-05-07 | Stop reason: HOSPADM

## 2021-05-06 RX ORDER — ONDANSETRON 2 MG/ML
INJECTION INTRAMUSCULAR; INTRAVENOUS AS NEEDED
Status: DISCONTINUED | OUTPATIENT
Start: 2021-05-06 | End: 2021-05-06 | Stop reason: HOSPADM

## 2021-05-06 RX ORDER — LIDOCAINE HYDROCHLORIDE 20 MG/ML
INJECTION, SOLUTION EPIDURAL; INFILTRATION; INTRACAUDAL; PERINEURAL AS NEEDED
Status: DISCONTINUED | OUTPATIENT
Start: 2021-05-06 | End: 2021-05-06 | Stop reason: HOSPADM

## 2021-05-06 RX ORDER — DORZOLAMIDE HCL 20 MG/ML
1 SOLUTION/ DROPS OPHTHALMIC 2 TIMES DAILY
Status: DISCONTINUED | OUTPATIENT
Start: 2021-05-06 | End: 2021-05-07 | Stop reason: HOSPADM

## 2021-05-06 RX ORDER — MIDAZOLAM HYDROCHLORIDE 1 MG/ML
1 INJECTION, SOLUTION INTRAMUSCULAR; INTRAVENOUS AS NEEDED
Status: DISCONTINUED | OUTPATIENT
Start: 2021-05-06 | End: 2021-05-06 | Stop reason: HOSPADM

## 2021-05-06 RX ORDER — HYDROMORPHONE HYDROCHLORIDE 1 MG/ML
.2-.5 INJECTION, SOLUTION INTRAMUSCULAR; INTRAVENOUS; SUBCUTANEOUS
Status: DISCONTINUED | OUTPATIENT
Start: 2021-05-06 | End: 2021-05-06 | Stop reason: HOSPADM

## 2021-05-06 RX ORDER — ONDANSETRON 2 MG/ML
4 INJECTION INTRAMUSCULAR; INTRAVENOUS AS NEEDED
Status: DISCONTINUED | OUTPATIENT
Start: 2021-05-06 | End: 2021-05-06 | Stop reason: HOSPADM

## 2021-05-06 RX ORDER — LIDOCAINE HYDROCHLORIDE 10 MG/ML
0.1 INJECTION, SOLUTION EPIDURAL; INFILTRATION; INTRACAUDAL; PERINEURAL AS NEEDED
Status: DISCONTINUED | OUTPATIENT
Start: 2021-05-06 | End: 2021-05-06 | Stop reason: HOSPADM

## 2021-05-06 RX ORDER — LATANOPROST 50 UG/ML
1 SOLUTION/ DROPS OPHTHALMIC EVERY EVENING
Status: DISCONTINUED | OUTPATIENT
Start: 2021-05-06 | End: 2021-05-07 | Stop reason: HOSPADM

## 2021-05-06 RX ORDER — ROCURONIUM BROMIDE 10 MG/ML
INJECTION, SOLUTION INTRAVENOUS AS NEEDED
Status: DISCONTINUED | OUTPATIENT
Start: 2021-05-06 | End: 2021-05-06 | Stop reason: HOSPADM

## 2021-05-06 RX ORDER — MECLIZINE HYDROCHLORIDE 25 MG/1
25 TABLET ORAL
Status: DISCONTINUED | OUTPATIENT
Start: 2021-05-06 | End: 2021-05-07 | Stop reason: HOSPADM

## 2021-05-06 RX ORDER — PHENYLEPHRINE HCL IN 0.9% NACL 0.4MG/10ML
SYRINGE (ML) INTRAVENOUS AS NEEDED
Status: DISCONTINUED | OUTPATIENT
Start: 2021-05-06 | End: 2021-05-06 | Stop reason: HOSPADM

## 2021-05-06 RX ORDER — SODIUM CHLORIDE, SODIUM LACTATE, POTASSIUM CHLORIDE, CALCIUM CHLORIDE 600; 310; 30; 20 MG/100ML; MG/100ML; MG/100ML; MG/100ML
25 INJECTION, SOLUTION INTRAVENOUS CONTINUOUS
Status: DISCONTINUED | OUTPATIENT
Start: 2021-05-06 | End: 2021-05-07 | Stop reason: HOSPADM

## 2021-05-06 RX ORDER — PROPOFOL 10 MG/ML
INJECTION, EMULSION INTRAVENOUS AS NEEDED
Status: DISCONTINUED | OUTPATIENT
Start: 2021-05-06 | End: 2021-05-06 | Stop reason: HOSPADM

## 2021-05-06 RX ORDER — METOPROLOL TARTRATE 25 MG/1
25 TABLET, FILM COATED ORAL DAILY
Status: DISCONTINUED | OUTPATIENT
Start: 2021-05-06 | End: 2021-05-07 | Stop reason: HOSPADM

## 2021-05-06 RX ORDER — INDAPAMIDE 2.5 MG/1
1.25 TABLET, FILM COATED ORAL DAILY
Status: DISCONTINUED | OUTPATIENT
Start: 2021-05-06 | End: 2021-05-07 | Stop reason: HOSPADM

## 2021-05-06 RX ORDER — EPHEDRINE SULFATE/0.9% NACL/PF 50 MG/5 ML
SYRINGE (ML) INTRAVENOUS AS NEEDED
Status: DISCONTINUED | OUTPATIENT
Start: 2021-05-06 | End: 2021-05-06 | Stop reason: HOSPADM

## 2021-05-06 RX ORDER — SUCCINYLCHOLINE CHLORIDE 20 MG/ML
INJECTION INTRAMUSCULAR; INTRAVENOUS AS NEEDED
Status: DISCONTINUED | OUTPATIENT
Start: 2021-05-06 | End: 2021-05-06 | Stop reason: HOSPADM

## 2021-05-06 RX ADMIN — Medication 40 MCG: at 09:37

## 2021-05-06 RX ADMIN — ROPIVACAINE HYDROCHLORIDE 20 ML: 5 INJECTION, SOLUTION EPIDURAL; INFILTRATION; PERINEURAL at 09:01

## 2021-05-06 RX ADMIN — LIDOCAINE HYDROCHLORIDE 60 MG: 20 INJECTION, SOLUTION EPIDURAL; INFILTRATION; INTRACAUDAL; PERINEURAL at 09:23

## 2021-05-06 RX ADMIN — FENTANYL CITRATE 25 MCG: 50 INJECTION, SOLUTION INTRAMUSCULAR; INTRAVENOUS at 11:38

## 2021-05-06 RX ADMIN — FENTANYL CITRATE 25 MCG: 50 INJECTION, SOLUTION INTRAMUSCULAR; INTRAVENOUS at 10:53

## 2021-05-06 RX ADMIN — SODIUM CHLORIDE, POTASSIUM CHLORIDE, SODIUM LACTATE AND CALCIUM CHLORIDE: 600; 310; 30; 20 INJECTION, SOLUTION INTRAVENOUS at 09:23

## 2021-05-06 RX ADMIN — DORZOLAMIDE HYDROCHLORIDE 1 DROP: 20 SOLUTION/ DROPS OPHTHALMIC at 18:33

## 2021-05-06 RX ADMIN — GLYCOPYRROLATE 0.2 MG: 0.2 INJECTION, SOLUTION INTRAMUSCULAR; INTRAVENOUS at 09:48

## 2021-05-06 RX ADMIN — ROCURONIUM BROMIDE 5 MG: 10 INJECTION INTRAVENOUS at 09:23

## 2021-05-06 RX ADMIN — ONDANSETRON HYDROCHLORIDE 4 MG: 2 INJECTION, SOLUTION INTRAMUSCULAR; INTRAVENOUS at 09:46

## 2021-05-06 RX ADMIN — PROPOFOL 50 MG: 10 INJECTION, EMULSION INTRAVENOUS at 08:57

## 2021-05-06 RX ADMIN — Medication 3 AMPULE: at 08:41

## 2021-05-06 RX ADMIN — GENTAMICIN SULFATE 262 MG: 40 INJECTION, SOLUTION INTRAMUSCULAR; INTRAVENOUS at 09:38

## 2021-05-06 RX ADMIN — SODIUM CHLORIDE, POTASSIUM CHLORIDE, SODIUM LACTATE AND CALCIUM CHLORIDE 25 ML/HR: 600; 310; 30; 20 INJECTION, SOLUTION INTRAVENOUS at 08:41

## 2021-05-06 RX ADMIN — SUCCINYLCHOLINE CHLORIDE 120 MG: 20 INJECTION, SOLUTION INTRAMUSCULAR; INTRAVENOUS at 09:23

## 2021-05-06 RX ADMIN — VANCOMYCIN HYDROCHLORIDE 1 G: 1 INJECTION, POWDER, LYOPHILIZED, FOR SOLUTION INTRAVENOUS at 09:41

## 2021-05-06 RX ADMIN — FENTANYL CITRATE 50 MCG: 50 INJECTION, SOLUTION INTRAMUSCULAR; INTRAVENOUS at 09:23

## 2021-05-06 RX ADMIN — PROPOFOL 100 MG: 10 INJECTION, EMULSION INTRAVENOUS at 09:23

## 2021-05-06 RX ADMIN — VANCOMYCIN HYDROCHLORIDE 1000 MG: 1 INJECTION, POWDER, LYOPHILIZED, FOR SOLUTION INTRAVENOUS at 08:41

## 2021-05-06 RX ADMIN — PROPOFOL 150 MCG/KG/MIN: 10 INJECTION, EMULSION INTRAVENOUS at 09:28

## 2021-05-06 RX ADMIN — BRIMONIDINE TARTRATE 1 DROP: 2 SOLUTION OPHTHALMIC at 18:33

## 2021-05-06 RX ADMIN — PANTOPRAZOLE SODIUM 40 MG: 40 TABLET, DELAYED RELEASE ORAL at 13:31

## 2021-05-06 RX ADMIN — LORAZEPAM 0.5 MG: 1 TABLET ORAL at 23:18

## 2021-05-06 RX ADMIN — FENTANYL CITRATE 25 MCG: 50 INJECTION, SOLUTION INTRAMUSCULAR; INTRAVENOUS at 11:15

## 2021-05-06 RX ADMIN — MONTELUKAST SODIUM 10 MG: 10 TABLET, COATED ORAL at 13:31

## 2021-05-06 RX ADMIN — Medication 10 MG: at 09:31

## 2021-05-06 RX ADMIN — ROCURONIUM BROMIDE 10 MG: 10 INJECTION INTRAVENOUS at 09:42

## 2021-05-06 RX ADMIN — THERA TABS 1 TABLET: TAB at 13:31

## 2021-05-06 RX ADMIN — FENTANYL CITRATE 25 MCG: 50 INJECTION, SOLUTION INTRAMUSCULAR; INTRAVENOUS at 10:58

## 2021-05-06 RX ADMIN — DEXAMETHASONE SODIUM PHOSPHATE 8 MG: 4 INJECTION, SOLUTION INTRAMUSCULAR; INTRAVENOUS at 09:38

## 2021-05-06 RX ADMIN — Medication 1 AMPULE: at 21:17

## 2021-05-06 RX ADMIN — INDAPAMIDE 1.25 MG: 2.5 TABLET, FILM COATED ORAL at 13:30

## 2021-05-06 RX ADMIN — Medication 40 MCG: at 09:34

## 2021-05-06 RX ADMIN — FENTANYL CITRATE 50 MCG: 50 INJECTION, SOLUTION INTRAMUSCULAR; INTRAVENOUS at 09:36

## 2021-05-06 RX ADMIN — METOPROLOL TARTRATE 25 MG: 25 TABLET, FILM COATED ORAL at 13:31

## 2021-05-06 NOTE — PROGRESS NOTES
Ortho / Neurosurgery NP Note    POD# 0  s/p LEFT PATELLA OPEN REDUCTION INTERNAL FIXATION   Pt seen with no visitor present. Pt resting in bed. Awake and alert. Reports minimal post-op pain, leathery to take narcotics given hx N/V and drowsiness with percocet. Discussed trying Tramadol if needed. As been using tylenol extra strength PTA  Tolerating regular diet. Denies nausea. VSS Afebrile. 2L NC. Visit Vitals  BP (!) 123/58   Pulse 69   Temp 98 °F (36.7 °C)   Resp 16   Ht 5' 3\" (1.6 m)   Wt 62.8 kg (138 lb 7.2 oz)   SpO2 98%   BMI 24.53 kg/m²       Voiding status: due to void   Output (mL)  Last Bowel Movement Date: 05/05/21 (05/06/21 0759)      Labs    Lab Results   Component Value Date/Time    HGB 13.6 06/11/2020 08:52 AM      Lab Results   Component Value Date/Time    INR 1.0 05/14/2018 07:30 AM      Lab Results   Component Value Date/Time    Sodium 138 06/11/2020 08:52 AM    Potassium 3.2 (L) 06/11/2020 08:52 AM    Chloride 103 06/11/2020 08:52 AM    CO2 29 06/11/2020 08:52 AM    Glucose 114 (H) 06/11/2020 08:52 AM    BUN 21 (H) 06/11/2020 08:52 AM    Creatinine 1.08 (H) 06/11/2020 08:52 AM    Calcium 9.3 06/11/2020 08:52 AM     Recent Glucose Results: No results found for: GLU, GLUPOC, GLUCPOC        Body mass index is 24.53 kg/m². : A BMI > 30 is classified as obesity and > 40 is classified as morbid obesity. Ace wrap and knee immobilizer in place - intact, peeks through wrap with no drainage noted   Cryotherapy in place over incision  Calves soft and supple; No pain with passive stretch  Sensation and motor intact - PF/DF/EHL intact 5/5  SCDs for mechanical DVT proph while in bed     PLAN:  1) Ambulate with nursing   2) WBAT - knee immobilizer at all times. 3) Dressing care - remove ace wrap in am. Surgical dressing should stay in place until 2 week f/u.   4) HTN - BP stable. Resume metoprolol tomorrow. 5) Received gentamycin and vancomycin in OR.    6) Discharge planning - Plans to return home tomorrow when daughter is able to assist her.             Geeta Esteves, NP

## 2021-05-06 NOTE — PERIOP NOTES
No covid test result in past 14 days. Denies known contact or S/S.  mepilex to sacrum, no redness tenderness or signs skin breakdown. Albuterol inhaler used by patient at bedside per dr Yumiko Oliver request.  Daughter in waiting room or via cell  Belongings to PACU, 1 bag  Bruising noted from thigh to left foot , posterior. Bilateral foot cold.  +ppp

## 2021-05-06 NOTE — ANESTHESIA POSTPROCEDURE EVALUATION
Procedure(s):  LEFT PATELLA OPEN REDUCTION INTERNAL FIXATION. total IV anesthesia, general    Anesthesia Post Evaluation        Patient location during evaluation: PACU  Note status: Adequate. Level of consciousness: responsive to verbal stimuli and sleepy but conscious  Pain management: satisfactory to patient  Airway patency: patent  Anesthetic complications: no  Cardiovascular status: acceptable  Respiratory status: acceptable  Hydration status: acceptable  Comments: +Post-Anesthesia Evaluation and Assessment    Patient: Kapil Hays MRN: [id number]  SSN: [id number]   YOB: 1935  Age: 80 y.o. Sex: female      Cardiovascular Function/Vital Signs    BP (!) 123/50   Pulse (!) 50   Temp 36.3 °C (97.4 °F)   Resp 17   Ht 5' 3\" (1.6 m)   Wt 62.8 kg (138 lb 7.2 oz)   SpO2 100%   BMI 24.53 kg/m²     Patient is status post Procedure(s):  LEFT PATELLA OPEN REDUCTION INTERNAL FIXATION. Nausea/Vomiting: Controlled. Postoperative hydration reviewed and adequate. Pain:  Pain Scale 1: Numeric (0 - 10) (05/06/21 1138)  Pain Intensity 1: 4 (05/06/21 1138)   Managed. Neurological Status:   Neuro (WDL): Within Defined Limits (05/06/21 0758)   At baseline. Mental Status and Level of Consciousness: Arousable. Pulmonary Status:   O2 Device: Nasal cannula (05/06/21 1130)   Adequate oxygenation and airway patent. Complications related to anesthesia: None    Post-anesthesia assessment completed. No concerns. Signed By: Cecil Rivera MD    5/6/2021  Post anesthesia nausea and vomiting:  controlled      INITIAL Post-op Vital signs:   Vitals Value Taken Time   /50 05/06/21 1130   Temp 36.3 °C (97.4 °F) 05/06/21 1049   Pulse 61 05/06/21 1145   Resp 21 05/06/21 1145   SpO2 100 % 05/06/21 1145   Vitals shown include unvalidated device data.

## 2021-05-06 NOTE — PERIOP NOTES
Handoff Report from Operating Room to PACU    Report received from [address] [place] [address] and Urena Papi RN regarding Michael Salgado. Surgeon(s):  Dahlia Brand MD  And Procedure(s) (LRB):  LEFT PATELLA OPEN REDUCTION INTERNAL FIXATION (Left)  confirmed   with allergies and dressings discussed. Anesthesia type, drugs, patient history, complications, estimated blood loss, vital signs, intake and output, and last pain medication, lines, reversal medications and temperature were reviewed. 1206- TRANSFER - OUT REPORT:    Verbal report given to Domenico CASH(name) on Michael Salgado  being transferred to [facility](unit) for routine progression of care       Report consisted of patients Situation, Background, Assessment and   Recommendations(SBAR). Information from the following report(s) OR Summary, Procedure Summary, Intake/Output, MAR and Recent Results was reviewed with the receiving nurse. Lines:   Peripheral IV 05/06/21 Right; Inner Wrist (Active)   Site Assessment Clean, dry, & intact 05/06/21 0831   Phlebitis Assessment 0 05/06/21 0831   Infiltration Assessment 0 05/06/21 0831   Dressing Status Clean, dry, & intact 05/06/21 0831   Dressing Type Tape;Transparent 05/06/21 0831   Hub Color/Line Status Infusing;Pink 05/06/21 0831        Opportunity for questions and clarification was provided. Patient transported with:   Tech   Personal belongings including top and bottom dentures. Attempted to call patient daughter twice with no answer. Domenico CONTRERAS made aware will attempt to call.

## 2021-05-06 NOTE — PROGRESS NOTES
TRANSFER - IN REPORT:    Verbal report received from Corin(name) on Janny Booth  being received from Sp[facility]) for routine post - op      Report consisted of patients Situation, Background, Assessment and   Recommendations(SBAR). Information from the following report(s) SBAR, Kardex, Intake/Output, MAR and Recent Results was reviewed with the receiving nurse. Opportunity for questions and clarification was provided. Assessment completed upon patients arrival to unit and care assumed.

## 2021-05-06 NOTE — BRIEF OP NOTE
Brief Postoperative Note    Patient: Rachele Doll  YOB: 1935  MRN: [id number]    Date of Procedure: 5/6/2021     Pre-Op Diagnosis: LEFT PATELLA FRACTURE    Post-Op Diagnosis: Same as preoperative diagnosis. Procedure(s):  LEFT PATELLA OPEN REDUCTION INTERNAL FIXATION    Surgeon(s):  Nirmal Reyna MD    Surgical Assistant: Surg Asst-1: Marcelo Aguero    Anesthesia: General     Estimated Blood Loss (mL): Minimal    Complications: None    Specimens: * No specimens in log *     Implants:   Implant Name Type Inv.  Item Serial No.  Lot No. LRB No. Used Action   K WIRE FIX L150MM DIA2MM S STL W/ TRCR PNT - SNA  K WIRE FIX L150MM DIA2MM S STL W/ TRCR PNT NA [employer] NA Left 2 Implanted       Drains: * No LDAs found *    Findings: comminuted patella fx    Electronically Signed by Herminia Boateng MD on 5/6/2021 at 10:52 AM

## 2021-05-06 NOTE — OP NOTES
[address]  OPERATIVE REPORT    Name:  Lia Anderson  MR#:  [id number]  :  1935  ACCOUNT #:  [de-identified]  DATE OF SERVICE:  2021    PREOPERATIVE DIAGNOSIS:  Left comminuted patella fracture, closed. POSTOPERATIVE DIAGNOSIS:  Left comminuted patella fracture, closed. PROCEDURE PERFORMED:  ORIF, left patella. SURGEON:  Mer Couch MD    ASSISTANT:  n/a    ANESTHESIA:  Block plus general.    COMPLICATIONS:  None. SPECIMENS REMOVED:  None. IMPLANTS:  2 mm Myron wire x2 and FiberWire suture #5 x2. ESTIMATED BLOOD LOSS:  Minimal.    DRAINS:  None. CONDITION:  Stable to PACU. INDICATIONS:  An 80-year-old female who suffered a fall resulting in a severely comminuted and displaced patella fracture. The risks, benefits and alternatives to operative fixation were explained to the patient and she wished to proceed. She understood no guarantees could be given about the outcome and there was a high likelihood for post-traumatic arthritis. DESCRIPTION OF PROCEDURE:  After being identified in the preoperative holding area and having her operative site marked, the patient underwent a block to good effect. She was brought back to the operating room, placed supine on standard OR table. General anesthesia was induced without difficulty. A thigh tourniquet was applied to the left lower extremity which was prepped and draped in usual fashion using sterile technique. Appropriate time-out was performed. The leg was exsanguinated and the tourniquet inflated to 275 mmHg, standard midline incision was utilized. Full-thickness skin flaps were developed. The displaced patella fracture was readily encountered and significant hematoma evacuated. Medial parapatellar approach was utilized. A portion of fat pad was excised. We were able to reassemble the majority of the articular surface from 2 major fragments. Multiple smaller fragments were excised.   The healthy major fragments reduced with a pointed tenaculum and pinned this with two 2.0 mm Myron wires. We then used #5 FiberWire in a tension band technique. The wires were then bent and cut and buried. We then performed a pursestring repair circumferentially with an additional FiberWire suture. The extensor mechanism and retinaculum were repaired with multiple interrupted sutures. A running suture was performed over the capsulotomy. Skin closure was then performed in layers after thorough irrigation. Final skin closure was performed with skin staples. Sterile compressive dressings were applied followed by a knee immobilizer. The patient was awakened, moved to the stretcher and taken to the recovery room in satisfactory condition. At the conclusion of the procedure, all counts were correct. There were no immediate complications.       MD TIMOTHY [id number]  D:  05/06/2021 11:02  T:  05/06/2021 13:20  JOB #:  [id number]

## 2021-05-06 NOTE — PROGRESS NOTES
End of Shift Note    Bedside shift change report given to Patricia Guy (oncoming nurse) by Miri Cherry RN (offgoing nurse). Report included the following information SBAR, Kardex, Intake/Output and MAR    Shift worked:  day shift     Shift summary and any significant changes:     POD 0     Concerns for physician to address:  none     Zone phone for oncoming shift:   [phone]       Activity:  Activity Level: Up with Assistance  Number times ambulated in hallways past shift: 0  Number of times OOB to chair past shift: 0    Cardiac:   Cardiac Monitoring: No      Cardiac Rhythm: Sinus Rhythm    Access:   Current line(s): PIV     Genitourinary:   Urinary status: voiding    Respiratory:   O2 Device: Nasal cannula  Chronic home O2 use?: NO  Incentive spirometer at bedside: NO     GI:  Last Bowel Movement Date: 05/05/21  Current diet:  DIET REGULAR  Passing flatus: YES  Tolerating current diet: NO       Pain Management:   Patient states pain is manageable on current regimen: YES    Skin:  Isidoro Score: 19  Interventions: increase time out of bed, foam dressing, PT/OT consult, limit briefs and nutritional support     Patient Safety:  Fall Score:  Total Score: 4  Interventions: assistive device (walker, cane, etc), gripper socks, pt to call before getting OOB and gait belt  High Fall Risk: Yes    Length of Stay:  Expected LOS: - - -  Actual LOS: 0      Miri Cherry RN

## 2021-05-06 NOTE — ANESTHESIA PROCEDURE NOTES
Peripheral Block    Start time: 5/6/2021 8:56 AM  End time: 5/6/2021 9:05 AM  Performed by: Anupam Mcgarry MD  Authorized by: Anupam Mcgarry MD       Pre-procedure:    Indications: at surgeon's request and post-op pain management    Preanesthetic Checklist: patient identified, risks and benefits discussed, site marked, timeout performed, anesthesia consent given and patient being monitored      Block Type:   Block Type:  Femoral single shot  Laterality:  Left  Monitoring:  Standard ASA monitoring, frequent vital sign checks, responsive to questions and oxygen  Injection Technique:  Single shot  Procedures: nerve stimulator    Patient Position: prone  Prep: chlorhexidine    Location:  Upper thigh  Needle Type:  Stimuplex  Needle Gauge:  22 G  Needle Localization:  Nerve stimulator  Motor Response comment:   Motor Response: minimal motor response >0.4 mA     Assessment:  Number of attempts:  1  Injection Assessment:  Incremental injection every 5 mL, no paresthesia, no intravascular symptoms, negative aspiration for blood, local visualized surrounding nerve on ultrasound and ultrasound image on chart  Patient tolerance:  Patient tolerated the procedure well with no immediate complications

## 2021-05-06 NOTE — H&P
Subjective:   Patient ID: Ritesh Pink is a 80 y.o. female. Chief Complaint: Pain of the Left Knee    Ms. Chuck Harper presents for an evaluation of her L knee pain. She recently had a fall on 4/27/2021 onto her left knee and went to the ER afterwards. She was given a knee immobilizer, which she has been wearing regularly since her fall. She has significant bruising along the medial aspect of her left thigh and knee. She presents today with a walker. She is not taking any blood-thinners. Objective:   Constitutional: She appears stated age. Pt is cooperative and is in no acute distress. Well nourished. Well developed. Body habitus is normal. There is no height or weight on file to calculate BMI. Gait is antalgic at left knee with a walker. Eyes: Sclera are nonicteric. Respiratory: No labored breathing. Cardiovascular: No marked edema. Well perfused extremities bilaterally. Skin: No marked skin ulcers. No lymphedema or skin abnormalities. Neurological: No marked sensory loss noted. Grossly neurovascularly intact. Both lower extremities are intact to distal sensory and motor function. Psychiatric: Alert and oriented x3. MUSCULOSKELETAL: L knee: extensor mechanism is out. Skin ecchymosis but intact. Imaging:   X-ray knee left 1 or 2 views (41196)    Result Date: 5/4/2021  Shielding: N/A. Standing. Views: AP, Lat. Impression: 2 views of L knee show displaced comminuted fracture of patella. Assessment:     ICD-10-CM   1. Closed displaced fracture of left patella, unspecified fracture morphology, initial encounter S82.002A     Plan: At this time, we reviewed today's films together, which showed a comminuted fracture of her left patella. I recommended setting her up for surgery to repair this fracture. We discussed this procedure at length today. She understands that she will need to continue to wear the knee immobilizer for at least 6 weeks following this procedure.  She may bear weight on her left side while using her knee immobilizer and walker. All questions were answered to her satisfaction. Follow up after surgery. Scribed for Dr. Prema Griffith by [facility]. ALLERGIES:  Allergies   Allergen Reactions    Codeine Nausea Only    Penicillins Rash   Other reaction(s):  Other (comments)  rash  rash     Duloxetine Nausea Only   Other reaction(s): GI intolerance, Other (comments)  \"within the hour I had a severe headache, and vomiting\"  \"within the hour I had a severe headache, and vomiting\"     Meperidine Nausea And Vomiting   Other reaction(s): GI intolerance    Oxycodone-Acetaminophen Nausea And Vomiting   Other reaction(s): GI intolerance       MEDICATIONS    Current Outpatient Medications:    albuterol HFA (PROVENTIL HFA;VENTOLIN HFA) 108 (90 Base) MCG/ACT inhaler, Inhale 1 puff, Disp: , Rfl:    bimatoprost (LUMIGAN) 0.01 % ophthalmic drops, Administer 1 drop into affected eye(s), Disp: , Rfl:    indapamide (LOZOL) 1.25 MG tablet, Take 1.25 mg by mouth once daily, Disp: , Rfl:    LORazepam (ATIVAN) 0.5 MG tablet, Take 0.5 mg by mouth 2 (two) times a day, Disp: , Rfl:    metoprolol succinate XL (TOPROL-XL) 25 MG 24 hr tablet, Take 25 mg by mouth once daily, Disp: , Rfl:    montelukast (SINGULAIR) 10 MG tablet, Take 10 mg by mouth once daily, Disp: , Rfl:    pantoprazole (PROTONIX) 40 MG EC tablet, Take 40 mg by mouth, Disp: , Rfl:    predniSONE (DELTASONE) 5 MG tablet, Take 5-15 mg by mouth, Disp: , Rfl:     MEDICAL HX  Past Medical History:   Diagnosis Date    Asthma     SURGICAL HX   Past Surgical History:   Procedure Laterality Date    NO RELEVANT SURGERIES    SHOULDER SURGERY

## 2021-05-06 NOTE — PROGRESS NOTES
Oral and Written notification given to patient and/or caregiver informing them that they are currently an Outpatient receiving care in our facility. Outpatient services include Observation Services. Per ortho NP patient has no HH therapy needs for d/c.      Levy PattersongonzálezLens, [address] [place] [address]

## 2021-05-06 NOTE — H&P
Date of Surgery Update:  Stuart Mendes was seen and examined on the day of surgery prior to the procedure. There were no significant clinical changes since the completion of the History and Physical.    Exam today prior to surgery showed no acute cardiac findings, no respiratory difficulty, and no abdominal complaints or pain.        Signed By: Gertrude Dahl MD     May 6, 2021 10:52 AM

## 2021-05-06 NOTE — ANESTHESIA PREPROCEDURE EVALUATION
Anesthetic History     PONV (no problems with colonoscopy)          Review of Systems / Medical History  Patient summary reviewed, nursing notes reviewed and pertinent labs reviewed    Pulmonary            Asthma (occasional wheezing) : well controlled       Neuro/Psych       CVA (pt denies)  Psychiatric history (anxiety)  Pertinent negatives: TIA: pt denies. Comments: Peripheral Neuropathy  Restless Legs Syndrome Cardiovascular    Hypertension          Hyperlipidemia    Exercise tolerance: >4 METS  Comments: Pt denies hrt probs or chest pain   GI/Hepatic/Renal     GERD: well controlled      Hiatal hernia    Comments: Epigastric Pain  Cholelithiasis  Nausea  Constipation Endo/Other        Arthritis     Other Findings   Comments: Fibromyalgia on chart, pt denies.   Glaucoma           Physical Exam    Airway  Mallampati: I  TM Distance: > 6 cm  Neck ROM: normal range of motion   Mouth opening: Normal     Cardiovascular    Rhythm: regular  Rate: normal      Pertinent negatives: No murmur   Dental    Dentition: Full upper dentures and Lower partial plate     Pulmonary  Breath sounds clear to auscultation               Abdominal  GI exam deferred       Other Findings            Anesthetic Plan    ASA: 2  Anesthesia type: total IV anesthesia          Induction: Intravenous  Anesthetic plan and risks discussed with: Patient

## 2021-05-07 VITALS
DIASTOLIC BLOOD PRESSURE: 44 MMHG | WEIGHT: 138.45 LBS | BODY MASS INDEX: 24.53 KG/M2 | OXYGEN SATURATION: 95 % | RESPIRATION RATE: 16 BRPM | HEIGHT: 63 IN | HEART RATE: 62 BPM | SYSTOLIC BLOOD PRESSURE: 135 MMHG | TEMPERATURE: 97.8 F

## 2021-05-07 PROCEDURE — 74011250637 HC RX REV CODE- 250/637: Performed by: NURSE PRACTITIONER

## 2021-05-07 PROCEDURE — 74011250636 HC RX REV CODE- 250/636: Performed by: NURSE PRACTITIONER

## 2021-05-07 PROCEDURE — 97530 THERAPEUTIC ACTIVITIES: CPT

## 2021-05-07 PROCEDURE — 97162 PT EVAL MOD COMPLEX 30 MIN: CPT

## 2021-05-07 PROCEDURE — 74011250637 HC RX REV CODE- 250/637: Performed by: ORTHOPAEDIC SURGERY

## 2021-05-07 PROCEDURE — 99218 HC RM OBSERVATION: CPT

## 2021-05-07 PROCEDURE — 97116 GAIT TRAINING THERAPY: CPT

## 2021-05-07 PROCEDURE — 2709999900 HC NON-CHARGEABLE SUPPLY

## 2021-05-07 PROCEDURE — 96374 THER/PROPH/DIAG INJ IV PUSH: CPT

## 2021-05-07 RX ORDER — TRAMADOL HYDROCHLORIDE 50 MG/1
50 TABLET ORAL
Qty: 30 TAB | Refills: 0 | Status: SHIPPED | OUTPATIENT
Start: 2021-05-07 | End: 2021-05-14

## 2021-05-07 RX ORDER — AMOXICILLIN 250 MG
1 CAPSULE ORAL DAILY
Qty: 30 TAB | Refills: 0 | Status: SHIPPED | OUTPATIENT
Start: 2021-05-07

## 2021-05-07 RX ORDER — POLYETHYLENE GLYCOL 3350 17 G/17G
17 POWDER, FOR SOLUTION ORAL
Qty: 15 PACKET | Refills: 0 | Status: SHIPPED | OUTPATIENT
Start: 2021-05-07 | End: 2021-05-22

## 2021-05-07 RX ORDER — ONDANSETRON 4 MG/1
4 TABLET, ORALLY DISINTEGRATING ORAL
Qty: 20 TAB | Refills: 0 | Status: SHIPPED | OUTPATIENT
Start: 2021-05-07 | End: 2022-10-19

## 2021-05-07 RX ADMIN — Medication 1 AMPULE: at 09:00

## 2021-05-07 RX ADMIN — MONTELUKAST SODIUM 10 MG: 10 TABLET, COATED ORAL at 08:50

## 2021-05-07 RX ADMIN — BRIMONIDINE TARTRATE 1 DROP: 2 SOLUTION OPHTHALMIC at 08:50

## 2021-05-07 RX ADMIN — TRAMADOL HYDROCHLORIDE 50 MG: 50 TABLET ORAL at 06:12

## 2021-05-07 RX ADMIN — PANTOPRAZOLE SODIUM 40 MG: 40 TABLET, DELAYED RELEASE ORAL at 08:50

## 2021-05-07 RX ADMIN — DORZOLAMIDE HYDROCHLORIDE 1 DROP: 20 SOLUTION/ DROPS OPHTHALMIC at 08:50

## 2021-05-07 RX ADMIN — TRAMADOL HYDROCHLORIDE 50 MG: 50 TABLET ORAL at 12:00

## 2021-05-07 RX ADMIN — ONDANSETRON 4 MG: 2 INJECTION INTRAMUSCULAR; INTRAVENOUS at 12:00

## 2021-05-07 NOTE — DISCHARGE SUMMARY
Ortho Discharge Summary    Patient ID:  Ama Watson  [id number]  female  80 y.o.  1935    Admit date: 5/6/2021    Discharge date: 5/7/2021    Admitting Physician: Prema Griffith MD     Consulting Physician(s):   Treatment Team: Attending Provider: Denisha Rendon MD; Utilization Review: Alberto Stiles RN    Date of Surgery:   5/6/2021     Preoperative Diagnosis:  LEFT PATELLA FRACTURE    Postoperative Diagnosis:   LEFT PATELLA FRACTURE    Procedure(s):     LEFT PATELLA OPEN REDUCTION INTERNAL FIXATION     Anesthesia Type:   General     Surgeon: Denisha Rendon MD                            HPI:  Pt is a 80 y.o. female who has a history of LEFT PATELLA FRACTURE  with pain and limitations of activities of daily living who presents at this time for a left patella ORIF following the failure of conservative management. PMH:   Past Medical History:   Diagnosis Date    Adverse effect of anesthesia     combative waking up    Anxiety     Arthritis     Asthma     as of 12/30/16 pt states under control    Diverticulitis Dx 11/2016    Fibromyalgia     GERD (gastroesophageal reflux disease)     Glaucoma     Hiatal hernia     Hypercholesteremia     Hypertension     Ill-defined condition     neurapthy in ble    Nausea & vomiting        Body mass index is 24.53 kg/m². : A BMI > 30 is classified as obesity and > 40 is classified as morbid obesity. Medications upon admission :   Prior to Admission Medications   Prescriptions Last Dose Informant Patient Reported? Taking? albuterol (PROVENTIL HFA, VENTOLIN HFA, PROAIR HFA) 90 mcg/actuation inhaler 5/6/2021 at Unknown time Self Yes Yes   Sig: Take 1 Puff by inhalation as needed for Wheezing. bimatoprost (LUMIGAN) 0.01 % ophthalmic drops 5/5/2021 at Unknown time Self Yes Yes   Sig: Administer 1 Drop to right eye every evening. brimonidine 0.025 % drop 5/6/2021 at 0630  Yes Yes   Sig: Apply 1 Drop to eye two (2) times a day.  Right eye   dorzolamide (TRUSOPT) 2 % ophthalmic solution 5/6/2021 at 0630  Yes Yes   Sig: Administer 1 Drop to right eye two (2) times a day. indapamide (LOZOL) 1.25 mg tablet 5/5/2021 at Unknown time  Yes Yes   Sig: Take 1.25 mg by mouth daily. lorazepam (ATIVAN) 0.5 mg tablet 5/2/2021 Self Yes Yes   Sig: Take 0.5 mg by mouth nightly. meclizine (ANTIVERT) 25 mg tablet 5/5/2021 at Unknown time  No Yes   Sig: Take 1 Tab by mouth three (3) times daily as needed for Dizziness. metoprolol tartrate (LOPRESSOR) 25 mg tablet 5/6/2021 at 0600  Yes Yes   Sig: Take 25 mg by mouth daily. montelukast (SINGULAIR) 10 mg tablet 5/6/2021 at 0600  Yes Yes   Sig: Take 10 mg by mouth daily. multivitamin (ONE A DAY) tablet 5/5/2021 Self Yes Yes   Sig: Take 1 Tab by mouth daily. pantoprazole (PROTONIX) 40 mg tablet 5/6/2021 at 0600  Yes Yes   Sig: Take 40 mg by mouth daily. Facility-Administered Medications: None        Allergies: Allergies   Allergen Reactions    Cymbalta [Duloxetine] Nausea Only and Other (comments)     \"within the hour I had a severe headache, and vomiting\"      Demerol [Meperidine] Nausea and Vomiting    Morphine Nausea and Vomiting    Penicillins Other (comments)     rash    Percocet [Oxycodone-Acetaminophen] Nausea and Vomiting        Hospital Course: The patient underwent surgery. Complications:  None; patient tolerated the procedure well. Was taken to the PACU in stable condition and then transferred to the ortho floor. Perioperative Antibiotics:  Ancef     Postoperative Pain Management:  Oxycodone      DVT Prophylaxis: none    Postoperative transfusions:    Number of units banked PRBCs =   none     Post Op complications: none    Hemoglobin at discharge:    Lab Results   Component Value Date/Time    HGB 13.6 06/11/2020 08:52 AM    INR 1.0 05/14/2018 07:30 AM       Dressing was changed on POD # 1. Incision - clean, dry and intact. No significant erythema or swelling.  Neurovascular exam found to be within normal limits. Wound appears to be healing without any evidence of infection. Physical Therapy started following surgery and participated in bed mobility, transfers and ambulation. Discharged to: Home. Condition on Discharge:   stable    Discharge instructions:    - Take pain medications as prescribed  - Resume pre hospital diet      - Discharge activity: activity as tolerated  - Ambulate with assistive device as needed. - Weight bearing status WBAT with knee immobilizer on at all times  - Wound Care Keep wound clean and dry. See discharge instruction sheet. -DISCHARGE MEDICATION LIST     Current Discharge Medication List      START taking these medications    Details   ondansetron (ZOFRAN ODT) 4 mg disintegrating tablet Take 1 Tab by mouth every eight (8) hours as needed for Nausea. Qty: 20 Tab, Refills: 0  Start date: 5/7/2021      traMADoL (ULTRAM) 50 mg tablet Take 1 Tab by mouth every six (6) hours as needed for Pain for up to 7 days. Max Daily Amount: 200 mg. Qty: 30 Tab, Refills: 0  Start date: 5/7/2021, End date: 5/14/2021    Associated Diagnoses: Other closed fracture of left patella, sequela      polyethylene glycol (MIRALAX) 17 gram packet Take 1 Packet by mouth daily as needed (constipation) for up to 15 days. Qty: 15 Packet, Refills: 0  Start date: 5/7/2021, End date: 5/22/2021      senna-docusate (PERICOLACE) 8.6-50 mg per tablet Take 1 Tab by mouth daily. Qty: 30 Tab, Refills: 0  Start date: 5/7/2021         CONTINUE these medications which have NOT CHANGED    Details   brimonidine 0.025 % drop Apply 1 Drop to eye two (2) times a day. Right eye      dorzolamide (TRUSOPT) 2 % ophthalmic solution Administer 1 Drop to right eye two (2) times a day. metoprolol tartrate (LOPRESSOR) 25 mg tablet Take 25 mg by mouth daily. indapamide (LOZOL) 1.25 mg tablet Take 1.25 mg by mouth daily. montelukast (SINGULAIR) 10 mg tablet Take 10 mg by mouth daily. pantoprazole (PROTONIX) 40 mg tablet Take 40 mg by mouth daily. meclizine (ANTIVERT) 25 mg tablet Take 1 Tab by mouth three (3) times daily as needed for Dizziness. Qty: 20 Tab, Refills: 0      bimatoprost (LUMIGAN) 0.01 % ophthalmic drops Administer 1 Drop to right eye every evening.      multivitamin (ONE A DAY) tablet Take 1 Tab by mouth daily. albuterol (PROVENTIL HFA, VENTOLIN HFA, PROAIR HFA) 90 mcg/actuation inhaler Take 1 Puff by inhalation as needed for Wheezing.      lorazepam (ATIVAN) 0.5 mg tablet Take 0.5 mg by mouth nightly. per medical continuation form      -Follow up in office in 2 weeks      Signed:  Sapna Cervantes.  Beatriz Jones, EDGARP-BC, ONP-C  Orthopaedic Nurse Practitioner    5/7/2021  11:19 AM

## 2021-05-07 NOTE — PROGRESS NOTES
AVS reviewed with the patient. Presented the opportunity for any standing questions. Patient verbalized understanding of the instructions. IV removed. Patient belongings removed from the room. Patient transported in wheelchair with volunteer services to private vehicle with daughter.

## 2021-05-07 NOTE — DISCHARGE INSTRUCTIONS
Discharge Instructions: How to [facility]  Surgery: Patella Fracture Repair  Surgeon:   Roseanna Jackson MD  Surgery Date:  5/6/2021     To relieve pain:   Use ice/gel packs.  Put the ice pack directly over the wound, or anywhere you are hurting or swollen.  To control pain and swelling, keep ice on regularly, especially after physical activity.  The packs should stay cold for 3-4 hours. When it is not cold anymore, rotate with the packs in the freezer.  Elevate your leg. This will also keep swelling down.  Rest for at least 20 minutes between activity or exercises.  To keep track of your pain medications, write down what you take and when you take it.  The last dose of pain medication you got in the hospital was:     Medication    Dose    Date & Time           Choose your medications based on the pain scale below:     To keep your pain under control, take Tylenol every 6 hours for 14 days - even if you feel like you dont need it.  For mild to moderate pain (1-6 on pain scale), take one pain pill every 3-4 hours as needed.  For severe pain (7-10 on pain scale), take two pain pills every 3-4 hours as needed.  To prevent nausea, take your pain medications with food. Pain Scale                 As your pain lessens:     Slowly start taking less pain medication. You may do this by waiting longer between doses or by taking smaller doses.  Stop using the pain medications as soon as you no longer need it, usually in 2-3 weeks.  Aspirin   To prevent blood clots, you will need to take Aspirin {[facility] BLANK LIST:20061::\"325\",\"81\"} mg twice a day for 30 days.  Lovenox***   To prevent blood clots, you will will need to take    Lovenox {[facility] BLANK LIST:20061::\"40 mg  daily\",\"30 mg twice daily\"} for {[facility] BLANK LIST:20061::\"14\",\"21\"} days.     It is an injection that you receive in the side of your  stomach.  Your nursing will teach you or a caregiver how to     do this before you go home.  To prevent stomach upset or bleeding:   Take Pepcid 20 mg twice a day, or a similar home medication, while you are taking a blood thinner.  Do not take non-steroidal anti-inflammatory (NSAID) medications (Ibuprofen, Advil, Motrin, Naproxen, etc.)                                                 OPSITE (Honeycomb dressing)     Keep your clear, waterproof dressing in place for 5-7 days after your leave the hospital.      If you are still having drainage, you will need to change your dressing in 5-7 days. You will be given one extra dressing to use at home.  If there is no more drainage from the wound, you may leave it open to the air. OPSITE DRESSING INSTRUCTIONS            PRINEO DRESSING INSTRUCTIONS ***     Prineo is a type of skin glue and mesh that is keeping your wound together.  Leave this covering alone. Do not peel it off.  Do not apply oils or lotions over it.  You may shower with this dressing but do not soak it. Gently pat your wound dry when you get out of the shower.  DO NOTs:   Do not rub your surgical wound   Do not put lotion or oils on your wound.  Do not take a tub bath or go swimming until your doctor says it is ok.  You may shower with this dressing over your wound.  After showering pat the dressing dry.  If you have staples a home health nurse will remove them in about 10 days.  To increase and promote healing:     Stop Smoking (or at least cut back on       Smoking).  Eat a well-balanced diet (high in protein       and vitamin C).  If you have a poor appetite, drink Ensure, Glucerna, or CarnationInstant Breakfast for the next 30 days.      If you are diabetic, you should control your blood         sugars to prevent infection and help your wound         to heal.       To prevent constipation, stay active & drink plenty of fluid.  While using pain medications, you should also take stool softeners and laxatives, such as Pericolace and Miralax.  If you are having too many bowel movements, then you may need  to stop taking the laxatives.  You should have a bowel movement 3-4 days after surgery and then at least every other day while on pain medication.  To improve your recovery, you must be active!  WEIGHT BEARING   {[facility] BLANK LIST:20061::\"As Tolerated = You can put as much weight on your leg as you can tolerate while walking\",\"Toe-Touch or Partial = You can let your toes touch the ground but may not put all of your weight on that leg\",\"None = you may not put any weight or pressure through that leg\"}.  THERAPY   If you go to a rehab facility, physical therapy (PT) will need to work with you daily, and sometimes twice a day to teach you how to:     Get in and out of the bed   Walk (gait training) and climb stairs   Do exercises and gain strength   Use a walker, crutches or cane     You may also need to have occupational therapy (OT) work with you to help you practice:     Getting on and off the toilet   Self-care (brushing teeth, eating, bathing, etc)     If you go directly home, home health physical therapy will come the day after you leave the hospital.  They will visit about 4 times over the next couple of weeks to teach you how to get out of bed, to safely walk in your home, and to do your exercises.  NO DRIVING until your surgeon tells you it is ok.  You can return to work when cleared by a physician.  Please call your physician immediately if you have:     Constant bleeding from your wound.  Increasing redness or swelling around your wound (Some warmth, bruising and swelling is normal).  Change in wound drainage (increase in amount, color, or bad smell).      Change in mental status (unusual behavior)     Temperature over 101.5 degrees Fahrenheit     Increased pain, swelling, or redness in the calf (back of your lower leg), thigh, ankle or foot.  Emergency: CALL 911 if you have:   Shortness of breath   Chest pain when you cough or taking a deep breath     Please call your surgeons office at {[facility] BLANK LIST:20061::\"[facility] [place] [phone]\",\"[facility] [phone]\",\" [phone]\",\" [phone]\"} for a follow up appointment.  You should call as soon as you get home or the next day after discharge. Ask to make an appointment in 2 weeks.

## 2021-05-07 NOTE — PROGRESS NOTES
Problem: Falls - Risk of  Goal: *Absence of Falls  Description: Document Sagar Shove Fall Risk and appropriate interventions in the flowsheet.   Outcome: Resolved/Met     Problem: Patient Education: Go to Patient Education Activity  Goal: Patient/Family Education  Outcome: Resolved/Met

## 2021-05-07 NOTE — PROGRESS NOTES
Ortho / Neurosurgery NP Note    POD# 1  s/p LEFT PATELLA OPEN REDUCTION INTERNAL FIXATION   Pt seen with no visitor present. Pt resting in chair. Awake and alert. Reports minimal post-op pain,   Tramadol is controlling pain and not having side effect from one tablet. Wishes to continue as needed at home. Tolerating regular diet. Denies nausea. VSS Afebrile. 2L NC. Visit Vitals  BP (!) 135/44   Pulse 62   Temp 97.8 °F (36.6 °C)   Resp 16   Ht 5' 3\" (1.6 m)   Wt 62.8 kg (138 lb 7.2 oz)   SpO2 95%   BMI 24.53 kg/m²       Voiding status: +void   Output (mL)  Urine Voided: 300 ml (05/07/21 0747)  Last Bowel Movement Date: 05/05/21 (05/07/21 0747)  Unmeasurable Output  Urine Occurrence(s): 2 (05/07/21 0612)      Labs    Lab Results   Component Value Date/Time    HGB 13.6 06/11/2020 08:52 AM      Lab Results   Component Value Date/Time    INR 1.0 05/14/2018 07:30 AM      Lab Results   Component Value Date/Time    Sodium 138 06/11/2020 08:52 AM    Potassium 3.2 (L) 06/11/2020 08:52 AM    Chloride 103 06/11/2020 08:52 AM    CO2 29 06/11/2020 08:52 AM    Glucose 114 (H) 06/11/2020 08:52 AM    BUN 21 (H) 06/11/2020 08:52 AM    Creatinine 1.08 (H) 06/11/2020 08:52 AM    Calcium 9.3 06/11/2020 08:52 AM     Recent Glucose Results: No results found for: GLU, GLUPOC, GLUCPOC    Body mass index is 24.53 kg/m². : A BMI > 30 is classified as obesity and > 40 is classified as morbid obesity. Ace wrap and knee immobilizer in place - intact, peeked through wrap with no drainage noted   Calves soft and supple; No pain with passive stretch  Sensation and motor intact - PF/DF/EHL intact 5/5  SCDs for mechanical DVT proph while in bed     PLAN:  1) Ambulate with PT this morning and cleared. 2) WBAT - knee immobilizer at all times. 3) Dressing care - remove ace wrap this am. Surgical dressing should stay in place until 2 week f/u.   4) HTN - BP stable. Resume metoprolol at discharge.    5) Discharge planning - Plans to return home today with daughter. Wants zofran ODT for home.    D/c home today    Radha Carreno NP

## 2021-05-07 NOTE — PROGRESS NOTES
End of Shift Note    Bedside shift change report given to [address] [place] [address] (oncoming nurse) by Francisca Flores RN (offgoing nurse). Report included the following information SBAR, Kardex and Intake/Output    Shift worked: night     Shift summary and any significant changes:    Uneventful night     Concerns for physician to address: Pain medication     Zone phone for oncoming shift:         Activity:  Activity Level: Up with Assistance  Number times ambulated in hallways past shift: 0  Number of times OOB to chair past shift: 0    Cardiac:   Cardiac Monitoring: No          Access:   Current line(s): PIV     Genitourinary:   Urinary status: voiding    Respiratory:   O2 Device: None (Room air)  Chronic home O2 use?: NO  Incentive spirometer at bedside: NO     GI:  Last Bowel Movement Date: 05/05/21  Current diet:  DIET REGULAR  Passing flatus: NO  Tolerating current diet: YES       Pain Management:   Patient states pain is manageable on current regimen: NO    Skin:  Isidoro Score: 19  Interventions: PT/OT consult    Patient Safety:  Fall Score:  Total Score: 4  Interventions: pt to call before getting OOB  High Fall Risk: Yes    Length of Stay:  Expected LOS: - - -  Actual LOS: Kj Richard, RN

## 2021-05-07 NOTE — PROGRESS NOTES
Orders received, chart reviewed and patient evaluated by physical therapy. Pending progression with skilled acute physical therapy, recommend:  No skilled physical therapy/ follow up rehabilitation needs identified at this time. Recommend with nursing OOB to chair 3x/day and walking daily with sba assist and walker with L knee immobilizer at all times. Thank you for completing as able in order to maintain patient strength, endurance and independence. Full evaluation to follow.      Myrtle Gibson, PT

## 2022-03-19 PROBLEM — H57.03 PERSISTENT MIOSIS: Status: ACTIVE | Noted: 2017-01-11

## 2022-03-19 PROBLEM — R53.1 WEAKNESS: Status: ACTIVE | Noted: 2018-01-15

## 2022-03-19 PROBLEM — G45.9 TIA (TRANSIENT ISCHEMIC ATTACK): Status: ACTIVE | Noted: 2018-01-15

## 2022-03-20 PROBLEM — K80.20 GALLSTONES: Status: ACTIVE | Noted: 2017-12-04

## 2022-03-20 PROBLEM — S82.002A LEFT PATELLA FRACTURE: Status: ACTIVE | Noted: 2021-05-06

## 2022-03-20 PROBLEM — H40.1121 PRIMARY OPEN ANGLE GLAUCOMA OF LEFT EYE, MILD STAGE: Status: ACTIVE | Noted: 2017-01-06

## 2022-03-20 PROBLEM — H25.812 COMBINED FORMS OF AGE-RELATED CATARACT OF LEFT EYE: Status: ACTIVE | Noted: 2017-01-06

## 2022-03-23 ENCOUNTER — OFFICE VISIT (OUTPATIENT)
Dept: NEUROLOGY | Age: 87
End: 2022-03-23
Payer: MEDICARE

## 2022-03-23 VITALS
HEART RATE: 62 BPM | RESPIRATION RATE: 20 BRPM | DIASTOLIC BLOOD PRESSURE: 74 MMHG | OXYGEN SATURATION: 98 % | BODY MASS INDEX: 23.99 KG/M2 | HEIGHT: 63 IN | WEIGHT: 135.4 LBS | SYSTOLIC BLOOD PRESSURE: 134 MMHG

## 2022-03-23 DIAGNOSIS — M54.17 LUMBOSACRAL RADICULOPATHY: Primary | ICD-10-CM

## 2022-03-23 PROCEDURE — G8510 SCR DEP NEG, NO PLAN REQD: HCPCS | Performed by: PSYCHIATRY & NEUROLOGY

## 2022-03-23 PROCEDURE — G8427 DOCREV CUR MEDS BY ELIG CLIN: HCPCS | Performed by: PSYCHIATRY & NEUROLOGY

## 2022-03-23 PROCEDURE — 99204 OFFICE O/P NEW MOD 45 MIN: CPT | Performed by: PSYCHIATRY & NEUROLOGY

## 2022-03-23 PROCEDURE — 1101F PT FALLS ASSESS-DOCD LE1/YR: CPT | Performed by: PSYCHIATRY & NEUROLOGY

## 2022-03-23 PROCEDURE — G8420 CALC BMI NORM PARAMETERS: HCPCS | Performed by: PSYCHIATRY & NEUROLOGY

## 2022-03-23 PROCEDURE — 1090F PRES/ABSN URINE INCON ASSESS: CPT | Performed by: PSYCHIATRY & NEUROLOGY

## 2022-03-23 PROCEDURE — G8536 NO DOC ELDER MAL SCRN: HCPCS | Performed by: PSYCHIATRY & NEUROLOGY

## 2022-03-23 RX ORDER — PREDNISONE 5 MG/1
5 TABLET ORAL DAILY
COMMUNITY
Start: 2022-02-09 | End: 2022-10-17

## 2022-03-23 NOTE — PROGRESS NOTES
Room:     Identified pt with two pt identifiers(name and ). Reviewed record in preparation for visit and have obtained necessary documentation. All patient medications has been reviewed. Chief Complaint   Patient presents with    New Patient    Peripheral Neuropathy       Health Maintenance Due   Topic    Depression Screen     COVID-19 Vaccine (1)    DTaP/Tdap/Td series (1 - Tdap)    Shingrix Vaccine Age 49> (1 of 2)    Bone Densitometry (Dexa) Screening     Pneumococcal 65+ years (1 of 1 - PPSV23)    Medicare Yearly Exam     Flu Vaccine (1)       Vitals:    22 1242   BP: 134/74   Pulse: 62   Resp: 20   SpO2: 98%   Weight: 135 lb 6.4 oz (61.4 kg)   Height: 5' 3\" (1.6 m)   PainSc:   0 - No pain       4. Have you been to the ER, urgent care clinic since your last visit? Hospitalized since your last visit? 5. Have you seen or consulted any other health care providers outside of the [address] [place] [address] since your last visit? Include any pap smears or colon screening. No    Patient is accompanied by daughter I have received verbal consent from [place] to discuss any/all medical information while they are present in the room. Room:     Identified pt with two pt identifiers(name and ). Reviewed record in preparation for visit and have obtained necessary documentation. All patient medications has been reviewed.     Chief Complaint   Patient presents with    New Patient    Peripheral Neuropathy       Health Maintenance Due   Topic    Depression Screen     COVID-19 Vaccine (1)    DTaP/Tdap/Td series (1 - Tdap)    Shingrix Vaccine Age 49> (1 of 2)    Bone Densitometry (Dexa) Screening     Pneumococcal 65+ years (1 of 1 - PPSV23)    Medicare Yearly Exam     Flu Vaccine (1)       Vitals:    22 1242   BP: 134/74   Pulse: 62   Resp: 20   SpO2: 98%   Weight: 135 lb 6.4 oz (61.4 kg)   Height: 5' 3\" (1.6 m)   PainSc:   0 - No pain

## 2022-03-23 NOTE — PROGRESS NOTES
ELEUTERIO/Freddy Phan PATIENT EVALUATION/CONSULTATION       PATIENT NAME: Doris Weldon    MRN: [id number]    REASON FOR CONSULTATION: Chronic sensory/motor changes of legs    03/23/22    HISTORY OF PRESENT ILLNESS:  Doris Weldon is a 80 y.o. right-hand-dominant female presents to [facility] neurology clinic for evaluation of chronic sensory changes in the bilateral lower extremities. Patient is not entirely clear on duration of symptoms but it has been over 10 years when she first noted numbness in her toes. Since that time there is been some slowly progressive symptomatology. Wanted to come back to evaluate if anything serious was going on. She does also note sensation of fatigue in her legs prickly with prolonged standing which will get better after she sits. During this time she will note an aching in her back. Otherwise denies any autonomic dysfunction any weakness in her left leg. Denies any neck pain or symptoms in her arms. Symptoms do affect her walking at times which she has to be mindful of. Otherwise other than being present and somewhat of a nuisance they do not appear to have any significant impact. No pain is noted.       PAST MEDICAL HISTORY:  Past Medical History:   Diagnosis Date    Adverse effect of anesthesia     combative waking up    Anxiety     Arthritis     Asthma     as of 12/30/16 pt states under control    Diverticulitis Dx 11/2016    Fibromyalgia     GERD (gastroesophageal reflux disease)     Glaucoma     Hiatal hernia     Hypercholesteremia     Hypertension     Ill-defined condition     neurapthy in ble    Nausea & vomiting        PAST SURGICAL HISTORY:  Past Surgical History:   Procedure Laterality Date    HX CATARACT REMOVAL Right 01/04/2017    HX HYSTERECTOMY      HX ROTATOR CUFF REPAIR Left 2008    HX TUBAL LIGATION      IL COLONOSCOPY FLX DX W/COLLJ SPEC WHEN PFRMD  11/9/2011         IL EGD TRANSORAL BIOPSY SINGLE/MULTIPLE  11/9/2011  UPPER GI ENDOSCOPY,BIOPSY  8/20/2019            FAMILY HISTORY:   No relevant past family history noted      SOCIAL HISTORY:  Social History     Socioeconomic History    Marital status:    Tobacco Use    Smoking status: Never Smoker    Smokeless tobacco: Never Used   Substance and Sexual Activity    Alcohol use: No    Drug use: No         MEDICATIONS:   Current Outpatient Medications   Medication Sig Dispense Refill    predniSONE (DELTASONE) 5 mg tablet Take 5 mg by mouth daily.  brimonidine 0.025 % drop Apply 1 Drop to eye two (2) times a day. Right eye      dorzolamide (TRUSOPT) 2 % ophthalmic solution Administer 1 Drop to right eye two (2) times a day.  metoprolol tartrate (LOPRESSOR) 25 mg tablet Take 25 mg by mouth daily.  indapamide (LOZOL) 1.25 mg tablet Take 1.25 mg by mouth daily.  montelukast (SINGULAIR) 10 mg tablet Take 10 mg by mouth daily.  pantoprazole (PROTONIX) 40 mg tablet Take 40 mg by mouth daily.  bimatoprost (LUMIGAN) 0.01 % ophthalmic drops Administer 1 Drop to right eye every evening.  multivitamin (ONE A DAY) tablet Take 1 Tab by mouth daily.  albuterol (PROVENTIL HFA, VENTOLIN HFA, PROAIR HFA) 90 mcg/actuation inhaler Take 1 Puff by inhalation as needed for Wheezing.  lorazepam (ATIVAN) 0.5 mg tablet Take 0.5 mg by mouth nightly.  ondansetron (ZOFRAN ODT) 4 mg disintegrating tablet Take 1 Tab by mouth every eight (8) hours as needed for Nausea. (Patient not taking: Reported on 3/23/2022) 20 Tab 0    senna-docusate (PERICOLACE) 8.6-50 mg per tablet Take 1 Tab by mouth daily. (Patient not taking: Reported on 3/23/2022) 30 Tab 0    meclizine (ANTIVERT) 25 mg tablet Take 1 Tab by mouth three (3) times daily as needed for Dizziness.  (Patient not taking: Reported on 3/23/2022) 20 Tab 0         ALLERGIES:  Allergies   Allergen Reactions    Cymbalta [Duloxetine] Nausea Only and Other (comments)     \"within the hour I had a severe headache, and vomiting\"      Demerol [Meperidine] Nausea and Vomiting    Morphine Nausea and Vomiting    Penicillins Other (comments)     rash    Percocet [Oxycodone-Acetaminophen] Nausea and Vomiting         REVIEW OF SYSTEMS:  10 point ROS reviewed with patient. Please see scanned document under media. PHYSICAL EXAM:  Vital Signs:   Visit Vitals  /74 (BP 1 Location: Left upper arm, BP Patient Position: Sitting, BP Cuff Size: Adult)   Pulse 62   Resp 20   Ht 5' 3\" (1.6 m)   Wt 135 lb 6.4 oz (61.4 kg)   SpO2 98%   BMI 23.99 kg/m²     Pleasant female scannable in exam room in no distress. HEENT appears grossly unremarkable neck appears supple. Cardiopulmonary exams are unrevealing. Abdomen is nondistended. Extremities are warm/dry. Neurologically, patient appears alert and oriented attention appears intact. Speech is clear, language fluent. Cranial nerves II through XII are unrevealing. Motorically patient has normal bulk and tone, 5 out of 5 strength in upper extremities as well as left lower extremity. Right iliopsoas is 4 to 4+ out of 5 hip abduction abduction are 5 out of 5, gluteus is 4/5. Knee flexion extension are 5 out of 5. Dorsiflexion is 4/5 inversion is 4/5 plantarflexion and eversion are 5- out of 5. Patient demonstrates a length dependent gradient in the medial/anterior leg less consistent posteriorly in the right leg. Sensation is diminished in the right leg more so than the left. Reflexes are symmetric and trace. Primary gait and station is reasonably intact. PERTINENT DATA:  CT Results (maximum last 3): Results from [place] encounter on 06/08/19    CT ABD PELV WO CONT    Narrative  EXAM: CT ABD PELV WO CONT    INDICATION: right flank and epigastric pain    COMPARISON:    CONTRAST:  None. TECHNIQUE:  Thin axial images were obtained through the abdomen and pelvis. Coronal and  sagittal reconstructions were generated.  Oral contrast was not administered. CT  dose reduction was achieved through use of a standardized protocol tailored for  this examination and automatic exposure control for dose modulation. The absence of intravenous contrast material reduces the sensitivity for  evaluation of the solid parenchymal organs of the abdomen. FINDINGS:  LUNG BASES: Clear. INCIDENTALLY IMAGED HEART AND MEDIASTINUM: Moderate paraesophageal type hiatal  hernia. LIVER: Hepatic cysts. No evidence of malignant hepatic mass. GALLBLADDER: Cholelithiasis is unchanged. No acute cholecystitis or CBD  obstruction. SPLEEN: No mass. PANCREAS: Mild atrophy is unchanged. No inflammation. ADRENALS: Unremarkable. KIDNEYS:  Right renal angiomyolipoma, unchanged. Left renal simple cysts . STOMACH: Nondistended. SMALL BOWEL: No dilatation or wall thickening. COLON: Diverticulosis. Fecal stasis. .  APPENDIX: Unremarkable. PERITONEUM: No ascites or pneumoperitoneum. Fat and mesentery containing  umbilical hernia. RETROPERITONEUM: Aortic atherosclerotic change. REPRODUCTIVE ORGANS: Uterus is been resected. No adnexal mass. URINARY BLADDER: No mass or calculus. BONES: Lumbar spine dextroscoliosis. Lumbar canal stenosis at L4-5. Foraminal  stenoses at L1-L2. . ADDITIONAL COMMENTS: No lymphadenopathy. Mild diffuse muscle  atrophy for age. No hernia. Impression  IMPRESSION:    No acute intracranial process. Moderate hiatal hernia. Clonic diverticula without evidence of diverticulitis. Degenerative changes of the lumbar spine with canal and foraminal stenoses. Results from [place] encounter on 08/31/18    CT HEAD WO CONT    Narrative  Indication:  Vertigo, episodic, peripheral; transient shaking, dizziness    Comparison: CT 1/15/2018    Findings: 5 mm axial images were obtained from the skull base through the  vertex.     CT dose reduction was achieved through the use of a standardized protocol  tailored for this examination and automatic exposure control for dose  modulation. The ventricles and cortical sulci are prominent, compatible with age related  volume loss. There is no evidence of intracranial hemorrhage, mass, mass effect,  or acute infarct. There is periventricular white matter disease. No extra-axial  fluid collections are seen. The visualized paranasal sinuses and mastoid air  cells are clear. The orbital structures are unremarkable. No osseous  abnormalities are seen. Impression  Impression:  1. No evidence of acute infarct or intracranial hemorrhage. 2. Periventricular white matter disease is likely secondary to chronic small  vessel ischemic changes. Results from [place] encounter on 05/14/18    CT ABD PELV W CONT    Narrative  EXAM:  CT ABD PELV W CONT    INDICATION: Lower abdominal pain started this morning. Colonic diverticulosis. Previous diverticulitis in 2016. Hysterectomy. COMPARISON: CT abdomen/pelvis on 2/2/2018 at 11/7/2017. CONTRAST:  100 mL of Isovue-370. TECHNIQUE:  Following the uneventful intravenous administration of contrast, thin axial  images were obtained through the abdomen and pelvis. Coronal and sagittal  reconstructions were generated. Oral contrast was not administered. CT dose  reduction was achieved through use of a standardized protocol tailored for this  examination and automatic exposure control for dose modulation. FINDINGS:  LUNG BASES: Clear. INCIDENTALLY IMAGED HEART AND MEDIASTINUM: Normal cardiac size. Moderate  paraesophageal type hiatal hernia is slightly increased. LIVER: Hepatic cysts are unchanged. No evidence of malignant hepatic mass. Surface is smooth. Size is normal.  GALLBLADDER: Cholelithiasis is unchanged. No acute cholecystitis or CBD  obstruction. SPLEEN: No mass. PANCREAS: Mild atrophy is unchanged. No inflammation. ADRENALS: Unremarkable. KIDNEYS: No suspicious renal mass or hydronephrosis.  Right renal angiomyolipoma  measures 1.6 cm in oblique diameter, unchanged. Left renal simple cysts measures  4.7 cm in transverse diameter. STOMACH: Nondistended. SMALL BOWEL: No dilatation or wall thickening. COLON: Distal descending colon diverticulitis is mild. Overall, diverticulosis  is moderate in greatest in the sigmoid colon. APPENDIX: Unremarkable. PERITONEUM: No ascites or pneumoperitoneum. RETROPERITONEUM: Aorta is atherosclerotic without aneurysm. At least mild  atherosclerotic narrowing of the origin of the celiac artery. REPRODUCTIVE ORGANS: Uterus is been resected. No adnexal mass. URINARY BLADDER: No mass or calculus. BONES: Lumbar spine dextroscoliosis is unchanged. No aggressive bone lesion. ADDITIONAL COMMENTS: No lymphadenopathy. Mild diffuse muscle atrophy for age. No  hernia. Impression  IMPRESSION:    1. Mild colonic diverticulitis involves the distal descending colon. No abscess. 2. Nonemergent incidental findings are detailed above. MRI Results (maximum last 3): Results from [place] encounter on 04/20/21    MRI SHOULDER RT WO CONT    Narrative  INDICATION: Right shoulder pain. COMPARISON: None    EXAM: Oblique coronal T1-weighted spin-echo, axial fat suppressed proton density  weighted fast spin echo, oblique coronal fat-suppressed proton density and  T2-weighted fast spin-echo, and oblique sagittal fat-suppressed T2-weighted fast  spin-echo MR images of the right shoulder are obtained. FINDINGS: AC joint: Mild osteoarthrosis with capsular distention and small  effusion. Acromion process type: 2-3 with mild lateral downsloping. Joint fluid: Moderate effusions of glenohumeral joint and subacromial subdeltoid  bursa. Bone: Normal signal.    Rotator cuff tendons: Diffuse high-grade articular sided partial thickness  tearing of supraspinatus and infraspinatus tendons with footplate retraction to  the superomedial humeral head.  Substantial thinning also shown throughout the  superior subscapularis tendon. Muscles: Moderate volume loss of supraspinatus and superior subscapularis  muscles. Mild volume loss of infraspinatus muscle. Fat signal equal to muscle  signal in the supraspinatus and superior subscapularis and less than muscle  signal in the infraspinatus. Long head biceps tendon: Absent intracapsular demonstration of the tendon  consistent with intracapsular rupture with distal retraction. The extracapsular  tendon is not identified within the imaging field of view. Labrum: Intact. Glenohumeral joint capsule: Appears intact. Glenohumeral articular cartilage: Mild osteoarthrosis with areas of intermediate  grade chondral loss and small marginal osteophytes. Mild superior subluxation of  the humeral head. Soft tissue mass: None. Impression  1. Diffuse high-grade partial-thickness tearing of supraspinatus, infraspinatus  and superior subscapularis tendons. There is footplate tendon retraction  demonstrated extending to the superomedial margin of the humeral head. There is  atrophy as indicated above. 2. Intracapsular rupture of long head biceps tendon with distal retraction. 3. Mild AC joint osteoarthrosis. Type II to III acromion. 4. Mild glenohumeral osteoarthrosis. Results from [place] encounter on 01/15/18    MRA BRAIN WO CONT    Narrative  INDICATION: Dizziness , this morning at 6:00 AM feeling like passing out. Headache. COMPARISON: None    EXAM: Unenhanced axial 3-dimensional time-of-flight MR images of the [place] with three-dimensional reformations. FINDINGS: No flow-limiting stenosis is demonstrated in the codominant vertebral  artery confluence, basilar artery or posterior cerebral arteries. A fetal origin  of the right PCA is incidentally shown. No flow-limiting stenosis is demonstrated in the internal carotid artery siphons  bilaterally or the anterior and middle cerebral arteries bilaterally.     Impression  IMPRESSION: No flow-limiting stenosis or occlusion demonstrated. MRA NECK WO CONT    Narrative  INDICATION: Stroke , awoke with lightheadedness and headache. COMPARISON: None    EXAM: Unenhanced axial two-dimensional time-of-flight MR images of the neck  arteries with dimensional reformations. FINDINGS: Images are degraded by substantial motion artifacts precluding  accurate assessment of artery integrity. No occlusion or high-grade stenosis of  the codominant vertebral arteries is shown nor of the common, internal and  external carotid arteries. Impression  IMPRESSION: Degraded by motion artifacts. No occlusion or high-grade stenosis  demonstrated. 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 PLAN:  Right lumbosacral radiculopathy:  Discussed with patient that she has somewhat mixed features does have an element of leg dependent gradient consistent with peripheral neuropathy though this is somewhat patchy and not consistent along dermatomes. She also does have weakness in muscles innervated by the upper lumbar root as well as L5/S1 with suspicion for some element of spinal stenosis/chronic lumbosacral radiculopathy. Given age, duration of symptoms would not be prone to engage in any significant degree of diagnostic evaluation  Given weakness in right leg however will refer to physical therapy for further treatment and potential benefit  No therapeutics otherwise recommended    Can follow-up as needed    Donovan Muniz MD       CC Referring provider:  No referring provider defined for this encounter.     Jesus Matthews MD

## 2022-08-01 ENCOUNTER — APPOINTMENT (OUTPATIENT)
Dept: GENERAL RADIOLOGY | Age: 87
End: 2022-08-01
Attending: EMERGENCY MEDICINE
Payer: MEDICARE

## 2022-08-01 ENCOUNTER — HOSPITAL ENCOUNTER (EMERGENCY)
Age: 87
Discharge: HOME | End: 2022-08-01
Attending: EMERGENCY MEDICINE
Payer: MEDICARE

## 2022-08-01 VITALS
WEIGHT: 122 LBS | RESPIRATION RATE: 18 BRPM | OXYGEN SATURATION: 96 % | HEART RATE: 57 BPM | SYSTOLIC BLOOD PRESSURE: 134 MMHG | DIASTOLIC BLOOD PRESSURE: 55 MMHG | BODY MASS INDEX: 21.62 KG/M2 | TEMPERATURE: 98 F | HEIGHT: 63 IN

## 2022-08-01 DIAGNOSIS — E87.6 HYPOKALEMIA: ICD-10-CM

## 2022-08-01 DIAGNOSIS — R68.89 CONGESTION OF THROAT: ICD-10-CM

## 2022-08-01 DIAGNOSIS — R11.2 NAUSEA AND VOMITING IN ADULT: ICD-10-CM

## 2022-08-01 DIAGNOSIS — R42 LIGHTHEADEDNESS: Primary | ICD-10-CM

## 2022-08-01 LAB
ALBUMIN SERPL-MCNC: 3.4 G/DL (ref 3.5–5)
ALBUMIN/GLOB SERPL: 1.2 {RATIO} (ref 1.1–2.2)
ALP SERPL-CCNC: 76 U/L (ref 45–117)
ALT SERPL-CCNC: 20 U/L (ref 12–78)
ANION GAP SERPL CALC-SCNC: 8 MMOL/L (ref 5–15)
APPEARANCE UR: CLEAR
AST SERPL-CCNC: 35 U/L (ref 15–37)
ATRIAL RATE: 441 BPM
ATRIAL RATE: 51 BPM
BACTERIA URNS QL MICRO: NEGATIVE /HPF
BASOPHILS # BLD: 0.1 K/UL (ref 0–0.1)
BASOPHILS NFR BLD: 2 % (ref 0–1)
BILIRUB SERPL-MCNC: 0.7 MG/DL (ref 0.2–1)
BILIRUB UR QL: NEGATIVE
BUN SERPL-MCNC: 13 MG/DL (ref 6–20)
BUN/CREAT SERPL: 15 (ref 12–20)
CALCIUM SERPL-MCNC: 9.6 MG/DL (ref 8.5–10.1)
CALCULATED P AXIS, ECG09: -34 DEGREES
CALCULATED P AXIS, ECG09: 9 DEGREES
CALCULATED R AXIS, ECG10: -8 DEGREES
CALCULATED R AXIS, ECG10: 4 DEGREES
CALCULATED T AXIS, ECG11: 32 DEGREES
CALCULATED T AXIS, ECG11: 39 DEGREES
CHLORIDE SERPL-SCNC: 103 MMOL/L (ref 97–108)
CO2 SERPL-SCNC: 29 MMOL/L (ref 21–32)
COLOR UR: NORMAL
CREAT SERPL-MCNC: 0.85 MG/DL (ref 0.55–1.02)
DIAGNOSIS, 93000: NORMAL
DIAGNOSIS, 93000: NORMAL
DIFFERENTIAL METHOD BLD: ABNORMAL
EOSINOPHIL # BLD: 0.2 K/UL (ref 0–0.4)
EOSINOPHIL NFR BLD: 6 % (ref 0–7)
EPITH CASTS URNS QL MICRO: NORMAL /LPF
ERYTHROCYTE [DISTWIDTH] IN BLOOD BY AUTOMATED COUNT: 12.6 % (ref 11.5–14.5)
GLOBULIN SER CALC-MCNC: 2.9 G/DL (ref 2–4)
GLUCOSE SERPL-MCNC: 137 MG/DL (ref 65–100)
GLUCOSE UR STRIP.AUTO-MCNC: NEGATIVE MG/DL
HCT VFR BLD AUTO: 40.4 % (ref 35–47)
HGB BLD-MCNC: 13.2 G/DL (ref 11.5–16)
HGB UR QL STRIP: NEGATIVE
IMM GRANULOCYTES # BLD AUTO: 0 K/UL (ref 0–0.04)
IMM GRANULOCYTES NFR BLD AUTO: 0 % (ref 0–0.5)
KETONES UR QL STRIP.AUTO: NEGATIVE MG/DL
LEUKOCYTE ESTERASE UR QL STRIP.AUTO: NEGATIVE
LYMPHOCYTES # BLD: 1.2 K/UL (ref 0.8–3.5)
LYMPHOCYTES NFR BLD: 33 % (ref 12–49)
MAGNESIUM SERPL-MCNC: 1.9 MG/DL (ref 1.6–2.4)
MCH RBC QN AUTO: 27.7 PG (ref 26–34)
MCHC RBC AUTO-ENTMCNC: 32.7 G/DL (ref 30–36.5)
MCV RBC AUTO: 84.9 FL (ref 80–99)
MONOCYTES # BLD: 0.5 K/UL (ref 0–1)
MONOCYTES NFR BLD: 13 % (ref 5–13)
NEUTS SEG # BLD: 1.7 K/UL (ref 1.8–8)
NEUTS SEG NFR BLD: 46 % (ref 32–75)
NITRITE UR QL STRIP.AUTO: NEGATIVE
NRBC # BLD: 0 K/UL (ref 0–0.01)
NRBC BLD-RTO: 0 PER 100 WBC
P-R INTERVAL, ECG05: 176 MS
PH UR STRIP: 7 [PH] (ref 5–8)
PLATELET # BLD AUTO: 248 K/UL (ref 150–400)
PMV BLD AUTO: 9.7 FL (ref 8.9–12.9)
POTASSIUM SERPL-SCNC: 3.3 MMOL/L (ref 3.5–5.1)
PROT SERPL-MCNC: 6.3 G/DL (ref 6.4–8.2)
PROT UR STRIP-MCNC: NEGATIVE MG/DL
Q-T INTERVAL, ECG07: 446 MS
Q-T INTERVAL, ECG07: 456 MS
QRS DURATION, ECG06: 80 MS
QRS DURATION, ECG06: 82 MS
QTC CALCULATION (BEZET), ECG08: 420 MS
QTC CALCULATION (BEZET), ECG08: 426 MS
RBC # BLD AUTO: 4.76 M/UL (ref 3.8–5.2)
RBC #/AREA URNS HPF: NORMAL /HPF (ref 0–5)
SODIUM SERPL-SCNC: 140 MMOL/L (ref 136–145)
SP GR UR REFRACTOMETRY: 1.01 (ref 1–1.03)
TROPONIN-HIGH SENSITIVITY: 12 NG/L (ref 0–51)
TROPONIN-HIGH SENSITIVITY: 9 NG/L (ref 0–51)
UA: UC IF INDICATED,UAUC: NORMAL
UROBILINOGEN UR QL STRIP.AUTO: 0.2 EU/DL (ref 0.2–1)
VENTRICULAR RATE, ECG03: 51 BPM
VENTRICULAR RATE, ECG03: 55 BPM
WBC # BLD AUTO: 3.6 K/UL (ref 3.6–11)
WBC URNS QL MICRO: NORMAL /HPF (ref 0–4)

## 2022-08-01 PROCEDURE — 70360 X-RAY EXAM OF NECK: CPT

## 2022-08-01 PROCEDURE — 85025 COMPLETE CBC W/AUTO DIFF WBC: CPT

## 2022-08-01 PROCEDURE — 74011250636 HC RX REV CODE- 250/636: Performed by: EMERGENCY MEDICINE

## 2022-08-01 PROCEDURE — 96374 THER/PROPH/DIAG INJ IV PUSH: CPT

## 2022-08-01 PROCEDURE — 84484 ASSAY OF TROPONIN QUANT: CPT

## 2022-08-01 PROCEDURE — 96361 HYDRATE IV INFUSION ADD-ON: CPT

## 2022-08-01 PROCEDURE — 99285 EMERGENCY DEPT VISIT HI MDM: CPT

## 2022-08-01 PROCEDURE — 36415 COLL VENOUS BLD VENIPUNCTURE: CPT

## 2022-08-01 PROCEDURE — 93005 ELECTROCARDIOGRAM TRACING: CPT

## 2022-08-01 PROCEDURE — 81001 URINALYSIS AUTO W/SCOPE: CPT

## 2022-08-01 PROCEDURE — 83735 ASSAY OF MAGNESIUM: CPT

## 2022-08-01 PROCEDURE — 74011250637 HC RX REV CODE- 250/637: Performed by: EMERGENCY MEDICINE

## 2022-08-01 PROCEDURE — 80053 COMPREHEN METABOLIC PANEL: CPT

## 2022-08-01 PROCEDURE — 71046 X-RAY EXAM CHEST 2 VIEWS: CPT

## 2022-08-01 RX ORDER — AZITHROMYCIN 250 MG/1
TABLET, FILM COATED ORAL
Qty: 6 TABLET | Refills: 0 | Status: SHIPPED | OUTPATIENT
Start: 2022-08-01 | End: 2022-10-19

## 2022-08-01 RX ORDER — ONDANSETRON 4 MG/1
4 TABLET, FILM COATED ORAL
Qty: 20 TABLET | Refills: 0 | Status: SHIPPED | OUTPATIENT
Start: 2022-08-01

## 2022-08-01 RX ORDER — DOXYCYCLINE HYCLATE 100 MG
100 TABLET ORAL 2 TIMES DAILY
Qty: 14 TABLET | Refills: 0 | Status: SHIPPED | OUTPATIENT
Start: 2022-08-01 | End: 2022-08-01 | Stop reason: CLARIF

## 2022-08-01 RX ORDER — ONDANSETRON 2 MG/ML
4 INJECTION INTRAMUSCULAR; INTRAVENOUS
Status: COMPLETED | OUTPATIENT
Start: 2022-08-01 | End: 2022-08-01

## 2022-08-01 RX ORDER — POTASSIUM CHLORIDE 750 MG/1
20 TABLET, FILM COATED, EXTENDED RELEASE ORAL
Status: COMPLETED | OUTPATIENT
Start: 2022-08-01 | End: 2022-08-01

## 2022-08-01 RX ADMIN — SODIUM CHLORIDE 500 ML: 9 INJECTION, SOLUTION INTRAVENOUS at 10:56

## 2022-08-01 RX ADMIN — SODIUM CHLORIDE 500 ML: 9 INJECTION, SOLUTION INTRAVENOUS at 09:22

## 2022-08-01 RX ADMIN — POTASSIUM CHLORIDE 20 MEQ: 750 TABLET, FILM COATED, EXTENDED RELEASE ORAL at 12:57

## 2022-08-01 RX ADMIN — ONDANSETRON 4 MG: 2 INJECTION INTRAMUSCULAR; INTRAVENOUS at 09:22

## 2022-08-01 NOTE — ED PROVIDER NOTES
EMERGENCY DEPARTMENT HISTORY AND PHYSICAL EXAM      Date: 8/1/2022  Patient Name: Alma Conner    History of Presenting Illness     Chief Complaint   Patient presents with    Nausea     Pt arrives via EMS with cc of \"not feeling well for a few months\". Pt is complaining of nausea, dizziness this AM. Pt is vomiting in triage. Pt denies SOB, denies abd pain, CP. Dizziness       History Provided By: Patient    HPI: Alma Conner, 80 y.o. female presents to the ED with cc of lightheadedness and nausea. Patient states she woke up at 6 AM, and felt that her heart rate was elevated. She says it was in the 90s and then it would go back down to the 50s. She denied having chest pain, shortness of breath, fever or chills. She does feel lightheaded, but denies spinning sensation. She denies ringing in her ears. She has had nausea but no vomiting. She denies abdominal pain, headache, dysuria or change in bowel habits. There are no other complaints, changes, or physical findings at this time. PCP: Vasile Cuellar MD    No current facility-administered medications on file prior to encounter. Current Outpatient Medications on File Prior to Encounter   Medication Sig Dispense Refill    predniSONE (DELTASONE) 5 mg tablet Take 5 mg by mouth daily. ondansetron (ZOFRAN ODT) 4 mg disintegrating tablet Take 1 Tab by mouth every eight (8) hours as needed for Nausea. (Patient not taking: Reported on 3/23/2022) 20 Tab 0    senna-docusate (PERICOLACE) 8.6-50 mg per tablet Take 1 Tab by mouth daily. (Patient not taking: Reported on 3/23/2022) 30 Tab 0    brimonidine 0.025 % drop Apply 1 Drop to eye two (2) times a day. Right eye      dorzolamide (TRUSOPT) 2 % ophthalmic solution Administer 1 Drop to right eye two (2) times a day. metoprolol tartrate (LOPRESSOR) 25 mg tablet Take 25 mg by mouth daily. indapamide (LOZOL) 1.25 mg tablet Take 1.25 mg by mouth daily.       montelukast (SINGULAIR) 10 mg tablet Take 10 mg by mouth daily. pantoprazole (PROTONIX) 40 mg tablet Take 40 mg by mouth daily. meclizine (ANTIVERT) 25 mg tablet Take 1 Tab by mouth three (3) times daily as needed for Dizziness. (Patient not taking: Reported on 3/23/2022) 20 Tab 0    bimatoprost (LUMIGAN) 0.01 % ophthalmic drops Administer 1 Drop to right eye every evening.      multivitamin (ONE A DAY) tablet Take 1 Tab by mouth daily. albuterol (PROVENTIL HFA, VENTOLIN HFA, PROAIR HFA) 90 mcg/actuation inhaler Take 1 Puff by inhalation as needed for Wheezing.      lorazepam (ATIVAN) 0.5 mg tablet Take 0.5 mg by mouth nightly. Past History     Past Medical History:  Past Medical History:   Diagnosis Date    Adverse effect of anesthesia     combative waking up    Anxiety     Arthritis     Asthma     as of 12/30/16 pt states under control    Diverticulitis Dx 11/2016    Fibromyalgia     GERD (gastroesophageal reflux disease)     Glaucoma     Hiatal hernia     Hypercholesteremia     Hypertension     Ill-defined condition     neurapthy in ble    Nausea & vomiting        Past Surgical History:  Past Surgical History:   Procedure Laterality Date    HX CATARACT REMOVAL Right 01/04/2017    HX HYSTERECTOMY      HX ROTATOR CUFF REPAIR Left 2008    HX TUBAL LIGATION      AL COLONOSCOPY FLX DX W/COLLJ SPEC WHEN PFRMD  11/9/2011         AL EGD TRANSORAL BIOPSY SINGLE/MULTIPLE  11/9/2011         UPPER GI ENDOSCOPY,BIOPSY  8/20/2019            Family History:  No family history on file. Social History:  Social History     Tobacco Use    Smoking status: Never    Smokeless tobacco: Never   Substance Use Topics    Alcohol use: No    Drug use: No       Allergies:   Allergies   Allergen Reactions    Codeine Other (comments)    Cymbalta [Duloxetine] Nausea Only and Other (comments)     \"within the hour I had a severe headache, and vomiting\"      Demerol [Meperidine] Nausea and Vomiting    Lidocaine Other (comments)    Morphine Nausea and Vomiting Novocain [Procaine] Other (comments)    Penicillins Other (comments)     rash    Percocet [Oxycodone-Acetaminophen] Nausea and Vomiting    Sulfa (Sulfonamide Antibiotics) Other (comments)         Review of Systems   Review of Systems   Constitutional:  Negative for fever. HENT:  Negative for congestion. Eyes: Negative. Respiratory:  Negative for shortness of breath. Cardiovascular:  Negative for chest pain. Gastrointestinal:  Positive for nausea. Negative for abdominal pain. Endocrine: Negative for heat intolerance. Genitourinary: Negative. Musculoskeletal:  Negative for back pain. Skin:  Negative for rash. Allergic/Immunologic: Negative for immunocompromised state. Neurological:  Positive for light-headedness. Hematological:  Does not bruise/bleed easily. Psychiatric/Behavioral: Negative. All other systems reviewed and are negative. Physical Exam   Physical Exam  Vitals and nursing note reviewed. Constitutional:       General: She is not in acute distress. Appearance: She is well-developed. HENT:      Head: Normocephalic. Eyes:      Extraocular Movements: Extraocular movements intact. Cardiovascular:      Rate and Rhythm: Regular rhythm. Bradycardia present. Heart sounds: Normal heart sounds. Pulmonary:      Effort: Pulmonary effort is normal.      Breath sounds: Normal breath sounds. Abdominal:      General: Bowel sounds are normal.      Palpations: Abdomen is soft. Tenderness: There is no abdominal tenderness. Musculoskeletal:         General: Normal range of motion. Cervical back: Normal range of motion and neck supple. Skin:     General: Skin is warm and dry. Neurological:      General: No focal deficit present. Mental Status: She is alert and oriented to person, place, and time.    Psychiatric:         Mood and Affect: Mood normal.         Behavior: Behavior normal.       Diagnostic Study Results     Labs -     Recent Results (from the past 12 hour(s))   EKG, 12 LEAD, INITIAL    Collection Time: 08/01/22  7:59 AM   Result Value Ref Range    Ventricular Rate 55 BPM    Atrial Rate 441 BPM    QRS Duration 80 ms    Q-T Interval 446 ms    QTC Calculation (Bezet) 426 ms    Calculated P Axis -34 degrees    Calculated R Axis -8 degrees    Calculated T Axis 39 degrees    Diagnosis       Atrial flutter  Possible Anterior infarct , age undetermined  When compared with ECG of 11-JUN-2020 09:16,  Atrial flutter has replaced Sinus rhythm  Criteria for Inferior infarct are no longer present     CBC WITH AUTOMATED DIFF    Collection Time: 08/01/22  8:18 AM   Result Value Ref Range    WBC 3.6 3.6 - 11.0 K/uL    RBC 4.76 3.80 - 5.20 M/uL    HGB 13.2 11.5 - 16.0 g/dL    HCT 40.4 35.0 - 47.0 %    MCV 84.9 80.0 - 99.0 FL    MCH 27.7 26.0 - 34.0 PG    MCHC 32.7 30.0 - 36.5 g/dL    RDW 12.6 11.5 - 14.5 %    PLATELET 761 493 - 536 K/uL    MPV 9.7 8.9 - 12.9 FL    NRBC 0.0 0  WBC    ABSOLUTE NRBC 0.00 0.00 - 0.01 K/uL    NEUTROPHILS 46 32 - 75 %    LYMPHOCYTES 33 12 - 49 %    MONOCYTES 13 5 - 13 %    EOSINOPHILS 6 0 - 7 %    BASOPHILS 2 (H) 0 - 1 %    IMMATURE GRANULOCYTES 0 0.0 - 0.5 %    ABS. NEUTROPHILS 1.7 (L) 1.8 - 8.0 K/UL    ABS. LYMPHOCYTES 1.2 0.8 - 3.5 K/UL    ABS. MONOCYTES 0.5 0.0 - 1.0 K/UL    ABS. EOSINOPHILS 0.2 0.0 - 0.4 K/UL    ABS. BASOPHILS 0.1 0.0 - 0.1 K/UL    ABS. IMM.  GRANS. 0.0 0.00 - 0.04 K/UL    DF AUTOMATED     METABOLIC PANEL, COMPREHENSIVE    Collection Time: 08/01/22  8:18 AM   Result Value Ref Range    Sodium 140 136 - 145 mmol/L    Potassium 3.3 (L) 3.5 - 5.1 mmol/L    Chloride 103 97 - 108 mmol/L    CO2 29 21 - 32 mmol/L    Anion gap 8 5 - 15 mmol/L    Glucose 137 (H) 65 - 100 mg/dL    BUN 13 6 - 20 MG/DL    Creatinine 0.85 0.55 - 1.02 MG/DL    BUN/Creatinine ratio 15 12 - 20      GFR est AA >60 >60 ml/min/1.73m2    GFR est non-AA >60 >60 ml/min/1.73m2    Calcium 9.6 8.5 - 10.1 MG/DL    Bilirubin, total 0.7 0.2 - 1.0 MG/DL ALT (SGPT) 20 12 - 78 U/L    AST (SGOT) 35 15 - 37 U/L    Alk. phosphatase 76 45 - 117 U/L    Protein, total 6.3 (L) 6.4 - 8.2 g/dL    Albumin 3.4 (L) 3.5 - 5.0 g/dL    Globulin 2.9 2.0 - 4.0 g/dL    A-G Ratio 1.2 1.1 - 2.2     TROPONIN-HIGH SENSITIVITY    Collection Time: 08/01/22  8:18 AM   Result Value Ref Range    Troponin-High Sensitivity 9 0 - 51 ng/L   EKG, 12 LEAD, SUBSEQUENT    Collection Time: 08/01/22  9:49 AM   Result Value Ref Range    Ventricular Rate 51 BPM    Atrial Rate 51 BPM    P-R Interval 176 ms    QRS Duration 82 ms    Q-T Interval 456 ms    QTC Calculation (Bezet) 420 ms    Calculated P Axis 9 degrees    Calculated R Axis 4 degrees    Calculated T Axis 32 degrees    Diagnosis       Sinus bradycardia  Low voltage QRS  When compared with ECG of 01-AUG-2022 07:59,  MANUAL COMPARISON REQUIRED, DATA IS UNCONFIRMED         Radiologic Studies -   No orders to display     CT Results  (Last 48 hours)      None          CXR Results  (Last 48 hours)      None            Medical Decision Making   I am the first provider for this patient. I reviewed the vital signs, available nursing notes, past medical history, past surgical history, family history and social history. Vital Signs-Reviewed the patient's vital signs. Patient Vitals for the past 12 hrs:   Temp Pulse Resp BP SpO2   08/01/22 0916 -- (!) 53 17 (!) 111/50 92 %   08/01/22 0753 98.3 °F (36.8 °C) 66 21 129/76 100 %       EKG interpretation: (Preliminary)  Rhythm: sinus bradycardia; and regular . Rate (approx.): 51; Axis: normal; MO interval: normal; QRS interval: normal ; ST/T wave: normal; Other findings: .    Records Reviewed: Nursing Notes, Old Medical Records, Previous electrocardiograms, and Previous Laboratory Studies    Provider Notes (Medical Decision Making):   UTI, ACS, arrhythmia, dehydration, electrolyte abnormality    ED Course:   Initial assessment performed.  The patients presenting problems have been discussed, and they are in agreement with the care plan formulated and outlined with them. I have encouraged them to ask questions as they arise throughout their visit. Progress note: The patient is not lightheaded now. Her nausea has resolved. States that she has had congestion and sputum in her throat for 2 months. She said she was able to cough it up recently and it was grayish-green. She was treated with Mucinex, and Zithromax venously. She states that Zithromax did seem to help. X-rays were ordered, and her results were reviewed. She is advised to follow-up and return to ER if worse             Critical Care Time:   0      Disposition:  home    DISCHARGE PLAN:  1. Discharge Medication List as of 8/1/2022  2:05 PM        START taking these medications    Details   ondansetron hcl (Zofran) 4 mg tablet Take 1 Tablet by mouth every eight (8) hours as needed for Nausea., Normal, Disp-20 Tablet, R-0      azithromycin (Zithromax Z-Evelio) 250 mg tablet As directed, Normal, Disp-6 Tablet, R-0           CONTINUE these medications which have NOT CHANGED    Details   predniSONE (DELTASONE) 5 mg tablet Take 5 mg by mouth daily. , Historical Med      ondansetron (ZOFRAN ODT) 4 mg disintegrating tablet Take 1 Tab by mouth every eight (8) hours as needed for Nausea. , Print, Disp-20 Tab, R-0      senna-docusate (PERICOLACE) 8.6-50 mg per tablet Take 1 Tab by mouth daily. , Normal, Disp-30 Tab, R-0      brimonidine 0.025 % drop Apply 1 Drop to eye two (2) times a day. Right eye, Historical Med      dorzolamide (TRUSOPT) 2 % ophthalmic solution Administer 1 Drop to right eye two (2) times a day., Historical Med      metoprolol tartrate (LOPRESSOR) 25 mg tablet Take 25 mg by mouth daily. , Historical Med      indapamide (LOZOL) 1.25 mg tablet Take 1.25 mg by mouth daily. , Historical Med      montelukast (SINGULAIR) 10 mg tablet Take 10 mg by mouth daily. , Historical Med      pantoprazole (PROTONIX) 40 mg tablet Take 40 mg by mouth daily. , Historical Med      bimatoprost (LUMIGAN) 0.01 % ophthalmic drops Administer 1 Drop to right eye every evening., Historical Med      multivitamin (ONE A DAY) tablet Take 1 Tab by mouth daily. , Historical Med      albuterol (PROVENTIL HFA, VENTOLIN HFA, PROAIR HFA) 90 mcg/actuation inhaler Take 1 Puff by inhalation as needed for Wheezing., Historical Med      lorazepam (ATIVAN) 0.5 mg tablet Take 0.5 mg by mouth nightly., Historical Med           2. Follow-up Information       Follow up With Specialties Details Why Contact Info    Chirag Jones MD Family Medicine  As needed [address] [place] [address] 83.  [phone]      [facility] EMERGENCY DEPT Emergency Medicine  If symptoms worsen [address]  [address] [facility]  [phone]          3. Return to ED if worse     Diagnosis     Clinical Impression:   1. Lightheadedness    2. Nausea and vomiting in adult    3. Hypokalemia    4. Congestion of throat        Attestations:    Laurie Perez MD        Please note that this dictation was completed with [employer], the [employer] voice recognition software. Quite often unanticipated grammatical, syntax, homophones, and other interpretive errors are inadvertently transcribed by the computer software. Please disregard these errors. Please excuse any errors that have escaped final proofreading. Thank you. [phone]

## 2022-08-22 ENCOUNTER — TRANSCRIBE ORDER (OUTPATIENT)
Dept: REGISTRATION | Age: 87
End: 2022-08-22

## 2022-08-22 ENCOUNTER — HOSPITAL ENCOUNTER (OUTPATIENT)
Dept: GENERAL RADIOLOGY | Age: 87
Discharge: HOME | End: 2022-08-22
Payer: MEDICARE

## 2022-08-22 DIAGNOSIS — T80.89XA WHEEZING AS MANIFESTATION OF BLOOD TRANSFUSION REACTION: Primary | ICD-10-CM

## 2022-08-22 DIAGNOSIS — R06.2 WHEEZING: ICD-10-CM

## 2022-08-22 DIAGNOSIS — R06.2 WHEEZING AS MANIFESTATION OF BLOOD TRANSFUSION REACTION: Primary | ICD-10-CM
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
## 2022-08-22 PROCEDURE — 71046 X-RAY EXAM CHEST 2 VIEWS: CPT

## 2022-09-22 ENCOUNTER — TRANSCRIBE ORDER (OUTPATIENT)
Dept: SCHEDULING | Age: 87
End: 2022-09-22

## 2022-09-22 DIAGNOSIS — J42 BRONCHITIS, CHRONIC (HCC): Primary | ICD-10-CM

## 2022-09-26 ENCOUNTER — TRANSCRIBE ORDER (OUTPATIENT)
Dept: SCHEDULING | Age: 87
End: 2022-09-26

## 2022-09-26 DIAGNOSIS — R93.89 ABNORMAL CHEST X-RAY: Primary | ICD-10-CM

## 2022-09-26 DIAGNOSIS — J45.909 ASTHMATIC BRONCHITIS: Primary | ICD-10-CM

## 2022-09-29 ENCOUNTER — HOSPITAL ENCOUNTER (OUTPATIENT)
Dept: CT IMAGING | Age: 87
Discharge: HOME | End: 2022-09-29
Attending: INTERNAL MEDICINE
Payer: MEDICARE

## 2022-09-29 DIAGNOSIS — R93.89 ABNORMAL CHEST X-RAY: ICD-10-CM

## 2022-09-29 PROCEDURE — 71250 CT THORAX DX C-: CPT

## 2022-10-17 ENCOUNTER — APPOINTMENT (OUTPATIENT)
Dept: GENERAL RADIOLOGY | Age: 87
DRG: 280 | End: 2022-10-17
Attending: EMERGENCY MEDICINE
Payer: MEDICARE

## 2022-10-17 ENCOUNTER — HOSPITAL ENCOUNTER (INPATIENT)
Age: 87
LOS: 2 days | Discharge: HOME | DRG: 280 | End: 2022-10-19
Attending: EMERGENCY MEDICINE | Admitting: STUDENT IN AN ORGANIZED HEALTH CARE EDUCATION/TRAINING PROGRAM
Payer: MEDICARE

## 2022-10-17 DIAGNOSIS — J42 ACUTE EXACERBATION OF CHRONIC BRONCHITIS (HCC): ICD-10-CM

## 2022-10-17 DIAGNOSIS — J96.01 ACUTE RESPIRATORY FAILURE WITH HYPOXIA (HCC): Primary | ICD-10-CM

## 2022-10-17 DIAGNOSIS — I21.4 NSTEMI (NON-ST ELEVATED MYOCARDIAL INFARCTION) (HCC): ICD-10-CM

## 2022-10-17 DIAGNOSIS — J20.9 ACUTE EXACERBATION OF CHRONIC BRONCHITIS (HCC): ICD-10-CM

## 2022-10-17 DIAGNOSIS — I25.10 CAD (CORONARY ARTERY DISEASE): ICD-10-CM

## 2022-10-17 PROBLEM — J96.90 RESPIRATORY FAILURE (HCC): Status: ACTIVE | Noted: 2022-10-17

## 2022-10-17 LAB
ALBUMIN SERPL-MCNC: 3.7 G/DL (ref 3.5–5)
ALBUMIN/GLOB SERPL: 1.1 {RATIO} (ref 1.1–2.2)
ALP SERPL-CCNC: 75 U/L (ref 45–117)
ALT SERPL-CCNC: 28 U/L (ref 12–78)
ANION GAP SERPL CALC-SCNC: 11 MMOL/L (ref 5–15)
ANION GAP SERPL CALC-SCNC: 8 MMOL/L (ref 5–15)
APTT PPP: 26.2 SEC (ref 22.1–31)
APTT PPP: 64.1 SEC (ref 22.1–31)
AST SERPL-CCNC: 35 U/L (ref 15–37)
ATRIAL RATE: 102 BPM
ATRIAL RATE: 111 BPM
BASOPHILS # BLD: 0.1 K/UL (ref 0–0.1)
BASOPHILS # BLD: 0.1 K/UL (ref 0–0.1)
BASOPHILS NFR BLD: 1 % (ref 0–1)
BASOPHILS NFR BLD: 2 % (ref 0–1)
BILIRUB SERPL-MCNC: 0.4 MG/DL (ref 0.2–1)
BNP SERPL-MCNC: 114 PG/ML
BUN SERPL-MCNC: 14 MG/DL (ref 6–20)
BUN SERPL-MCNC: 16 MG/DL (ref 6–20)
BUN/CREAT SERPL: 14 (ref 12–20)
BUN/CREAT SERPL: 16 (ref 12–20)
CALCIUM SERPL-MCNC: 10.3 MG/DL (ref 8.5–10.1)
CALCIUM SERPL-MCNC: 9.1 MG/DL (ref 8.5–10.1)
CALCULATED P AXIS, ECG09: 78 DEGREES
CALCULATED R AXIS, ECG10: -48 DEGREES
CALCULATED R AXIS, ECG10: -59 DEGREES
CALCULATED T AXIS, ECG11: 86 DEGREES
CALCULATED T AXIS, ECG11: 98 DEGREES
CHLORIDE SERPL-SCNC: 100 MMOL/L (ref 97–108)
CHLORIDE SERPL-SCNC: 102 MMOL/L (ref 97–108)
CO2 SERPL-SCNC: 26 MMOL/L (ref 21–32)
CO2 SERPL-SCNC: 29 MMOL/L (ref 21–32)
COVID-19 RAPID TEST, COVR: NOT DETECTED
CREAT SERPL-MCNC: 0.99 MG/DL (ref 0.55–1.02)
CREAT SERPL-MCNC: 0.99 MG/DL (ref 0.55–1.02)
DIAGNOSIS, 93000: NORMAL
DIAGNOSIS, 93000: NORMAL
DIFFERENTIAL METHOD BLD: ABNORMAL
DIFFERENTIAL METHOD BLD: ABNORMAL
EOSINOPHIL # BLD: 0.2 K/UL (ref 0–0.4)
EOSINOPHIL # BLD: 1.2 K/UL (ref 0–0.4)
EOSINOPHIL NFR BLD: 18 % (ref 0–7)
EOSINOPHIL NFR BLD: 2 % (ref 0–7)
ERYTHROCYTE [DISTWIDTH] IN BLOOD BY AUTOMATED COUNT: 13.3 % (ref 11.5–14.5)
ERYTHROCYTE [DISTWIDTH] IN BLOOD BY AUTOMATED COUNT: 13.4 % (ref 11.5–14.5)
FLUAV AG NPH QL IA: NEGATIVE
FLUBV AG NOSE QL IA: NEGATIVE
GLOBULIN SER CALC-MCNC: 3.3 G/DL (ref 2–4)
GLUCOSE SERPL-MCNC: 126 MG/DL (ref 65–100)
GLUCOSE SERPL-MCNC: 155 MG/DL (ref 65–100)
HCT VFR BLD AUTO: 40.6 % (ref 35–47)
HCT VFR BLD AUTO: 40.6 % (ref 35–47)
HGB BLD-MCNC: 13.4 G/DL (ref 11.5–16)
HGB BLD-MCNC: 13.7 G/DL (ref 11.5–16)
IMM GRANULOCYTES # BLD AUTO: 0 K/UL (ref 0–0.04)
IMM GRANULOCYTES # BLD AUTO: 0 K/UL (ref 0–0.04)
IMM GRANULOCYTES NFR BLD AUTO: 0 % (ref 0–0.5)
IMM GRANULOCYTES NFR BLD AUTO: 0 % (ref 0–0.5)
LACTATE BLD-SCNC: 2.94 MMOL/L (ref 0.4–2)
LACTATE BLD-SCNC: 3.16 MMOL/L (ref 0.4–2)
LACTATE BLD-SCNC: 3.3 MMOL/L (ref 0.4–2)
LACTATE BLD-SCNC: 4.51 MMOL/L (ref 0.4–2)
LYMPHOCYTES # BLD: 0.6 K/UL (ref 0.8–3.5)
LYMPHOCYTES # BLD: 2.2 K/UL (ref 0.8–3.5)
LYMPHOCYTES NFR BLD: 34 % (ref 12–49)
LYMPHOCYTES NFR BLD: 8 % (ref 12–49)
MAGNESIUM SERPL-MCNC: 2 MG/DL (ref 1.6–2.4)
MCH RBC QN AUTO: 28.4 PG (ref 26–34)
MCH RBC QN AUTO: 28.6 PG (ref 26–34)
MCHC RBC AUTO-ENTMCNC: 33 G/DL (ref 30–36.5)
MCHC RBC AUTO-ENTMCNC: 33.7 G/DL (ref 30–36.5)
MCV RBC AUTO: 84.8 FL (ref 80–99)
MCV RBC AUTO: 86 FL (ref 80–99)
MONOCYTES # BLD: 0.2 K/UL (ref 0–1)
MONOCYTES # BLD: 0.7 K/UL (ref 0–1)
MONOCYTES NFR BLD: 11 % (ref 5–13)
MONOCYTES NFR BLD: 3 % (ref 5–13)
NEUTS SEG # BLD: 2.2 K/UL (ref 1.8–8)
NEUTS SEG # BLD: 6.8 K/UL (ref 1.8–8)
NEUTS SEG NFR BLD: 35 % (ref 32–75)
NEUTS SEG NFR BLD: 86 % (ref 32–75)
NRBC # BLD: 0 K/UL (ref 0–0.01)
NRBC # BLD: 0 K/UL (ref 0–0.01)
NRBC BLD-RTO: 0 PER 100 WBC
NRBC BLD-RTO: 0 PER 100 WBC
P-R INTERVAL, ECG05: 146 MS
P-R INTERVAL, ECG05: 182 MS
PLATELET # BLD AUTO: 251 K/UL (ref 150–400)
PLATELET # BLD AUTO: 266 K/UL (ref 150–400)
PMV BLD AUTO: 10 FL (ref 8.9–12.9)
PMV BLD AUTO: 10.1 FL (ref 8.9–12.9)
POTASSIUM SERPL-SCNC: 3 MMOL/L (ref 3.5–5.1)
POTASSIUM SERPL-SCNC: 3.2 MMOL/L (ref 3.5–5.1)
PROT SERPL-MCNC: 7 G/DL (ref 6.4–8.2)
Q-T INTERVAL, ECG07: 400 MS
Q-T INTERVAL, ECG07: 404 MS
QRS DURATION, ECG06: 122 MS
QRS DURATION, ECG06: 128 MS
QTC CALCULATION (BEZET), ECG08: 521 MS
QTC CALCULATION (BEZET), ECG08: 549 MS
RBC # BLD AUTO: 4.72 M/UL (ref 3.8–5.2)
RBC # BLD AUTO: 4.79 M/UL (ref 3.8–5.2)
RBC MORPH BLD: ABNORMAL
RBC MORPH BLD: ABNORMAL
SODIUM SERPL-SCNC: 137 MMOL/L (ref 136–145)
SODIUM SERPL-SCNC: 139 MMOL/L (ref 136–145)
SOURCE, COVRS: NORMAL
THERAPEUTIC RANGE,PTTT: ABNORMAL SECS (ref 58–77)
THERAPEUTIC RANGE,PTTT: NORMAL SECS (ref 58–77)
TROPONIN-HIGH SENSITIVITY: 3612 NG/L (ref 0–51)
TROPONIN-HIGH SENSITIVITY: 3711 NG/L (ref 0–51)
TROPONIN-HIGH SENSITIVITY: 4069 NG/L (ref 0–51)
TROPONIN-HIGH SENSITIVITY: 407 NG/L (ref 0–51)
TROPONIN-HIGH SENSITIVITY: 4390 NG/L (ref 0–51)
VENTRICULAR RATE, ECG03: 102 BPM
VENTRICULAR RATE, ECG03: 111 BPM
WBC # BLD AUTO: 6.4 K/UL (ref 3.6–11)
WBC # BLD AUTO: 7.9 K/UL (ref 3.6–11)
WBC MORPH BLD: ABNORMAL

## 2022-10-17 PROCEDURE — 74011250637 HC RX REV CODE- 250/637: Performed by: STUDENT IN AN ORGANIZED HEALTH CARE EDUCATION/TRAINING PROGRAM

## 2022-10-17 PROCEDURE — 93005 ELECTROCARDIOGRAM TRACING: CPT

## 2022-10-17 PROCEDURE — 85730 THROMBOPLASTIN TIME PARTIAL: CPT

## 2022-10-17 PROCEDURE — 74011250636 HC RX REV CODE- 250/636: Performed by: EMERGENCY MEDICINE

## 2022-10-17 PROCEDURE — 96365 THER/PROPH/DIAG IV INF INIT: CPT

## 2022-10-17 PROCEDURE — 83735 ASSAY OF MAGNESIUM: CPT

## 2022-10-17 PROCEDURE — 74011000250 HC RX REV CODE- 250: Performed by: EMERGENCY MEDICINE

## 2022-10-17 PROCEDURE — 94640 AIRWAY INHALATION TREATMENT: CPT

## 2022-10-17 PROCEDURE — 96375 TX/PRO/DX INJ NEW DRUG ADDON: CPT

## 2022-10-17 PROCEDURE — 36415 COLL VENOUS BLD VENIPUNCTURE: CPT

## 2022-10-17 PROCEDURE — 71045 X-RAY EXAM CHEST 1 VIEW: CPT

## 2022-10-17 PROCEDURE — 74011250636 HC RX REV CODE- 250/636: Performed by: STUDENT IN AN ORGANIZED HEALTH CARE EDUCATION/TRAINING PROGRAM

## 2022-10-17 PROCEDURE — 87040 BLOOD CULTURE FOR BACTERIA: CPT

## 2022-10-17 PROCEDURE — 83605 ASSAY OF LACTIC ACID: CPT

## 2022-10-17 PROCEDURE — 80053 COMPREHEN METABOLIC PANEL: CPT

## 2022-10-17 PROCEDURE — 74011000258 HC RX REV CODE- 258: Performed by: STUDENT IN AN ORGANIZED HEALTH CARE EDUCATION/TRAINING PROGRAM

## 2022-10-17 PROCEDURE — 85025 COMPLETE CBC W/AUTO DIFF WBC: CPT

## 2022-10-17 PROCEDURE — 84484 ASSAY OF TROPONIN QUANT: CPT

## 2022-10-17 PROCEDURE — 87804 INFLUENZA ASSAY W/OPTIC: CPT

## 2022-10-17 PROCEDURE — 74011000250 HC RX REV CODE- 250: Performed by: STUDENT IN AN ORGANIZED HEALTH CARE EDUCATION/TRAINING PROGRAM

## 2022-10-17 PROCEDURE — 65270000046 HC RM TELEMETRY

## 2022-10-17 PROCEDURE — 83880 ASSAY OF NATRIURETIC PEPTIDE: CPT

## 2022-10-17 PROCEDURE — 99285 EMERGENCY DEPT VISIT HI MDM: CPT

## 2022-10-17 PROCEDURE — 87635 SARS-COV-2 COVID-19 AMP PRB: CPT

## 2022-10-17 RX ORDER — BRIMONIDINE TARTRATE 2 MG/ML
1 SOLUTION/ DROPS OPHTHALMIC 2 TIMES DAILY
Status: DISCONTINUED | OUTPATIENT
Start: 2022-10-17 | End: 2022-10-19 | Stop reason: HOSPADM

## 2022-10-17 RX ORDER — HEPARIN SODIUM 1000 [USP'U]/ML
60 INJECTION, SOLUTION INTRAVENOUS; SUBCUTANEOUS ONCE
Status: COMPLETED | OUTPATIENT
Start: 2022-10-17 | End: 2022-10-17

## 2022-10-17 RX ORDER — GUAIFENESIN 100 MG/5ML
81 LIQUID (ML) ORAL DAILY
Status: DISCONTINUED | OUTPATIENT
Start: 2022-10-18 | End: 2022-10-19 | Stop reason: HOSPADM

## 2022-10-17 RX ORDER — DORZOLAMIDE HCL 20 MG/ML
1 SOLUTION/ DROPS OPHTHALMIC 2 TIMES DAILY
Status: DISCONTINUED | OUTPATIENT
Start: 2022-10-17 | End: 2022-10-19 | Stop reason: HOSPADM

## 2022-10-17 RX ORDER — ALBUTEROL SULFATE 0.83 MG/ML
10 SOLUTION RESPIRATORY (INHALATION)
Status: COMPLETED | OUTPATIENT
Start: 2022-10-17 | End: 2022-10-17

## 2022-10-17 RX ORDER — AZITHROMYCIN 250 MG/1
500 TABLET, FILM COATED ORAL DAILY
Status: DISCONTINUED | OUTPATIENT
Start: 2022-10-18 | End: 2022-10-17

## 2022-10-17 RX ORDER — SODIUM CHLORIDE 0.9 % (FLUSH) 0.9 %
5-40 SYRINGE (ML) INJECTION EVERY 8 HOURS
Status: DISCONTINUED | OUTPATIENT
Start: 2022-10-17 | End: 2022-10-19 | Stop reason: HOSPADM

## 2022-10-17 RX ORDER — LATANOPROST 50 UG/ML
1 SOLUTION/ DROPS OPHTHALMIC EVERY EVENING
Status: DISCONTINUED | OUTPATIENT
Start: 2022-10-17 | End: 2022-10-19 | Stop reason: HOSPADM

## 2022-10-17 RX ORDER — ALBUTEROL SULFATE 0.83 MG/ML
2.5 SOLUTION RESPIRATORY (INHALATION)
Status: DISCONTINUED | OUTPATIENT
Start: 2022-10-17 | End: 2022-10-19 | Stop reason: HOSPADM

## 2022-10-17 RX ORDER — IPRATROPIUM BROMIDE AND ALBUTEROL SULFATE 2.5; .5 MG/3ML; MG/3ML
3 SOLUTION RESPIRATORY (INHALATION)
Status: DISCONTINUED | OUTPATIENT
Start: 2022-10-17 | End: 2022-10-19 | Stop reason: HOSPADM

## 2022-10-17 RX ORDER — PANTOPRAZOLE SODIUM 40 MG/1
40 TABLET, DELAYED RELEASE ORAL DAILY
Status: DISCONTINUED | OUTPATIENT
Start: 2022-10-17 | End: 2022-10-19 | Stop reason: HOSPADM

## 2022-10-17 RX ORDER — BRIMONIDINE TARTRATE 2 MG/ML
1 SOLUTION/ DROPS OPHTHALMIC 2 TIMES DAILY
Status: DISCONTINUED | OUTPATIENT
Start: 2022-10-17 | End: 2022-10-17

## 2022-10-17 RX ORDER — HEPARIN SODIUM 10000 [USP'U]/100ML
12-25 INJECTION, SOLUTION INTRAVENOUS
Status: DISCONTINUED | OUTPATIENT
Start: 2022-10-17 | End: 2022-10-19 | Stop reason: HOSPADM

## 2022-10-17 RX ORDER — IPRATROPIUM BROMIDE AND ALBUTEROL SULFATE 2.5; .5 MG/3ML; MG/3ML
3 SOLUTION RESPIRATORY (INHALATION)
Status: COMPLETED | OUTPATIENT
Start: 2022-10-17 | End: 2022-10-17

## 2022-10-17 RX ORDER — PREDNISONE 20 MG/1
40 TABLET ORAL
Status: DISCONTINUED | OUTPATIENT
Start: 2022-10-18 | End: 2022-10-19 | Stop reason: HOSPADM

## 2022-10-17 RX ORDER — POLYETHYLENE GLYCOL 3350 17 G/17G
17 POWDER, FOR SOLUTION ORAL DAILY PRN
Status: DISCONTINUED | OUTPATIENT
Start: 2022-10-17 | End: 2022-10-19 | Stop reason: HOSPADM

## 2022-10-17 RX ORDER — BENZONATATE 100 MG/1
CAPSULE ORAL
COMMUNITY
Start: 2022-09-22

## 2022-10-17 RX ORDER — ASPIRIN 325 MG
325 TABLET ORAL ONCE
Status: COMPLETED | OUTPATIENT
Start: 2022-10-17 | End: 2022-10-17

## 2022-10-17 RX ORDER — SODIUM CHLORIDE 0.9 % (FLUSH) 0.9 %
5-40 SYRINGE (ML) INJECTION AS NEEDED
Status: DISCONTINUED | OUTPATIENT
Start: 2022-10-17 | End: 2022-10-19 | Stop reason: HOSPADM

## 2022-10-17 RX ORDER — POTASSIUM CHLORIDE 20 MEQ/1
40 TABLET, EXTENDED RELEASE ORAL
Status: COMPLETED | OUTPATIENT
Start: 2022-10-17 | End: 2022-10-17

## 2022-10-17 RX ORDER — ACETAMINOPHEN 325 MG/1
650 TABLET ORAL
Status: DISCONTINUED | OUTPATIENT
Start: 2022-10-17 | End: 2022-10-19 | Stop reason: HOSPADM

## 2022-10-17 RX ORDER — HEPARIN SODIUM 1000 [USP'U]/ML
30 INJECTION, SOLUTION INTRAVENOUS; SUBCUTANEOUS AS NEEDED
Status: DISCONTINUED | OUTPATIENT
Start: 2022-10-17 | End: 2022-10-19 | Stop reason: HOSPADM

## 2022-10-17 RX ORDER — LATANOPROST 50 UG/ML
1 SOLUTION/ DROPS OPHTHALMIC EVERY EVENING
Status: DISCONTINUED | OUTPATIENT
Start: 2022-10-17 | End: 2022-10-17

## 2022-10-17 RX ORDER — ONDANSETRON 4 MG/1
4 TABLET, ORALLY DISINTEGRATING ORAL
Status: DISCONTINUED | OUTPATIENT
Start: 2022-10-17 | End: 2022-10-19 | Stop reason: HOSPADM

## 2022-10-17 RX ORDER — HEPARIN SODIUM 1000 [USP'U]/ML
60 INJECTION, SOLUTION INTRAVENOUS; SUBCUTANEOUS AS NEEDED
Status: DISCONTINUED | OUTPATIENT
Start: 2022-10-17 | End: 2022-10-19 | Stop reason: HOSPADM

## 2022-10-17 RX ORDER — AZITHROMYCIN 250 MG/1
250 TABLET, FILM COATED ORAL DAILY
Status: DISCONTINUED | OUTPATIENT
Start: 2022-10-18 | End: 2022-10-19 | Stop reason: HOSPADM

## 2022-10-17 RX ORDER — METOPROLOL TARTRATE 25 MG/1
25 TABLET, FILM COATED ORAL DAILY
Status: DISCONTINUED | OUTPATIENT
Start: 2022-10-17 | End: 2022-10-19 | Stop reason: HOSPADM

## 2022-10-17 RX ORDER — ONDANSETRON 2 MG/ML
4 INJECTION INTRAMUSCULAR; INTRAVENOUS
Status: DISCONTINUED | OUTPATIENT
Start: 2022-10-17 | End: 2022-10-19 | Stop reason: HOSPADM

## 2022-10-17 RX ORDER — MAGNESIUM SULFATE HEPTAHYDRATE 40 MG/ML
2 INJECTION, SOLUTION INTRAVENOUS
Status: COMPLETED | OUTPATIENT
Start: 2022-10-17 | End: 2022-10-17

## 2022-10-17 RX ORDER — MONTELUKAST SODIUM 10 MG/1
10 TABLET ORAL DAILY
Status: DISCONTINUED | OUTPATIENT
Start: 2022-10-17 | End: 2022-10-19 | Stop reason: HOSPADM

## 2022-10-17 RX ADMIN — ALBUTEROL SULFATE 10 MG: 2.5 SOLUTION RESPIRATORY (INHALATION) at 07:14

## 2022-10-17 RX ADMIN — DORZOLAMIDE HYDROCHLORIDE 1 DROP: 20 SOLUTION/ DROPS OPHTHALMIC at 22:07

## 2022-10-17 RX ADMIN — MAGNESIUM SULFATE HEPTAHYDRATE 2 G: 40 INJECTION, SOLUTION INTRAVENOUS at 08:18

## 2022-10-17 RX ADMIN — IPRATROPIUM BROMIDE AND ALBUTEROL SULFATE 3 ML: .5; 3 SOLUTION RESPIRATORY (INHALATION) at 21:09

## 2022-10-17 RX ADMIN — CEFTRIAXONE 1 G: 1 INJECTION, POWDER, FOR SOLUTION INTRAMUSCULAR; INTRAVENOUS at 10:30

## 2022-10-17 RX ADMIN — HEPARIN SODIUM 3320 UNITS: 1000 INJECTION INTRAVENOUS; SUBCUTANEOUS at 10:41

## 2022-10-17 RX ADMIN — SODIUM CHLORIDE 1000 ML: 9 INJECTION, SOLUTION INTRAVENOUS at 13:39

## 2022-10-17 RX ADMIN — DORZOLAMIDE HYDROCHLORIDE 1 DROP: 20 SOLUTION/ DROPS OPHTHALMIC at 14:09

## 2022-10-17 RX ADMIN — IPRATROPIUM BROMIDE AND ALBUTEROL SULFATE 3 ML: .5; 3 SOLUTION RESPIRATORY (INHALATION) at 07:14

## 2022-10-17 RX ADMIN — BRIMONIDINE TARTRATE 1 DROP: 2 SOLUTION OPHTHALMIC at 22:02

## 2022-10-17 RX ADMIN — LATANOPROST 1 DROP: 50 SOLUTION OPHTHALMIC at 22:04

## 2022-10-17 RX ADMIN — HEPARIN SODIUM AND DEXTROSE 12 UNITS/KG/HR: 10000; 5 INJECTION INTRAVENOUS at 10:42

## 2022-10-17 RX ADMIN — SODIUM CHLORIDE, PRESERVATIVE FREE 10 ML: 5 INJECTION INTRAVENOUS at 18:12

## 2022-10-17 RX ADMIN — METHYLPREDNISOLONE SODIUM SUCCINATE 125 MG: 125 INJECTION, POWDER, FOR SOLUTION INTRAMUSCULAR; INTRAVENOUS at 08:18

## 2022-10-17 RX ADMIN — POTASSIUM CHLORIDE 40 MEQ: 1500 TABLET, EXTENDED RELEASE ORAL at 21:56

## 2022-10-17 RX ADMIN — AZITHROMYCIN DIHYDRATE 500 MG: 500 INJECTION, POWDER, LYOPHILIZED, FOR SOLUTION INTRAVENOUS at 11:42

## 2022-10-17 RX ADMIN — SODIUM CHLORIDE, PRESERVATIVE FREE 10 ML: 5 INJECTION INTRAVENOUS at 22:09

## 2022-10-17 RX ADMIN — IPRATROPIUM BROMIDE AND ALBUTEROL SULFATE 3 ML: .5; 3 SOLUTION RESPIRATORY (INHALATION) at 15:17

## 2022-10-17 RX ADMIN — IPRATROPIUM BROMIDE AND ALBUTEROL SULFATE 3 ML: .5; 3 SOLUTION RESPIRATORY (INHALATION) at 11:53

## 2022-10-17 RX ADMIN — BRIMONIDINE TARTRATE 1 DROP: 2 SOLUTION OPHTHALMIC at 18:00

## 2022-10-17 RX ADMIN — METOPROLOL TARTRATE 25 MG: 25 TABLET, FILM COATED ORAL at 11:40

## 2022-10-17 RX ADMIN — ASPIRIN 325 MG ORAL TABLET 325 MG: 325 PILL ORAL at 10:30

## 2022-10-17 RX ADMIN — PANTOPRAZOLE SODIUM 40 MG: 40 TABLET, DELAYED RELEASE ORAL at 11:40

## 2022-10-17 RX ADMIN — MONTELUKAST 10 MG: 10 TABLET, FILM COATED ORAL at 13:39

## 2022-10-17 NOTE — ED TRIAGE NOTES
Pt comes from home by EMS reporting of SOB waking her up this morning. Pt used her albuterol inhaler at home. EMS did a duo-neb in route.

## 2022-10-17 NOTE — PROGRESS NOTES
1730: Dr. Candice Spears at patient's bedside aware of recent lactic acid level = 4.51. Per MD, repeat BMP instead of lactic acid level. 1750: BMP sent to the lab    [id number]:  Unable to complete PTA med list, patient left list of meds at home

## 2022-10-17 NOTE — H&P
Hospitalist Admission Note    NAME:  Heather Cardona   :  1935   MRN:  [id number]     Date/Time:  10/17/2022 10:03 AM    Patient PCP: Radhames Carter MD  ______________________________________________________________________  Given the patient's current clinical presentation, I have a high level of concern for decompensation if discharged from the emergency department. Complex decision making was performed, which includes reviewing the patient's available past medical records, laboratory results, and x-ray films. Assessment / Plan:  Acute hypoxic respiratory failure 2/2 asthma exacerbation  Afebrile, normal white count. No productive cough. CXR clear. Follows with Dr. Tika Blanco.  - holding off on antibiotics, unlikely pneumonia preset  - continue azithromycin 250 daily for antiinflammatory effect  - duonebs q4h scheduled with albuterol q2h prn  - s/p solumedrol in ED  - start prednisone 40 daily 10/18/22  - continue home singulair    NSTEMI  New LBBB  Troponin 400 -> 3600 -> 4000. No chest pain. - s/p  in ED, continue ASA 81 daily  - heparin drip  - Cardiology consulted, plan for [facility] 10/18/22, NPO at midnight  - trend troponins, EKGs    Lactic acidosis  Likely 2/2 NSTEMI vs asthma exacerbation. Is receiving scheduled albuterol, may increase lactate. ?HTN  - continue home metop tartrate 25 daily  - holding indapamide 1.25    ? PMR  Recent injection, patient family unsure of what medication was given    Glaucoma  - continue home drops (dorzolamide, brimonidine, bimatoprost)    Code Status: Full  Surrogate Decision Maker: Yvette Tavarez daughter. [phone]    DVT Prophylaxis: heparin drip  GI Prophylaxis: not indicated  Disposition: stepdown  Barriers: medical stability  CHRISTINE: 10/20/22        Subjective:   CHIEF COMPLAINT: SOB    HISTORY OF PRESENT ILLNESS:     Mirela Bae is a 80 y.o.  female with asthma, PMR, HTN, glaucoma, who presents with acute SOB for 1 day.  Patient woke up at 3 am on 10/17/22 with difficulty breathing, wheezing, sweating. Denies cough, chest pain, nausea, vomiting, abdominal pain, hematuria, hematochezia, melena. Took a nebulized breathing treatment however symptoms persisted, thus she called 911, was brought to the ED. No sick contacts. Received an injection with Dr. Hazel Carpio for PMR recently, unsure of the specific medication. No other medication changes. No sick contacts. In the ED, found to be hypoxic to 88%. Wheezing present, started on broad antibiotics, received IV steroids. Troponin elevated with new LBBB, Cardiology consulted. We were asked to admit for work up and evaluation of the above problems. Past Medical History:   Diagnosis Date    Adverse effect of anesthesia     combative waking up    Anxiety     Arthritis     Asthma     as of 12/30/16 pt states under control    Diverticulitis Dx 11/2016    Fibromyalgia     GERD (gastroesophageal reflux disease)     Glaucoma     Hiatal hernia     Hypercholesteremia     Hypertension     Ill-defined condition     neurapthy in ble    Nausea & vomiting         Past Surgical History:   Procedure Laterality Date    HX CATARACT REMOVAL Right 01/04/2017    HX HYSTERECTOMY      HX ROTATOR CUFF REPAIR Left 2008    HX TUBAL LIGATION      [place] COLONOSCOPY FLX DX W/COLLJ SPEC WHEN PFRMD  11/9/2011         [place] EGD TRANSORAL BIOPSY SINGLE/MULTIPLE  11/9/2011         UPPER GI ENDOSCOPY,BIOPSY  8/20/2019            Social History     Tobacco Use    Smoking status: Never    Smokeless tobacco: Never   Substance Use Topics    Alcohol use: No      Lives by herself. No alcohol, tobacco, drug use. No family history on file.     Father - ulcerated stomach  Mother - HT    Allergies   Allergen Reactions    Codeine Other (comments)    Cymbalta [Duloxetine] Nausea Only and Other (comments)     \"within the hour I had a severe headache, and vomiting\"      Demerol [Meperidine] Nausea and Vomiting    Lidocaine Other (comments) Morphine Nausea and Vomiting    Novocain [Procaine] Other (comments)    Penicillins Other (comments)     rash    Percocet [Oxycodone-Acetaminophen] Nausea and Vomiting    Sulfa (Sulfonamide Antibiotics) Other (comments)        Prior to Admission medications    Medication Sig Start Date End Date Taking? Authorizing Provider   ondansetron hcl (Zofran) 4 mg tablet Take 1 Tablet by mouth every eight (8) hours as needed for Nausea. 8/1/22   Suhas Mitchell MD   azithromycin (Zithromax Z-Evelio) 250 mg tablet As directed 8/1/22   Suhas Mitchell MD   predniSONE (DELTASONE) 5 mg tablet Take 5 mg by mouth daily. 2/9/22   Provider, Historical   ondansetron (ZOFRAN ODT) 4 mg disintegrating tablet Take 1 Tab by mouth every eight (8) hours as needed for Nausea. Patient not taking: Reported on 3/23/2022 5/7/21   Woody Epley, NP   senna-docusate (PERICOLACE) 8.6-50 mg per tablet Take 1 Tab by mouth daily. Patient not taking: Reported on 3/23/2022 5/7/21   Woody Epley, NP   brimonidine 0.025 % drop Apply 1 Drop to eye two (2) times a day. Right eye    Provider, Historical   dorzolamide (TRUSOPT) 2 % ophthalmic solution Administer 1 Drop to right eye two (2) times a day. Provider, Historical   metoprolol tartrate (LOPRESSOR) 25 mg tablet Take 25 mg by mouth daily. Provider, Historical   indapamide (LOZOL) 1.25 mg tablet Take 1.25 mg by mouth daily. Provider, Historical   montelukast (SINGULAIR) 10 mg tablet Take 10 mg by mouth daily. Provider, Historical   pantoprazole (PROTONIX) 40 mg tablet Take 40 mg by mouth daily. Provider, Historical   bimatoprost (LUMIGAN) 0.01 % ophthalmic drops Administer 1 Drop to right eye every evening. Provider, Historical   multivitamin (ONE A DAY) tablet Take 1 Tab by mouth daily. Provider, Historical   albuterol (PROVENTIL HFA, VENTOLIN HFA, PROAIR HFA) 90 mcg/actuation inhaler Take 1 Puff by inhalation as needed for Wheezing.     Provider, Historical   lorazepam (ATIVAN) 0.5 mg tablet Take 0.5 mg by mouth nightly. Provider, Historical       REVIEW OF SYSTEMS:     14 point ROS reviewed with patient and negative except per above. Objective:   VITALS:    Visit Vitals  /72   Pulse 98   Resp 19   Ht 5' 3\" (1.6 m)   Wt 55.3 kg (122 lb)   SpO2 95%   BMI 21.61 kg/m²       PHYSICAL EXAM:  General:   Mild distress, Answering questions appropriately  HEENT:  PERRL, EOMI, Anicteric sclera. MMM  Neck:   Supple  Resp:    Moderate diffuse wheezing present, non-labored, No  crackles  Cardio:  regular rate, normal rhythm, no murmurs, no JVD  GI:    Soft, Non-tender, Non-distended, Normal bowel sounds. Extremities:  Warm, well perfused, no LE edema, pulses 2+ and symmetric. Skin:    Warm, Dry, Pink, Intact. Neurologic:   Alert and Oriented x4, No focal defects, Cranial Nerves II-XII are grossly intact.    _____________________________________________________________________    Procedures: see electronic medical records for all procedures/Xrays and details which were not copied into this note but were reviewed prior to creation of Plan. LAB DATA REVIEWED:    Recent Results (from the past 24 hour(s))   EKG, 12 LEAD, INITIAL    Collection Time: 10/17/22  7:32 AM   Result Value Ref Range    Ventricular Rate 104 BPM    Atrial Rate 104 BPM    P-R Interval 168 ms    QRS Duration 124 ms    Q-T Interval 392 ms    QTC Calculation (Bezet) 515 ms    Calculated R Axis -59 degrees    Calculated T Axis 87 degrees    Diagnosis       Sinus tachycardia  Left axis deviation  Left bundle branch block  When compared with ECG of 01-AUG-2022 09:49,  Vent.  rate has increased BY  53 BPM  Left bundle branch block is now present     CBC WITH AUTOMATED DIFF    Collection Time: 10/17/22  7:50 AM   Result Value Ref Range    WBC 6.4 3.6 - 11.0 K/uL    RBC 4.79 3.80 - 5.20 M/uL    HGB 13.7 11.5 - 16.0 g/dL    HCT 40.6 35.0 - 47.0 %    MCV 84.8 80.0 - 99.0 FL    MCH 28.6 26.0 - 34.0 PG    MCHC 33.7 30.0 - 36.5 g/dL    RDW 13.4 11.5 - 14.5 %    PLATELET 581 407 - 257 K/uL    MPV 10.0 8.9 - 12.9 FL    NRBC 0.0 0  WBC    ABSOLUTE NRBC 0.00 0.00 - 0.01 K/uL    NEUTROPHILS 35 32 - 75 %    LYMPHOCYTES 34 12 - 49 %    MONOCYTES 11 5 - 13 %    EOSINOPHILS 18 (H) 0 - 7 %    BASOPHILS 2 (H) 0 - 1 %    IMMATURE GRANULOCYTES 0 0.0 - 0.5 %    ABS. NEUTROPHILS 2.2 1.8 - 8.0 K/UL    ABS. LYMPHOCYTES 2.2 0.8 - 3.5 K/UL    ABS. MONOCYTES 0.7 0.0 - 1.0 K/UL    ABS. EOSINOPHILS 1.2 (H) 0.0 - 0.4 K/UL    ABS. BASOPHILS 0.1 0.0 - 0.1 K/UL    ABS. IMM. GRANS. 0.0 0.00 - 0.04 K/UL    DF SMEAR SCANNED      RBC COMMENTS NORMOCYTIC, NORMOCHROMIC      WBC COMMENTS REACTIVE LYMPHS     NT-PRO BNP    Collection Time: 10/17/22  7:50 AM   Result Value Ref Range    NT pro- <525 PG/ML   METABOLIC PANEL, COMPREHENSIVE    Collection Time: 10/17/22  7:50 AM   Result Value Ref Range    Sodium 137 136 - 145 mmol/L    Potassium 3.2 (L) 3.5 - 5.1 mmol/L    Chloride 100 97 - 108 mmol/L    CO2 29 21 - 32 mmol/L    Anion gap 8 5 - 15 mmol/L    Glucose 126 (H) 65 - 100 mg/dL    BUN 16 6 - 20 MG/DL    Creatinine 0.99 0.55 - 1.02 MG/DL    BUN/Creatinine ratio 16 12 - 20      eGFR 55 (L) >60 ml/min/1.73m2    Calcium 10.3 (H) 8.5 - 10.1 MG/DL    Bilirubin, total 0.4 0.2 - 1.0 MG/DL    ALT (SGPT) 28 12 - 78 U/L    AST (SGOT) 35 15 - 37 U/L    Alk.  phosphatase 75 45 - 117 U/L    Protein, total 7.0 6.4 - 8.2 g/dL    Albumin 3.7 3.5 - 5.0 g/dL    Globulin 3.3 2.0 - 4.0 g/dL    A-G Ratio 1.1 1.1 - 2.2     TROPONIN-HIGH SENSITIVITY    Collection Time: 10/17/22  7:50 AM   Result Value Ref Range    Troponin-High Sensitivity 407 (HH) 0 - 51 ng/L   MAGNESIUM    Collection Time: 10/17/22  7:50 AM   Result Value Ref Range    Magnesium 2.0 1.6 - 2.4 mg/dL   COVID-19 RAPID TEST    Collection Time: 10/17/22  7:50 AM   Result Value Ref Range    Specimen source Nasopharyngeal      COVID-19 rapid test Not detected NOTD     INFLUENZA A+B VIRAL AGS    Collection Time: 10/17/22  7:50 AM   Result Value Ref Range    Influenza A Antigen Negative NEG      Influenza B Antigen Negative NEG       Imaging:  CXR - clear lungs    >50% of visit spent in counseling and coordination of care.     Signed: García Sevilla MD

## 2022-10-17 NOTE — PROGRESS NOTES
verfied ptt of 64.1 with pharmacist Tara Alvarado. Will keep current rate of 12units/kg/hr and repeat ptt lab in 6 hours from last blood draw, due at 2300. Verified heparin gtt with second RN [employer].

## 2022-10-17 NOTE — PROGRESS NOTES
End of Shift Note    Bedside shift change report given to Veronique Fagan (oncoming nurse) by Herrera Andre RN (offgoing nurse). Report included the following information SBAR, Kardex, ED Summary, Intake/Output, MAR, and Recent Results    Shift worked:  4p-7-p     Shift summary and any significant changes:    Patient arrived to [facility] from ER today. Plan  for cath lab tomorrow, NPO midnight     Concerns for physician to address:  Eye drops     Zone phone for oncoming shift:          Activity:  Activity Level: Up with Assistance  Number times ambulated in hallways past shift: 0  Number of times OOB to chair past shift: 0    Cardiac:   Cardiac Monitoring: Yes      Cardiac Rhythm: Sinus Rhythm    Access:  Current line(s): PIV     Genitourinary:   Urinary status: voiding    Respiratory:   O2 Device: Nasal cannula  Chronic home O2 use?: NO  Incentive spirometer at bedside: NO       GI:  Last Bowel Movement Date: 10/16/22  Current diet:  DIET NPO Sips of Water with Meds  ADULT DIET Regular; Low Fat/Low Chol/High Fiber/MAGGIE  Passing flatus: YES  Tolerating current diet: YES       Pain Management:   Patient states pain is manageable on current regimen: YES    Skin:  Isidoro Score: 19  Interventions: increase time out of bed    Patient Safety:  Fall Score:  Total Score: 4  Interventions: bed/chair alarm and gripper socks  High Fall Risk: Yes    Length of Stay:  Expected LOS: - - -  Actual LOS: 0      Herrera Andre RN

## 2022-10-17 NOTE — PROGRESS NOTES
TRANSFER - IN REPORT:    Verbal report received from Mare Soni RN(name) on Micheal De La Garza  being received from ER(unit) for routine progression of care      Report consisted of patients Situation, Background, Assessment and   Recommendations(SBAR). Information from the following report(s) SBAR, Kardex, ED Summary, Intake/Output, MAR, and Recent Results was reviewed with the receiving nurse. Opportunity for questions and clarification was provided. Assessment completed upon patients arrival to unit and care assumed.

## 2022-10-17 NOTE — Clinical Note
TRANSFER - OUT REPORT:     Verbal report given to: Naresh CONTRERAS. Report consisted of patient's Situation, Background, Assessment and   Recommendations(SBAR). Opportunity for questions and clarification was provided. Patient transported with a Registered Nurse and [address] [place] [address] / [facility]. Patient transported to: IVCU.

## 2022-10-17 NOTE — ED NOTES
Patient is being transferred to [address], Room # 0699 [id number]. Report given to Robert Billings on Velteo  for routine progression of care. Report consisted of the following information SBAR, Kardex, ED Summary, and MAR. Patient transferred to receiving unit by: Transport (RN or tech name). Outstanding consults needed: No     Next labs due: No     The following personal items will be sent with the patient during transfer to the floor: All valuables:    Cardiac monitoring ordered: Yes    The following CURRENT information was reported to the receiving RN:    Code status: Full Code at time of transfer    Last set of vital signs:  Vital Signs  Level of Consciousness: Alert (0) (10/17/22 1357)  Temp: 97.9 °F (36.6 °C) (10/17/22 1357)  Temp Source: Oral (10/17/22 1357)  Pulse (Heart Rate): 84 (10/17/22 1500)  Resp Rate: 19 (10/17/22 1500)  BP: (!) 92/56 (10/17/22 1500)  MAP (Monitor): 68 (10/17/22 1500)  MAP (Calculated): 68 (10/17/22 1500)  MEWS Score: 3 (10/17/22 1357)         Oxygen Therapy  O2 Sat (%): 96 % (10/17/22 1517)  Pulse via Oximetry: 90 beats per minute (10/17/22 1517)  O2 Device: Nasal cannula (10/17/22 1517)  O2 Flow Rate (L/min): 2 l/min (10/17/22 1517)      Last pain assessment:  Pain 1  Pain Scale 1: Numeric (0 - 10)  Pain Intensity 1: 0      Wounds: No     Urinary catheter: voiding  Is there a voss order: No     LDAs:       Peripheral IV 10/17/22 Left Antecubital (Active)       Peripheral IV 10/17/22 Right Antecubital (Active)         Opportunity for questions and clarification was provided.     Good Abdi RN

## 2022-10-17 NOTE — ED NOTES
Pt is hypotensive and reporting dizziness. Dr. Amy Mccartney made aware.  No new orders at this time

## 2022-10-17 NOTE — Clinical Note
Rescheduled pt to 2/01/22   Sheath #1: Sheath: inserted. Sheath inserted/placed in the right femoral  artery.

## 2022-10-17 NOTE — CONSULTS
Consult    NAME: Ameena Proctor   :  1935   MRN:  [id number]     Date/Time:  10/17/2022 10:21 AM    Patient PCP: Wilfredo Laird MD  ________________________________________________________________________     Assessment:     NSTEMI  New LBBB  Acute on chronic hypoxic respiratory failure    Problem List:  1. Palps; TSH 0.8. Follow up EVR with SR and PACs. Echo 3/2020 with EF 55-60%. No significant valve disease. RVSP 30mmHg. 2.  Fatigue. 2020 MPI without i/i and EF WNL. 2.  HTN  3. XOL  4.  RA  5. 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 All risks, benefits, and alternatives were discussed and the patient wishes to proceed. As long as she can lie flat. Continue ASA  Continue metoprolol  Statins, ABX, etc per primary    Thank you for this consult and allowing me to take part in this patients care. Please call with questions. [x]        High complexity decision making was performed        Subjective:   CHIEF COMPLAINT: SOB/CP    HISTORY OF PRESENT ILLNESS:     Lionel Cabral is a 80 y.o.  female who presents with SOB and CP. Trop abnormal and she has a new LBBB. She continues with SOB, she cannot lay flat. CP is gone. We were asked to consult for work up and evaluation of the above problems.      Past Medical History:   Diagnosis Date    Adverse effect of anesthesia     combative waking up    Anxiety     Arthritis     Asthma     as of 16 pt states under control    Diverticulitis Dx 2016    Fibromyalgia     GERD (gastroesophageal reflux disease)     Glaucoma     Hiatal hernia     Hypercholesteremia     Hypertension     Ill-defined condition     neurapthy in ble    Nausea & vomiting       Past Surgical History:   Procedure Laterality Date    HX CATARACT REMOVAL Right 2017    HX HYSTERECTOMY      HX ROTATOR CUFF REPAIR Left 2008    HX TUBAL LIGATION      [place] COLONOSCOPY FLX DX W/COLLJ SPEC WHEN PFRMD  11/9/2011         [place] EGD TRANSORAL BIOPSY SINGLE/MULTIPLE  11/9/2011         UPPER GI ENDOSCOPY,BIOPSY  8/20/2019          Allergies   Allergen Reactions    Codeine Other (comments)    Cymbalta [Duloxetine] Nausea Only and Other (comments)     \"within the hour I had a severe headache, and vomiting\"      Demerol [Meperidine] Nausea and Vomiting    Lidocaine Other (comments)    Morphine Nausea and Vomiting    Novocain [Procaine] Other (comments)    Penicillins Other (comments)     rash    Percocet [Oxycodone-Acetaminophen] Nausea and Vomiting    Sulfa (Sulfonamide Antibiotics) Other (comments)      Meds:  See below  Social History     Tobacco Use    Smoking status: Never    Smokeless tobacco: Never   Substance Use Topics    Alcohol use: No      No family history on file. REVIEW OF SYSTEMS:     []         Unable to obtain  ROS due to ---   [x]         Total of 12 systems reviewed as follows:    Constitutional: negative fever, negative chills, negative weight loss  Eyes:   negative visual changes  ENT:   negative sore throat, tongue or lip swelling  Respiratory:  negative cough, negative dyspnea  Cards:  negative for chest pain, palpitations, lower extremity edema  GI:   negative for nausea, vomiting, diarrhea, and abdominal pain  Genitourinary: negative for frequency, dysuria  Integument:  negative for rash   Hematologic:  negative for easy bruising and gum/nose bleeding  Musculoskel: negative for myalgias,  back pain  Neurological:  negative for headaches, dizziness, vertigo, weakness  Behavl/Psych: negative for feelings of anxiety, depression     Pertinent Positives include :    Objective:      Physical Exam:    Last 24hrs VS reviewed since prior progress note.  Most recent are:    Visit Vitals  /72   Pulse 98   Resp 19   Ht 5' 3\" (1.6 m)   Wt 55.3 kg (122 lb)   SpO2 95%   BMI 21.61 kg/m²     No intake or output data in the 24 hours ending 10/17/22 1021     General Appearance: Well developed, well nourished, alert & oriented x 3,    no acute distress. Ears/Nose/Mouth/Throat: Pupils equal and round, Hearing grossly normal.  Neck: Supple. JVP within normal limits. Carotids good upstrokes, with no bruit. Chest: Lungs coarse diffusely. Cardiovascular: Regular rate and rhythm, S1S2 normal, no murmur, rubs, gallops. Abdomen: Soft, non-tender, bowel sounds are active. No organomegaly. Extremities: No edema bilaterally. Femoral pulses +2, Distal Pulses +1. Skin: Warm and dry. Neuro: CN II-XII grossly intact, Strength and sensation grossly intact. []         Post-cath site without hematoma, bruit, tenderness, or thrill. Distal pulses intact. Data:      Telemetry:  SR    EKG:  SR, LBBB  []  No new EKG for review. Prior to Admission medications    Medication Sig Start Date End Date Taking? Authorizing Provider   benzonatate (TESSALON) 100 mg capsule TAKE 1 CAPSULE BY MOUTH THREE TIMES A DAY AS NEEDED FOR COUGH 9/22/22   Provider, Historical   ondansetron hcl (Zofran) 4 mg tablet Take 1 Tablet by mouth every eight (8) hours as needed for Nausea. 8/1/22   Will MD Tim   azithromycin (Zithromax Z-Evelio) 250 mg tablet As directed 8/1/22   Will MD Tim   ondansetron (ZOFRAN ODT) 4 mg disintegrating tablet Take 1 Tab by mouth every eight (8) hours as needed for Nausea. Patient not taking: Reported on 3/23/2022 5/7/21   Magaly Lua NP   senna-docusate (PERICOLACE) 8.6-50 mg per tablet Take 1 Tab by mouth daily. Patient not taking: Reported on 3/23/2022 5/7/21   Magaly Lua NP   brimonidine 0.025 % drop Apply 1 Drop to eye two (2) times a day. Right eye    Provider, Historical   dorzolamide (TRUSOPT) 2 % ophthalmic solution Administer 1 Drop to right eye two (2) times a day.     Provider, Historical   metoprolol tartrate (LOPRESSOR) 25 mg tablet Take 25 mg by mouth daily.    Provider, Historical   indapamide (LOZOL) 1.25 mg tablet Take 1.25 mg by mouth daily. Provider, Historical   montelukast (SINGULAIR) 10 mg tablet Take 10 mg by mouth daily. Provider, Historical   pantoprazole (PROTONIX) 40 mg tablet Take 40 mg by mouth daily. Provider, Historical   bimatoprost (LUMIGAN) 0.01 % ophthalmic drops Administer 1 Drop to right eye every evening. Provider, Historical   multivitamin (ONE A DAY) tablet Take 1 Tab by mouth daily. Provider, Historical   albuterol (PROVENTIL HFA, VENTOLIN HFA, PROAIR HFA) 90 mcg/actuation inhaler Take 1 Puff by inhalation as needed for Wheezing. Provider, Historical   lorazepam (ATIVAN) 0.5 mg tablet Take 0.5 mg by mouth nightly. Provider, Historical       Recent Results (from the past 24 hour(s))   EKG, 12 LEAD, INITIAL    Collection Time: 10/17/22  7:32 AM   Result Value Ref Range    Ventricular Rate 104 BPM    Atrial Rate 104 BPM    P-R Interval 168 ms    QRS Duration 124 ms    Q-T Interval 392 ms    QTC Calculation (Bezet) 515 ms    Calculated R Axis -59 degrees    Calculated T Axis 87 degrees    Diagnosis       Sinus tachycardia  Left axis deviation  Left bundle branch block  When compared with ECG of 01-AUG-2022 09:49,  Vent. rate has increased BY  53 BPM  Left bundle branch block is now present     CBC WITH AUTOMATED DIFF    Collection Time: 10/17/22  7:50 AM   Result Value Ref Range    WBC 6.4 3.6 - 11.0 K/uL    RBC 4.79 3.80 - 5.20 M/uL    HGB 13.7 11.5 - 16.0 g/dL    HCT 40.6 35.0 - 47.0 %    MCV 84.8 80.0 - 99.0 FL    MCH 28.6 26.0 - 34.0 PG    MCHC 33.7 30.0 - 36.5 g/dL    RDW 13.4 11.5 - 14.5 %    PLATELET 706 787 - 803 K/uL    MPV 10.0 8.9 - 12.9 FL    NRBC 0.0 0  WBC    ABSOLUTE NRBC 0.00 0.00 - 0.01 K/uL    NEUTROPHILS 35 32 - 75 %    LYMPHOCYTES 34 12 - 49 %    MONOCYTES 11 5 - 13 %    EOSINOPHILS 18 (H) 0 - 7 %    BASOPHILS 2 (H) 0 - 1 %    IMMATURE GRANULOCYTES 0 0.0 - 0.5 %    ABS.  NEUTROPHILS 2.2 1.8 - 8.0 K/UL    ABS. LYMPHOCYTES 2.2 0.8 - 3.5 K/UL    ABS. MONOCYTES 0.7 0.0 - 1.0 K/UL    ABS. EOSINOPHILS 1.2 (H) 0.0 - 0.4 K/UL    ABS. BASOPHILS 0.1 0.0 - 0.1 K/UL    ABS. IMM. GRANS. 0.0 0.00 - 0.04 K/UL    DF SMEAR SCANNED      RBC COMMENTS NORMOCYTIC, NORMOCHROMIC      WBC COMMENTS REACTIVE LYMPHS     NT-PRO BNP    Collection Time: 10/17/22  7:50 AM   Result Value Ref Range    NT pro- <946 PG/ML   METABOLIC PANEL, COMPREHENSIVE    Collection Time: 10/17/22  7:50 AM   Result Value Ref Range    Sodium 137 136 - 145 mmol/L    Potassium 3.2 (L) 3.5 - 5.1 mmol/L    Chloride 100 97 - 108 mmol/L    CO2 29 21 - 32 mmol/L    Anion gap 8 5 - 15 mmol/L    Glucose 126 (H) 65 - 100 mg/dL    BUN 16 6 - 20 MG/DL    Creatinine 0.99 0.55 - 1.02 MG/DL    BUN/Creatinine ratio 16 12 - 20      eGFR 55 (L) >60 ml/min/1.73m2    Calcium 10.3 (H) 8.5 - 10.1 MG/DL    Bilirubin, total 0.4 0.2 - 1.0 MG/DL    ALT (SGPT) 28 12 - 78 U/L    AST (SGOT) 35 15 - 37 U/L    Alk.  phosphatase 75 45 - 117 U/L    Protein, total 7.0 6.4 - 8.2 g/dL    Albumin 3.7 3.5 - 5.0 g/dL    Globulin 3.3 2.0 - 4.0 g/dL    A-G Ratio 1.1 1.1 - 2.2     TROPONIN-HIGH SENSITIVITY    Collection Time: 10/17/22  7:50 AM   Result Value Ref Range    Troponin-High Sensitivity 407 (HH) 0 - 51 ng/L   MAGNESIUM    Collection Time: 10/17/22  7:50 AM   Result Value Ref Range    Magnesium 2.0 1.6 - 2.4 mg/dL   COVID-19 RAPID TEST    Collection Time: 10/17/22  7:50 AM   Result Value Ref Range    Specimen source Nasopharyngeal      COVID-19 rapid test Not detected NOTD     INFLUENZA A+B VIRAL AGS    Collection Time: 10/17/22  7:50 AM   Result Value Ref Range    Influenza A Antigen Negative NEG      Influenza B Antigen Negative NEG     POC LACTIC ACID    Collection Time: 10/17/22  9:54 AM   Result Value Ref Range    Lactic Acid (POC) 3.16 (HH) 0.40 - 2.00 mmol/L        Giuliana Dolan III, DO

## 2022-10-17 NOTE — PROGRESS NOTES
Pharmacy Heparin Monitoring    Indication: ACS/PCI/STEMI/NSTEMI (uncertain at this time)    Goal aPTT: 58-77.9 seconds    Initial dosing Weight: 55.3 kg    Labs:  Recent Labs     10/17/22  1659 10/17/22  1026   APTT 64.1* 26.2   HGB  --  13.4   PLT  --  266       Current rate:  12 unit/kg/hr    Impression/Plan:   New rate: no change, maintain 12 unit/kg/hr  PRN bolus dose: No  Infusion rate, PRN bolus dose and aPTT results were discussed with the nurse: Travis Diego     Thank you,  Melissa Patino, PHARMD      Heparin Protocol    Document located on pharmacy Teams site: Clinical Practice -> Anticoagulation & Cardiology -> Anticoagulation Policies, Protocols, Guidance

## 2022-10-18 LAB
ANION GAP SERPL CALC-SCNC: 10 MMOL/L (ref 5–15)
APTT PPP: 55.9 SEC (ref 22.1–31)
APTT PPP: 63.6 SEC (ref 22.1–31)
BUN SERPL-MCNC: 16 MG/DL (ref 6–20)
BUN/CREAT SERPL: 16 (ref 12–20)
CALCIUM SERPL-MCNC: 9.8 MG/DL (ref 8.5–10.1)
CHLORIDE SERPL-SCNC: 105 MMOL/L (ref 97–108)
CO2 SERPL-SCNC: 26 MMOL/L (ref 21–32)
CREAT SERPL-MCNC: 1 MG/DL (ref 0.55–1.02)
GLUCOSE SERPL-MCNC: 98 MG/DL (ref 65–100)
MAGNESIUM SERPL-MCNC: 2.2 MG/DL (ref 1.6–2.4)
PHOSPHATE SERPL-MCNC: 3.6 MG/DL (ref 2.6–4.7)
POTASSIUM SERPL-SCNC: 4.2 MMOL/L (ref 3.5–5.1)
SODIUM SERPL-SCNC: 141 MMOL/L (ref 136–145)
THERAPEUTIC RANGE,PTTT: ABNORMAL SECS (ref 58–77)
THERAPEUTIC RANGE,PTTT: ABNORMAL SECS (ref 58–77)
TROPONIN-HIGH SENSITIVITY: 3177 NG/L (ref 0–51)

## 2022-10-18 PROCEDURE — C1769 GUIDE WIRE: HCPCS | Performed by: INTERNAL MEDICINE

## 2022-10-18 PROCEDURE — 74011250636 HC RX REV CODE- 250/636: Performed by: STUDENT IN AN ORGANIZED HEALTH CARE EDUCATION/TRAINING PROGRAM

## 2022-10-18 PROCEDURE — B2111ZZ FLUOROSCOPY OF MULTIPLE CORONARY ARTERIES USING LOW OSMOLAR CONTRAST: ICD-10-PCS | Performed by: INTERNAL MEDICINE

## 2022-10-18 PROCEDURE — B2151ZZ FLUOROSCOPY OF LEFT HEART USING LOW OSMOLAR CONTRAST: ICD-10-PCS | Performed by: INTERNAL MEDICINE

## 2022-10-18 PROCEDURE — 93458 L HRT ARTERY/VENTRICLE ANGIO: CPT | Performed by: INTERNAL MEDICINE

## 2022-10-18 PROCEDURE — 84100 ASSAY OF PHOSPHORUS: CPT

## 2022-10-18 PROCEDURE — 74011636637 HC RX REV CODE- 636/637: Performed by: STUDENT IN AN ORGANIZED HEALTH CARE EDUCATION/TRAINING PROGRAM

## 2022-10-18 PROCEDURE — 74011250637 HC RX REV CODE- 250/637: Performed by: STUDENT IN AN ORGANIZED HEALTH CARE EDUCATION/TRAINING PROGRAM

## 2022-10-18 PROCEDURE — 99152 MOD SED SAME PHYS/QHP 5/>YRS: CPT | Performed by: INTERNAL MEDICINE

## 2022-10-18 PROCEDURE — 77010033678 HC OXYGEN DAILY

## 2022-10-18 PROCEDURE — 74011000258 HC RX REV CODE- 258: Performed by: INTERNAL MEDICINE

## 2022-10-18 PROCEDURE — 94640 AIRWAY INHALATION TREATMENT: CPT

## 2022-10-18 PROCEDURE — 83735 ASSAY OF MAGNESIUM: CPT

## 2022-10-18 PROCEDURE — 2709999900 HC NON-CHARGEABLE SUPPLY: Performed by: INTERNAL MEDICINE

## 2022-10-18 PROCEDURE — 94761 N-INVAS EAR/PLS OXIMETRY MLT: CPT

## 2022-10-18 PROCEDURE — 4A023N7 MEASUREMENT OF CARDIAC SAMPLING AND PRESSURE, LEFT HEART, PERCUTANEOUS APPROACH: ICD-10-PCS | Performed by: INTERNAL MEDICINE

## 2022-10-18 PROCEDURE — 65270000046 HC RM TELEMETRY

## 2022-10-18 PROCEDURE — 74011000250 HC RX REV CODE- 250: Performed by: STUDENT IN AN ORGANIZED HEALTH CARE EDUCATION/TRAINING PROGRAM

## 2022-10-18 PROCEDURE — 76937 US GUIDE VASCULAR ACCESS: CPT | Performed by: INTERNAL MEDICINE

## 2022-10-18 PROCEDURE — 36415 COLL VENOUS BLD VENIPUNCTURE: CPT

## 2022-10-18 PROCEDURE — 74011250636 HC RX REV CODE- 250/636: Performed by: INTERNAL MEDICINE

## 2022-10-18 PROCEDURE — 85730 THROMBOPLASTIN TIME PARTIAL: CPT

## 2022-10-18 PROCEDURE — C1894 INTRO/SHEATH, NON-LASER: HCPCS | Performed by: INTERNAL MEDICINE

## 2022-10-18 PROCEDURE — 74011000636 HC RX REV CODE- 636: Performed by: INTERNAL MEDICINE

## 2022-10-18 PROCEDURE — 99153 MOD SED SAME PHYS/QHP EA: CPT | Performed by: INTERNAL MEDICINE

## 2022-10-18 PROCEDURE — 84484 ASSAY OF TROPONIN QUANT: CPT

## 2022-10-18 PROCEDURE — 77030022017 HC DRSG HEMO QCLOT ZMED -A: Performed by: INTERNAL MEDICINE

## 2022-10-18 PROCEDURE — 74011250637 HC RX REV CODE- 250/637: Performed by: INTERNAL MEDICINE

## 2022-10-18 PROCEDURE — 80048 BASIC METABOLIC PNL TOTAL CA: CPT

## 2022-10-18 PROCEDURE — 77030042145: Performed by: INTERNAL MEDICINE

## 2022-10-18 RX ORDER — HEPARIN SODIUM 5000 [USP'U]/ML
5000 INJECTION, SOLUTION INTRAVENOUS; SUBCUTANEOUS EVERY 12 HOURS
Status: DISCONTINUED | OUTPATIENT
Start: 2022-10-18 | End: 2022-10-19 | Stop reason: HOSPADM

## 2022-10-18 RX ORDER — FENTANYL CITRATE 50 UG/ML
INJECTION, SOLUTION INTRAMUSCULAR; INTRAVENOUS AS NEEDED
Status: DISCONTINUED | OUTPATIENT
Start: 2022-10-18 | End: 2022-10-18 | Stop reason: HOSPADM

## 2022-10-18 RX ORDER — HEPARIN SODIUM 200 [USP'U]/100ML
INJECTION, SOLUTION INTRAVENOUS
Status: DISCONTINUED | OUTPATIENT
Start: 2022-10-18 | End: 2022-10-18

## 2022-10-18 RX ORDER — ATORVASTATIN CALCIUM 40 MG/1
40 TABLET, FILM COATED ORAL
Status: DISCONTINUED | OUTPATIENT
Start: 2022-10-18 | End: 2022-10-19 | Stop reason: HOSPADM

## 2022-10-18 RX ORDER — MIDAZOLAM HYDROCHLORIDE 1 MG/ML
INJECTION, SOLUTION INTRAMUSCULAR; INTRAVENOUS AS NEEDED
Status: DISCONTINUED | OUTPATIENT
Start: 2022-10-18 | End: 2022-10-18 | Stop reason: HOSPADM

## 2022-10-18 RX ADMIN — ACETAMINOPHEN 650 MG: 325 TABLET ORAL at 00:07

## 2022-10-18 RX ADMIN — IPRATROPIUM BROMIDE AND ALBUTEROL SULFATE 3 ML: .5; 3 SOLUTION RESPIRATORY (INHALATION) at 05:34

## 2022-10-18 RX ADMIN — BRIMONIDINE TARTRATE 1 DROP: 2 SOLUTION OPHTHALMIC at 22:39

## 2022-10-18 RX ADMIN — SODIUM CHLORIDE 100 ML: 900 INJECTION, SOLUTION INTRAVENOUS at 13:50

## 2022-10-18 RX ADMIN — BRIMONIDINE TARTRATE 1 DROP: 2 SOLUTION OPHTHALMIC at 07:25

## 2022-10-18 RX ADMIN — SODIUM CHLORIDE, PRESERVATIVE FREE 10 ML: 5 INJECTION INTRAVENOUS at 06:17

## 2022-10-18 RX ADMIN — ONDANSETRON 4 MG: 2 INJECTION INTRAMUSCULAR; INTRAVENOUS at 04:08

## 2022-10-18 RX ADMIN — PANTOPRAZOLE SODIUM 40 MG: 40 TABLET, DELAYED RELEASE ORAL at 08:06

## 2022-10-18 RX ADMIN — ASPIRIN 81 MG CHEWABLE TABLET 81 MG: 81 TABLET CHEWABLE at 08:07

## 2022-10-18 RX ADMIN — IPRATROPIUM BROMIDE AND ALBUTEROL SULFATE 3 ML: .5; 3 SOLUTION RESPIRATORY (INHALATION) at 10:08

## 2022-10-18 RX ADMIN — SODIUM CHLORIDE, PRESERVATIVE FREE 10 ML: 5 INJECTION INTRAVENOUS at 22:00

## 2022-10-18 RX ADMIN — IPRATROPIUM BROMIDE AND ALBUTEROL SULFATE 3 ML: .5; 3 SOLUTION RESPIRATORY (INHALATION) at 12:05

## 2022-10-18 RX ADMIN — AZITHROMYCIN 250 MG: 250 TABLET, FILM COATED ORAL at 08:06

## 2022-10-18 RX ADMIN — SODIUM CHLORIDE 100 ML: 900 INJECTION, SOLUTION INTRAVENOUS at 13:30

## 2022-10-18 RX ADMIN — LATANOPROST 1 DROP: 50 SOLUTION OPHTHALMIC at 22:39

## 2022-10-18 RX ADMIN — METOPROLOL TARTRATE 25 MG: 25 TABLET, FILM COATED ORAL at 08:05

## 2022-10-18 RX ADMIN — DORZOLAMIDE HYDROCHLORIDE 1 DROP: 20 SOLUTION/ DROPS OPHTHALMIC at 22:40

## 2022-10-18 RX ADMIN — MONTELUKAST 10 MG: 10 TABLET, FILM COATED ORAL at 08:06

## 2022-10-18 RX ADMIN — SODIUM CHLORIDE 250 ML: 900 INJECTION, SOLUTION INTRAVENOUS at 14:00

## 2022-10-18 RX ADMIN — PREDNISONE 40 MG: 20 TABLET ORAL at 08:06

## 2022-10-18 RX ADMIN — IPRATROPIUM BROMIDE AND ALBUTEROL SULFATE 3 ML: .5; 3 SOLUTION RESPIRATORY (INHALATION) at 20:18

## 2022-10-18 RX ADMIN — IPRATROPIUM BROMIDE AND ALBUTEROL SULFATE 3 ML: .5; 3 SOLUTION RESPIRATORY (INHALATION) at 15:50

## 2022-10-18 NOTE — PROGRESS NOTES
Problem: Falls - Risk of  Goal: *Absence of Falls  Description: Document [place] Fall Risk and appropriate interventions in the flowsheet.   Outcome: Progressing Towards Goal  Note: Fall Risk Interventions:  Mobility Interventions: Bed/chair exit alarm, Communicate number of staff needed for ambulation/transfer, OT consult for ADLs, Patient to call before getting OOB, PT Consult for mobility concerns, PT Consult for assist device competence, Strengthening exercises (ROM-active/passive), Utilize walker, cane, or other assistive device, Utilize gait belt for transfers/ambulation         Medication Interventions: Bed/chair exit alarm, Patient to call before getting OOB, Teach patient to arise slowly, Utilize gait belt for transfers/ambulation    Elimination Interventions: Bed/chair exit alarm, Call light in reach, Elevated toilet seat, Patient to call for help with toileting needs, Toilet paper/wipes in reach, Toileting schedule/hourly rounds, Stay With Me (per policy)    History of Falls Interventions: Bed/chair exit alarm, Room close to nurse's station, Evaluate medications/consider consulting pharmacy, Door open when patient unattended, Consult care management for discharge planning, Utilize gait belt for transfer/ambulation, Investigate reason for fall

## 2022-10-18 NOTE — CARDIO/PULMONARY
Cardiac Rehab Note: chart review/referral     Cardiac cath 10/18/22:  \"Pre-procedure Diagnosis: NSTEMI     Post-procedure Diagnosis: No sig CAD, elevated LVEDP, T[place]o CM\"    Smoking history assessed. Patient is a non smoker.    Smoking Cessation Program link has not been added to the AVS.
numerical 0-10

## 2022-10-18 NOTE — PROGRESS NOTES
Progress Note      10/18/2022 9:50 AM  NAME: Geovanny Mehta   MRN:  [id number]   Admit Diagnosis: Respiratory failure ([address] [J96.90]      Problem List:     NSTEMI  New LBBB  Acute on chronic hypoxic respiratory failure    Problem List:  1. Palps; TSH 0.8. Follow up EVR with SR and PACs. Echo 3/2020 with EF 55-60%. No significant valve disease. RVSP 30mmHg. 2.  Fatigue. 2/2020 MPI without i/i and EF WNL. 2.  HTN  3. XOL  4.  RA  5. 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 Continue ASA  Continue metoprolol  Add statin  ABX, etc per primary         [x]       High complexity decision making was performed in this patient at high risk for decompensation with multiple organ involvement. Subjective:     Geovanny Mehta denies chest pain, dyspnea. Discussed with RN events overnight. Review of Systems:    Symptom Y/N Comments  Symptom Y/N Comments   Fever/Chills N   Chest Pain N    Poor Appetite N   Edema N    Cough N   Abdominal Pain N    Sputum N   Joint Pain N    SOB/GALAN N   Pruritis/Rash N    Nausea/vomit N   Tolerating PT/OT Y    Diarrhea N   Tolerating Diet Y    Constipation N   Other       Could NOT obtain due to:      Objective:      Physical Exam:    Last 24hrs VS reviewed since prior progress note. Most recent are:    Visit Vitals  /85 (BP 1 Location: Right upper arm, BP Patient Position: At rest)   Pulse 91   Temp 98 °F (36.7 °C)   Resp 20   Ht 5' 3\" (1.6 m)   Wt 55.3 kg (122 lb)   SpO2 99%   Breastfeeding No   BMI 21.61 kg/m²       Intake/Output Summary (Last 24 hours) at 10/18/2022 0950  Last data filed at 10/18/2022 9794  Gross per 24 hour   Intake --   Output 400 ml   Net -400 ml        General Appearance: Well developed, well nourished, alert & oriented x 3,    no acute distress. Ears/Nose/Mouth/Throat: Hearing grossly normal.  Neck: Supple.   Chest: Lungs clear to auscultation bilaterally. Cardiovascular: Regular rate and rhythm, S1S2 normal, no murmur. Abdomen: Soft, non-tender, bowel sounds are active. Extremities: No edema bilaterally. Skin: Warm and dry. []         Post-cath site without hematoma, bruit, tenderness, or thrill. Distal pulses intact. PMH/SH reviewed - no change compared to H&P    Data Review    Telemetry: sinus rhythm     EKG:   [x]  No new EKG for review    Lab Data Personally Reviewed:    Recent Labs     10/17/22  1026 10/17/22  0750   WBC 7.9 6.4   HGB 13.4 13.7   HCT 40.6 40.6    251     Recent Labs     10/18/22  0615 10/18/22  0004 10/17/22  1659   APTT 63.6* 55.9* 64.1*      Recent Labs     10/18/22  0615 10/17/22  1745 10/17/22  0750    139 137   K 4.2 3.0* 3.2*    102 100   CO2 26 26 29   BUN 16 14 16   CREA 1.00 0.99 0.99   GLU 98 155* 126*   CA 9.8 9.1 10.3*   MG 2.2  --  2.0     No results for input(s): CPK, CKNDX, TROIQ in the last 72 hours. No lab exists for component: CPKMB  Lab Results   Component Value Date/Time    Cholesterol, total 191 01/16/2018 01:38 AM    HDL Cholesterol 60 01/16/2018 01:38 AM    LDL, calculated 95.6 01/16/2018 01:38 AM    Triglyceride 177 (H) 01/16/2018 01:38 AM    CHOL/HDL Ratio 3.2 01/16/2018 01:38 AM       Recent Labs     10/17/22  0750   AP 75   TP 7.0   ALB 3.7   GLOB 3.3     No results for input(s): PH, PCO2, PO2 in the last 72 hours.     Medications Personally Reviewed:    Current Facility-Administered Medications   Medication Dose Route Frequency    heparin 25,000 units in D5W 250 ml infusion  12-25 Units/kg/hr IntraVENous TITRATE    heparin (porcine) 1,000 unit/mL injection 1,660 Units  30 Units/kg IntraVENous PRN    Or    heparin (porcine) 1,000 unit/mL injection 3,320 Units  60 Units/kg IntraVENous PRN    dorzolamide (TRUSOPT) 2 % ophthalmic solution 1 Drop  1 Drop Right Eye BID    metoprolol tartrate (LOPRESSOR) tablet 25 mg  25 mg Oral DAILY    montelukast (SINGULAIR) tablet 10 mg  10 mg Oral DAILY    pantoprazole (PROTONIX) tablet 40 mg  40 mg Oral DAILY    sodium chloride (NS) flush 5-40 mL  5-40 mL IntraVENous Q8H    sodium chloride (NS) flush 5-40 mL  5-40 mL IntraVENous PRN    acetaminophen (TYLENOL) tablet 650 mg  650 mg Oral Q6H PRN    Or    acetaminophen (TYLENOL) suppository 650 mg  650 mg Rectal Q6H PRN    polyethylene glycol (MIRALAX) packet 17 g  17 g Oral DAILY PRN    ondansetron (ZOFRAN ODT) tablet 4 mg  4 mg Oral Q8H PRN    Or    ondansetron (ZOFRAN) injection 4 mg  4 mg IntraVENous Q6H PRN    albuterol-ipratropium (DUO-NEB) 2.5 MG-0.5 MG/3 ML  3 mL Nebulization Q4H RT    albuterol (PROVENTIL VENTOLIN) nebulizer solution 2.5 mg  2.5 mg Nebulization Q2H PRN    predniSONE (DELTASONE) tablet 40 mg  40 mg Oral DAILY WITH BREAKFAST    aspirin chewable tablet 81 mg  81 mg Oral DAILY    azithromycin (ZITHROMAX) tablet 250 mg  250 mg Oral DAILY    brimonidine (ALPHAGAN) 0.2 % ophthalmic solution 1 Drop  1 Drop Right Eye BID    latanoprost (XALATAN) 0.005 % ophthalmic solution 1 Drop  1 Drop Both Eyes QPM         Elva Chu III, DO

## 2022-10-18 NOTE — PROGRESS NOTES
Hospitalist Progress Note    NAME: Brigitte Finn   :  1935   MRN:  [id number]       Assessment / Plan:  Acute hypoxic respiratory failure 2/2 asthma exacerbation  Afebrile, normal white count. No productive cough. CXR clear. Follows with Dr. Leda Calvillo.  - holding off on antibiotics, unlikely pneumonia preset  - continue azithromycin 250 daily for antiinflammatory effect  - duonebs q4h scheduled with albuterol q2h prn  - s/p solumedrol in ED  - start prednisone 40 daily 10/18/22  - continue home singulair     NSTEMI  New LBBB  TakoTsubo CM  Troponin 400 -> 3600 -> 4000. No chest pain. - s/p  in ED, continue ASA 81 daily  S/p LHC 10/18 no significant CAD  Hypotension post cath, systolic baseline 089'O  Cardiology recs to give fluids     Lactic acidosis  Likely 2/2 NSTEMI vs asthma exacerbation. Is receiving scheduled albuterol, may increase lactate. ?HTN  - continue home metop tartrate 25 daily  - holding indapamide 1.25     ? PMR  Recent injection, patient family unsure of what medication was given     Glaucoma  - continue home drops (dorzolamide, brimonidine, bimatoprost)      18.5 - 24.9 Normal weight / Body mass index is 21.61 kg/m². Estimated discharge date:   Barriers: clinical improvement    Code status: Full  Prophylaxis: Hep SQ  Recommended Disposition: Home w/Family     Subjective:     Chief Complaint / Reason for Physician Visit  \"sob\". Discussed with RN events overnight.    Denies chest pain, sob  Family at bedside  Explained plan of care, all questions answered to satisfaction    Review of Systems:  Symptom Y/N Comments  Symptom Y/N Comments   Fever/Chills n   Chest Pain n    Poor Appetite    Edema     Cough n   Abdominal Pain n    Sputum    Joint Pain     SOB/GALAN n   Pruritis/Rash     Nausea/vomit n   Tolerating PT/OT     Diarrhea    Tolerating Diet     Constipation    Other       Could NOT obtain due to:      Objective:     VITALS:   Last 24hrs VS reviewed since prior progress note. Most recent are:  Patient Vitals for the past 24 hrs:   Temp Pulse Resp BP SpO2   10/18/22 1425 -- 83 21 93/62 95 %   10/18/22 1420 -- 85 27 (!) 91/49 90 %   10/18/22 1415 -- 85 15 (!) 94/54 96 %   10/18/22 1410 -- 86 22 (!) 95/49 93 %   10/18/22 1407 -- 83 28 (!) 95/49 91 %   10/18/22 1405 -- 83 21 (!) 90/43 93 %   10/18/22 1400 -- 83 18 (!) 96/45 95 %   10/18/22 1355 -- 83 22 (!) 95/49 96 %   10/18/22 1350 -- 81 21 (!) 96/47 97 %   10/18/22 1345 -- 82 17 (!) 92/46 97 %   10/18/22 1341 -- 84 23 (!) 88/58 --   10/18/22 1335 -- 81 13 (!) 94/44 96 %   10/18/22 1330 -- 81 24 (!) 94/48 95 %   10/18/22 1329 -- 82 18 (!) 94/44 95 %   10/18/22 1326 -- 84 23 (!) 93/50 97 %   10/18/22 1325 -- 83 19 (!) 70/46 97 %   10/18/22 1320 98.1 °F (36.7 °C) 86 19 91/68 96 %   10/18/22 1108 97.7 °F (36.5 °C) 81 18 102/62 99 %   10/18/22 0749 98 °F (36.7 °C) 91 20 107/85 99 %   10/18/22 0534 -- -- -- -- 100 %   10/18/22 0253 97.6 °F (36.4 °C) 78 19 (!) 96/57 97 %   10/17/22 2310 97.7 °F (36.5 °C) 82 22 95/62 97 %   10/17/22 2109 -- -- -- -- 99 %   10/17/22 1910 98.8 °F (37.1 °C) 94 20 (!) 104/56 98 %   10/17/22 1819 -- 91 16 (!) 92/52 97 %   10/17/22 1715 -- -- -- -- 98 %   10/17/22 1710 97.4 °F (36.3 °C) 94 25 (!) 109/51 98 %   10/17/22 1517 -- -- -- -- 96 %       Intake/Output Summary (Last 24 hours) at 10/18/2022 1500  Last data filed at 10/18/2022 7802  Gross per 24 hour   Intake --   Output 400 ml   Net -400 ml        I had a face to face encounter and independently examined this patient on 10/18/2022, as outlined below:  PHYSICAL EXAM:  General: Alert, cooperative, no acute distress    Resp:  Bibasilar rales, No accessory muscle use  CV:  Regular  rhythm,  No edema  GI:  Soft, Non distended, Non tender. +Bowel sounds  Neurologic:  Alert and oriented X 3, normal speech,   Psych:   Not anxious nor agitated  Skin:  No rashes.   No jaundice    Reviewed most current lab test results and cultures  YES  Reviewed most current radiology test results   YES  Review and summation of old records today    NO  Reviewed patient's current orders and MAR    YES  PMH/SH reviewed - no change compared to H&P  ________________________________________________________________________  Care Plan discussed with:    Comments   Patient x    Family  x    RN x    Care Manager     Consultant                       x Multidiciplinary team rounds were held today with , nursing, pharmacist and clinical coordinator. Patient's plan of care was discussed; medications were reviewed and discharge planning was addressed. ________________________________________________________________________  Total NON critical care TIME:  40  Minutes    Total CRITICAL CARE TIME Spent:   Minutes non procedure based      Comments   >50% of visit spent in counseling and coordination of care x    ________________________________________________________________________  Marcelo Doll MD     Procedures: see electronic medical records for all procedures/Xrays and details which were not copied into this note but were reviewed prior to creation of Plan. LABS:  I reviewed today's most current labs and imaging studies.   Pertinent labs include:  Recent Labs     10/17/22  1026 10/17/22  0750   WBC 7.9 6.4   HGB 13.4 13.7   HCT 40.6 40.6    251     Recent Labs     10/18/22  0615 10/17/22  1745 10/17/22  0750    139 137   K 4.2 3.0* 3.2*    102 100   CO2 26 26 29   GLU 98 155* 126*   BUN 16 14 16   CREA 1.00 0.99 0.99   CA 9.8 9.1 10.3*   MG 2.2  --  2.0   PHOS 3.6  --   --    ALB  --   --  3.7   TBILI  --   --  0.4   ALT  --   --  28       Signed: Marcelo Doll MD

## 2022-10-18 NOTE — PROGRESS NOTES
5fr sheath removed from the right femoral artery, manual pressure and quickclot held for 7 min  upon release no oozing or hematoma noted.  Groin inspected and care taken over by CAROLA Nelson.

## 2022-10-18 NOTE — PROGRESS NOTES
End of Shift Note    Bedside shift change report given to Bernis Fabry, RN (oncoming nurse) by Urmila Galvez RN (offgoing nurse). Report included the following information SBAR    Shift worked:  9922-0947     Shift summary and any significant changes:    Pt to cath lab today, been NPO since midnight. Heparin drip running at 13 units/kg/hr. Concerns for physician to address:  none     Zone phone for oncoming shift:            Activity:  Activity Level: Up with Assistance  Number times ambulated in hallways past shift: 0  Number of times OOB to chair past shift: 0    Cardiac:   Cardiac Monitoring: Yes      Cardiac Rhythm: Sinus Rhythm    Access:  Current line(s): PIV     Genitourinary:   Urinary status: voiding and external catheter    Respiratory:   O2 Device: (P) Nasal cannula  Chronic home O2 use?: NO  Incentive spirometer at bedside: NO       GI:  Last Bowel Movement Date: 10/16/22  Current diet:  DIET NPO Sips of Water with Meds  Passing flatus: YES  Tolerating current diet: YES       Pain Management:   Patient states pain is manageable on current regimen: YES    Skin:  Isidoro Score: 19  Interventions: float heels, increase time out of bed, internal/external urinary devices, and nutritional support     Patient Safety:  Fall Score:  Total Score: 4  Interventions: bed/chair alarm, gripper socks, and pt to call before getting OOB  High Fall Risk: Yes    Length of Stay:  Expected LOS: - - -  Actual LOS: 9320 Serafin Rust RN

## 2022-10-18 NOTE — PROGRESS NOTES
Brief Procedure Note    Patient: Autumn Moctezuma MRN: [id number]  SSN: [id number]    YOB: 1935  Age: 80 y.o. Sex: female      Date of Procedure: 10/18/2022     Pre-procedure Diagnosis: NSTEMI    Post-procedure Diagnosis: No sig CAD, elevated LVEDP, TakoTsubo CM    Procedure: ultrasound-guided vascular access, LHC, cors, LVg    Performed By: Mima Galeazzi III, DO     Anesthesia: Moderate Sedation    Estimated Blood Loss: Less than 10 mL      Specimens:  None    Findings: as above    Complications: None    Implants: None    Recommendations: Continue medical therapy.     Signed By: Doreen Estevez DO     October 18, 2022

## 2022-10-18 NOTE — PROGRESS NOTES
TRANSFER - IN REPORT:    Verbal report received from RN(name) on Kelli Fox  being received from [facility](unit) for routine progression of care      Report consisted of patients Situation, Background, Assessment and   Recommendations(SBAR). Information from the following report(s) SBAR, Kardex, Intake/Output, MAR, Recent Results, and Cardiac Rhythm NSR  was reviewed with the receiving nurse. Opportunity for questions and clarification was provided. Assessment completed upon patients arrival to unit and care assumed.

## 2022-10-19 VITALS
DIASTOLIC BLOOD PRESSURE: 68 MMHG | RESPIRATION RATE: 20 BRPM | SYSTOLIC BLOOD PRESSURE: 136 MMHG | OXYGEN SATURATION: 98 % | TEMPERATURE: 98 F | HEART RATE: 92 BPM | WEIGHT: 122 LBS | BODY MASS INDEX: 21.62 KG/M2 | HEIGHT: 63 IN

## 2022-10-19 LAB
ANION GAP SERPL CALC-SCNC: 9 MMOL/L (ref 5–15)
BASOPHILS # BLD: 0.1 K/UL (ref 0–0.1)
BASOPHILS NFR BLD: 1 % (ref 0–1)
BUN SERPL-MCNC: 19 MG/DL (ref 6–20)
BUN/CREAT SERPL: 19 (ref 12–20)
CALCIUM SERPL-MCNC: 9.7 MG/DL (ref 8.5–10.1)
CHLORIDE SERPL-SCNC: 107 MMOL/L (ref 97–108)
CO2 SERPL-SCNC: 24 MMOL/L (ref 21–32)
CREAT SERPL-MCNC: 0.98 MG/DL (ref 0.55–1.02)
DIFFERENTIAL METHOD BLD: ABNORMAL
EOSINOPHIL # BLD: 0.1 K/UL (ref 0–0.4)
EOSINOPHIL NFR BLD: 1 % (ref 0–7)
ERYTHROCYTE [DISTWIDTH] IN BLOOD BY AUTOMATED COUNT: 14.2 % (ref 11.5–14.5)
GLUCOSE SERPL-MCNC: 88 MG/DL (ref 65–100)
HCT VFR BLD AUTO: 40.1 % (ref 35–47)
HGB BLD-MCNC: 12.6 G/DL (ref 11.5–16)
IMM GRANULOCYTES # BLD AUTO: 0 K/UL (ref 0–0.04)
IMM GRANULOCYTES NFR BLD AUTO: 0 % (ref 0–0.5)
LYMPHOCYTES # BLD: 1.6 K/UL (ref 0.8–3.5)
LYMPHOCYTES NFR BLD: 12 % (ref 12–49)
MAGNESIUM SERPL-MCNC: 2.2 MG/DL (ref 1.6–2.4)
MCH RBC QN AUTO: 27.4 PG (ref 26–34)
MCHC RBC AUTO-ENTMCNC: 31.4 G/DL (ref 30–36.5)
MCV RBC AUTO: 87.2 FL (ref 80–99)
MONOCYTES # BLD: 1.4 K/UL (ref 0–1)
MONOCYTES NFR BLD: 11 % (ref 5–13)
NEUTS SEG # BLD: 9.7 K/UL (ref 1.8–8)
NEUTS SEG NFR BLD: 75 % (ref 32–75)
NRBC # BLD: 0 K/UL (ref 0–0.01)
NRBC BLD-RTO: 0 PER 100 WBC
PHOSPHATE SERPL-MCNC: 2.6 MG/DL (ref 2.6–4.7)
PLATELET # BLD AUTO: 257 K/UL (ref 150–400)
PMV BLD AUTO: 10.4 FL (ref 8.9–12.9)
POTASSIUM SERPL-SCNC: 3.8 MMOL/L (ref 3.5–5.1)
RBC # BLD AUTO: 4.6 M/UL (ref 3.8–5.2)
SODIUM SERPL-SCNC: 140 MMOL/L (ref 136–145)
WBC # BLD AUTO: 12.9 K/UL (ref 3.6–11)

## 2022-10-19 PROCEDURE — 83735 ASSAY OF MAGNESIUM: CPT

## 2022-10-19 PROCEDURE — 74011000250 HC RX REV CODE- 250: Performed by: STUDENT IN AN ORGANIZED HEALTH CARE EDUCATION/TRAINING PROGRAM

## 2022-10-19 PROCEDURE — 85025 COMPLETE CBC W/AUTO DIFF WBC: CPT

## 2022-10-19 PROCEDURE — 74011636637 HC RX REV CODE- 636/637: Performed by: STUDENT IN AN ORGANIZED HEALTH CARE EDUCATION/TRAINING PROGRAM

## 2022-10-19 PROCEDURE — 74011250637 HC RX REV CODE- 250/637: Performed by: STUDENT IN AN ORGANIZED HEALTH CARE EDUCATION/TRAINING PROGRAM

## 2022-10-19 PROCEDURE — 80048 BASIC METABOLIC PNL TOTAL CA: CPT

## 2022-10-19 PROCEDURE — 36415 COLL VENOUS BLD VENIPUNCTURE: CPT

## 2022-10-19 PROCEDURE — 94640 AIRWAY INHALATION TREATMENT: CPT

## 2022-10-19 PROCEDURE — 84100 ASSAY OF PHOSPHORUS: CPT

## 2022-10-19 RX ORDER — IPRATROPIUM BROMIDE AND ALBUTEROL SULFATE 2.5; .5 MG/3ML; MG/3ML
3 SOLUTION RESPIRATORY (INHALATION)
Qty: 180 EACH | Refills: 0 | Status: SHIPPED | OUTPATIENT
Start: 2022-10-19 | End: 2022-11-18

## 2022-10-19 RX ORDER — AZITHROMYCIN 250 MG/1
TABLET, FILM COATED ORAL
Qty: 3 TABLET | Refills: 0 | Status: SHIPPED | OUTPATIENT
Start: 2022-10-20 | End: 2022-10-22

## 2022-10-19 RX ORDER — PREDNISONE 20 MG/1
40 TABLET ORAL
Qty: 3 TABLET | Refills: 0 | Status: SHIPPED | OUTPATIENT
Start: 2022-10-19 | End: 2022-10-22

## 2022-10-19 RX ORDER — GUAIFENESIN 100 MG/5ML
81 LIQUID (ML) ORAL DAILY
Qty: 30 TABLET | Refills: 0 | Status: SHIPPED | OUTPATIENT
Start: 2022-10-19 | End: 2022-11-18

## 2022-10-19 RX ORDER — ATORVASTATIN CALCIUM 40 MG/1
40 TABLET, FILM COATED ORAL
Qty: 30 TABLET | Refills: 0 | Status: SHIPPED | OUTPATIENT
Start: 2022-10-19 | End: 2022-11-18

## 2022-10-19 RX ADMIN — IPRATROPIUM BROMIDE AND ALBUTEROL SULFATE 3 ML: .5; 3 SOLUTION RESPIRATORY (INHALATION) at 00:15

## 2022-10-19 RX ADMIN — AZITHROMYCIN 250 MG: 250 TABLET, FILM COATED ORAL at 11:38

## 2022-10-19 RX ADMIN — IPRATROPIUM BROMIDE AND ALBUTEROL SULFATE 3 ML: .5; 3 SOLUTION RESPIRATORY (INHALATION) at 08:00

## 2022-10-19 RX ADMIN — PREDNISONE 40 MG: 20 TABLET ORAL at 11:38

## 2022-10-19 RX ADMIN — ASPIRIN 81 MG CHEWABLE TABLET 81 MG: 81 TABLET CHEWABLE at 11:38

## 2022-10-19 RX ADMIN — PANTOPRAZOLE SODIUM 40 MG: 40 TABLET, DELAYED RELEASE ORAL at 11:38

## 2022-10-19 RX ADMIN — METOPROLOL TARTRATE 25 MG: 25 TABLET, FILM COATED ORAL at 11:39

## 2022-10-19 RX ADMIN — MONTELUKAST 10 MG: 10 TABLET, FILM COATED ORAL at 11:38

## 2022-10-19 RX ADMIN — IPRATROPIUM BROMIDE AND ALBUTEROL SULFATE 3 ML: .5; 3 SOLUTION RESPIRATORY (INHALATION) at 11:38

## 2022-10-19 NOTE — PROGRESS NOTES
Problem: Falls - Risk of  Goal: *Absence of Falls  Description: Document Nikki Skill Fall Risk and appropriate interventions in the flowsheet. Outcome: Progressing Towards Goal  Note: Fall Risk Interventions:  Mobility Interventions: Assess mobility with egress test, Bed/chair exit alarm, Patient to call before getting OOB, Utilize walker, cane, or other assistive device         Medication Interventions: Patient to call before getting OOB, Teach patient to arise slowly    Elimination Interventions: Call light in reach    History of Falls Interventions:  Investigate reason for fall, Room close to nurse's station, Utilize gait belt for transfer/ambulation

## 2022-10-19 NOTE — PROGRESS NOTES
Progress Note      10/19/2022 9:50 AM  NAME: Armando Campos   MRN:  [id number]   Admit Diagnosis: Respiratory failure ([address] [J96.90]      Problem List:     NSTEMI  New LBBB  Acute on chronic hypoxic respiratory failure    Problem List:  1. Palps; TSH 0.8. Follow up EVR with SR and PACs. Echo 3/2020 with EF 55-60%. No significant valve disease. RVSP 30mmHg. 2.  Fatigue. 2/2020 MPI without i/i and EF WNL. 2.  HTN  3. XOL  4.  RA  5. 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 Subjective:     Armando Campos denies chest pain, dyspnea. Discussed with RN events overnight. Review of Systems:    Symptom Y/N Comments  Symptom Y/N Comments   Fever/Chills N   Chest Pain N    Poor Appetite N   Edema N    Cough N   Abdominal Pain N    Sputum N   Joint Pain N    SOB/GALAN N   Pruritis/Rash N    Nausea/vomit N   Tolerating PT/OT Y    Diarrhea N   Tolerating Diet Y    Constipation N   Other       Could NOT obtain due to:      Objective:      Physical Exam:    Last 24hrs VS reviewed since prior progress note.  Most recent are:    Visit Vitals  /70 (BP 1 Location: Left upper arm, BP Patient Position: At rest)   Pulse 92   Temp 97.4 °F (36.3 °C)   Resp 21   Ht 5' 3\" (1.6 m)   Wt 55.3 kg (122 lb)   SpO2 92%   Breastfeeding No   BMI 21.61 kg/m²     No intake or output data in the 24 hours ending 10/19/22 0813       General Appearance: Well developed, well nourished, alert & oriented x 3,    no acute distress. Ears/Nose/Mouth/Throat: Hearing grossly normal.  Neck: Supple. Chest: Lungs clear to auscultation bilaterally. Cardiovascular: Regular rate and rhythm, S1S2 normal, no murmur. Abdomen: Soft, non-tender, bowel sounds are active. Extremities: No edema bilaterally. Skin: Warm and dry. [x]         Post-cath site without hematoma, bruit, tenderness, or thrill. Distal pulses intact. PMH/ reviewed - no change compared to H&P    Data Review    Telemetry: sinus rhythm     EKG:   [x]  No new EKG for review    Lab Data Personally Reviewed:    Recent Labs     10/17/22  1026 10/17/22  0750   WBC 7.9 6.4   HGB 13.4 13.7   HCT 40.6 40.6    251       Recent Labs     10/18/22  0615 10/18/22  0004 10/17/22  1659   APTT 63.6* 55.9* 64.1*        Recent Labs     10/18/22  0615 10/17/22  1745 10/17/22  0750    139 137   K 4.2 3.0* 3.2*    102 100   CO2 26 26 29   BUN 16 14 16   CREA 1.00 0.99 0.99   GLU 98 155* 126*   CA 9.8 9.1 10.3*   MG 2.2  --  2.0       No results for input(s): CPK, CKNDX, TROIQ in the last 72 hours. No lab exists for component: CPKMB  Lab Results   Component Value Date/Time    Cholesterol, total 191 01/16/2018 01:38 AM    HDL Cholesterol 60 01/16/2018 01:38 AM    LDL, calculated 95.6 01/16/2018 01:38 AM    Triglyceride 177 (H) 01/16/2018 01:38 AM    CHOL/HDL Ratio 3.2 01/16/2018 01:38 AM       Recent Labs     10/17/22  0750   AP 75   TP 7.0   ALB 3.7   GLOB 3.3       No results for input(s): PH, PCO2, PO2 in the last 72 hours.     Medications Personally Reviewed:    Current Facility-Administered Medications   Medication Dose Route Frequency    atorvastatin (LIPITOR) tablet 40 mg  40 mg Oral QHS    heparin (porcine) injection 5,000 Units  5,000 Units SubCUTAneous Q12H    heparin 25,000 units in D5W 250 ml infusion  12-25 Units/kg/hr IntraVENous TITRATE    heparin (porcine) 1,000 unit/mL injection 1,660 Units  30 Units/kg IntraVENous PRN    Or    heparin (porcine) 1,000 unit/mL injection 3,320 Units  60 Units/kg IntraVENous PRN    dorzolamide (TRUSOPT) 2 % ophthalmic solution 1 Drop  1 Drop Right Eye BID    metoprolol tartrate (LOPRESSOR) tablet 25 mg  25 mg Oral DAILY    montelukast (SINGULAIR) tablet 10 mg  10 mg Oral DAILY    pantoprazole (PROTONIX) tablet 40 mg  40 mg Oral DAILY    sodium chloride (NS) flush 5-40 mL  5-40 mL IntraVENous Q8H    sodium chloride (NS) flush 5-40 mL  5-40 mL IntraVENous PRN    acetaminophen (TYLENOL) tablet 650 mg  650 mg Oral Q6H PRN    Or    acetaminophen (TYLENOL) suppository 650 mg  650 mg Rectal Q6H PRN    polyethylene glycol (MIRALAX) packet 17 g  17 g Oral DAILY PRN    ondansetron (ZOFRAN ODT) tablet 4 mg  4 mg Oral Q8H PRN    Or    ondansetron (ZOFRAN) injection 4 mg  4 mg IntraVENous Q6H PRN    albuterol-ipratropium (DUO-NEB) 2.5 MG-0.5 MG/3 ML  3 mL Nebulization Q4H RT    albuterol (PROVENTIL VENTOLIN) nebulizer solution 2.5 mg  2.5 mg Nebulization Q2H PRN    predniSONE (DELTASONE) tablet 40 mg  40 mg Oral DAILY WITH BREAKFAST    aspirin chewable tablet 81 mg  81 mg Oral DAILY    azithromycin (ZITHROMAX) tablet 250 mg  250 mg Oral DAILY    brimonidine (ALPHAGAN) 0.2 % ophthalmic solution 1 Drop  1 Drop Right Eye BID    latanoprost (XALATAN) 0.005 % ophthalmic solution 1 Drop  1 Drop Both Eyes QPM         Mao Chu III, DO

## 2022-10-19 NOTE — DISCHARGE INSTR - DIET
[address] [facility], [address]   [phone]  [address]    [email or web address]    Patient Discharge Instructions    [place] / [id number] : 1935    Admitted 10/17/2022 Discharged: 10/19/2022       It is important that you take the medication exactly as they are prescribed. Keep your medication in the bottles provided by the pharmacist and keep a list of the medication names, dosages, and times to be taken in your wallet. Do not take other medications without consulting your doctor. BRING ALL OF YOUR MEDICINES TO YOUR OFFICE VISIT with my nurse practitioner, Danish Vásquez. .    Follow-up with my nurse practitioner, Danish Vásquez in 2 weeks. Cardiac Catheterization  Discharge Instructions    Transradial Catheterization Discharge Instructions (WRIST)    Discharge instructions: Your radial artery in your wrist was used for your cardiac catheterization. This site may be slightly bruised and sore following your procedure. Expect mild tingling or the hand and tenderness at the puncture site for up to 3 days. Excess movement of the wrist used should be avoided for the next 24-48 hours. No lifting over 2 pounds (approximately a ½ gallon of milk) with this arm for 24 hours. Keep the site of the procedure covered with a bandage for 24 hours. You may shower the day after your procedure. Do not take a tub bath or submerge the puncture site in water for 48 hours. No heavy impact activity/lifting > 30 pounds for 1 week. If bleeding of the wrist occurs at home:   If the site on your wrist where you had the catheterization procedure begins to bleed, do not panic. Place 1 or 2 fingers over the puncture site and hold pressure to stop the bleeding. You may be able to feel your pulse as you hold pressure. Lift your fingers after 5 minutes to see if the bleeding has stopped. Once the bleeding has stopped, gently wipe the wrist area clean and cover with a bandage.      If the bleeding from your wrist does not stop after 15 minutes, or if there is a large amount of bleeding or spurting, call 911 immediately (do not drive yourself to the hospital). Other concerns: The site may be slightly bruised and sore following your procedure. Should any of the following occur, contact your physician immediately:   Any cool or coldness of the arm, discoloration over a large area, ongoing numbness or any abnormal sensations , moderate to severe pain or swelling in the arm. Redness, soreness, swelling, chills or fever, or colored drainage at the procedure site within 3-7 days after your procedure. If you have any further questions or concerns regarding your procedure please call the Cardiac Cath Lab office at [phone]. During regular business hours ask to speak to Dr. Yovany Parra. During non-business hours the answering service will answer. Ask to speak to the physician on call for [facility].     Transfemoral Catheterization Discharge Instructions (GROIN)    Do not drive, operate any machinery, or sign any legal documents for 24 hours after your procedure. You must have someone to drive you home. You may take a shower 24 hours after your cardiac catheterization. Be sure to get the dressing wet and then remove it; gently wash the area with warm soapy water. Pat dry and leave open to air. To help prevent infections, be sure to keep the cath site clean and dry. No lotions, creams, powders, ointments, etc. in the cath site for approximately 1 week. Do not take a tub bath, get in a hot tub or swimming pool for approximately 5 days or until the cath site is completely healed. No strenuous activity or heavy lifting over 10 lbs. for 7 days. Drink plenty of fluids for 24-48 hours after your cath to flush the contrast dye from your kidneys. No alcoholic beverages for 24 hours. You may resume your previous diet (low fat, low cholesterol) after your cath. After your cath, some bruising or discomfort is common during the healing process. Tylenol, 1-2 tablets every 6 hours as needed, is recommended if you experience any discomfort. If you experience any signs or symptoms of infection such as fever, chills, or poorly healing incision, persistent tenderness or swelling in the groin, redness and/or warmth to the touch, numbness, significant tingling or pain at the groin site or affected extremity, rash, drainage from the cath site, or if the leg feels tight or swollen, call your physician right away. If bleeding at the cath site occurs, take a clean gauze pad and apply direct pressure to the groin just above the puncture site. Call 911 immediately, and continue to apply direct pressure until an ambulance gets to your location. You may return to work  2  days after your cardiac cath if no groin bleeding. Information obtained by :  I understand that if any problems occur once I am at home I am to contact my physician. I understand and acknowledge receipt of the instructions indicated above. R.N.'s Signature                                                                  Date/Time                                                                                                                                              Patient or Representative Signature                                                          Date/Time      Cady Overton III, DO             [address]    [phone]  [facility] [address] [place]    [email or web address]

## 2022-10-19 NOTE — PROGRESS NOTES
ADULT PROTOCOL: JET AEROSOL ASSESSMENT    Patient  Jeovanny Hansen     80 y.o.   female     10/19/2022  1:42 AM    Breath Sounds Pre Procedure: Right Breath Sounds:  (increased aeration)                               Left Breath Sounds: Coarse, Expiratory wheezing (faint exp wheeze)    Breath Sounds Post Procedure: Right Breath Sounds: Expiratory wheezing                                 Left Breath Sounds: Expiratory wheezing    Breathing pattern: Pre procedure Breathing Pattern: Regular          Post procedure Breathing Pattern: Regular    Heart Rate: Pre procedure Pulse: 93           Post procedure Pulse: 97    Resp Rate: Pre procedure Respirations: 20           Post procedure Respirations: 22    Peak Flow: Pre bronchodilator             Post bronchodilator               Cough: Pre procedure Cough: Non-productive               Post procedure Cough: Non-productive, Dry    Suctioned: NO    Sputum: Pre procedure                   Post procedure      Oxygen: O2 Device: None (Room air)       Changed: NO    SpO2: Pre procedure SpO2: 95 %   without oxygen              Post procedure SpO2: 95 %  without oxygen    Nebulizer Therapy: Current medications Aerosolized Medications: DuoNeb      Changed: NO        Problem List:   Patient Active Problem List   Diagnosis Code    Abdominal mass R19.00    Essential hypertension, benign I10    Peritoneal cyst K66.8    Neuropathy G62.9    Restless leg syndrome G25.81    Primary open angle glaucoma of left eye, mild stage H40.1121    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

## 2022-10-19 NOTE — PROGRESS NOTES
1900: Received report from day shift nurse Naresh. Reviewed SBAR, Kardex, I/O, MAR, Procedural information and Recent results. VSS, NSR/Sinus Tach (rhythm), RA (oxygenation). A/O X 4  Pt resting in bed/sitting up in the bed. Family at bedside. Cath lab site: RG CDI, no bleeding/hematoma. Pt reporting no CP/SOB. 2200: RN discussed a new medication, Lipitor with the patient. Patient reports she does not remember her doctor talking to her about this medication. RN educated on the rationale for the medication. Patient is refusing the medication until she is able to speak with MD Chu. Documented in [facility]     [id number]: Pt is requesting to ambulate with the walker in the hallway. RN walked with the patient to determine gait. Pt is steady, up ad melissa with the walker. [facility]. Pt sat in the hallway with the nurse for about 30 minutes and then returned to bed. 0210: VSS, NSR/Sinus Tach, RA, Afebrile. 0758: Pt refusing lab work at this time. Patient reports that if her doctor (MD Shakeel Pascal) would have wanted lab work to be taken, they would have told her. She is oriented but sun-downing since 1900. RN educated the patient on the reason for lab work and how the doctor ordered lab work, but she is pleasantly refusing. RN will re-attempt to collect lab work in the morning. Pre-cath labs were all normal.     0330: Pt pacing in the room. Pt got dressed but agreed to keep EKG leads on. Pt says she is ready to go home but she knows she needs to wait to see MD Chu at 6.     0400: Pt is getting increasingly anxious to get home. Pt called security and 911 several times. Pt called the daughter to report concern with her safety. Pt reports to security that she is \"concerned for her safety here. \" RN reassured the patient she is safe, patient is getting increasingly paranoid. Pt is refusing her scheduled heparin and duo-neb.    [id number]: Patient is trying to leave.  Pt become aggressive with staff, resisting re-direction. Code atlas called for support. Supervisor and Hospital security at bedside to assist primary RN. Patient is very situationally confused. Pt is paranoid of all hospital staff. RN attempted to call the family member on file, but it was an incorrect number. Family, daughter and son, came to the room, having received the phone calls from the patients. RN discussed the situation with the family. Patient then became paranoid of family. Family at bedside to help the patient remain calm and willing to stay in the hospital. Patient is awaiting the report of the doctors so she can go home. 0730: Report given to [facility], RN.

## 2022-10-19 NOTE — PROGRESS NOTES
Comprehensive Nutrition Assessment    Type and Reason for Visit: Initial, Positive nutrition screen    Nutrition Recommendations/Plan:   Continue Cardiac diet. Please document % meals and supplements consumed in flowsheet I/O's under intake. Malnutrition Assessment:  Malnutrition Status:  Insufficient data (10/19/22 1305)      Nutrition Assessment:    10/19: Chart reviewed; med noted for respiratory failure on admission, STEMI, s/p cath. Pt tolerating a Cardiac diet. Noted discharge orders for today. Lab trends stable. No acute nutrition needs identified at this time. Nutrition Related Findings:    BM: 10/16; Labs: reviewed; Meds: reviewed Wound Type: None    Current Nutrition Intake & Therapies:        ADULT DIET Regular; Low Fat/Low Chol/High Fiber/2 gm Na    Anthropometric Measures:  Height: 5' 3\" (160 cm)  Ideal Body Weight (IBW): 115 lbs (52 kg)     Current Body Wt:  55.3 kg (121 lb 14.6 oz), 106 % IBW. Current BMI (kg/m2): 21.6                          BMI Category: Underweight (BMI less than 22) age over 72    Estimated Daily Nutrient Needs:  Energy Requirements Based On: Formula  Weight Used for Energy Requirements: Current  Energy (kcal/day): 1300 (BMR x 1. 3AF)  Weight Used for Protein Requirements: Current  Protein (g/day): 55-66 (1.0-1.2 g/kg bw)  Method Used for Fluid Requirements: 1 ml/kcal  Fluid (ml/day): 1300 ml/day    Nutrition Diagnosis:   Inadequate protein-energy intake related to  (decreased appetite) as evidenced by weight loss    Nutrition Interventions:   Food and/or Nutrient Delivery: Continue current diet  Nutrition Education/Counseling: No recommendations at this time  Coordination of Nutrition Care: Continue to monitor while inpatient       Goals:     Goals: PO intake 50% or greater, by next RD assessment       Nutrition Monitoring and Evaluation:   Behavioral-Environmental Outcomes: None identified  Food/Nutrient Intake Outcomes: Food and nutrient intake  Physical Signs/Symptoms Outcomes: Biochemical data, Skin, Weight    Discharge Planning:    Continue current diet    Vicky Martinez RD  Contact:

## 2022-10-20 ENCOUNTER — PATIENT OUTREACH (OUTPATIENT)
Dept: CASE MANAGEMENT | Age: 87
End: 2022-10-20

## 2022-10-20 NOTE — PROGRESS NOTES
Care Transitions Initial Call    Call within 2 business days of discharge: Yes     Patient: Alberto Kemp Patient : 1935 MRN: [id number]    Last Discharge  [address]       Date Complaint Diagnosis Description Type Department Provider    10/17/22 Shortness of Breath Acute respiratory failure with hypoxia ([facility] [address] . .. ED to Hosp-Admission (Discharged) (ADMIT) Gelacio Moreno MD; Charles Benoit, ... Was this an external facility discharge? No     Challenges to be reviewed by the provider   Additional needs identified to be addressed with provider:   OP Echo  C/o dry mouth     Method of communication with provider : none    Discussed COVID-19 related testing which was available at this time. Test results were negative. Patient informed of results, if available? yes     Advance Care Planning:   Does patient have an Advance Directive: not on file. Inpatient Readmission Risk score: Unplanned Readmit Risk Score: 12.4    Was this a readmission? no   Patient stated reason for the admission: sob    Patients top risk factors for readmission: medical condition-respiratory failure    Interventions to address risk factors: Scheduled appointment with PCP-10/24, Scheduled appointment with Specialist-will arrange fu Dr. Yovany Parra, and Obtained and reviewed discharge summary and/or continuity of care documents    Care Transition Nurse (CTN) contacted the  daughter, Lindsay Davidson, HIPAA verified dated 3/23/2022   by telephone to perform post hospital discharge assessment. Verified name and  with patient as identifiers. Provided introduction to self, and explanation of the CTN role. Reports fatigue. Ambulates with walker. Reports sob, but recovers with rest. Spot check oxygen saturation 94%. Tolerating PO. No bladder, bowel concerns. Recommended taking Lipitor at night. R groin site without evidence of infection.     CTN reviewed discharge instructions, medical action plan and red flags with patient who verbalized understanding. Were discharge instructions available to patient? yes. Reviewed appropriate site of care based on symptoms and resources available to patient including: PCP and Specialist. Patient given an opportunity to ask questions and does not have any further questions or concerns at this time. The patient agrees to contact the PCP office for questions related to their healthcare. Medication reconciliation was performed with patient, who verbalizes understanding of administration of home medications. Referral to Pharm D needed: no     Home Health/Outpatient orders at discharge: none    Durable Medical Equipment ordered at discharge: None    Was patient discharged with a pulse oximeter? no    Discussed follow-up appointments. If no appointment was previously scheduled, appointment scheduling offered: no. Is follow up appointment scheduled within 7 days of discharge? yes. [facility] follow up appointment(s): No future appointments. Non-[facility] follow up appointment(s): 10/24 Dr. Cari Joe for follow-up call in 5-7 days based on severity of symptoms and risk factors. Plan for next call: self management-sob, muscle-shaky and follow up appointment-pcp  CTN provided contact information for future needs. Goals Addressed                   This Visit's Progress     Prevent complications post hospitalization.           10/21/22  Absence of falls  Will complete abx/steroid therapy  Will monitor for worsening SOB and return to ER if needed  Will attend follow up appt with Dr. Summer Bose scheduled 10/24  Will arrange fu appt with Dr. Zak Fleming, cards  Pt will remain out of the hospital or ER for remainder of SALINA period

## 2022-10-20 NOTE — ROUTINE PROCESS
0730- Report received per Devan Galdamez for care. Pt denies CP SOB. Right groin CDI. 1030- Dtr at bedside. Pt with intermittant confusion and agitation and redirected. Amb in hallway. [phone]- Marcella given info per  Aneudy for assistance with living at home. 1- Dr Gisel hankins served and this nurse asked if steroids at discharge should be tapered. MD stated there was no need. This nurse verbalized to MD that nebs and steroids combined could be attributed to confusion and agitation. D/C instructions reviewed with dtr and pt. Both verbalized understanding. D/C to home via W/C.

## 2022-10-23 LAB
BACTERIA SPEC CULT: NORMAL
SERVICE CMNT-IMP: NORMAL

## 2022-10-26 ENCOUNTER — PATIENT OUTREACH (OUTPATIENT)
Dept: CASE MANAGEMENT | Age: 87
End: 2022-10-26

## 2022-10-26 NOTE — PROGRESS NOTES
Care Transitions Follow Up Call    Care Transition Nurse (CTN) contacted the patient by telephone to follow up after admission on 10/17/2022. Addressed changes since last contact:  cough improved  Follow up appointment completed? yes. Was follow up appointment scheduled within 7 days of discharge? yes. CTN reviewed medical action plan and red flags with patient and discussed any barriers to care and/or understanding of plan of care after discharge. Discussed appropriate site of care based on symptoms and resources available to patient including: Specialist.      Patients top risk factors for readmission:  NA    Interventions to address risk factors:  [address] Dr follow up appointment(s): No future appointments. Non-[facility] follow up appointment(s): KINZA    CTN provided contact information for future needs. Plan for follow-up call in 10-14 days based on severity of symptoms and risk factors. Plan for next call: symptom management-cough improved       Goals Addressed                   This Visit's Progress     Prevent complications post hospitalization.    On track       10/21/22  Absence of falls  Will complete abx/steroid therapy  Will monitor for worsening SOB and return to ER if needed  Will attend follow up appt with Dr. Africa Grijalva scheduled 10/24  Will arrange fu appt with sachin Vanessa  Pt will remain out of the hospital or ER for remainder of SAL[place] period    10/26/22  No falls  One more dose of abx  No worsening sob  Attended follow up appt with Dr. Africa Grijalva

## 2022-10-26 NOTE — PROGRESS NOTES
Physician Progress Note      PATIENT:               Jose L Long  CSN #:                  [id number]  :                       1935  ADMIT DATE:       10/17/2022 7:01 AM  DISCH DATE:        10/19/2022 1:15 PM  RESPONDING  PROVIDER #:        Rhonda Lee MD          QUERY TEXT:    Dr. Aury Ríos:    Patient admitted with SOB. Noted documentation of \"NSTEMI\" in DC Summary dated 10/19. 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 In order to support the diagnosis of \"NSTEMI\", please include additional clinical indicators in your documentation. Or please document if the diagnosis of \"NSTEMI\" has been ruled out after further study. The medical record reflects the following:  Risk Factors: Hx: HTN; HLD. ...... C/o SOB  Clinical Indicators: hr: 98; bp: 119/79. ....trop.hs: 407 ^ 3,612 ^ Lonie Levo ^ Army Amen ^ Khadijah Maikol ^ Scarlet Stains. ....... POC Lac acid: 3.16 ^ 3.30 ^ 2.94 ^ 4.51. ...... Lennie Remedies Per Cath Report: Post-procedure Diagnosis: No sig CAD, elevated LVEDP, TakoTsubo CM. .... Lennie Remedies Lennie Remedies Noted: No CP  Treatment: ASA; Heparin gtt; Serial Trop . HS.; Cards consult; Cardiac Cath; Cont. Metoprolol; Added Statin    Thank you,    Enriqueta Lanes  CDI  Options provided:  -- NSTEMI present as evidenced by, Please document evidence.   -- NSTEMI was ruled out  -- Other - I will add my own diagnosis  -- Disagree - Not applicable / Not valid  -- Disagree - Clinically unable to determine / Unknown  -- Refer to Clinical Documentation Reviewer    PROVIDER RESPONSE TEXT:    NSTEMI is present as evidenced by trop >4000    Query created by: Dwayne Méndez on 10/25/2022 12:07 PM      Electronically signed by:  Rhonda Lee MD 10/26/2022 1:19 PM

## 2022-11-09 ENCOUNTER — PATIENT OUTREACH (OUTPATIENT)
Dept: CASE MANAGEMENT | Age: 87
End: 2022-11-09

## 2022-11-09 NOTE — PROGRESS NOTES
Care Transitions Follow Up Call    Care Transition Nurse (CTN) contacted the patient by telephone to follow up after admission on 10/17/2022. Addressed changes since last contact: none    CTN reviewed medical action plan and red flags with patient and discussed any barriers to care and/or understanding of plan of care after discharge. Discussed appropriate site of care based on symptoms and resources available to patient including: Specialist.     Patients top risk factors for readmission:  NA    Interventions to address risk factors:  [facility] follow up appointment(s): No future appointments. Non-[facility] follow up appointment(s): 12/10 pulm    CTN provided contact information for future needs. No further follow-up call indicated based on severity of symptoms and risk factors. Goals Addressed                   This Visit's Progress     COMPLETED: Prevent complications post hospitalization.           10/21/22  Absence of falls  Will complete abx/steroid therapy  Will monitor for worsening SOB and return to ER if needed  Will attend follow up appt with Dr. Dariana Brooks scheduled 10/24  Will arrange fu appt with Dr. Yoandy Raza, cards  Pt will remain out of the hospital or ER for remainder of [place] period    10/26/22  No falls  One more dose of abx  No worsening sob  Attended follow up appt with Dr. Dariana Brooks

## 2022-11-21 ENCOUNTER — PATIENT OUTREACH (OUTPATIENT)
Dept: CASE MANAGEMENT | Age: 87
End: 2022-11-21

## 2022-12-24 ENCOUNTER — HOSPITAL ENCOUNTER (INPATIENT)
Age: 87
LOS: 4 days | Discharge: NURSING, REHAB OR LONG-TERM CARE | DRG: 193 | End: 2022-12-28
Attending: STUDENT IN AN ORGANIZED HEALTH CARE EDUCATION/TRAINING PROGRAM | Admitting: INTERNAL MEDICINE
Payer: MEDICARE

## 2022-12-24 ENCOUNTER — APPOINTMENT (OUTPATIENT)
Dept: CT IMAGING | Age: 87
DRG: 193 | End: 2022-12-24
Attending: STUDENT IN AN ORGANIZED HEALTH CARE EDUCATION/TRAINING PROGRAM
Payer: MEDICARE

## 2022-12-24 ENCOUNTER — APPOINTMENT (OUTPATIENT)
Dept: GENERAL RADIOLOGY | Age: 87
DRG: 193 | End: 2022-12-24
Attending: STUDENT IN AN ORGANIZED HEALTH CARE EDUCATION/TRAINING PROGRAM
Payer: MEDICARE

## 2022-12-24 DIAGNOSIS — J96.01 ACUTE RESPIRATORY FAILURE WITH HYPOXIA (HCC): ICD-10-CM

## 2022-12-24 DIAGNOSIS — J18.9 COMMUNITY ACQUIRED PNEUMONIA OF RIGHT UPPER LOBE OF LUNG: Primary | ICD-10-CM

## 2022-12-24 DIAGNOSIS — J45.901 ASTHMA WITH ACUTE EXACERBATION, UNSPECIFIED ASTHMA SEVERITY, UNSPECIFIED WHETHER PERSISTENT: ICD-10-CM

## 2022-12-24 PROBLEM — R06.02 SHORTNESS OF BREATH: Status: ACTIVE | Noted: 2022-12-24

## 2022-12-24 LAB
ALBUMIN SERPL-MCNC: 3.6 G/DL (ref 3.5–5)
ALBUMIN/GLOB SERPL: 1 {RATIO} (ref 1.1–2.2)
ALP SERPL-CCNC: 79 U/L (ref 45–117)
ALT SERPL-CCNC: 33 U/L (ref 12–78)
ANION GAP SERPL CALC-SCNC: 7 MMOL/L (ref 5–15)
AST SERPL-CCNC: 34 U/L (ref 15–37)
BASOPHILS # BLD: 0.1 K/UL (ref 0–0.1)
BASOPHILS NFR BLD: 1 % (ref 0–1)
BILIRUB SERPL-MCNC: 0.3 MG/DL (ref 0.2–1)
BNP SERPL-MCNC: 189 PG/ML
BUN SERPL-MCNC: 24 MG/DL (ref 6–20)
BUN/CREAT SERPL: 20 (ref 12–20)
CALCIUM SERPL-MCNC: 9.4 MG/DL (ref 8.5–10.1)
CHLORIDE SERPL-SCNC: 102 MMOL/L (ref 97–108)
CO2 SERPL-SCNC: 28 MMOL/L (ref 21–32)
COVID-19 RAPID TEST, COVR: NOT DETECTED
CREAT SERPL-MCNC: 1.23 MG/DL (ref 0.55–1.02)
DIFFERENTIAL METHOD BLD: ABNORMAL
EOSINOPHIL # BLD: 1.5 K/UL (ref 0–0.4)
EOSINOPHIL NFR BLD: 15 % (ref 0–7)
ERYTHROCYTE [DISTWIDTH] IN BLOOD BY AUTOMATED COUNT: 13.8 % (ref 11.5–14.5)
FLUAV AG NPH QL IA: NEGATIVE
FLUBV AG NOSE QL IA: NEGATIVE
GLOBULIN SER CALC-MCNC: 3.6 G/DL (ref 2–4)
GLUCOSE SERPL-MCNC: 168 MG/DL (ref 65–100)
HCT VFR BLD AUTO: 39.9 % (ref 35–47)
HGB BLD-MCNC: 12.9 G/DL (ref 11.5–16)
IMM GRANULOCYTES # BLD AUTO: 0 K/UL (ref 0–0.04)
IMM GRANULOCYTES NFR BLD AUTO: 0 % (ref 0–0.5)
LYMPHOCYTES # BLD: 3.7 K/UL (ref 0.8–3.5)
LYMPHOCYTES NFR BLD: 38 % (ref 12–49)
MCH RBC QN AUTO: 28.3 PG (ref 26–34)
MCHC RBC AUTO-ENTMCNC: 32.3 G/DL (ref 30–36.5)
MCV RBC AUTO: 87.5 FL (ref 80–99)
MONOCYTES # BLD: 1 K/UL (ref 0–1)
MONOCYTES NFR BLD: 10 % (ref 5–13)
NEUTS SEG # BLD: 3.6 K/UL (ref 1.8–8)
NEUTS SEG NFR BLD: 36 % (ref 32–75)
NRBC # BLD: 0 K/UL (ref 0–0.01)
NRBC BLD-RTO: 0 PER 100 WBC
PLATELET # BLD AUTO: 256 K/UL (ref 150–400)
PMV BLD AUTO: 10.3 FL (ref 8.9–12.9)
POTASSIUM SERPL-SCNC: 2.8 MMOL/L (ref 3.5–5.1)
PROT SERPL-MCNC: 7.2 G/DL (ref 6.4–8.2)
RBC # BLD AUTO: 4.56 M/UL (ref 3.8–5.2)
RBC MORPH BLD: ABNORMAL
SODIUM SERPL-SCNC: 137 MMOL/L (ref 136–145)
SOURCE, COVRS: NORMAL
TROPONIN-HIGH SENSITIVITY: 12 NG/L (ref 0–51)
WBC # BLD AUTO: 9.9 K/UL (ref 3.6–11)

## 2022-12-24 PROCEDURE — 77010033711 HC HIGH FLOW OXYGEN

## 2022-12-24 PROCEDURE — 93005 ELECTROCARDIOGRAM TRACING: CPT

## 2022-12-24 PROCEDURE — 71045 X-RAY EXAM CHEST 1 VIEW: CPT

## 2022-12-24 PROCEDURE — 71275 CT ANGIOGRAPHY CHEST: CPT

## 2022-12-24 PROCEDURE — 74011000636 HC RX REV CODE- 636: Performed by: INTERNAL MEDICINE

## 2022-12-24 PROCEDURE — 83880 ASSAY OF NATRIURETIC PEPTIDE: CPT

## 2022-12-24 PROCEDURE — 74011000250 HC RX REV CODE- 250: Performed by: STUDENT IN AN ORGANIZED HEALTH CARE EDUCATION/TRAINING PROGRAM

## 2022-12-24 PROCEDURE — 87635 SARS-COV-2 COVID-19 AMP PRB: CPT

## 2022-12-24 PROCEDURE — 74011250637 HC RX REV CODE- 250/637: Performed by: STUDENT IN AN ORGANIZED HEALTH CARE EDUCATION/TRAINING PROGRAM

## 2022-12-24 PROCEDURE — 74011250636 HC RX REV CODE- 250/636: Performed by: STUDENT IN AN ORGANIZED HEALTH CARE EDUCATION/TRAINING PROGRAM

## 2022-12-24 PROCEDURE — 94664 DEMO&/EVAL PT USE INHALER: CPT

## 2022-12-24 PROCEDURE — 36415 COLL VENOUS BLD VENIPUNCTURE: CPT

## 2022-12-24 PROCEDURE — 80053 COMPREHEN METABOLIC PANEL: CPT

## 2022-12-24 PROCEDURE — 65270000046 HC RM TELEMETRY

## 2022-12-24 PROCEDURE — 74011000250 HC RX REV CODE- 250: Performed by: INTERNAL MEDICINE

## 2022-12-24 PROCEDURE — 94762 N-INVAS EAR/PLS OXIMTRY CONT: CPT

## 2022-12-24 PROCEDURE — 84484 ASSAY OF TROPONIN QUANT: CPT

## 2022-12-24 PROCEDURE — 94640 AIRWAY INHALATION TREATMENT: CPT

## 2022-12-24 PROCEDURE — 87804 INFLUENZA ASSAY W/OPTIC: CPT

## 2022-12-24 PROCEDURE — 99285 EMERGENCY DEPT VISIT HI MDM: CPT

## 2022-12-24 PROCEDURE — 74011250637 HC RX REV CODE- 250/637: Performed by: INTERNAL MEDICINE

## 2022-12-24 PROCEDURE — 85025 COMPLETE CBC W/AUTO DIFF WBC: CPT

## 2022-12-24 RX ORDER — ONDANSETRON 4 MG/1
4 TABLET, ORALLY DISINTEGRATING ORAL
Status: DISCONTINUED | OUTPATIENT
Start: 2022-12-24 | End: 2022-12-28 | Stop reason: HOSPADM

## 2022-12-24 RX ORDER — MONTELUKAST SODIUM 10 MG/1
10 TABLET ORAL DAILY
Status: DISCONTINUED | OUTPATIENT
Start: 2022-12-25 | End: 2022-12-28 | Stop reason: HOSPADM

## 2022-12-24 RX ORDER — ONDANSETRON 2 MG/ML
4 INJECTION INTRAMUSCULAR; INTRAVENOUS
Status: DISCONTINUED | OUTPATIENT
Start: 2022-12-24 | End: 2022-12-28 | Stop reason: HOSPADM

## 2022-12-24 RX ORDER — ENOXAPARIN SODIUM 100 MG/ML
40 INJECTION SUBCUTANEOUS DAILY
Status: DISCONTINUED | OUTPATIENT
Start: 2022-12-25 | End: 2022-12-24 | Stop reason: DRUGHIGH

## 2022-12-24 RX ORDER — PANTOPRAZOLE SODIUM 40 MG/1
40 TABLET, DELAYED RELEASE ORAL DAILY
Status: DISCONTINUED | OUTPATIENT
Start: 2022-12-25 | End: 2022-12-28 | Stop reason: HOSPADM

## 2022-12-24 RX ORDER — DORZOLAMIDE HCL 20 MG/ML
1 SOLUTION/ DROPS OPHTHALMIC 2 TIMES DAILY
Status: DISCONTINUED | OUTPATIENT
Start: 2022-12-24 | End: 2022-12-28 | Stop reason: HOSPADM

## 2022-12-24 RX ORDER — SODIUM CHLORIDE 0.9 % (FLUSH) 0.9 %
5-40 SYRINGE (ML) INJECTION EVERY 8 HOURS
Status: DISCONTINUED | OUTPATIENT
Start: 2022-12-24 | End: 2022-12-28 | Stop reason: HOSPADM

## 2022-12-24 RX ORDER — INDAPAMIDE 2.5 MG/1
1.25 TABLET, FILM COATED ORAL DAILY
Status: DISCONTINUED | OUTPATIENT
Start: 2022-12-25 | End: 2022-12-28 | Stop reason: HOSPADM

## 2022-12-24 RX ORDER — BRIMONIDINE TARTRATE 2 MG/ML
1 SOLUTION/ DROPS OPHTHALMIC 2 TIMES DAILY
Status: DISCONTINUED | OUTPATIENT
Start: 2022-12-24 | End: 2022-12-28 | Stop reason: HOSPADM

## 2022-12-24 RX ORDER — AZITHROMYCIN 250 MG/1
500 TABLET, FILM COATED ORAL
Status: COMPLETED | OUTPATIENT
Start: 2022-12-25 | End: 2022-12-25

## 2022-12-24 RX ORDER — METOPROLOL TARTRATE 25 MG/1
25 TABLET, FILM COATED ORAL DAILY
Status: DISCONTINUED | OUTPATIENT
Start: 2022-12-25 | End: 2022-12-28 | Stop reason: HOSPADM

## 2022-12-24 RX ORDER — ENOXAPARIN SODIUM 100 MG/ML
30 INJECTION SUBCUTANEOUS DAILY
Status: DISCONTINUED | OUTPATIENT
Start: 2022-12-25 | End: 2022-12-28 | Stop reason: HOSPADM

## 2022-12-24 RX ORDER — IPRATROPIUM BROMIDE AND ALBUTEROL SULFATE 2.5; .5 MG/3ML; MG/3ML
3 SOLUTION RESPIRATORY (INHALATION)
Status: COMPLETED | OUTPATIENT
Start: 2022-12-24 | End: 2022-12-24

## 2022-12-24 RX ORDER — ACETAMINOPHEN 650 MG/1
650 SUPPOSITORY RECTAL
Status: DISCONTINUED | OUTPATIENT
Start: 2022-12-24 | End: 2022-12-28 | Stop reason: HOSPADM

## 2022-12-24 RX ORDER — IPRATROPIUM BROMIDE AND ALBUTEROL SULFATE 2.5; .5 MG/3ML; MG/3ML
3 SOLUTION RESPIRATORY (INHALATION)
Status: DISCONTINUED | OUTPATIENT
Start: 2022-12-24 | End: 2022-12-27

## 2022-12-24 RX ORDER — SODIUM CHLORIDE 0.9 % (FLUSH) 0.9 %
5-40 SYRINGE (ML) INJECTION AS NEEDED
Status: DISCONTINUED | OUTPATIENT
Start: 2022-12-24 | End: 2022-12-28 | Stop reason: HOSPADM

## 2022-12-24 RX ORDER — IPRATROPIUM BROMIDE AND ALBUTEROL SULFATE 2.5; .5 MG/3ML; MG/3ML
3 SOLUTION RESPIRATORY (INHALATION)
Status: DISCONTINUED | OUTPATIENT
Start: 2022-12-24 | End: 2022-12-24

## 2022-12-24 RX ORDER — ACETAMINOPHEN 325 MG/1
650 TABLET ORAL
Status: DISCONTINUED | OUTPATIENT
Start: 2022-12-24 | End: 2022-12-28 | Stop reason: HOSPADM

## 2022-12-24 RX ORDER — LORAZEPAM 0.5 MG/1
0.5 TABLET ORAL
Status: DISCONTINUED | OUTPATIENT
Start: 2022-12-24 | End: 2022-12-28 | Stop reason: HOSPADM

## 2022-12-24 RX ORDER — LATANOPROST 50 UG/ML
1 SOLUTION/ DROPS OPHTHALMIC
Status: DISCONTINUED | OUTPATIENT
Start: 2022-12-24 | End: 2022-12-28 | Stop reason: HOSPADM

## 2022-12-24 RX ORDER — POTASSIUM CHLORIDE 20 MEQ/1
40 TABLET, EXTENDED RELEASE ORAL
Status: COMPLETED | OUTPATIENT
Start: 2022-12-24 | End: 2022-12-24

## 2022-12-24 RX ORDER — POLYETHYLENE GLYCOL 3350 17 G/17G
17 POWDER, FOR SOLUTION ORAL DAILY PRN
Status: DISCONTINUED | OUTPATIENT
Start: 2022-12-24 | End: 2022-12-28 | Stop reason: HOSPADM

## 2022-12-24 RX ORDER — BENZONATATE 100 MG/1
100 CAPSULE ORAL
Status: DISCONTINUED | OUTPATIENT
Start: 2022-12-24 | End: 2022-12-28 | Stop reason: HOSPADM

## 2022-12-24 RX ADMIN — IPRATROPIUM BROMIDE AND ALBUTEROL SULFATE 3 ML: .5; 3 SOLUTION RESPIRATORY (INHALATION) at 20:42

## 2022-12-24 RX ADMIN — POTASSIUM CHLORIDE 40 MEQ: 1500 TABLET, EXTENDED RELEASE ORAL at 18:44

## 2022-12-24 RX ADMIN — IPRATROPIUM BROMIDE AND ALBUTEROL SULFATE 3 ML: .5; 3 SOLUTION RESPIRATORY (INHALATION) at 20:13

## 2022-12-24 RX ADMIN — LORAZEPAM 0.5 MG: 0.5 TABLET ORAL at 21:47

## 2022-12-24 RX ADMIN — BENZONATATE 100 MG: 100 CAPSULE ORAL at 20:22

## 2022-12-24 RX ADMIN — SODIUM CHLORIDE 1000 ML: 9 INJECTION, SOLUTION INTRAVENOUS at 18:44

## 2022-12-24 RX ADMIN — SODIUM CHLORIDE, PRESERVATIVE FREE 10 ML: 5 INJECTION INTRAVENOUS at 21:47

## 2022-12-24 RX ADMIN — IOPAMIDOL 70 ML: 755 INJECTION, SOLUTION INTRAVENOUS at 20:01

## 2022-12-24 RX ADMIN — IPRATROPIUM BROMIDE AND ALBUTEROL SULFATE 3 ML: .5; 3 SOLUTION RESPIRATORY (INHALATION) at 20:28

## 2022-12-24 RX ADMIN — METHYLPREDNISOLONE SODIUM SUCCINATE 125 MG: 125 INJECTION, POWDER, FOR SOLUTION INTRAMUSCULAR; INTRAVENOUS at 20:18

## 2022-12-25 LAB
ANION GAP SERPL CALC-SCNC: 10 MMOL/L (ref 5–15)
ATRIAL RATE: 91 BPM
ATRIAL RATE: 98 BPM
BUN SERPL-MCNC: 18 MG/DL (ref 6–20)
BUN/CREAT SERPL: 16 (ref 12–20)
CALCIUM SERPL-MCNC: 9.3 MG/DL (ref 8.5–10.1)
CALCULATED P AXIS, ECG09: 72 DEGREES
CALCULATED P AXIS, ECG09: 79 DEGREES
CALCULATED R AXIS, ECG10: 0 DEGREES
CALCULATED R AXIS, ECG10: 5 DEGREES
CALCULATED T AXIS, ECG11: 68 DEGREES
CHLORIDE SERPL-SCNC: 105 MMOL/L (ref 97–108)
CO2 SERPL-SCNC: 25 MMOL/L (ref 21–32)
CREAT SERPL-MCNC: 1.13 MG/DL (ref 0.55–1.02)
DIAGNOSIS, 93000: NORMAL
DIAGNOSIS, 93000: NORMAL
ERYTHROCYTE [DISTWIDTH] IN BLOOD BY AUTOMATED COUNT: 13.7 % (ref 11.5–14.5)
GLUCOSE BLD STRIP.AUTO-MCNC: 159 MG/DL (ref 65–117)
GLUCOSE BLD STRIP.AUTO-MCNC: 191 MG/DL (ref 65–117)
GLUCOSE SERPL-MCNC: 165 MG/DL (ref 65–100)
HCT VFR BLD AUTO: 40 % (ref 35–47)
HGB BLD-MCNC: 12.9 G/DL (ref 11.5–16)
MCH RBC QN AUTO: 27.7 PG (ref 26–34)
MCHC RBC AUTO-ENTMCNC: 32.3 G/DL (ref 30–36.5)
MCV RBC AUTO: 86 FL (ref 80–99)
NRBC # BLD: 0 K/UL (ref 0–0.01)
NRBC BLD-RTO: 0 PER 100 WBC
P-R INTERVAL, ECG05: 120 MS
P-R INTERVAL, ECG05: 154 MS
PLATELET # BLD AUTO: 247 K/UL (ref 150–400)
PMV BLD AUTO: 10.2 FL (ref 8.9–12.9)
POTASSIUM SERPL-SCNC: 3.7 MMOL/L (ref 3.5–5.1)
Q-T INTERVAL, ECG07: 338 MS
Q-T INTERVAL, ECG07: 366 MS
QRS DURATION, ECG06: 80 MS
QRS DURATION, ECG06: 94 MS
QTC CALCULATION (BEZET), ECG08: 431 MS
QTC CALCULATION (BEZET), ECG08: 450 MS
RBC # BLD AUTO: 4.65 M/UL (ref 3.8–5.2)
SERVICE CMNT-IMP: ABNORMAL
SERVICE CMNT-IMP: ABNORMAL
SODIUM SERPL-SCNC: 140 MMOL/L (ref 136–145)
TROPONIN-HIGH SENSITIVITY: 14 NG/L (ref 0–51)
VENTRICULAR RATE, ECG03: 91 BPM
VENTRICULAR RATE, ECG03: 98 BPM
WBC # BLD AUTO: 7.1 K/UL (ref 3.6–11)

## 2022-12-25 PROCEDURE — 74011250637 HC RX REV CODE- 250/637: Performed by: STUDENT IN AN ORGANIZED HEALTH CARE EDUCATION/TRAINING PROGRAM

## 2022-12-25 PROCEDURE — 36415 COLL VENOUS BLD VENIPUNCTURE: CPT

## 2022-12-25 PROCEDURE — 74011250636 HC RX REV CODE- 250/636: Performed by: STUDENT IN AN ORGANIZED HEALTH CARE EDUCATION/TRAINING PROGRAM

## 2022-12-25 PROCEDURE — 94640 AIRWAY INHALATION TREATMENT: CPT

## 2022-12-25 PROCEDURE — 74011250637 HC RX REV CODE- 250/637: Performed by: PHYSICIAN ASSISTANT

## 2022-12-25 PROCEDURE — 74011000258 HC RX REV CODE- 258: Performed by: INTERNAL MEDICINE

## 2022-12-25 PROCEDURE — 80048 BASIC METABOLIC PNL TOTAL CA: CPT

## 2022-12-25 PROCEDURE — 77010033678 HC OXYGEN DAILY

## 2022-12-25 PROCEDURE — 93005 ELECTROCARDIOGRAM TRACING: CPT

## 2022-12-25 PROCEDURE — 74011000258 HC RX REV CODE- 258: Performed by: STUDENT IN AN ORGANIZED HEALTH CARE EDUCATION/TRAINING PROGRAM

## 2022-12-25 PROCEDURE — 74011250637 HC RX REV CODE- 250/637: Performed by: INTERNAL MEDICINE

## 2022-12-25 PROCEDURE — 74011000250 HC RX REV CODE- 250: Performed by: INTERNAL MEDICINE

## 2022-12-25 PROCEDURE — 65270000046 HC RM TELEMETRY

## 2022-12-25 PROCEDURE — 85027 COMPLETE CBC AUTOMATED: CPT

## 2022-12-25 PROCEDURE — 84484 ASSAY OF TROPONIN QUANT: CPT

## 2022-12-25 PROCEDURE — 74011250636 HC RX REV CODE- 250/636: Performed by: INTERNAL MEDICINE

## 2022-12-25 PROCEDURE — 82962 GLUCOSE BLOOD TEST: CPT

## 2022-12-25 RX ORDER — GUAIFENESIN 600 MG/1
600 TABLET, EXTENDED RELEASE ORAL EVERY 12 HOURS
Status: DISCONTINUED | OUTPATIENT
Start: 2022-12-25 | End: 2022-12-28 | Stop reason: HOSPADM

## 2022-12-25 RX ORDER — LANOLIN ALCOHOL/MO/W.PET/CERES
3 CREAM (GRAM) TOPICAL
Status: DISCONTINUED | OUTPATIENT
Start: 2022-12-25 | End: 2022-12-28 | Stop reason: HOSPADM

## 2022-12-25 RX ORDER — GUAIFENESIN 100 MG/5ML
100 SOLUTION ORAL
Status: DISCONTINUED | OUTPATIENT
Start: 2022-12-25 | End: 2022-12-25

## 2022-12-25 RX ORDER — DIPHENHYDRAMINE HYDROCHLORIDE 50 MG/ML
25 INJECTION, SOLUTION INTRAMUSCULAR; INTRAVENOUS ONCE
Status: COMPLETED | OUTPATIENT
Start: 2022-12-25 | End: 2022-12-25

## 2022-12-25 RX ADMIN — GUAIFENESIN 600 MG: 600 TABLET, EXTENDED RELEASE ORAL at 21:43

## 2022-12-25 RX ADMIN — SODIUM CHLORIDE, PRESERVATIVE FREE 10 ML: 5 INJECTION INTRAVENOUS at 14:52

## 2022-12-25 RX ADMIN — SODIUM CHLORIDE 1 G: 900 INJECTION INTRAVENOUS at 09:29

## 2022-12-25 RX ADMIN — METOPROLOL TARTRATE 25 MG: 25 TABLET ORAL at 09:31

## 2022-12-25 RX ADMIN — METHYLPREDNISOLONE SODIUM SUCCINATE 40 MG: 40 INJECTION, POWDER, FOR SOLUTION INTRAMUSCULAR; INTRAVENOUS at 02:02

## 2022-12-25 RX ADMIN — METHYLPREDNISOLONE SODIUM SUCCINATE 40 MG: 40 INJECTION, POWDER, FOR SOLUTION INTRAMUSCULAR; INTRAVENOUS at 09:31

## 2022-12-25 RX ADMIN — BRIMONIDINE TARTRATE 1 DROP: 2 SOLUTION OPHTHALMIC at 09:34

## 2022-12-25 RX ADMIN — DORZOLAMIDE HYDROCHLORIDE 1 DROP: 20 SOLUTION/ DROPS OPHTHALMIC at 18:21

## 2022-12-25 RX ADMIN — DIPHENHYDRAMINE HYDROCHLORIDE 25 MG: 50 INJECTION, SOLUTION INTRAMUSCULAR; INTRAVENOUS at 16:24

## 2022-12-25 RX ADMIN — SODIUM CHLORIDE, PRESERVATIVE FREE 10 ML: 5 INJECTION INTRAVENOUS at 22:00

## 2022-12-25 RX ADMIN — INDAPAMIDE 1.25 MG: 2.5 TABLET, FILM COATED ORAL at 09:31

## 2022-12-25 RX ADMIN — MELATONIN 3 MG: at 02:02

## 2022-12-25 RX ADMIN — CEFTRIAXONE 2 G: 2 INJECTION, POWDER, FOR SOLUTION INTRAMUSCULAR; INTRAVENOUS at 00:22

## 2022-12-25 RX ADMIN — BRIMONIDINE TARTRATE 1 DROP: 2 SOLUTION OPHTHALMIC at 18:21

## 2022-12-25 RX ADMIN — DORZOLAMIDE HYDROCHLORIDE 1 DROP: 20 SOLUTION/ DROPS OPHTHALMIC at 09:36

## 2022-12-25 RX ADMIN — AZITHROMYCIN MONOHYDRATE 500 MG: 500 INJECTION, POWDER, LYOPHILIZED, FOR SOLUTION INTRAVENOUS at 10:57

## 2022-12-25 RX ADMIN — METHYLPREDNISOLONE SODIUM SUCCINATE 40 MG: 40 INJECTION, POWDER, FOR SOLUTION INTRAMUSCULAR; INTRAVENOUS at 14:24

## 2022-12-25 RX ADMIN — IPRATROPIUM BROMIDE AND ALBUTEROL SULFATE 3 ML: 2.5; .5 SOLUTION RESPIRATORY (INHALATION) at 03:53

## 2022-12-25 RX ADMIN — GUAIFENESIN 600 MG: 600 TABLET, EXTENDED RELEASE ORAL at 14:24

## 2022-12-25 RX ADMIN — IPRATROPIUM BROMIDE AND ALBUTEROL SULFATE 3 ML: 2.5; .5 SOLUTION RESPIRATORY (INHALATION) at 13:53

## 2022-12-25 RX ADMIN — AZITHROMYCIN 500 MG: 250 TABLET, FILM COATED ORAL at 00:22

## 2022-12-25 RX ADMIN — IPRATROPIUM BROMIDE AND ALBUTEROL SULFATE 3 ML: 2.5; .5 SOLUTION RESPIRATORY (INHALATION) at 18:08

## 2022-12-25 RX ADMIN — SODIUM CHLORIDE, PRESERVATIVE FREE 10 ML: 5 INJECTION INTRAVENOUS at 05:17

## 2022-12-25 RX ADMIN — GUAIFENESIN 100 MG: 200 SOLUTION ORAL at 02:02

## 2022-12-25 RX ADMIN — IPRATROPIUM BROMIDE AND ALBUTEROL SULFATE 3 ML: 2.5; .5 SOLUTION RESPIRATORY (INHALATION) at 10:10

## 2022-12-25 RX ADMIN — MONTELUKAST 10 MG: 10 TABLET, FILM COATED ORAL at 09:31

## 2022-12-25 RX ADMIN — LORAZEPAM 0.5 MG: 0.5 TABLET ORAL at 21:46

## 2022-12-25 NOTE — PROGRESS NOTES
P&T-Approved DVT Prophylaxis Dosing    Per P&T Committee-approved protocol 40 mg sq daily  has been adjusted to 30 mg sq daily  based on weight and renal function as shown in the table below.          Vira Fox, [facility] admission

## 2022-12-25 NOTE — PROGRESS NOTES
Care plan reviewed.     Problem: General Medical Care Plan  Goal: *Vital signs within specified parameters  Outcome: Progressing Towards Goal  Goal: *Labs within defined limits  Outcome: Progressing Towards Goal  Goal: *Absence of infection signs and symptoms  Outcome: Progressing Towards Goal  Goal: *Optimal pain control at patient's stated goal  Outcome: Progressing Towards Goal  Goal: *Skin integrity maintained  Outcome: Progressing Towards Goal  Goal: *Fluid volume balance  Outcome: Progressing Towards Goal  Goal: *Optimize nutritional status  Outcome: Progressing Towards Goal  Goal: *Anxiety reduced or absent  Outcome: Progressing Towards Goal  Goal: *Progressive mobility and function (eg: ADL's)  Outcome: Progressing Towards Goal

## 2022-12-25 NOTE — PROGRESS NOTES
2100: Patient admitted to room 2140 from ED. Patient A/Ox4, RASS 0. VS obtained, stable. Full head-to-toe skin assessment completed with second RN, Dasha Suarez; skin fully intact. Patient oriented to unit; plan of care discussed. Questions asked and answered. Call bell left within reach, will continue to monitor.

## 2022-12-25 NOTE — PROGRESS NOTES
RAPID RESPONSE:    0030: While rounding, primary nurse notified Rapid Response nurse of patient admitted tonight with pneumonia, c/o chest pain. Patient with intermittent strong non-productive cough. Peripheral tremors noted, patient reports feeling \"jittery. \" Has received duo-neb treatments as well as IV steroids. . Blood drawn for scheduled troponin, first troponin 12. EKG completed. No st or t wave changes noted, though poor quality r/t muscle tremors. Patient has history of several heart catheterizations, last in October which she reports have all been negative and she has had no stents placed. . Last heart cath 10/18/22 showed:   1. No angiographic epicardial coronary disease  2. Elevated LVEDP  3. 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 Mild MR    Z. KENYA Robertson at bedside. No additional orders at this time. Troponin pending. 0145: Noted repeat troponin 14.

## 2022-12-25 NOTE — CONSULTS
Pulmonary, Critical Care, and Sleep Medicine    Initial Patient Consult    Name: Herve Villaseñor MRN: [id number]   : 1935 Hospital: [place] [address]   Date: 2022              IMPRESSION:   Acute Hypoxic Respiratory Failure, pox of 80% on RA prior to arrival. Was increased to 6L when she presented to ER. Pneumonia-suspected acute community acquired pneumonia. Hx of Asthma. Acute wheezing. Allergic Rhinitis on Singulair. Reported allergy, intolerance to prednisone. No acute Pulmonary Embolus  Zenkers Diverticulum, Hiatal hernia  Hepatic Cysts. T12 compression   Desires to be full code. RECOMMENDATIONS:   ON Duonebs  ON Ctx, Azithryomycin  ON Solumedrol  Oyxgen to keep pox over 90%. Pulmonary: Chica Ozuna  PCP: Jorge L Serrato    Subjective:     CC: shortness of breath. This patient has been seen and evaluated at the request of Dr. Derrick Max for above. Patient is a 80 y.o. female who had acute shortness of breath. Has been with a dry cough. No chest pain, no myalgias or arthralgias. Has been with worsening dyspnea. Has some sputum production. Has some sinus congestion. Has been seen by DR. Chica Ozuna in past.   Has ongoing wheezing. NO GERD. NO aspiration. No leg pain or swelling.        Past Medical History:   Diagnosis Date    Adverse effect of anesthesia     combative waking up    Anxiety     Arthritis     Asthma     as of 16 pt states under control    Diverticulitis Dx 2016    Fibromyalgia     GERD (gastroesophageal reflux disease)     Glaucoma     Hiatal hernia     Hypercholesteremia     Hypertension     Ill-defined condition     neurapthy in ble    Nausea & vomiting       Past Surgical History:   Procedure Laterality Date    HX CATARACT REMOVAL Right 2017    HX HYSTERECTOMY      HX ROTATOR CUFF REPAIR Left     HX TUBAL LIGATION      ME COLONOSCOPY FLX DX W/COLLJ SPEC WHEN PFRMD  2011         ME EGD TRANSORAL BIOPSY SINGLE/MULTIPLE  2011 UPPER GI ENDOSCOPY,BIOPSY  8/20/2019           Prior to Admission medications    Medication Sig Start Date End Date Taking? Authorizing Provider   azithromycin (ZITHROMAX) 250 mg tablet Take as directed 10/20/22 10/22/22  Gareth Kaur MD   benzonatate (TESSALON) 100 mg capsule TAKE 1 CAPSULE BY MOUTH THREE TIMES A DAY AS NEEDED FOR COUGH 9/22/22   Provider, Historical   ondansetron hcl (Zofran) 4 mg tablet Take 1 Tablet by mouth every eight (8) hours as needed for Nausea. 8/1/22   Evan Dumont MD   senbal-docusate (PERICOLACE) 8.6-50 mg per tablet Take 1 Tab by mouth daily. Patient not taking: Reported on 3/23/2022 5/7/21   Magaly Lua NP   brimonidine 0.025 % drop Apply 1 Drop to eye two (2) times a day. Right eye    Provider, Historical   dorzolamide (TRUSOPT) 2 % ophthalmic solution Administer 1 Drop to right eye two (2) times a day. Provider, Historical   metoprolol tartrate (LOPRESSOR) 25 mg tablet Take 25 mg by mouth daily. Provider, Historical   indapamide (LOZOL) 1.25 mg tablet Take 1.25 mg by mouth daily. Provider, Historical   montelukast (SINGULAIR) 10 mg tablet Take 10 mg by mouth daily. Provider, Historical   pantoprazole (PROTONIX) 40 mg tablet Take 40 mg by mouth daily. Provider, Historical   bimatoprost (LUMIGAN) 0.01 % ophthalmic drops Administer 1 Drop to right eye every evening. Provider, Historical   multivitamin (ONE A DAY) tablet Take 1 Tab by mouth daily. Provider, Historical   albuterol (PROVENTIL HFA, VENTOLIN HFA, PROAIR HFA) 90 mcg/actuation inhaler Take 1 Puff by inhalation as needed for Wheezing. Provider, Historical   lorazepam (ATIVAN) 0.5 mg tablet Take 0.5 mg by mouth nightly.     Provider, Historical     Allergies   Allergen Reactions    Codeine Other (comments)    Cymbalta [Duloxetine] Nausea Only and Other (comments)     \"within the hour I had a severe headache, and vomiting\"      Demerol [Meperidine] Nausea and Vomiting    Lidocaine Other (comments)    Morphine Nausea and Vomiting    Novocain [Procaine] Other (comments)    Penicillins Other (comments)     rash    Percocet [Oxycodone-Acetaminophen] Nausea and Vomiting    Prednisone Rash    Sulfa (Sulfonamide Antibiotics) Other (comments)      Social History     Tobacco Use    Smoking status: Never    Smokeless tobacco: Never   Substance Use Topics    Alcohol use: No      No family history on file.      Current Facility-Administered Medications   Medication Dose Route Frequency    cefTRIAXone (ROCEPHIN) 1 g in 0.9% sodium chloride (MBP/ADV) 50 mL MBP  1 g IntraVENous Q24H    azithromycin (ZITHROMAX) 500 mg in 0.9% sodium chloride 250 mL (Roid4Dco)  500 mg IntraVENous Q24H    guaiFENesin ER (MUCINEX) tablet 600 mg  600 mg Oral Q12H    latanoprost (XALATAN) 0.005 % ophthalmic solution 1 Drop  1 Drop Right Eye QHS    brimonidine (ALPHAGAN) 0.2 % ophthalmic solution 1 Drop  1 Drop Right Eye BID    dorzolamide (TRUSOPT) 2 % ophthalmic solution 1 Drop  1 Drop Right Eye BID    indapamide (LOZOL) tablet 1.25 mg  1.25 mg Oral DAILY    LORazepam (ATIVAN) tablet 0.5 mg  0.5 mg Oral QHS    metoprolol tartrate (LOPRESSOR) tablet 25 mg  25 mg Oral DAILY    montelukast (SINGULAIR) tablet 10 mg  10 mg Oral DAILY    pantoprazole (PROTONIX) tablet 40 mg  40 mg Oral DAILY    sodium chloride (NS) flush 5-40 mL  5-40 mL IntraVENous Q8H    albuterol-ipratropium (DUO-NEB) 2.5 MG-0.5 MG/3 ML  3 mL Nebulization Q6H RT    methylPREDNISolone (PF) (SOLU-MEDROL) injection 40 mg  40 mg IntraVENous Q6H    enoxaparin (LOVENOX) injection 30 mg  30 mg SubCUTAneous DAILY       Review of Systems:  Constitutional: positive for fatigue  Eyes: negative  Ears, nose, mouth, throat, and face: positive for nasal congestion  Respiratory: positive for cough, sputum, wheezing, dyspnea on exertion, or chronic bronchitis  Cardiovascular: negative  Gastrointestinal: negative  Genitourinary:negative  Integument/breast: negative  Hematologic/lymphatic: negative  Musculoskeletal:negative  Neurological: negative  Behavioral/Psych: negative  Endocrine: negative  Allergic/Immunologic: negative    Objective:   Vital Signs:    Visit Vitals  /84   Pulse 77   Temp 97.5 °F (36.4 °C)   Resp 18   Ht 5' 3\" (1.6 m)   Wt 54.4 kg (120 lb)   SpO2 97%   BMI 21.26 kg/m²       O2 Device: Hi flow nasal cannula   O2 Flow Rate (L/min): 12 l/min   Temp (24hrs), Av.7 °F (36.5 °C), Min:97.3 °F (36.3 °C), Max:98.4 °F (36.9 °C)       Intake/Output:   Last shift:      No intake/output data recorded. Last 3 shifts: 1901 -  0700  In: 350 [P.O.:300; I.V.:50]  Out: 1200 [Urine:1200]    Intake/Output Summary (Last 24 hours) at 2022 1138  Last data filed at 2022 0624  Gross per 24 hour   Intake 350 ml   Output 1200 ml   Net -850 ml      Physical Exam: ON NC oxygen. General:  Alert, cooperative, no distress, appears stated age. Head:  Normocephalic, without obvious abnormality, atraumatic. Eyes:  Conjunctivae/corneas clear. PERRL, EOMs intact. Nose: Nares normal. Septum midline. Mucosa normal. No drainage or sinus tenderness. Throat: Lips, mucosa, and tongue normal. Teeth and gums normal.   Neck: Supple, symmetrical, trachea midline, no adenopathy, thyroid: no enlargment/tenderness/nodules, no carotid bruit and no JVD. Back:   Symmetric, no curvature. ROM normal.   Lungs:   Has bilateral wheezing, has a dry cough. Chest wall:  No tenderness or deformity. Heart:  Regular rate and rhythm, S1, S2 normal, no murmur, click, rub or gallop. Abdomen:   Soft, non-tender. Bowel sounds normal. No masses,  No organomegaly. Extremities: Extremities normal, atraumatic, no cyanosis or edema. Pulses: 2+ and symmetric all extremities. Skin: Skin color, texture, turgor normal. No rashes or lesions   Lymph nodes: Cervical, supraclavicular, and axillary nodes normal.   Neurologic: Grossly nonfocal, motor intact.       Data review:     Recent Results (from the past 24 hour(s))   EKG, 12 LEAD, INITIAL    Collection Time: 12/24/22  5:09 PM   Result Value Ref Range    Ventricular Rate 98 BPM    Atrial Rate 98 BPM    P-R Interval 154 ms    QRS Duration 80 ms    Q-T Interval 338 ms    QTC Calculation (Bezet) 431 ms    Calculated P Axis 79 degrees    Calculated R Axis 5 degrees    Calculated T Axis 68 degrees    Diagnosis       Sinus rhythm with premature supraventricular complexes and with occasional   premature ventricular complexes  Nonspecific ST and T wave abnormality  When compared with ECG of 17-OCT-2022 10:40,  premature ventricular complexes are now present  premature supraventricular complexes are now present  Left bundle branch block is no longer present     CBC WITH AUTOMATED DIFF    Collection Time: 12/24/22  5:18 PM   Result Value Ref Range    WBC 9.9 3.6 - 11.0 K/uL    RBC 4.56 3.80 - 5.20 M/uL    HGB 12.9 11.5 - 16.0 g/dL    HCT 39.9 35.0 - 47.0 %    MCV 87.5 80.0 - 99.0 FL    MCH 28.3 26.0 - 34.0 PG    MCHC 32.3 30.0 - 36.5 g/dL    RDW 13.8 11.5 - 14.5 %    PLATELET 602 609 - 937 K/uL    MPV 10.3 8.9 - 12.9 FL    NRBC 0.0 0  WBC    ABSOLUTE NRBC 0.00 0.00 - 0.01 K/uL    NEUTROPHILS 36 32 - 75 %    LYMPHOCYTES 38 12 - 49 %    MONOCYTES 10 5 - 13 %    EOSINOPHILS 15 (H) 0 - 7 %    BASOPHILS 1 0 - 1 %    IMMATURE GRANULOCYTES 0 0.0 - 0.5 %    ABS. NEUTROPHILS 3.6 1.8 - 8.0 K/UL    ABS. LYMPHOCYTES 3.7 (H) 0.8 - 3.5 K/UL    ABS. MONOCYTES 1.0 0.0 - 1.0 K/UL    ABS. EOSINOPHILS 1.5 (H) 0.0 - 0.4 K/UL    ABS. BASOPHILS 0.1 0.0 - 0.1 K/UL    ABS. IMM.  GRANS. 0.0 0.00 - 0.04 K/UL    DF SMEAR SCANNED      RBC COMMENTS NORMOCYTIC, NORMOCHROMIC     METABOLIC PANEL, COMPREHENSIVE    Collection Time: 12/24/22  5:18 PM   Result Value Ref Range    Sodium 137 136 - 145 mmol/L    Potassium 2.8 (L) 3.5 - 5.1 mmol/L    Chloride 102 97 - 108 mmol/L    CO2 28 21 - 32 mmol/L    Anion gap 7 5 - 15 mmol/L    Glucose 168 (H) 65 - 100 mg/dL    BUN 24 (H) 6 - 20 MG/DL Creatinine 1.23 (H) 0.55 - 1.02 MG/DL    BUN/Creatinine ratio 20 12 - 20      eGFR 43 (L) >60 ml/min/1.73m2    Calcium 9.4 8.5 - 10.1 MG/DL    Bilirubin, total 0.3 0.2 - 1.0 MG/DL    ALT (SGPT) 33 12 - 78 U/L    AST (SGOT) 34 15 - 37 U/L    Alk.  phosphatase 79 45 - 117 U/L    Protein, total 7.2 6.4 - 8.2 g/dL    Albumin 3.6 3.5 - 5.0 g/dL    Globulin 3.6 2.0 - 4.0 g/dL    A-G Ratio 1.0 (L) 1.1 - 2.2     TROPONIN-HIGH SENSITIVITY    Collection Time: 12/24/22  5:18 PM   Result Value Ref Range    Troponin-High Sensitivity 12 0 - 51 ng/L   NT-PRO BNP    Collection Time: 12/24/22  5:18 PM   Result Value Ref Range    NT pro- <450 PG/ML   COVID-19 RAPID TEST    Collection Time: 12/24/22  6:11 PM   Result Value Ref Range    Specimen source Nasopharyngeal      COVID-19 rapid test Not detected NOTD     INFLUENZA A+B VIRAL AGS    Collection Time: 12/24/22  6:11 PM   Result Value Ref Range    Influenza A Antigen Negative NEG      Influenza B Antigen Negative NEG     GLUCOSE, POC    Collection Time: 12/25/22 12:34 AM   Result Value Ref Range    Glucose (POC) 159 (H) 65 - 117 mg/dL    Performed by Vermarcion Grippe C    METABOLIC PANEL, BASIC    Collection Time: 12/25/22 12:35 AM   Result Value Ref Range    Sodium 140 136 - 145 mmol/L    Potassium 3.7 3.5 - 5.1 mmol/L    Chloride 105 97 - 108 mmol/L    CO2 25 21 - 32 mmol/L    Anion gap 10 5 - 15 mmol/L    Glucose 165 (H) 65 - 100 mg/dL    BUN 18 6 - 20 MG/DL    Creatinine 1.13 (H) 0.55 - 1.02 MG/DL    BUN/Creatinine ratio 16 12 - 20      eGFR 47 (L) >60 ml/min/1.73m2    Calcium 9.3 8.5 - 10.1 MG/DL   CBC W/O DIFF    Collection Time: 12/25/22 12:35 AM   Result Value Ref Range    WBC 7.1 3.6 - 11.0 K/uL    RBC 4.65 3.80 - 5.20 M/uL    HGB 12.9 11.5 - 16.0 g/dL    HCT 40.0 35.0 - 47.0 %    MCV 86.0 80.0 - 99.0 FL    MCH 27.7 26.0 - 34.0 PG    MCHC 32.3 30.0 - 36.5 g/dL    RDW 13.7 11.5 - 14.5 %    PLATELET 413 267 - 268 K/uL    MPV 10.2 8.9 - 12.9 FL    NRBC 0.0 0  WBC ABSOLUTE NRBC 0.00 0.00 - 0.01 K/uL   TROPONIN-HIGH SENSITIVITY    Collection Time: 12/25/22 12:35 AM   Result Value Ref Range    Troponin-High Sensitivity 14 0 - 51 ng/L       Imaging:  I have personally reviewed the patients radiographs and have reviewed the reports:  12-24-22 CT of chest:   FINDINGS:  Few ill defined subcentimeter opacities in the periphery of the right upper lobe  (image 78). Zenker's diverticulum. The pulmonary arteries are well enhanced and no pulmonary emboli are identified. There is no mediastinal or hilar adenopathy or mass. The aorta enhances normally  without evidence of aneurysm or dissection. The visualized portions of the upper abdominal organs are remarkable for a  hiatal hernia as well as stable left hepatic hypodensities, previously  determined to represent cysts. Mild superior endplate E63 wedge deformity, stable. IMPRESSION     Right upper lobe pneumonia versus alveolitis. No pulmonary embolism  Mild stable T12 wedge deformity.  Recommend DXA          Kacy Rene MD

## 2022-12-25 NOTE — PROGRESS NOTES
ADULT PROTOCOL: JET AEROSOL ASSESSMENT    Patient  Cornell      80 y.o.   female     12/25/2022  1:00 PM    Breath Sounds Pre Procedure: Right Breath Sounds: Diminished, Scattered wheezing                               Left Breath Sounds: Diminished, Scattered wheezing    Breath Sounds Post Procedure: Right Breath Sounds: Diminished, Scattered wheezing                                 Left Breath Sounds: Diminished, Scattered wheezing    Breathing pattern: Pre procedure Breathing Pattern: Regular          Post procedure Breathing Pattern: Regular    Heart Rate: Pre procedure Pulse: 74           Post procedure Pulse: 81    Resp Rate: Pre procedure Respirations: 19           Post procedure Respirations: 19      Oxygen: O2 Device: Hi flow nasal cannula        Changed: NO    SpO2: Pre procedure SpO2: 97 %   with oxygen              Post procedure SpO2: 100 %  with oxygen    Nebulizer Therapy: Current medications Aerosolized Medications: DuoNeb      Changed: NO    Problem List:   Patient Active Problem List   Diagnosis Code    Abdominal mass R19.00    Essential hypertension, benign I10    Peritoneal cyst K66.8    Neuropathy G62.9    Restless leg syndrome G25.81    Primary open angle glaucoma of left eye, mild stage H40.1121    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

## 2022-12-25 NOTE — ED PROVIDER NOTES
EMERGENCY DEPARTMENT HISTORY AND PHYSICAL EXAM      Date: 12/24/2022  Patient Name: Geovanny Mehta    History of Presenting Illness     Chief Complaint   Patient presents with    Shortness of Breath     Pt comes from home with complaints of SOB. Pt was 85% with a neb going when EMS arrived. Ems gave 5mg albuterol en route. History Provided By: Patient    HPI: Geovanny Mehta, 80 y.o. female with a past medical history significant for medical problems as stated below presents to the ED with cc of difficulty breathing that started today. She reports that she has been having shortness of breath. She has had some cough but it has not been productive. She denies any chest pain, myalgias, nausea, vomiting, or diarrhea. She reports that she is in short of breath like this before its been due to her asthma. She reports that she has been wheezing. In route to the hospital she was given albuterol nebulizer by EMS. They found her to be maintaining O2 sats in the mid 80s in route. She only improved to about 88% on 6 L nasal cannula. She reported that after the breathing treatment her wheezing felt improved. She says that she has an allergy to prednisone with a rash of her as with worsening of her glaucoma. There are no other associated symptoms. No other exacerbating or ameliorating factors. PCP: Senia Sharif MD    No current facility-administered medications on file prior to encounter. Current Outpatient Medications on File Prior to Encounter   Medication Sig Dispense Refill    azithromycin (ZITHROMAX) 250 mg tablet Take as directed 3 Tablet 0    benzonatate (TESSALON) 100 mg capsule TAKE 1 CAPSULE BY MOUTH THREE TIMES A DAY AS NEEDED FOR COUGH      ondansetron hcl (Zofran) 4 mg tablet Take 1 Tablet by mouth every eight (8) hours as needed for Nausea. 20 Tablet 0    senna-docusate (PERICOLACE) 8.6-50 mg per tablet Take 1 Tab by mouth daily.  (Patient not taking: Reported on 3/23/2022) 30 Tab 0 brimonidine 0.025 % drop Apply 1 Drop to eye two (2) times a day. Right eye      dorzolamide (TRUSOPT) 2 % ophthalmic solution Administer 1 Drop to right eye two (2) times a day. metoprolol tartrate (LOPRESSOR) 25 mg tablet Take 25 mg by mouth daily. indapamide (LOZOL) 1.25 mg tablet Take 1.25 mg by mouth daily. montelukast (SINGULAIR) 10 mg tablet Take 10 mg by mouth daily. pantoprazole (PROTONIX) 40 mg tablet Take 40 mg by mouth daily. bimatoprost (LUMIGAN) 0.01 % ophthalmic drops Administer 1 Drop to right eye every evening.      multivitamin (ONE A DAY) tablet Take 1 Tab by mouth daily. albuterol (PROVENTIL HFA, VENTOLIN HFA, PROAIR HFA) 90 mcg/actuation inhaler Take 1 Puff by inhalation as needed for Wheezing.      lorazepam (ATIVAN) 0.5 mg tablet Take 0.5 mg by mouth nightly. Past History     Past Medical History:  Past Medical History:   Diagnosis Date    Adverse effect of anesthesia     combative waking up    Anxiety     Arthritis     Asthma     as of 12/30/16 pt states under control    Diverticulitis Dx 11/2016    Fibromyalgia     GERD (gastroesophageal reflux disease)     Glaucoma     Hiatal hernia     Hypercholesteremia     Hypertension     Ill-defined condition     neurapthy in ble    Nausea & vomiting      Past Surgical History:  Past Surgical History:   Procedure Laterality Date    HX CATARACT REMOVAL Right 01/04/2017    HX HYSTERECTOMY      HX ROTATOR CUFF REPAIR Left 2008    HX TUBAL LIGATION      [place] COLONOSCOPY FLX DX W/COLLJ SPEC WHEN PFRMD  11/9/2011         [place] EGD TRANSORAL BIOPSY SINGLE/MULTIPLE  11/9/2011         UPPER GI ENDOSCOPY,BIOPSY  8/20/2019            Family History:  No family history on file. Social History:  Social History     Tobacco Use    Smoking status: Never    Smokeless tobacco: Never   Substance Use Topics    Alcohol use: No    Drug use: No       Allergies:   Allergies   Allergen Reactions    Codeine Other (comments)    Cymbalta [Duloxetine] Nausea Only and Other (comments)     \"within the hour I had a severe headache, and vomiting\"      Demerol [Meperidine] Nausea and Vomiting    Lidocaine Other (comments)    Morphine Nausea and Vomiting    Novocain [Procaine] Other (comments)    Penicillins Other (comments)     rash    Percocet [Oxycodone-Acetaminophen] Nausea and Vomiting    Prednisone Rash    Sulfa (Sulfonamide Antibiotics) Other (comments)         Review of Systems   Review of Systems   Constitutional:  Negative for activity change, chills and fever. HENT:  Negative for sore throat. Respiratory:  Positive for cough and shortness of breath. Cardiovascular:  Negative for chest pain. Gastrointestinal:  Negative for abdominal pain, nausea and vomiting. Genitourinary:  Negative for difficulty urinating, dysuria and hematuria. Musculoskeletal:  Negative for myalgias. Skin:  Negative for color change. Neurological:  Negative for dizziness, weakness and headaches. Psychiatric/Behavioral:  Negative for agitation. Physical Exam   Physical Exam  Constitutional:       Appearance: Normal appearance. HENT:      Head: Normocephalic and atraumatic. Mouth/Throat:      Mouth: Mucous membranes are moist.   Eyes:      Pupils: Pupils are equal, round, and reactive to light. Cardiovascular:      Rate and Rhythm: Normal rate and regular rhythm. Heart sounds: Normal heart sounds. Pulmonary:      Effort: Pulmonary effort is normal.      Breath sounds: Normal breath sounds. Comments: Rhonchi on right  End expiratory wheezing throughout, good air movement  No acc muscle use  Requiring 15L via HFNC  Abdominal:      General: Abdomen is flat. Bowel sounds are normal.      Tenderness: There is no abdominal tenderness. There is no guarding or rebound. Musculoskeletal:         General: Normal range of motion. Cervical back: Normal range of motion and neck supple. Right lower leg: No edema.       Left lower leg: No edema. Skin:     General: Skin is warm and dry. Capillary Refill: Capillary refill takes less than 2 seconds. Neurological:      General: No focal deficit present. Mental Status: She is alert. Diagnostic Study Results     Labs -     Recent Results (from the past 24 hour(s))   EKG, 12 LEAD, INITIAL    Collection Time: 12/24/22  5:09 PM   Result Value Ref Range    Ventricular Rate 98 BPM    Atrial Rate 98 BPM    P-R Interval 154 ms    QRS Duration 80 ms    Q-T Interval 338 ms    QTC Calculation (Bezet) 431 ms    Calculated P Axis 79 degrees    Calculated R Axis 5 degrees    Calculated T Axis 68 degrees    Diagnosis       Sinus rhythm with premature supraventricular complexes and with occasional   premature ventricular complexes  Nonspecific ST and T wave abnormality  When compared with ECG of 17-OCT-2022 10:40,  premature ventricular complexes are now present  premature supraventricular complexes are now present  Left bundle branch block is no longer present     CBC WITH AUTOMATED DIFF    Collection Time: 12/24/22  5:18 PM   Result Value Ref Range    WBC 9.9 3.6 - 11.0 K/uL    RBC 4.56 3.80 - 5.20 M/uL    HGB 12.9 11.5 - 16.0 g/dL    HCT 39.9 35.0 - 47.0 %    MCV 87.5 80.0 - 99.0 FL    MCH 28.3 26.0 - 34.0 PG    MCHC 32.3 30.0 - 36.5 g/dL    RDW 13.8 11.5 - 14.5 %    PLATELET 178 944 - 442 K/uL    MPV 10.3 8.9 - 12.9 FL    NRBC 0.0 0  WBC    ABSOLUTE NRBC 0.00 0.00 - 0.01 K/uL    NEUTROPHILS 36 32 - 75 %    LYMPHOCYTES 38 12 - 49 %    MONOCYTES 10 5 - 13 %    EOSINOPHILS 15 (H) 0 - 7 %    BASOPHILS 1 0 - 1 %    IMMATURE GRANULOCYTES 0 0.0 - 0.5 %    ABS. NEUTROPHILS 3.6 1.8 - 8.0 K/UL    ABS. LYMPHOCYTES 3.7 (H) 0.8 - 3.5 K/UL    ABS. MONOCYTES 1.0 0.0 - 1.0 K/UL    ABS. EOSINOPHILS 1.5 (H) 0.0 - 0.4 K/UL    ABS. BASOPHILS 0.1 0.0 - 0.1 K/UL    ABS. IMM.  GRANS. 0.0 0.00 - 0.04 K/UL    DF SMEAR SCANNED      RBC COMMENTS NORMOCYTIC, NORMOCHROMIC     METABOLIC PANEL, COMPREHENSIVE Collection Time: 12/24/22  5:18 PM   Result Value Ref Range    Sodium 137 136 - 145 mmol/L    Potassium 2.8 (L) 3.5 - 5.1 mmol/L    Chloride 102 97 - 108 mmol/L    CO2 28 21 - 32 mmol/L    Anion gap 7 5 - 15 mmol/L    Glucose 168 (H) 65 - 100 mg/dL    BUN 24 (H) 6 - 20 MG/DL    Creatinine 1.23 (H) 0.55 - 1.02 MG/DL    BUN/Creatinine ratio 20 12 - 20      eGFR 43 (L) >60 ml/min/1.73m2    Calcium 9.4 8.5 - 10.1 MG/DL    Bilirubin, total 0.3 0.2 - 1.0 MG/DL    ALT (SGPT) 33 12 - 78 U/L    AST (SGOT) 34 15 - 37 U/L    Alk. phosphatase 79 45 - 117 U/L    Protein, total 7.2 6.4 - 8.2 g/dL    Albumin 3.6 3.5 - 5.0 g/dL    Globulin 3.6 2.0 - 4.0 g/dL    A-G Ratio 1.0 (L) 1.1 - 2.2     TROPONIN-HIGH SENSITIVITY    Collection Time: 12/24/22  5:18 PM   Result Value Ref Range    Troponin-High Sensitivity 12 0 - 51 ng/L   NT-PRO BNP    Collection Time: 12/24/22  5:18 PM   Result Value Ref Range    NT pro- <450 PG/ML   COVID-19 RAPID TEST    Collection Time: 12/24/22  6:11 PM   Result Value Ref Range    Specimen source Nasopharyngeal      COVID-19 rapid test Not detected NOTD     INFLUENZA A+B VIRAL AGS    Collection Time: 12/24/22  6:11 PM   Result Value Ref Range    Influenza A Antigen Negative NEG      Influenza B Antigen Negative NEG         Radiologic Studies -   CTA CHEST W OR W WO CONT   Final Result      Right upper lobe pneumonia versus alveolitis. No pulmonary embolism   Mild stable T12 wedge deformity. Recommend DXA      XR CHEST PORT   Final Result   No acute cardiopulmonary findings. CT Results  (Last 48 hours)                 12/24/22 2001  CTA CHEST W OR W WO CONT Final result    Impression:      Right upper lobe pneumonia versus alveolitis. No pulmonary embolism   Mild stable T12 wedge deformity. Recommend DXA       Narrative:  EXAM: CTA CHEST W OR W WO CONT       INDICATION: Hypoxia. Evaluation for pulmonary embolism        COMPARISON: 9/29/2022. CONTRAST: 100 mL of Isovue-370. TECHNIQUE:    Precontrast  images were obtained to localize the volume for acquisition. Multislice helical CT arteriography was performed from the diaphragm to the   thoracic inlet during uneventful rapid bolus intravenous contrast   administration. Lung and soft tissue windows were generated. Coronal and   sagittal images were generated and 3D post processing consisting of coronal   maximum intensity images was performed. CT dose reduction was achieved through   use of a standardized protocol tailored for this examination and automatic   exposure control for dose modulation. FINDINGS:   Few ill defined subcentimeter opacities in the periphery of the right upper lobe   (image 66). Zenker's diverticulum. The pulmonary arteries are well enhanced and no pulmonary emboli are identified. There is no mediastinal or hilar adenopathy or mass. The aorta enhances normally   without evidence of aneurysm or dissection. The visualized portions of the upper abdominal organs are remarkable for a   hiatal hernia as well as stable left hepatic hypodensities, previously   determined to represent cysts. Mild superior endplate S70 wedge deformity, stable. CXR Results  (Last 48 hours)                 12/24/22 1806  XR CHEST PORT Final result    Impression:  No acute cardiopulmonary findings. Narrative:  EXAM: XR CHEST PORT       DATE: 12/24/2022 6:06 PM       INDICATION: Shortness of breath       COMPARISON: Chest October 17, 2022       FINDINGS: AP portable chest radiograph. The heart size is normal. Lungs are   well-inflated. Mild opacity along the LEFT hemidiaphragm favors atelectasis. No   consolidation is seen. The vascular clarity is normal. There is no evidence of   effusion or pneumothorax. Medical Decision Making   I am the first provider for this patient.     I reviewed the vital signs, available nursing notes, past medical history, past surgical history, family history and social history. Vital Signs-Reviewed the patient's vital signs. Patient Vitals for the past 12 hrs:   Temp Pulse Resp BP SpO2   12/24/22 2130 97.6 °F (36.4 °C) 92 19 139/64 95 %   12/24/22 2015 -- -- -- -- 93 %   12/24/22 2012 -- -- -- (!) 148/97 --   12/24/22 1823 -- 84 30 (!) 123/106 --   12/24/22 1808 -- 81 20 (!) 131/56 --   12/24/22 1753 -- 80 19 (!) 129/111 92 %   12/24/22 1738 -- 89 16 (!) 141/69 94 %   12/24/22 1723 -- 94 21 136/77 (!) 86 %   12/24/22 1716 -- -- -- -- 90 %   12/24/22 1712 98.4 °F (36.9 °C) 96 19 (!) 144/95 (!) 85 %   12/24/22 1708 -- -- -- -- (!) 82 %   12/24/22 1707 -- (!) 101 16 -- (!) 82 %       Records Reviewed: Nursing records and medical records reviewed    EKG as interpreted by me with sinus rhythm, heart rate 98, normal axis, normal intervals, nonspecific ST changes    Medical Decision Making  Patient presents the emergency department with acute shortness of breath that started today. He has nonproductive cough as well but no fever. Does have asymmetric lung findings on exam suggest pneumonia. CTA was ordered due to the patient's profound hypoxia on exam.  There is no pulmonary embolism, but that was right upper lobe pneumonia. Antibiotics were ordered after she was admitted to hospital medicine service with IV ceftriaxone and p.o. azithromycin. She was treated in the emergency department with high flow nasal cannula. She was given 125 mg IV Methylpred this alone and serial DuoNeb treatments. She improved significantly when her oxygen saturation came up with high flow nasal cannula. She was admitted in stable condition. Trop negative and EKG nonischemic - doubt ACS. BNP negative and no pulmonary edema - doubt heart failure. Nontoxic appearing - doubt sepsis or systemic infection. ED Course:   Initial assessment performed.  The patients presenting problems have been discussed, and they are in agreement with the care plan formulated and outlined with them. I have encouraged them to ask questions as they arise throughout their visit. ED Course as of 12/24/22 2329   Sat Dec 24, 2022   1719 I now [WB]   1941 Patient still has not received her CTA. She feels much better now on high flow nasal cannula. She is on 15 L and satting 98%. She does not appear to be in any respiratory distress. Reevaluated her lungs and she does have expiratory wheezing throughout, but good air movement. Will order serial duo nebs now. Have ordered p.o. potassium repletion for the patient as well as IV fluids for her slight AMY. No indication for antibiotics at this time, and her symptoms may all be due to COPD exacerbation. She states that she has significant rash to her face when she has prednisone so have held off on steroids at this point.   Patient has been admitted to the medicine service for further evaluation. [WB]      ED Course User Index  [WB] Yue Anton MD       Medications Administered       albuterol-ipratropium (DUO-NEB) 2.5 MG-0.5 MG/3 ML       Admin Date  12/24/2022 Action  Given Dose  3 mL Route  Nebulization Administered By  Maria Eugenia Castillo, RT               Admin Date  12/24/2022 Action  Given Dose  3 mL Route  Nebulization Administered By  Maria Eugenia Castillo, RT               Admin Date  12/24/2022 Action  Given Dose  3 mL Route  Nebulization Administered By  Maria Eugenia Castillo,               benzonatate (TESSALON) capsule 100 mg       Admin Date  12/24/2022 Action  Given Dose  100 mg Route  Oral Administered By  Evelyn Bradshaw RN              iopamidoL (ISOVUE-370) 370 mg iodine /mL (76 %) injection 100 mL       Admin Date  12/24/2022 Action  Given Dose  70 mL Route  IntraVENous Administered By  Sole Carson              LORazepam (ATIVAN) tablet 0.5 mg       Admin Date  12/24/2022 Action  Given Dose  0.5 mg Route  Oral Administered By  Claudia Roe RN              methylPREDNISolone (PF) (Solu-MEDROL) injection 125 mg       Admin Date  12/24/2022 Action  Given Dose  125 mg Route  IntraVENous Administered By  Lei Richardson RN              please enter the patient's weight in the chart. Admin Date  12/24/2022 Action  Given Dose  1 Each Route  Other Administered By  Radha Reyes RN              potassium chloride (K-DUR, KLOR-CON M20) SR tablet 40 mEq       Admin Date  12/24/2022 Action  Given Dose  40 mEq Route  Oral Administered By  Becky Russell RN              sodium chloride (NS) flush 5-40 mL       Admin Date  12/24/2022 Action  Given Dose  10 mL Route  IntraVENous Administered By  Radha Reyes RN              sodium chloride 0.9 % bolus infusion 1,000 mL       Admin Date  12/24/2022 Action  New Bag Dose  1,000 mL Route  IntraVENous Administered By  Becky Russell RN                  Critical Care:  I have spent 33 minutes of critical care time in evaluating and treating this patient. This includes time spent at bedside, time with family and decision makers, documentation, review of labs and imaging, and/or consultation with specialists. It does not include time spent on separately billed procedures. This patient presents with a critical illness or injury that acutely impairs one or more vital organ systems such that there is a high probability of imminent or life threatening deterioration in the patient's condition. This case involved decision making of high complexity to assess, manipulate, and support vital organ system failure and/or to prevent further life threatening deterioration of the patient's condition. Failure to initiate these interventions on an urgent basis would likely result in sudden, clinically significant or life threatening deterioration in the patient's condition.     Abnormal findings supporting critical care: right upper lobe pneumonia, acute hypoxic respiratory failure  Interventions to support critical care: high flow oxygen, serial bronchodilators, IV abx  Failure to intervene may result in: worsened respiratory failure    Disposition:  Admission    Diagnosis     Clinical Impression:   1. Community acquired pneumonia of right upper lobe of lung    2. Asthma with acute exacerbation, unspecified asthma severity, unspecified whether persistent    3. Acute respiratory failure with hypoxia (HCC)        Attestations:    Kyrie Chowdary MD    Please note that this dictation was completed with [employer], the computer voice recognition software. Quite often unanticipated grammatical, syntax, homophones, and other interpretive errors are inadvertently transcribed by the computer software. Please disregard these errors. Please excuse any errors that have escaped final proofreading. Thank you.

## 2022-12-25 NOTE — H&P
Hospitalist Admission Note    NAME: Mohinder Stinson   :  1935   MRN:  [id number]     Date/Time:  2022 12:01 AM    Patient PCP: Rubie Phoenix, MD  ______________________________________________________________________  Given the patient's current clinical presentation, I have a high level of concern for decompensation if discharged from the emergency department. Complex decision making was performed, which includes reviewing the patient's available past medical records, laboratory results, and x-ray films. My assessment of this patient's clinical condition and my plan of care is as follows. Assessment / Plan:  Community-acquired pneumonia  Acute asthma exacerbation   acute hypoxic respiratory failure due to 1  -Start ceftriaxone and Zithromax. CT shows right upper lobe pneumonia.  -Continue oxygen by nasal cannula, currently on 4 L and saturating 94%  -Start Solu-Medrol, DuoNeb. Continue home inhalers. Hypertension  Glaucoma  Anxiety  Fibromyalgia  Dyslipidemia  -Continue home metoprolol, indapamide  -Continue home eyedrops  -Continue home Ativan    Code Status: Full code  Surrogate Decision Maker:    DVT Prophylaxis: Lovenox  GI Prophylaxis: not indicated    Baseline: From home independent of ADLs      Subjective:   CHIEF COMPLAINT: Shortness of breath    HISTORY OF PRESENT ILLNESS:     Param Duffy is a 80 y.o.  female who presents with past medical history of asthma, GERD is coming the hospital chief complaint of shortness of breath, cough and phlegm. Patient reports being usual until about 3 days ago when she started having shortness of breath along with some cough and also whitish phlegm. Reports mild fever. No chills. Does not report any chest pain. No abdominal pain, nausea or vomiting. On arrival to ED, noted to have stable vitals. She was hypoxic with saturations of about 82% on room air. On labs CBC is normal.  Potassium is 2.8, creatinine 1.23.   LFTs are normal.  Troponin is 12.  proBNP is 189. CTA shows right upper lobe pneumonia    We were asked to admit for work up and evaluation of the above problems. Past Medical History:   Diagnosis Date    Adverse effect of anesthesia     combative waking up    Anxiety     Arthritis     Asthma     as of 12/30/16 pt states under control    Diverticulitis Dx 11/2016    Fibromyalgia     GERD (gastroesophageal reflux disease)     Glaucoma     Hiatal hernia     Hypercholesteremia     Hypertension     Ill-defined condition     neurapthy in ble    Nausea & vomiting         Past Surgical History:   Procedure Laterality Date    HX CATARACT REMOVAL Right 01/04/2017    HX HYSTERECTOMY      HX ROTATOR CUFF REPAIR Left 2008    HX TUBAL LIGATION      MN COLONOSCOPY FLX DX W/COLLJ SPEC WHEN PFRMD  11/9/2011         MN EGD TRANSORAL BIOPSY SINGLE/MULTIPLE  11/9/2011         UPPER GI ENDOSCOPY,BIOPSY  8/20/2019            Social History     Tobacco Use    Smoking status: Never    Smokeless tobacco: Never   Substance Use Topics    Alcohol use: No      Family history  No CAD      Allergies   Allergen Reactions    Codeine Other (comments)    Cymbalta [Duloxetine] Nausea Only and Other (comments)     \"within the hour I had a severe headache, and vomiting\"      Demerol [Meperidine] Nausea and Vomiting    Lidocaine Other (comments)    Morphine Nausea and Vomiting    Novocain [Procaine] Other (comments)    Penicillins Other (comments)     rash    Percocet [Oxycodone-Acetaminophen] Nausea and Vomiting    Prednisone Rash    Sulfa (Sulfonamide Antibiotics) Other (comments)        Prior to Admission medications    Medication Sig Start Date End Date Taking?  Authorizing Provider   azithromycin (ZITHROMAX) 250 mg tablet Take as directed 10/20/22 10/22/22  Hans Adams MD   benzonatate (TESSALON) 100 mg capsule TAKE 1 CAPSULE BY MOUTH THREE TIMES A DAY AS NEEDED FOR COUGH 9/22/22   Provider, Historical   ondansetron hcl (Zofran) 4 mg tablet Take 1 Tablet by mouth every eight (8) hours as needed for Nausea. 8/1/22   Grace Yoo MD   senna-docusate (PERICOLACE) 8.6-50 mg per tablet Take 1 Tab by mouth daily. Patient not taking: Reported on 3/23/2022 5/7/21   Kennedy Pastrana NP   brimonidine 0.025 % drop Apply 1 Drop to eye two (2) times a day. Right eye    Provider, Historical   dorzolamide (TRUSOPT) 2 % ophthalmic solution Administer 1 Drop to right eye two (2) times a day. Provider, Historical   metoprolol tartrate (LOPRESSOR) 25 mg tablet Take 25 mg by mouth daily. Provider, Historical   indapamide (LOZOL) 1.25 mg tablet Take 1.25 mg by mouth daily. Provider, Historical   montelukast (SINGULAIR) 10 mg tablet Take 10 mg by mouth daily. Provider, Historical   pantoprazole (PROTONIX) 40 mg tablet Take 40 mg by mouth daily. Provider, Historical   bimatoprost (LUMIGAN) 0.01 % ophthalmic drops Administer 1 Drop to right eye every evening. Provider, Historical   multivitamin (ONE A DAY) tablet Take 1 Tab by mouth daily. Provider, Historical   albuterol (PROVENTIL HFA, VENTOLIN HFA, PROAIR HFA) 90 mcg/actuation inhaler Take 1 Puff by inhalation as needed for Wheezing. Provider, Historical   lorazepam (ATIVAN) 0.5 mg tablet Take 0.5 mg by mouth nightly. Provider, Historical       REVIEW OF SYSTEMS:     I am not able to complete the review of systems because:    The patient is intubated and sedated    The patient has altered mental status due to his acute medical problems    The patient has baseline aphasia from prior stroke(s)    The patient has baseline dementia and is not reliable historian    The patient is in acute medical distress and unable to provide information           Total of 12 systems reviewed as follows:       POSITIVE= underlined text  Negative = text not underlined  General:  fever, chills, sweats, generalized weakness, weight loss/gain,      loss of appetite   Eyes:    blurred vision, eye pain, loss of vision, double vision  ENT:    rhinorrhea, pharyngitis   Respiratory:   cough, sputum production, SOB, GALAN, wheezing, pleuritic pain   Cardiology:   chest pain, palpitations, orthopnea, PND, edema, syncope   Gastrointestinal:  abdominal pain , N/V, diarrhea, dysphagia, constipation, bleeding   Genitourinary:  frequency, urgency, dysuria, hematuria, incontinence   Muskuloskeletal :  arthralgia, myalgia, back pain  Hematology:  easy bruising, nose or gum bleeding, lymphadenopathy   Dermatological: rash, ulceration, pruritis, color change / jaundice  Endocrine:   hot flashes or polydipsia   Neurological:  headache, dizziness, confusion, focal weakness, paresthesia,     Speech difficulties, memory loss, gait difficulty  Psychological: Feelings of anxiety, depression, agitation    Objective:   VITALS:    Visit Vitals  /64 (BP 1 Location: Left upper arm, BP Patient Position: At rest;Lying)   Pulse 92   Temp 97.6 °F (36.4 °C)   Resp 19   Ht 5' 3\" (1.6 m)   Wt 54.4 kg (120 lb)   SpO2 95%   BMI 21.26 kg/m²       PHYSICAL EXAM:    General:    Alert, cooperative, no distress, appears stated age. HEENT: Atraumatic, anicteric sclerae, pink conjunctivae     No oral ulcers, mucosa moist, throat clear, dentition fair  Neck:  Supple, symmetrical,  thyroid: non tender  Lungs:   Bilateral air entry is diminished, wheezing present, no crackles  Chest wall:  No tenderness  No Accessory muscle use. Heart:   Regular  rhythm,  No  murmur   No edema  Abdomen:   Soft, non-tender. Not distended. Bowel sounds normal  Extremities: No cyanosis. No clubbing,      Skin turgor normal, Capillary refill normal, Radial dial pulse 2+  Skin:     Not pale. Not Jaundiced  No rashes   Psych:  Good insight. Not depressed. Not anxious or agitated. Neurologic: EOMs intact. No facial asymmetry. No aphasia or slurred speech. Symmetrical strength, Sensation grossly intact. Alert and oriented X 4. _______________________________________________________________________  Care Plan discussed with:    Comments   Patient y    Family      RN y    Care Manager                    Consultant:      _______________________________________________________________________  Expected  Disposition:   Home with Family    HH/PT/OT/RN    SNF/LTC    JOHN    ________________________________________________________________________  TOTAL TIME:  61 Minutes    Critical Care Provided     Minutes non procedure based      Comments    y Reviewed previous records   >50% of visit spent in counseling and coordination of care y Discussion with patient and/or family and questions answered       ________________________________________________________________________  Signed: Edgar Henriquez MD    Procedures: see electronic medical records for all procedures/Xrays and details which were not copied into this note but were reviewed prior to creation of Plan.     LAB DATA REVIEWED:    Recent Results (from the past 24 hour(s))   EKG, 12 LEAD, INITIAL    Collection Time: 12/24/22  5:09 PM   Result Value Ref Range    Ventricular Rate 98 BPM    Atrial Rate 98 BPM    P-R Interval 154 ms    QRS Duration 80 ms    Q-T Interval 338 ms    QTC Calculation (Bezet) 431 ms    Calculated P Axis 79 degrees    Calculated R Axis 5 degrees    Calculated T Axis 68 degrees    Diagnosis       Sinus rhythm with premature supraventricular complexes and with occasional   premature ventricular complexes  Nonspecific ST and T wave abnormality  When compared with ECG of 17-OCT-2022 10:40,  premature ventricular complexes are now present  premature supraventricular complexes are now present  Left bundle branch block is no longer present     CBC WITH AUTOMATED DIFF    Collection Time: 12/24/22  5:18 PM   Result Value Ref Range    WBC 9.9 3.6 - 11.0 K/uL    RBC 4.56 3.80 - 5.20 M/uL    HGB 12.9 11.5 - 16.0 g/dL    HCT 39.9 35.0 - 47.0 %    MCV 87.5 80.0 - 99.0 FL    MCH 28.3 26.0 - 34.0 PG    MCHC 32.3 30.0 - 36.5 g/dL    RDW 13.8 11.5 - 14.5 %    PLATELET 474 021 - 229 K/uL    MPV 10.3 8.9 - 12.9 FL    NRBC 0.0 0  WBC    ABSOLUTE NRBC 0.00 0.00 - 0.01 K/uL    NEUTROPHILS 36 32 - 75 %    LYMPHOCYTES 38 12 - 49 %    MONOCYTES 10 5 - 13 %    EOSINOPHILS 15 (H) 0 - 7 %    BASOPHILS 1 0 - 1 %    IMMATURE GRANULOCYTES 0 0.0 - 0.5 %    ABS. NEUTROPHILS 3.6 1.8 - 8.0 K/UL    ABS. LYMPHOCYTES 3.7 (H) 0.8 - 3.5 K/UL    ABS. MONOCYTES 1.0 0.0 - 1.0 K/UL    ABS. EOSINOPHILS 1.5 (H) 0.0 - 0.4 K/UL    ABS. BASOPHILS 0.1 0.0 - 0.1 K/UL    ABS. IMM. GRANS. 0.0 0.00 - 0.04 K/UL    DF SMEAR SCANNED      RBC COMMENTS NORMOCYTIC, NORMOCHROMIC     METABOLIC PANEL, COMPREHENSIVE    Collection Time: 12/24/22  5:18 PM   Result Value Ref Range    Sodium 137 136 - 145 mmol/L    Potassium 2.8 (L) 3.5 - 5.1 mmol/L    Chloride 102 97 - 108 mmol/L    CO2 28 21 - 32 mmol/L    Anion gap 7 5 - 15 mmol/L    Glucose 168 (H) 65 - 100 mg/dL    BUN 24 (H) 6 - 20 MG/DL    Creatinine 1.23 (H) 0.55 - 1.02 MG/DL    BUN/Creatinine ratio 20 12 - 20      eGFR 43 (L) >60 ml/min/1.73m2    Calcium 9.4 8.5 - 10.1 MG/DL    Bilirubin, total 0.3 0.2 - 1.0 MG/DL    ALT (SGPT) 33 12 - 78 U/L    AST (SGOT) 34 15 - 37 U/L    Alk.  phosphatase 79 45 - 117 U/L    Protein, total 7.2 6.4 - 8.2 g/dL    Albumin 3.6 3.5 - 5.0 g/dL    Globulin 3.6 2.0 - 4.0 g/dL    A-G Ratio 1.0 (L) 1.1 - 2.2     TROPONIN-HIGH SENSITIVITY    Collection Time: 12/24/22  5:18 PM   Result Value Ref Range    Troponin-High Sensitivity 12 0 - 51 ng/L   NT-PRO BNP    Collection Time: 12/24/22  5:18 PM   Result Value Ref Range    NT pro- <450 PG/ML   COVID-19 RAPID TEST    Collection Time: 12/24/22  6:11 PM   Result Value Ref Range    Specimen source Nasopharyngeal      COVID-19 rapid test Not detected NOTD     INFLUENZA A+B VIRAL AGS    Collection Time: 12/24/22  6:11 PM   Result Value Ref Range    Influenza A Antigen Negative NEG      Influenza B Antigen Negative NEG

## 2022-12-25 NOTE — ED NOTES
ADMISSION SBAR NOTE    IP UNIT CALLED NOTE IS READY: Yes Spoke to Cite Camilo Villagomez  IF there are questions Call Vipin Barreto RN at phone # [phone]    SITUATION/BACKGROUND:    Patient is being transferred to [address], Room# 2145    Patient's Chief Complaint was SOB and is admitted for New Oxygen Needs. CODE STATUS: Full Code    ISOLATION/PRECAUTIONS: Yes   ISOLATION TYPE: N/A    Called outstanding consults: Yes     Are there still sign and held orders that need to be released? No     STAT labs collected: Yes  REPEAT LACTIC ACID DUE? No  TIME DUE: N/A    All STAT orders are complete: Yes    The following personal items will be sent with the patient during transfer to the floor: All valuables:   ITEM:    ITEM:    ITEM:    ITEM:    ITEM:         ASSESSMENT:    CIWA Assessment: No  Last Score: N/A    NEURO:     NIH SCORE:    REGINALDO SCREENING:      NEURO ASSESSMENT:      Is patient impulsive? No   Is patient oriented? Yes   Do they follow commands? Yes  Is the patient ambulatory? No  Device need N/A    FALL RISK? Yes   INTERVENTIONS: Call [facility] in [place]    RESPIRATORY: Is patient on Oxygen? Yes   OXYGEN: Oxygen Therapy  O2 Device: Hi flow nasal cannula (22)  O2 Flow Rate (L/min): 14 l/min (22)  FIO2 (%): 85 % (22)    CARDIAC: Is cardiac monitoring ordered? Yes Last Rhythm:  Patient to transfer with tele box on? Yes    LINE ACCESS:   Peripheral IV 22 Right Antecubital (Active)        /GI: CONTINENT BOWEL/BLADDER? Yes  URINARY OUTPUT: voiding  CHRONIC OR ACUTE? If CHRONIC, is it 1days old, was it changed prior to specimen collection? N/A  WAS UA WITH REFLEX SENT TO LAB? Yes IF NO, COLLECT AND SEND PRIOR TO TRANSPORT TO INPATIENT AREA    INTEGUMENTARY:   IS THE PATIENT UNDRESSED? Yes  ARE THERE WOUNDS PRESENT? No   ARE THE WOUNDS DOCUMENTED? No     RESTRAINTS IN USE: No      IS DOCUMENTATION COMPLETE: Yes  Is there a current Order? N/A When does it ?  N/A    Vital Signs  Level of Consciousness: Alert (0) (12/24/22 1712)  Temp: 98.4 °F (36.9 °C) (12/24/22 1712)  Temp Source: Oral (12/24/22 1712)  Pulse (Heart Rate): 84 (12/24/22 1823)  Heart Rate Source: Monitor (12/24/22 1712)  Resp Rate: 30 (12/24/22 1823)  BP: (!) 148/97 (12/24/22 2012)  MAP (Monitor): 113 (12/24/22 1823)  MAP (Calculated): 114 (12/24/22 2012)  MEWS Score: 1 (12/24/22 1712)         REVIEW:

## 2022-12-25 NOTE — PROGRESS NOTES
Patient seen and evaluated. She was admitted by my colleague earlier today for Right lung pneumonia. On my evaluation she feels better than yesterday. However she is on 12 liters oxygen, I have talked to nurse to decrease oxygen as tolerated to Keep Spo2>90. CTA reveiwed: showed Right upper lobe pneumonia vs pneumonitis. C/w abx, methylprednisone, duonebs  Pulmonary consulted.     Non billable note

## 2022-12-26 PROCEDURE — 74011250636 HC RX REV CODE- 250/636: Performed by: STUDENT IN AN ORGANIZED HEALTH CARE EDUCATION/TRAINING PROGRAM

## 2022-12-26 PROCEDURE — 74011000250 HC RX REV CODE- 250: Performed by: INTERNAL MEDICINE

## 2022-12-26 PROCEDURE — 74011000258 HC RX REV CODE- 258: Performed by: STUDENT IN AN ORGANIZED HEALTH CARE EDUCATION/TRAINING PROGRAM

## 2022-12-26 PROCEDURE — 77010033678 HC OXYGEN DAILY

## 2022-12-26 PROCEDURE — 65270000046 HC RM TELEMETRY

## 2022-12-26 PROCEDURE — 74011250637 HC RX REV CODE- 250/637: Performed by: STUDENT IN AN ORGANIZED HEALTH CARE EDUCATION/TRAINING PROGRAM

## 2022-12-26 PROCEDURE — 94761 N-INVAS EAR/PLS OXIMETRY MLT: CPT

## 2022-12-26 PROCEDURE — 74011250636 HC RX REV CODE- 250/636: Performed by: INTERNAL MEDICINE

## 2022-12-26 PROCEDURE — 94640 AIRWAY INHALATION TREATMENT: CPT

## 2022-12-26 PROCEDURE — 74011250637 HC RX REV CODE- 250/637: Performed by: INTERNAL MEDICINE

## 2022-12-26 RX ADMIN — SODIUM CHLORIDE, PRESERVATIVE FREE 10 ML: 5 INJECTION INTRAVENOUS at 06:02

## 2022-12-26 RX ADMIN — METOPROLOL TARTRATE 25 MG: 25 TABLET ORAL at 08:55

## 2022-12-26 RX ADMIN — LATANOPROST 1 DROP: 50 SOLUTION OPHTHALMIC at 22:05

## 2022-12-26 RX ADMIN — PANTOPRAZOLE SODIUM 40 MG: 40 TABLET, DELAYED RELEASE ORAL at 08:55

## 2022-12-26 RX ADMIN — BRIMONIDINE TARTRATE 1 DROP: 2 SOLUTION OPHTHALMIC at 08:57

## 2022-12-26 RX ADMIN — ACETAMINOPHEN 650 MG: 325 TABLET ORAL at 01:25

## 2022-12-26 RX ADMIN — SODIUM CHLORIDE, PRESERVATIVE FREE 10 ML: 5 INJECTION INTRAVENOUS at 23:37

## 2022-12-26 RX ADMIN — SODIUM CHLORIDE 1 G: 900 INJECTION INTRAVENOUS at 08:55

## 2022-12-26 RX ADMIN — DORZOLAMIDE HYDROCHLORIDE 1 DROP: 20 SOLUTION/ DROPS OPHTHALMIC at 08:57

## 2022-12-26 RX ADMIN — AZITHROMYCIN DIHYDRATE 500 MG: 500 INJECTION, POWDER, LYOPHILIZED, FOR SOLUTION INTRAVENOUS at 08:55

## 2022-12-26 RX ADMIN — LORAZEPAM 0.5 MG: 0.5 TABLET ORAL at 22:05

## 2022-12-26 RX ADMIN — IPRATROPIUM BROMIDE AND ALBUTEROL SULFATE 3 ML: 2.5; .5 SOLUTION RESPIRATORY (INHALATION) at 13:14

## 2022-12-26 RX ADMIN — DORZOLAMIDE HYDROCHLORIDE 1 DROP: 20 SOLUTION/ DROPS OPHTHALMIC at 17:44

## 2022-12-26 RX ADMIN — MONTELUKAST 10 MG: 10 TABLET, FILM COATED ORAL at 08:55

## 2022-12-26 RX ADMIN — SODIUM CHLORIDE, PRESERVATIVE FREE 10 ML: 5 INJECTION INTRAVENOUS at 13:31

## 2022-12-26 RX ADMIN — INDAPAMIDE 1.25 MG: 2.5 TABLET, FILM COATED ORAL at 08:55

## 2022-12-26 RX ADMIN — GUAIFENESIN 600 MG: 600 TABLET, EXTENDED RELEASE ORAL at 08:55

## 2022-12-26 RX ADMIN — BRIMONIDINE TARTRATE 1 DROP: 2 SOLUTION OPHTHALMIC at 17:44

## 2022-12-26 RX ADMIN — IPRATROPIUM BROMIDE AND ALBUTEROL SULFATE 3 ML: 2.5; .5 SOLUTION RESPIRATORY (INHALATION) at 20:59

## 2022-12-26 RX ADMIN — IPRATROPIUM BROMIDE AND ALBUTEROL SULFATE 3 ML: 2.5; .5 SOLUTION RESPIRATORY (INHALATION) at 07:08

## 2022-12-26 RX ADMIN — GUAIFENESIN 600 MG: 600 TABLET, EXTENDED RELEASE ORAL at 20:36

## 2022-12-26 NOTE — PROGRESS NOTES
Hospitalist Progress Note    NAME: Abdoulaye Webb   :  1935   MRN:  [id number]       Assessment / Plan:  Acute hypoxic respiratory failure d/t   Community-acquired pneumonia  Acute asthma exacerbation     On my evaluation she is on 15 liters oxygen, could likely be lowered, wean to keep spo2>90  C/w ceftriaxone and Zithromax. CT shows right upper lobe pneumonia vs alveolitis  C/w Solu-Medrol, DuoNeb. Continue home inhalers. Pulmonary consulted     Hypertension  Glaucoma  Anxiety  Fibromyalgia  Dyslipidemia  Continue home metoprolol, indapamide  Continue home eyedrops  Continue home Ativan     Code Status: Full code  Surrogate Decision Maker:     DVT Prophylaxis: Lovenox  GI Prophylaxis: not indicated     Baseline: From home independent of ADLs    Estimated discharge date:   Barriers: Improvement in oxygen       Subjective:     Chief Complaint / Reason for Physician Visit  Follow up for CAP  She feels better than yesterday, however on 15 liters oxygen  Has cough    Review of Systems:  Symptom Y/N Comments  Symptom Y/N Comments   Fever/Chills n   Chest Pain n    Poor Appetite n   Edema n    Cough y   Abdominal Pain n    Sputum y   Joint Pain     SOB/GALAN y   Pruritis/Rash     Nausea/vomit    Tolerating PT/OT     Diarrhea    Tolerating Diet     Constipation    Other       Could NOT obtain due to:      Objective:     VITALS:   Last 24hrs VS reviewed since prior progress note.  Most recent are:  Patient Vitals for the past 24 hrs:   Temp Pulse Resp BP SpO2   22 1316 -- -- -- -- 98 %   22 1240 97.7 °F (36.5 °C) 75 18 136/81 99 %   22 0829 97.3 °F (36.3 °C) 81 20 (!) 155/98 91 %   22 0709 -- -- -- -- 92 %   22 0300 97.5 °F (36.4 °C) 82 22 (!) 159/72 94 %   22 97.7 °F (36.5 °C) (!) 108 24 139/84 92 %   225 -- -- -- (!) 155/84 --   22 1930 97.7 °F (36.5 °C) (!) 108 24 (!) 171/81 92 %   22 1809 -- -- -- -- 94 %   22 2306 -- -- -- -- 93 % 12/25/22 1600 -- 97 -- -- --   12/25/22 1545 97.9 °F (36.6 °C) (!) 103 18 (!) 164/98 94 %       Intake/Output Summary (Last 24 hours) at 12/26/2022 1416  Last data filed at 12/26/2022 0556  Gross per 24 hour   Intake --   Output 2001 ml   Net -2001 ml        I had a face to face encounter and independently examined this patient on 12/26/2022, as outlined below:  PHYSICAL EXAM:  General: Awake, No acute distress  EENT:  EOMI. Anicteric sclerae. MMM  Resp:  Bilateral rhonchi  CV:  Regular  rhythm,  No edema  GI:  Soft, Non distended, Non tender. +Bowel sounds  Neurologic:  Alert and oriented X 3, normal speech,   Psych:   Not anxious nor agitated  Skin:  No rashes. No jaundice    Reviewed most current lab test results and cultures  YES  Reviewed most current radiology test results   YES  Review and summation of old records today    NO  Reviewed patient's current orders and MAR    YES  PMH/ reviewed - no change compared to H&P  ________________________________________________________________________  Care Plan discussed with:    Comments   Patient y    Family      RN y    Care Manager     Consultant                        Multidiciplinary team rounds were held today with , nursing, pharmacist and clinical coordinator. Patient's plan of care was discussed; medications were reviewed and discharge planning was addressed. ________________________________________________________________________  Total NON critical care TIME:  36   Minutes    Total CRITICAL CARE TIME Spent:   Minutes non procedure based      Comments   >50% of visit spent in counseling and coordination of care     ________________________________________________________________________  Hortensia Wilcox MD     Procedures: see electronic medical records for all procedures/Xrays and details which were not copied into this note but were reviewed prior to creation of Plan.       LABS:  I reviewed today's most current labs and imaging studies.   Pertinent labs include:  Recent Labs     12/25/22  0035 12/24/22  1718   WBC 7.1 9.9   HGB 12.9 12.9   HCT 40.0 39.9    256     Recent Labs     12/25/22  0035 12/24/22  1718    137   K 3.7 2.8*    102   CO2 25 28   * 168*   BUN 18 24*   CREA 1.13* 1.23*   CA 9.3 9.4   ALB  --  3.6   TBILI  --  0.3   ALT  --  33       Signed: Álvaro Lopez MD

## 2022-12-26 NOTE — PROGRESS NOTES
Problem: Falls - Risk of  Goal: *Absence of Falls  Description: Document Macario Jeong Fall Risk and appropriate interventions in the flowsheet.   Outcome: Progressing Towards Goal  Note: Fall Risk Interventions:  Mobility Interventions: Bed/chair exit alarm         Medication Interventions: Bed/chair exit alarm    Elimination Interventions: Bed/chair exit alarm              Problem: Patient Education: Go to Patient Education Activity  Goal: Patient/Family Education  Outcome: Progressing Towards Goal

## 2022-12-26 NOTE — PROGRESS NOTES
Problem: General Medical Care Plan  Goal: *Vital signs within specified parameters  Outcome: Progressing Towards Goal  Goal: *Labs within defined limits  Outcome: Progressing Towards Goal  Goal: *Absence of infection signs and symptoms  Outcome: Progressing Towards Goal  Goal: *Optimal pain control at patient's stated goal  Outcome: Progressing Towards Goal  Goal: *Skin integrity maintained  Outcome: Progressing Towards Goal  Goal: *Fluid volume balance  Outcome: Progressing Towards Goal  Goal: *Optimize nutritional status  Outcome: Progressing Towards Goal  Goal: *Anxiety reduced or absent  Outcome: Progressing Towards Goal  Goal: *Progressive mobility and function (eg: ADL's)  Outcome: Progressing Towards Goal     Problem: Patient Education: Go to Patient Education Activity  Goal: Patient/Family Education  Outcome: Progressing Towards Goal     Problem: Falls - Risk of  Goal: *Absence of Falls  Description: Document Susie Fall Risk and appropriate interventions in the flowsheet.   Outcome: Progressing Towards Goal  Note: Fall Risk Interventions:  Mobility Interventions: Patient to call before getting OOB, Bed/chair exit alarm         Medication Interventions: Bed/chair exit alarm    Elimination Interventions: Bed/chair exit alarm, Call light in reach              Problem: Patient Education: Go to Patient Education Activity  Goal: Patient/Family Education  Outcome: Progressing Towards Goal

## 2022-12-26 NOTE — PROGRESS NOTES
Transition of Care Plan:    RUR: 13% (low)  Disposition: Pending therapy recs. If SNF or IPR: Date FOC offered: 12/26/2022. Date FOC received: TBD. Date authorization started with reference number: N/A  Date authorization received and expires: N/A  Accepting facility: [facility]. Follow up appointments: PCP/specialists if needed. DME needed: TBD. Patient has RW and cane available at home. Transportation at Discharge: DTR. Keys or means to access home: Patient has access. IM Medicare Letter: 2nd IM needed prior to discharge. Is patient a [place] and connected with the [place]? N/A              If yes, was [employer] transfer form completed and VA notified? N/A  Caregiver ContactTyson Cornelia - [facility] - [phone]. Discharge Caregiver contacted prior to discharge? CM to contact. Care Conference needed?: No.                   Reason for Admission: SOB. RUR Score: 13% (low)                    Plan for utilizing home health:  Has never used HH. PCP: First and Last name:  Annette Norman MD     Name of Practice: [facility]. Are you a current patient: Yes/No: Yes. Approximate date of last visit: Nov. 2022. Can you participate in a virtual visit with your PCP: No.                    Current Advanced Directive/Advance Care Plan: Full Code  Advance Care Planning     General Advance Care Planning (ACP) Conversation    Date of Conversation: 12/26/2022. Conducted with: Healthcare Decision Maker: Next of Kin by law (only applies in absence of a Healthcare Power of  or [address] [place] [address] Avenue:   No healthcare decision makers have been documented. Click here to complete [employer] including selection of the Healthcare Decision Maker Relationship (ie \"Primary\")  Today we documented Decision Maker(s) consistent with Legal Next of Kin hierarchy. Content/Action Overview:    Has ACP document(s) NOT on file - requested patient to provide  Reviewed DNR/DNI and patient elects Full Code (Attempt Resuscitation)  Topics discussed: treatment goals    Length of Voluntary ACP Conversation in minutes:  <16 minutes (Non-Billable)    Luis Smart, DIANE            Healthcare Decision Maker:   Click here to complete [address] including selection of the Healthcare Decision Maker Relationship (ie \"Primary\")                         Transition of Care Plan:            CM contacted patient's DTR, Cong Cali, to introduce self/role and verify demographic, contact, insurance and PCP information. Patient lives alone and is independent with ADLs/IADLs. She rarely drives and has RW/cane available to use at home. DTR would like patient to go to [facility] IPR. CM explained the difference between IPR and SNF, SNF list sent to patient's DTR via email: [email or web address]. Patient was documented on 6L O2 NC this morning, DTR confirmed patient does not use O2 at home but she feels like the patient needs it. CM will continue to follow for any discharge needs. Care Management Interventions  PCP Verified by CM:  Yes  Last Visit to PCP: 11/01/22  Palliative Care Criteria Met (RRAT>21 & CHF Dx)?: No  Mode of Transport at Discharge: Self  Transition of Care Consult (CM Consult): Discharge Planning  Support Systems: Child(kerry)  Confirm Follow Up Transport: Family  The Patient and/or Patient Representative was Provided with a Choice of Provider and Agrees with the Discharge Plan?: Yes  Name of the Patient Representative Who was Provided with a Choice of Provider and Agrees with the Discharge Plan: Lindsay Davidson (DTR)  Freedom of Choice List was Provided with Basic Dialogue that Supports the Patient's Individualized Plan of Care/Goals, Treatment Preferences and Shares the Quality Data Associated with the Providers?: Yes  [place] Resource Information Provided?: No  Discharge Location  Patient Expects to be Discharged to[de-identified] Home with home health      FIDEL Toussaint, RN          Care Management  [phone]

## 2022-12-26 NOTE — CONSULTS
Pulmonary, Critical Care, and Sleep Medicine    Patient Consult    Name: Herve Villaseñor MRN: [id number]   : 1935 Hospital: [facility]   Date: 2022              IMPRESSION:   Acute Hypoxic Respiratory Failure, pox of 80% on RA prior to arrival. Was increased to 6L when she presented to ER. WAS at 15L when seen yesterday. Now down to 6L NC. Pneumonia-suspected acute community acquired pneumonia. Hx of Asthma. Acute wheezing. This is better this am.   Allergic Rhinitis on Singulair. Reported allergy, intolerance to prednisone. No acute Pulmonary Embolus  Zenkers Diverticulum, Hiatal hernia  Hepatic Cysts. T12 compression   Desires to be full code. RECOMMENDATIONS:   ON Duonebs  ON Ctx, Azithryomycin  ON Solumedrol  Oyxgen to keep pox over 90%. Has been weaning over last 24 hours. Pulmonary: Chica Ozuna  [facility]    Subjective:   22:   Pt is feeling better today compared to yesterday. NO chest pain. Has a cough which is mainly dry. Not much wheezing. She has pain at her right arm IV site. ----  CC: shortness of breath. This patient has been seen and evaluated at the request of Dr. Derrick aMx for above. Patient is a 80 y.o. female who had acute shortness of breath. Has been with a dry cough. No chest pain, no myalgias or arthralgias. Has been with worsening dyspnea. Has some sputum production. Has some sinus congestion. Has been seen by DR. Chica Ozuna in past.   Has ongoing wheezing. NO GERD. NO aspiration. No leg pain or swelling.        Past Medical History:   Diagnosis Date    Adverse effect of anesthesia     combative waking up    Anxiety     Arthritis     Asthma     as of 16 pt states under control    Diverticulitis Dx 2016    Fibromyalgia     GERD (gastroesophageal reflux disease)     Glaucoma     Hiatal hernia     Hypercholesteremia     Hypertension     Ill-defined condition     neurapthy in ble    Nausea & vomiting       Past Surgical History:   Procedure Laterality Date    HX CATARACT REMOVAL Right 01/04/2017    HX HYSTERECTOMY      HX ROTATOR CUFF REPAIR Left 2008    HX TUBAL LIGATION      ID COLONOSCOPY FLX DX W/COLLJ SPEC WHEN PFRMD  11/9/2011         ID EGD TRANSORAL BIOPSY SINGLE/MULTIPLE  11/9/2011         UPPER GI ENDOSCOPY,BIOPSY  8/20/2019           Prior to Admission medications    Medication Sig Start Date End Date Taking? Authorizing Provider   azithromycin (ZITHROMAX) 250 mg tablet Take as directed 10/20/22 10/22/22  Pardeep Bosch MD   benzonatate (TESSALON) 100 mg capsule TAKE 1 CAPSULE BY MOUTH THREE TIMES A DAY AS NEEDED FOR COUGH 9/22/22   Provider, Historical   ondansetron hcl (Zofran) 4 mg tablet Take 1 Tablet by mouth every eight (8) hours as needed for Nausea. 8/1/22   Param Wren MD   senna-docusate (PERICOLACE) 8.6-50 mg per tablet Take 1 Tab by mouth daily. Patient not taking: Reported on 3/23/2022 5/7/21   Carl Wilkinson NP   brimonidine 0.025 % drop Apply 1 Drop to eye two (2) times a day. Right eye    Provider, Historical   dorzolamide (TRUSOPT) 2 % ophthalmic solution Administer 1 Drop to right eye two (2) times a day. Provider, Historical   metoprolol tartrate (LOPRESSOR) 25 mg tablet Take 25 mg by mouth daily. Provider, Historical   indapamide (LOZOL) 1.25 mg tablet Take 1.25 mg by mouth daily. Provider, Historical   montelukast (SINGULAIR) 10 mg tablet Take 10 mg by mouth daily. Provider, Historical   pantoprazole (PROTONIX) 40 mg tablet Take 40 mg by mouth daily. Provider, Historical   bimatoprost (LUMIGAN) 0.01 % ophthalmic drops Administer 1 Drop to right eye every evening. Provider, Historical   multivitamin (ONE A DAY) tablet Take 1 Tab by mouth daily. Provider, Historical   albuterol (PROVENTIL HFA, VENTOLIN HFA, PROAIR HFA) 90 mcg/actuation inhaler Take 1 Puff by inhalation as needed for Wheezing.     Provider, Historical   lorazepam (ATIVAN) 0.5 mg tablet Take 0.5 mg by mouth nightly. Provider, Historical     Allergies   Allergen Reactions    Codeine Other (comments)    Cymbalta [Duloxetine] Nausea Only and Other (comments)     \"within the hour I had a severe headache, and vomiting\"      Demerol [Meperidine] Nausea and Vomiting    Lidocaine Other (comments)    Morphine Nausea and Vomiting    Novocain [Procaine] Other (comments)    Penicillins Other (comments)     rash    Percocet [Oxycodone-Acetaminophen] Nausea and Vomiting    Prednisone Rash    Sulfa (Sulfonamide Antibiotics) Other (comments)      Social History     Tobacco Use    Smoking status: Never    Smokeless tobacco: Never   Substance Use Topics    Alcohol use: No      No family history on file.      Current Facility-Administered Medications   Medication Dose Route Frequency    OTHER(NON-FORMULARY) 2 Drop  2 Drop Right Eye QHS    cefTRIAXone (ROCEPHIN) 1 g in 0.9% sodium chloride (MBP/ADV) 50 mL MBP  1 g IntraVENous Q24H    guaiFENesin ER (MUCINEX) tablet 600 mg  600 mg Oral Q12H    azithromycin (ZITHROMAX) 500 mg in 0.9% sodium chloride 250 mL ([id number])  500 mg IntraVENous Q24H    latanoprost (XALATAN) 0.005 % ophthalmic solution 1 Drop  1 Drop Right Eye QHS    brimonidine (ALPHAGAN) 0.2 % ophthalmic solution 1 Drop  1 Drop Right Eye BID    dorzolamide (TRUSOPT) 2 % ophthalmic solution 1 Drop  1 Drop Right Eye BID    indapamide (LOZOL) tablet 1.25 mg  1.25 mg Oral DAILY    LORazepam (ATIVAN) tablet 0.5 mg  0.5 mg Oral QHS    metoprolol tartrate (LOPRESSOR) tablet 25 mg  25 mg Oral DAILY    montelukast (SINGULAIR) tablet 10 mg  10 mg Oral DAILY    pantoprazole (PROTONIX) tablet 40 mg  40 mg Oral DAILY    sodium chloride (NS) flush 5-40 mL  5-40 mL IntraVENous Q8H    albuterol-ipratropium (DUO-NEB) 2.5 MG-0.5 MG/3 ML  3 mL Nebulization Q6H RT    methylPREDNISolone (PF) (SOLU-MEDROL) injection 40 mg  40 mg IntraVENous Q6H    enoxaparin (LOVENOX) injection 30 mg  30 mg SubCUTAneous DAILY       Review of Systems:  Constitutional: positive for fatigue  Eyes: negative  Ears, nose, mouth, throat, and face: positive for nasal congestion  Respiratory: positive for cough, sputum, wheezing, dyspnea on exertion, or chronic bronchitis  Cardiovascular: negative  Gastrointestinal: negative  Genitourinary:negative  Integument/breast: negative  Hematologic/lymphatic: negative  Musculoskeletal:negative  Neurological: negative  Behavioral/Psych: negative  Endocrine: negative  Allergic/Immunologic: negative    Objective:   Vital Signs:    Visit Vitals  BP (!) 155/98 (BP 1 Location: Left upper arm, BP Patient Position: Supine)   Pulse 81   Temp 97.3 °F (36.3 °C)   Resp 20   Ht 5' 3\" (1.6 m)   Wt 59.6 kg (131 lb 4.8 oz)   SpO2 91%   BMI 23.26 kg/m²       O2 Device: Nasal cannula   O2 Flow Rate (L/min): 6 l/min   Temp (24hrs), Av.6 °F (36.4 °C), Min:97.3 °F (36.3 °C), Max:97.9 °F (36.6 °C)       Intake/Output:   Last shift:      No intake/output data recorded. Last 3 shifts:  1901 -  0700  In: 350 [P.O.:300; I.V.:50]  Out: 3201 [Urine:3201]    Intake/Output Summary (Last 24 hours) at 2022 1057  Last data filed at 2022 0556  Gross per 24 hour   Intake --   Output 2001 ml   Net -2001 ml        Physical Exam: ON NC oxygen. General:  Alert, cooperative, no distress, appears stated age. Head:  Normocephalic, without obvious abnormality, atraumatic. Eyes:  Conjunctivae/corneas clear. PERRL, EOMs intact. Nose: Nares normal. Septum midline. Mucosa normal. No drainage or sinus tenderness. Throat: Lips, mucosa, and tongue normal. Teeth and gums normal.   Neck: Supple, symmetrical, trachea midline, no adenopathy, thyroid: no enlargment/tenderness/nodules, no carotid bruit and no JVD. Back:   Symmetric, no curvature. ROM normal.   Lungs:   Has good air movement, clear lung when seen,  has a dry cough. Chest wall:  No tenderness or deformity.    Heart:  Regular rate and rhythm, S1, S2 normal, no murmur, click, rub or gallop. Abdomen:   Soft, non-tender. Bowel sounds normal. No masses,  No organomegaly. Extremities: Extremities normal, atraumatic, no cyanosis or edema. Pulses: 2+ and symmetric all extremities. Skin: Skin color, texture, turgor normal. No rashes or lesions   Lymph nodes: Cervical, supraclavicular, and axillary nodes normal.   Neurologic: Grossly nonfocal, motor intact. Data review:     Recent Results (from the past 24 hour(s))   GLUCOSE, POC    Collection Time: 12/25/22 11:41 AM   Result Value Ref Range    Glucose (POC) 191 (H) 65 - 117 mg/dL    Performed by Ankita Cervantes PCT        Imaging:  I have personally reviewed the patients radiographs and have reviewed the reports:  12-24-22 CT of chest:   FINDINGS:  Few ill defined subcentimeter opacities in the periphery of the right upper lobe  (image 78). Zenker's diverticulum. The pulmonary arteries are well enhanced and no pulmonary emboli are identified. There is no mediastinal or hilar adenopathy or mass. The aorta enhances normally  without evidence of aneurysm or dissection. The visualized portions of the upper abdominal organs are remarkable for a  hiatal hernia as well as stable left hepatic hypodensities, previously  determined to represent cysts. Mild superior endplate O14 wedge deformity, stable. IMPRESSION     Right upper lobe pneumonia versus alveolitis. No pulmonary embolism  Mild stable T12 wedge deformity.  Recommend DXA              Bri Nguyen MD

## 2022-12-26 NOTE — PROGRESS NOTES
End of Shift Note    Bedside shift change report given to Claus Inman (oncoming nurse) by Fred Villegas RN (offgoing nurse). Report included the following information SBAR, Kardex, Intake/Output, and MAR    Shift worked:  7a-7p     Shift summary and any significant changes:     Pt O2 down from 15L to 6L. Pt had an allergic reaction to methylprednisolone and given IV Benadryl. Pt eyes were swollen, cheeks were red, patient jittery and anxious during reaction. Pt had an eye drop from home that needs to be verified by pharmacy.     Concerns for physician to address:       Zone phone for oncoming shift:

## 2022-12-26 NOTE — PROGRESS NOTES
Hospitalist Progress Note    NAME: Darlene Beal   :  1935   MRN:  [id number]       Assessment / Plan:  Acute hypoxic respiratory failure d/t   Community-acquired pneumonia  Acute asthma exacerbation     Today she is on 5 liters oxygen, wean as tolerated, keep SPO2 >90  C/w ceftriaxone and Zithromax. CT shows right upper lobe pneumonia vs alveolitis  C/w Solu-Medrol, DuoNeb. Continue home inhalers. Pulmonary following  PT/OT consulted     Hypertension  Glaucoma  Anxiety  Fibromyalgia  Dyslipidemia  Continue home metoprolol, indapamide  Continue home eyedrops  Continue home Ativan     Code Status: Full code  Surrogate Decision Maker:     DVT Prophylaxis: Lovenox  GI Prophylaxis: not indicated     Baseline: From home independent of ADLs    Estimated discharge date:   Barriers: Improvement in oxygen       Subjective:     Chief Complaint / Reason for Physician Visit  Follow up for CAP  She feels better today, hopeful about discharge tomorrow    Review of Systems:  Symptom Y/N Comments  Symptom Y/N Comments   Fever/Chills n   Chest Pain n    Poor Appetite n   Edema n    Cough y   Abdominal Pain n    Sputum y   Joint Pain     SOB/GALAN y   Pruritis/Rash     Nausea/vomit    Tolerating PT/OT     Diarrhea    Tolerating Diet     Constipation    Other       Could NOT obtain due to:      Objective:     VITALS:   Last 24hrs VS reviewed since prior progress note.  Most recent are:  Patient Vitals for the past 24 hrs:   Temp Pulse Resp BP SpO2   22 1316 -- -- -- -- 98 %   22 1240 97.7 °F (36.5 °C) 75 18 136/81 99 %   22 0829 97.3 °F (36.3 °C) 81 20 (!) 155/98 91 %   22 0709 -- -- -- -- 92 %   22 0300 97.5 °F (36.4 °C) 82 22 (!) 159/72 94 %   22 97.7 °F (36.5 °C) (!) 108 24 139/84 92 %   22 1935 -- -- -- (!) 155/84 --   22 1930 97.7 °F (36.5 °C) (!) 108 24 (!) 171/81 92 %   22 1809 -- -- -- -- 94 %   22 4587 -- -- -- -- 93 %   22 1600 -- 97 -- -- --   12/25/22 1545 97.9 °F (36.6 °C) (!) 103 18 (!) 164/98 94 %       Intake/Output Summary (Last 24 hours) at 12/26/2022 1422  Last data filed at 12/26/2022 0556  Gross per 24 hour   Intake --   Output 2001 ml   Net -2001 ml        I had a face to face encounter and independently examined this patient on 12/26/2022, as outlined below:  PHYSICAL EXAM:  General: Awake, No acute distress  EENT:  EOMI. Anicteric sclerae. MMM  Resp:  Bilateral rhonchi minimal  CV:  Regular  rhythm,  No edema  GI:  Soft, Non distended, Non tender. +Bowel sounds  Neurologic:  Alert and oriented X 3, normal speech,   Psych:   Not anxious nor agitated  Skin:  No rashes. No jaundice    Reviewed most current lab test results and cultures  YES  Reviewed most current radiology test results   YES  Review and summation of old records today    NO  Reviewed patient's current orders and MAR    YES  PMH/SH reviewed - no change compared to H&P  ________________________________________________________________________  Care Plan discussed with:    Comments   Patient y    Family      RN y    Care Manager     Consultant                        Multidiciplinary team rounds were held today with , nursing, pharmacist and clinical coordinator. Patient's plan of care was discussed; medications were reviewed and discharge planning was addressed. ________________________________________________________________________  Total NON critical care TIME:  36   Minutes    Total CRITICAL CARE TIME Spent:   Minutes non procedure based      Comments   >50% of visit spent in counseling and coordination of care     ________________________________________________________________________  Debi Fothergill, MD     Procedures: see electronic medical records for all procedures/Xrays and details which were not copied into this note but were reviewed prior to creation of Plan. LABS:  I reviewed today's most current labs and imaging studies.   Pertinent labs include:  Recent Labs     12/25/22  0035 12/24/22  1718   WBC 7.1 9.9   HGB 12.9 12.9   HCT 40.0 39.9    256     Recent Labs     12/25/22  0035 12/24/22  1718    137   K 3.7 2.8*    102   CO2 25 28   * 168*   BUN 18 24*   CREA 1.13* 1.23*   CA 9.3 9.4   ALB  --  3.6   TBILI  --  0.3   ALT  --  33       Signed: Álvaro Lopez MD

## 2022-12-26 NOTE — PROGRESS NOTES
ADULT PROTOCOL: JET AEROSOL  REASSESSMENT    Patient  Alberto Kemp     80 y.o.   female     12/26/2022  7:56 AM    Breath Sounds Pre Procedure: Right Breath Sounds: Scattered wheezing                               Left Breath Sounds: Scattered wheezing    Breath Sounds Post Procedure: Right Breath Sounds: Scattered wheezing                                 Left Breath Sounds: Scattered wheezing    Breathing pattern: Pre procedure Breathing Pattern: Regular          Post procedure Breathing Pattern: Regular    Heart Rate: Pre procedure Pulse: 84           Post procedure Pulse: 84    Resp Rate: Pre procedure Respirations: 18           Post procedure Respirations: 18  Cough: Pre procedure Cough: Non-productive               Post procedure Cough: Non-productive    Oxygen: O2 Device: Nasal cannula        Changed: no    SpO2: Pre procedure SpO2: 92 %                 Post procedure SpO2: 92 %      Nebulizer Therapy: Current medications Aerosolized Medications: DuoNeb      Changed: no    Problem List:   Patient Active Problem List   Diagnosis Code    Abdominal mass R19.00    Essential hypertension, benign I10    Peritoneal cyst K66.8    Neuropathy G62.9    Restless leg syndrome G25.81    Primary open angle glaucoma of left eye, 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

## 2022-12-27 PROCEDURE — 74011000250 HC RX REV CODE- 250: Performed by: INTERNAL MEDICINE

## 2022-12-27 PROCEDURE — 74011250637 HC RX REV CODE- 250/637: Performed by: STUDENT IN AN ORGANIZED HEALTH CARE EDUCATION/TRAINING PROGRAM

## 2022-12-27 PROCEDURE — 94640 AIRWAY INHALATION TREATMENT: CPT

## 2022-12-27 PROCEDURE — 77010033678 HC OXYGEN DAILY

## 2022-12-27 PROCEDURE — 74011000258 HC RX REV CODE- 258: Performed by: STUDENT IN AN ORGANIZED HEALTH CARE EDUCATION/TRAINING PROGRAM

## 2022-12-27 PROCEDURE — 97165 OT EVAL LOW COMPLEX 30 MIN: CPT

## 2022-12-27 PROCEDURE — 74011250636 HC RX REV CODE- 250/636: Performed by: INTERNAL MEDICINE

## 2022-12-27 PROCEDURE — 74011250637 HC RX REV CODE- 250/637: Performed by: INTERNAL MEDICINE

## 2022-12-27 PROCEDURE — 65270000046 HC RM TELEMETRY

## 2022-12-27 PROCEDURE — 94761 N-INVAS EAR/PLS OXIMETRY MLT: CPT

## 2022-12-27 PROCEDURE — 74011250636 HC RX REV CODE- 250/636: Performed by: STUDENT IN AN ORGANIZED HEALTH CARE EDUCATION/TRAINING PROGRAM

## 2022-12-27 PROCEDURE — 97116 GAIT TRAINING THERAPY: CPT

## 2022-12-27 PROCEDURE — 97162 PT EVAL MOD COMPLEX 30 MIN: CPT

## 2022-12-27 PROCEDURE — 97535 SELF CARE MNGMENT TRAINING: CPT

## 2022-12-27 RX ORDER — ACETAMINOPHEN 325 MG/1
650 TABLET ORAL
Qty: 60 TABLET | Refills: 0 | Status: SHIPPED | OUTPATIENT
Start: 2022-12-27

## 2022-12-27 RX ORDER — LANOLIN ALCOHOL/MO/W.PET/CERES
3 CREAM (GRAM) TOPICAL
Qty: 30 TABLET | Refills: 0 | Status: SHIPPED | OUTPATIENT
Start: 2022-12-27

## 2022-12-27 RX ORDER — GUAIFENESIN 600 MG/1
600 TABLET, EXTENDED RELEASE ORAL EVERY 12 HOURS
Qty: 60 TABLET | Refills: 0 | Status: SHIPPED | OUTPATIENT
Start: 2022-12-27

## 2022-12-27 RX ORDER — BENZONATATE 100 MG/1
CAPSULE ORAL
Qty: 45 CAPSULE | Refills: 0 | Status: SHIPPED | OUTPATIENT
Start: 2022-12-27

## 2022-12-27 RX ORDER — METHYLPREDNISOLONE 4 MG/1
TABLET ORAL
Qty: 1 DOSE PACK | Refills: 0 | Status: SHIPPED | OUTPATIENT
Start: 2022-12-27

## 2022-12-27 RX ORDER — IPRATROPIUM BROMIDE AND ALBUTEROL SULFATE 2.5; .5 MG/3ML; MG/3ML
3 SOLUTION RESPIRATORY (INHALATION)
Status: DISCONTINUED | OUTPATIENT
Start: 2022-12-27 | End: 2022-12-28 | Stop reason: HOSPADM

## 2022-12-27 RX ORDER — ALBUTEROL SULFATE 90 UG/1
1 AEROSOL, METERED RESPIRATORY (INHALATION) AS NEEDED
Qty: 18 G | Refills: 2 | Status: SHIPPED | OUTPATIENT
Start: 2022-12-27

## 2022-12-27 RX ADMIN — MONTELUKAST 10 MG: 10 TABLET, FILM COATED ORAL at 09:00

## 2022-12-27 RX ADMIN — SODIUM CHLORIDE, PRESERVATIVE FREE 10 ML: 5 INJECTION INTRAVENOUS at 05:09

## 2022-12-27 RX ADMIN — METOPROLOL TARTRATE 25 MG: 25 TABLET ORAL at 09:00

## 2022-12-27 RX ADMIN — AZITHROMYCIN DIHYDRATE 500 MG: 500 INJECTION, POWDER, LYOPHILIZED, FOR SOLUTION INTRAVENOUS at 09:07

## 2022-12-27 RX ADMIN — SODIUM CHLORIDE, PRESERVATIVE FREE 10 ML: 5 INJECTION INTRAVENOUS at 22:29

## 2022-12-27 RX ADMIN — DORZOLAMIDE HYDROCHLORIDE 1 DROP: 20 SOLUTION/ DROPS OPHTHALMIC at 09:00

## 2022-12-27 RX ADMIN — GUAIFENESIN 600 MG: 600 TABLET, EXTENDED RELEASE ORAL at 09:00

## 2022-12-27 RX ADMIN — SODIUM CHLORIDE 1 G: 900 INJECTION INTRAVENOUS at 09:02

## 2022-12-27 RX ADMIN — IPRATROPIUM BROMIDE AND ALBUTEROL SULFATE 3 ML: 2.5; .5 SOLUTION RESPIRATORY (INHALATION) at 08:48

## 2022-12-27 RX ADMIN — IPRATROPIUM BROMIDE AND ALBUTEROL SULFATE 3 ML: 2.5; .5 SOLUTION RESPIRATORY (INHALATION) at 02:32

## 2022-12-27 RX ADMIN — LORAZEPAM 0.5 MG: 0.5 TABLET ORAL at 21:41

## 2022-12-27 RX ADMIN — BENZONATATE 100 MG: 100 CAPSULE ORAL at 04:12

## 2022-12-27 RX ADMIN — LATANOPROST 1 DROP: 50 SOLUTION OPHTHALMIC at 21:48

## 2022-12-27 RX ADMIN — ENOXAPARIN SODIUM 30 MG: 100 INJECTION SUBCUTANEOUS at 09:09

## 2022-12-27 RX ADMIN — METHYLPREDNISOLONE SODIUM SUCCINATE 40 MG: 40 INJECTION, POWDER, FOR SOLUTION INTRAMUSCULAR; INTRAVENOUS at 08:00

## 2022-12-27 RX ADMIN — PANTOPRAZOLE SODIUM 40 MG: 40 TABLET, DELAYED RELEASE ORAL at 09:00

## 2022-12-27 RX ADMIN — IPRATROPIUM BROMIDE AND ALBUTEROL SULFATE 3 ML: .5; 3 SOLUTION RESPIRATORY (INHALATION) at 21:08

## 2022-12-27 RX ADMIN — GUAIFENESIN 600 MG: 600 TABLET, EXTENDED RELEASE ORAL at 20:45

## 2022-12-27 RX ADMIN — ACETAMINOPHEN 650 MG: 325 TABLET ORAL at 04:16

## 2022-12-27 RX ADMIN — DORZOLAMIDE HYDROCHLORIDE 1 DROP: 20 SOLUTION/ DROPS OPHTHALMIC at 21:48

## 2022-12-27 RX ADMIN — INDAPAMIDE 1.25 MG: 2.5 TABLET, FILM COATED ORAL at 09:00

## 2022-12-27 RX ADMIN — METHYLPREDNISOLONE SODIUM SUCCINATE 40 MG: 40 INJECTION, POWDER, FOR SOLUTION INTRAMUSCULAR; INTRAVENOUS at 13:27

## 2022-12-27 RX ADMIN — BRIMONIDINE TARTRATE 1 DROP: 2 SOLUTION OPHTHALMIC at 21:48

## 2022-12-27 RX ADMIN — BRIMONIDINE TARTRATE 1 DROP: 2 SOLUTION OPHTHALMIC at 09:00

## 2022-12-27 RX ADMIN — METHYLPREDNISOLONE SODIUM SUCCINATE 40 MG: 40 INJECTION, POWDER, FOR SOLUTION INTRAMUSCULAR; INTRAVENOUS at 21:41

## 2022-12-27 RX ADMIN — SODIUM CHLORIDE, PRESERVATIVE FREE 10 ML: 5 INJECTION INTRAVENOUS at 21:43

## 2022-12-27 NOTE — PROGRESS NOTES
Hospitalist Progress Note    NAME: Geovanny Mehta   :  1935   MRN:  [id number]       Assessment / Plan:  Acute hypoxic respiratory failure d/t   Community-acquired pneumonia  Acute asthma exacerbation     Today she is on 2 liters oxygen, wean as tolerated, keep SPO2 >90  C/w ceftriaxone and Zithromax. CT shows right upper lobe pneumonia vs alveolitis  C/w Solu-Medrol, DuoNeb. Continue home inhalers. Pulmonary following  PT/OT consulted     Hypertension  Glaucoma  Anxiety  Fibromyalgia  Dyslipidemia  Continue home metoprolol, indapamide  Continue home eyedrops  Continue home Ativan     Code Status: Full code  Surrogate Decision Maker: Patients daughter Marcelino Andersen [phone], updated via phone     DVT Prophylaxis: Lovenox  GI Prophylaxis: not indicated     Baseline: From home independent of ADLs    Estimated discharge date: Patient is stable for discharge, spoke to patients daughter in detail, she initially requested IRF, however, patient does not qualify for IRF  based on performance with PT/OT, will pursue SNF placement       Subjective:     Chief Complaint / Reason for Physician Visit  Patient seen and evaluated at bedside overnight events reviewed, patient currently has no new complaints    Review of Systems:  Symptom Y/N Comments  Symptom Y/N Comments   Fever/Chills n   Chest Pain n    Poor Appetite n   Edema n    Cough y   Abdominal Pain n    Sputum y   Joint Pain     SOB/GALAN y   Pruritis/Rash     Nausea/vomit    Tolerating PT/OT     Diarrhea    Tolerating Diet     Constipation    Other       Could NOT obtain due to:      Objective:     VITALS:   Last 24hrs VS reviewed since prior progress note.  Most recent are:  Patient Vitals for the past 24 hrs:   Temp Pulse Resp BP SpO2   22 1516 98.2 °F (36.8 °C) 91 20 125/84 93 %   22 1100 -- 92 -- -- (!) 88 %   22 0950 98.4 °F (36.9 °C) 63 18 119/65 90 %   22 0851 -- -- -- -- 95 %   22 0241 98.1 °F (36.7 °C) 82 16 128/78 93 %   12/27/22 0232 -- -- -- -- 94 %   12/26/22 2059 -- -- -- -- 93 %   12/26/22 1929 97.3 °F (36.3 °C) 79 17 128/71 92 %         Intake/Output Summary (Last 24 hours) at 12/27/2022 1711  Last data filed at 12/27/2022 0349  Gross per 24 hour   Intake --   Output 450 ml   Net -450 ml          I had a face to face encounter and independently examined this patient on 12/27/2022, as outlined below:  PHYSICAL EXAM:  General: Awake, No acute distress  EENT:  EOMI. Anicteric sclerae. MMM  Resp:  Bilateral rhonchi minimal  CV:  Regular  rhythm,  No edema  GI:  Soft, Non distended, Non tender. +Bowel sounds  Neurologic:  Alert and oriented X 3, normal speech,   Psych:   Not anxious nor agitated  Skin:  No rashes. No jaundice    Reviewed most current lab test results and cultures  YES  Reviewed most current radiology test results   YES  Review and summation of old records today    NO  Reviewed patient's current orders and MAR    YES  PMH/ reviewed - no change compared to H&P  ________________________________________________________________________  Care Plan discussed with:    Comments   Patient y    Family      RN y    Care Manager     Consultant                        Multidiciplinary team rounds were held today with , nursing, pharmacist and clinical coordinator. Patient's plan of care was discussed; medications were reviewed and discharge planning was addressed. ________________________________________________________________________  Total NON critical care TIME:  36   Minutes    Total CRITICAL CARE TIME Spent:   Minutes non procedure based      Comments   >50% of visit spent in counseling and coordination of care     ________________________________________________________________________  Kendrick Varela MD     Procedures: see electronic medical records for all procedures/Xrays and details which were not copied into this note but were reviewed prior to creation of Plan.       LABS:  I reviewed today's most current labs and imaging studies. Pertinent labs include:  Recent Labs     12/25/22  0035 12/24/22  1718   WBC 7.1 9.9   HGB 12.9 12.9   HCT 40.0 39.9    256       Recent Labs     12/25/22 0035 12/24/22  1718    137   K 3.7 2.8*    102   CO2 25 28   * 168*   BUN 18 24*   CREA 1.13* 1.23*   CA 9.3 9.4   ALB  --  3.6   TBILI  --  0.3   ALT  --  33         Signed:  Phoebe Villegas MD

## 2022-12-27 NOTE — PROGRESS NOTES
Home O2 eval    Sa02 on RA at rest 88%   Sa02 on 2L  at rest 94%     Walk test done by PT on 2lpm.     Pt will need 2lpm cont.

## 2022-12-27 NOTE — PROGRESS NOTES
Problem: Self Care Deficits Care Plan (Adult)  Goal: *Acute Goals and Plan of Care (Insert Text)  Description: FUNCTIONAL STATUS PRIOR TO ADMISSION: Patient was independent and active without use of DME. Lives alone in [place], I for all ADL and I-ADL tasks, stands in tub shower for showering, Drives. Did not use O2 PTA. HOME SUPPORT: The patient lived alone with daughter available, yet she doesn't need her    Occupational Therapy Goals  Initiated 12/27/2022  1. Patient will perform grooming with independence standing at sink within 7 day(s). 2.  Patient will perform upper body dressing and lower body dressing with independence within 7 day(s). 3.  Patient will perform bathing standing at sink for shins and above, sitting for distal LE with modified independence within 7 day(s). 4.  Patient will perform toilet transfers with modified independence within 7 day(s). 5.  Patient will perform all aspects of toileting with modified independence within 7 day(s). 6. Patient will perform I-ADL tasks without AD with I within 7 days. Outcome: Not Met    OCCUPATIONAL THERAPY EVALUATION  Patient: Steven Olguin (31 y.o. female)  Date: 12/27/2022  Primary Diagnosis: Shortness of breath [R06.02]       Precautions:   Fall (new O2 dependence, on2L)    ASSESSMENT  Based on the objective data described below, the patient presents with increased SOB, decreased ADL, I-ADL, functional mobility, transfers, safety, standing balance/tolerance, with O2 dependence currently. She is Laura to mod I for ADL tasks, amb in room without AD with CGA to toilet/sink/chair. O2 on 2L 88-93%, drops with activity briefly then returns within 15 secs to above=90. She exhibits a cough with inspiration with attempts at pursed lip breathing.  She is no congruent with her PLOF of I and active, will benefit from OT with [address] [place] [address]'S [place] recommended at d/c. OT notified nurse that OT inquiring if incentive spirometer would be appropriate for patient, rec up to chair TID for meals, and amb with CGA to toilet for toieting (would not rec PurWICK as she lives alone. Continue per POC. Current Level of Function Impacting Discharge (ADLs/self-care): see below    Functional Outcome Measure: The patient scored Total: 60/100 on the Barthel Index outcome measure which is indicative of being 40% in basic self-care. Other factors to consider for discharge: lives alone, may need O2     Patient will benefit from skilled therapy intervention to address the above noted impairments. PLAN :  Recommendations and Planned Interventions: self care training, functional mobility training, therapeutic exercise, balance training, therapeutic activities, endurance activities, neuromuscular re-education, patient education, home safety training, and family training/education    Frequency/Duration: Patient will be followed by occupational therapy 4 times a week to address goals. Recommendation for discharge: (in order for the patient to meet his/her long term goals)  Occupational therapy at least 2 days/week in the home AND ensure assist and/or supervision for safety with I-ADL    This discharge recommendation:  Has not yet been discussed the attending provider and/or case management    IF patient discharges home will need the following DME: TBD, likely none, vs shower chair or TTB for ECWS purposes       SUBJECTIVE:   Patient stated Oh, that's great (to toilet in bathroom on toilet).  \"every time I breathe in I cough\".       OBJECTIVE DATA SUMMARY:   HISTORY:   Past Medical History:   Diagnosis Date    Adverse effect of anesthesia     combative waking up    Anxiety     Arthritis     Asthma     as of 12/30/16 pt states under control    Diverticulitis Dx 11/2016    Fibromyalgia     GERD (gastroesophageal reflux disease)     Glaucoma     Hiatal hernia     Hypercholesteremia     Hypertension     Ill-defined condition     neurapthy in ble    Nausea & vomiting      Past Surgical History:   Procedure Laterality Date    HX CATARACT REMOVAL Right 01/04/2017    HX HYSTERECTOMY      HX ROTATOR CUFF REPAIR Left 2008    HX TUBAL LIGATION      NC COLONOSCOPY FLX DX W/COLLJ SPEC WHEN PFRMD  11/9/2011         NC EGD TRANSORAL BIOPSY SINGLE/MULTIPLE  11/9/2011         UPPER GI ENDOSCOPY,BIOPSY  8/20/2019            Expanded or extensive additional review of patient history:     Home Situation  Home Environment: Apartment  # Steps to Enter: 0  One/Two Story Residence: One story  Living Alone: Yes  Support Systems: Child(kerry)  Patient Expects to be Discharged to[de-identified] Home with home health  Current DME Used/Available at Home: Grab bars  Tub or Shower Type: Tub/Shower combination    Hand dominance: Right    EXAMINATION OF PERFORMANCE DEFICITS:  Cognitive/Behavioral Status:                        Hearing: Auditory  Auditory Impairment: None    Vision/Perceptual:                                     Range of Motion:  BUE  AROM: Within functional limits  PROM: Within functional limits                      Strength:  BUE  Strength: Generally decreased, functional                Coordination:  Coordination: Generally decreased, functional  Fine Motor Skills-Upper: Left Intact; Right Intact    Gross Motor Skills-Upper: Left Intact; Right Intact    Tone & Sensation:    Tone: Normal  Sensation: Intact      C/o very rare tingling in R hand fingers--hasn't had for several months--ed on carpal tunnel stretches                Balance:  Sitting: Intact  Standing: Impaired; Without support  Standing - Static: Good;Fair;Constant support (without AD)  Standing - Dynamic : Fair;Good;Constant support    Functional Mobility and Transfers for ADLs:  Bed Mobility:  Supine to Sit: Stand-by assistance    Transfers:  Sit to Stand: Stand-by assistance  Stand to Sit: Stand-by assistance  Bed to Chair: Contact guard assistance  Bathroom Mobility: Contact guard assistance  Toilet Transfer : Contact guard assistance    ADL Assessment:  Feeding: Modified independent    Oral Facial Hygiene/Grooming: Setup    Bathing: Contact guard assistance         Upper Body Dressing: Setup    Lower Body Dressing: Contact guard assistance    Toileting: Contact guard assistance                ADL Intervention and task modifications:       Grooming  Position Performed: Standing  Washing Face: Contact guard assistance  Washing Hands: Contact guard assistance  Brushing Teeth: Contact guard assistance  Brushing/Combing Hair: Contact guard assistance  Cues: Verbal cues provided                        Lower Body Dressing Assistance  Socks: Set-up  Leg Crossed Method Used: Yes  Position Performed: Seated in chair    Toileting  Bladder Hygiene: Independent  Clothing Management: Contact guard assistance  Cues: Verbal cues provided  Adaptive Equipment: Grab bars         Therapeutic Exercise:     Functional Measure:    Barthel Index:  Bathin  Bladder: 10  Bowels: 10  Groomin  Dressin  Feeding: 10  Mobility: 0  Stairs: 5  Toilet Use: 5  Transfer (Bed to Chair and Back): 10  Total: 60/100      The Barthel ADL Index: Guidelines  1. The index should be used as a record of what a patient does, not as a record of what a patient could do. 2. The main aim is to establish degree of independence from any help, physical or verbal, however minor and for whatever reason. 3. The need for supervision renders the patient not independent. 4. A patient's performance should be established using the best available evidence. Asking the patient, friends/relatives and nurses are the usual sources, but direct observation and common sense are also important. However direct testing is not needed. 5. Usually the patient's performance over the preceding 24-48 hours is important, but occasionally longer periods will be relevant. 6. Middle categories imply that the patient supplies over 50 per cent of the effort. 7. Use of aids to be independent is allowed.     Score Interpretation (from [facility] 83)    Independent   60-79 Minimally independent   40-59 Partially dependent   20-39 Very dependent   <20 Totally dependent     -Aimee Eisenberg., Barthel, D.W. (1965). Functional evaluation: the Barthel Index. [address] ([address] (1997). The Barthel activities of daily living index: self-reporting versus actual performance in the old (> or = 75 years). Journal [address] 45(7), [facility], JENNIE, Gianna Ceron., Blanca Ayers. (1999). Measuring the change in disability after inpatient rehabilitation; comparison of the responsiveness of the Barthel Index and Functional Hayes Measure. Journal of Neurology, Neurosurgery, and Psychiatry, 66(4), 862-698. PREM Kumar, DAVIN Her, & Easton Mcgarry M.A. (2004) Assessment of post-stroke quality of life in cost-effectiveness studies: The usefulness of the Barthel Index and the EuroQoL-5D. Quality of Life Research, 15, 486-88     Occupational Therapy Evaluation Charge Determination   History Examination Decision-Making   LOW Complexity : Brief history review  LOW Complexity : 1-3 performance deficits relating to physical, cognitive , or psychosocial skils that result in activity limitations and / or participation restrictions  LOW Complexity : No comorbidities that affect functional and no verbal or physical assistance needed to complete eval tasks       Based on the above components, the patient evaluation is determined to be of the following complexity level: LOW   Pain Rating:  No c/o    Activity Tolerance:   Fair and desaturates with exertion and requires oxygen    After treatment patient left in no apparent distress:    Sitting in chair, Call bell within reach, and SpO2 92% on 2L    COMMUNICATION/EDUCATION:   The patients plan of care was discussed with: Registered nurse.      Home safety education was provided and the patient/caregiver indicated understanding., Patient/family have participated as able in goal setting and plan of care. , and Patient/family agree to work toward stated goals and plan of care. This patients plan of care is appropriate for delegation to PEDRO PABLO.     Thank you for this referral.  Colonel Pierre, OTR/L  Time Calculation: 24 mins

## 2022-12-27 NOTE — PROGRESS NOTES
Physician Progress Note      Huy UGALDE #:                  [id number]  :                       1935  ADMIT DATE:       2022 5:03 PM  DISCH DATE:  RESPONDING  PROVIDER #:        Myah Pascal MD          QUERY TEXT:    Pt admitted with Pneumonia. If possible, please document in the progress notes and discharge summary if you are evaluating and/or treating any of the following:    Note: CAP and HCAP indicate where the pneumonia was acquired, not a specific type. The medical record reflects the following:  Risk Factors: 79 y/o to hospital CC SOB, cough with phlegm. Clinical Indicators: Hypoxic 82% RA, CTA shows right upper lobe pneumonia    H&P noted acute hypoxic respiratory failure due to Community-acquired pneumonia    per Pulmonary consult: Pneumonia-suspected acute community acquired pneumonia    Treatment: admit, supplemental oxygen, CTA, Pulm.  consult, ceftriaxone, Zithromax, solu medrol, DuoNebs,  Options provided:  -- Gram negative pneumonia  -- Gram positive pneumonia  -- MRSA pneumonia  -- MSSA pneumonia  -- Viral pneumonia  -- Aspiration pneumonia  -- Hypostatic pneumonia  -- Other - I will add my own diagnosis  -- Disagree - Not applicable / Not valid  -- Disagree - Clinically unable to determine / Unknown  -- Refer to Clinical Documentation Reviewer    PROVIDER RESPONSE TEXT:    Bacterial PNA, can't be specified if gram negative or positive until culture results    Query created by: Tal Elliott on 2022 2:46 PM      Electronically signed by:  Myah Pascal MD 2022 2:53 PM

## 2022-12-27 NOTE — PROGRESS NOTES
Problem: Mobility Impaired (Adult and Pediatric)  Goal: *Acute Goals and Plan of Care (Insert Text)  Outcome: Not Progressing Towards Goal  Note: FUNCTIONAL STATUS PRIOR TO ADMISSION:  The patient lives alone in an independent apartment at [facility]. She does not use AD to ambulate inside her apartment, only uses SPC to go outside. She still drives, goes to the grocery store, cooks and cleans. HOME SUPPORT PRIOR TO ADMISSION:  The patient lives alone in a senior living apartment. She has a daughter in the area to provide assist if needed. Physical Therapy Goals  Initiated 12/27/2022  1. Patient will move from supine to sit and sit to supine  in bed with modified independence within 7 day(s). 2.  Patient will transfer from bed to chair and chair to bed with modified independence using the least restrictive device within 7 day(s). 3.  Patient will perform sit to stand with modified independence within 7 day(s). 4.  Patient will ambulate with supervision/set-up for 150 feet with the least restrictive device within 7 day(s). PHYSICAL THERAPY EVALUATION  Patient: [place] (1 y.o. female)  Date: 12/27/2022  Primary Diagnosis: Shortness of breath [R06.02]       Precautions:   Fall, Other (comment) (2L O2)    ASSESSMENT  Based on the objective data described below, the patient presents with decreased activity tolerance and decreased strength. She coughed quite a bit during therapy session and did expel mucous multiple times. PT tried to take the supplemental O2 off during session (patient did not wear O2 prior to hospitalization) however at rest on room air she was below 90%. Patient kept 2L O2 on during session except for the brief time she urinated on the commode due to cord not reaching. She performed transfers with standby assist for verbal cuing.  She ambulated 10ft x 2 to go from the bed to the bathroom and back to the bedside chair using RW and contact guard assist. She required a seated rest break on the bed after walking back from the bathroom and demonstrated good sitting balance with no leaning or LOB. Patient was left sitting up in the chair, chair alarm activated, call bell within reach, no complaints. Recommend HH PT upon discharge for safe transition. Current Level of Function Impacting Discharge (mobility/balance): standby assist for transfers, contact guard ambulating with RW    Functional Outcome Measure: The patient scored 20 on the Tinetti outcome measure which is indicative of moderate fall risk. Other factors to consider for discharge: lives alone but has daughter in the area     Patient will benefit from skilled therapy intervention to address the above noted impairments. PLAN :  Recommendations and Planned Interventions: bed mobility training, transfer training, gait training, therapeutic exercises, neuromuscular re-education, patient and family training/education, and therapeutic activities      Frequency/Duration: Patient will be followed by physical therapy:  3 times a week to address goals. Recommendation for discharge: (in order for the patient to meet his/her long term goals)  Physical therapy at least 2 days/week in the home     This discharge recommendation:  Has not yet been discussed the attending provider and/or case management    IF patient discharges home will need the following DME: to be determined (TBD)- possible walker (unsure if patient has one)         SUBJECTIVE:   Patient stated Oh I am so happy to see you .     OBJECTIVE DATA SUMMARY:   HISTORY:    Past Medical History:   Diagnosis Date    Adverse effect of anesthesia     combative waking up    Anxiety     Arthritis     Asthma     as of 12/30/16 pt states under control    Diverticulitis Dx 11/2016    Fibromyalgia     GERD (gastroesophageal reflux disease)     Glaucoma     Hiatal hernia     Hypercholesteremia     Hypertension     Ill-defined condition     neurapthy in ble    Nausea & vomiting      Past Surgical History:   Procedure Laterality Date    HX CATARACT REMOVAL Right 01/04/2017    HX HYSTERECTOMY      HX ROTATOR CUFF REPAIR Left 2008    HX TUBAL LIGATION      CT COLONOSCOPY FLX DX W/COLLJ SPEC WHEN PFRMD  11/9/2011         CT EGD TRANSORAL BIOPSY SINGLE/MULTIPLE  11/9/2011         UPPER GI ENDOSCOPY,BIOPSY  8/20/2019            Personal factors and/or comorbidities impacting plan of care: currently on 2L O2    Home Situation  Home Environment: Apartment  # Steps to Enter: 0  One/Two Story Residence: One story  Living Alone: Yes  Support Systems: Child(kerry)  Patient Expects to be Discharged to[de-identified] Home with home health  Current DME Used/Available at Home: None    EXAMINATION/PRESENTATION/DECISION MAKING:   Critical Behavior:              Hearing: Auditory  Auditory Impairment: None  Skin:  intact  Edema: none  Range Of Motion:  AROM: Within functional limits           PROM: Within functional limits           Strength:    Strength: Generally decreased, functional                    Tone & Sensation:   Tone: Normal              Sensation: Intact               Coordination:  Coordination: Within functional limits  Vision:      Functional Mobility:  Bed Mobility:     Supine to Sit: Stand-by assistance        Transfers:  Sit to Stand: Stand-by assistance  Stand to Sit: Stand-by assistance                       Balance:   Sitting: Intact  Standing: Impaired  Standing - Static: Good;Constant support  Standing - Dynamic : Good;Constant support  Ambulation/Gait Training:  Distance (ft): 20 Feet (ft) (10x2)  Assistive Device: Walker, rolling  Ambulation - Level of Assistance: Contact guard assistance        Gait Abnormalities: Decreased step clearance              Speed/Aspen: Slow  Step Length: Right shortened;Left shortened                     Stairs:               Therapeutic Exercises:   none    Functional Measure:  Tinetti 20/28       Physical Therapy Evaluation Charge Determination   History Examination Presentation Decision-Making   MEDIUM  Complexity : 1-2 comorbidities / personal factors will impact the outcome/ POC  MEDIUM Complexity : 3 Standardized tests and measures addressing body structure, function, activity limitation and / or participation in recreation  MEDIUM Complexity : Evolving with changing characteristics  Other outcome measures Tinetti 20/28  MEDIUM      Based on the above components, the patient evaluation is determined to be of the following complexity level: MEDIUM    Pain Rating:  none    Activity Tolerance:   Fair    After treatment patient left in no apparent distress:   Sitting in chair, Call bell within reach, and Bed / chair alarm activated    COMMUNICATION/EDUCATION:   The patients plan of care was discussed with: Registered nurse. Fall prevention education was provided and the patient/caregiver indicated understanding., Patient/family have participated as able in goal setting and plan of care. , and Patient/family agree to work toward stated goals and plan of care.     Thank you for this referral.  Connor Fernández, PT   Time Calculation: 32 mins

## 2022-12-27 NOTE — PROGRESS NOTES
Pt walked to restroom independently, steady gait.   Transition of Care Plan:    RUR: 13%   Disposition: SNF- [address] [place] [address],[address] [facility]- referral pending    If SNF or IPR: Date FOC offered: 12/27/22  Date [address] received: 12/27/22  Date authorization started with reference number:n/a  Date authorization received and expires:n/a  Accepting facility: [address] [place] [address]  Follow up appointments: PCP, Specialist  DME needed: Has RW and cane  Transportation at Discharge: S to provide transportation  [facility] or means to access home:        IM Medicare Letter: 1st IM Letter given on 12/26/22  Is patient a  and connected with the [place]? N/A            If yes, was [employer] transfer form completed and VA notified? N/A  Caregiver Contact: Chloe Oconnor- Daughter- [phone]  Discharge Caregiver contacted prior to discharge? CM discussed d/c plan with Daughter via phone and pt at bedside. CM discussed d/c plan of SNF vs HH. CM called and spoke with daughter and explained [facility] services. She had concerns of mother living alone and her ability to independently care for herself, the late d/c and not knowing how to use the oxygen. She prefers the pt get d/c in the am and has requested to speak with the physician. Daughter called CM and asked if referral can be sent to [address] [place] [address] so pt can get stronger prior to going home to live independently. Care Conference needed?:        CM sent referral to [address] [place] [address] via CC link. FOC completed and signed copy placed on pt chart. FARAZ perfect served Dr. Claudette Salcido to contact daughter to discuss pt medical condition.     [employer]  Phone: [phone]

## 2022-12-27 NOTE — PROGRESS NOTES
ADULT PROTOCOL: JET AEROSOL ASSESSMENT    Patient  Will Sagastume     80 y.o.   female     12/27/2022  1:48 PM    Breath Sounds Pre Procedure: Right Breath Sounds: Diminished                               Left Breath Sounds: Diminished    Breath Sounds Post Procedure: Right Breath Sounds: Diminished                                 Left Breath Sounds: Diminished    Breathing pattern: Pre procedure Breathing Pattern: Regular          Post procedure Breathing Pattern: Regular    Heart Rate: Pre procedure Pulse: 87           Post procedure Pulse: 87    Resp Rate: Pre procedure Respirations: 18           Post procedure Respirations: 19    Cough: Pre procedure Cough: Non-productive               Post procedure Cough: Non-productive    Suctioned: NO    Sputum: Pre procedure                   Post procedure      Oxygen: O2 Device: Nasal cannula   O2 Flow Rate (L/min): 2 l/min     Changed: NO    SpO2: Pre procedure SpO2: 95 %   with oxygen              Post procedure SpO2: 95 %  with oxygen    Nebulizer Therapy: Current medications Aerosolized Medications: DuoNeb      Changed: YES   Decreased frequency to BID. Oxygen consumption has decreased. Currently on [facility]. Breath sounds have been diminished/Clear.      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

## 2022-12-27 NOTE — PROGRESS NOTES
End of Shift Note    Bedside shift change report given to Jose Banks (oncoming nurse) by Ekaterina Parr RN (offgoing nurse). Report included the following information SBAR, Kardex, Intake/Output, and Recent Results    Shift worked:  9598-8325     Shift summary and any significant changes:     Pt ween to 2L nc, anxious at times but mostly calm and cooperative, no c/o pain, refused solumedrol due to reacion on 12/25 md notified      Concerns for physician to address:  None      Zone phone for oncoming shift:   [phone]       Activity:  Activity Level: Up with Assistance  Number times ambulated in hallways past shift: 0  Number of times OOB to chair past shift: 0    Cardiac:   Cardiac Monitoring: Yes      Cardiac Rhythm: PAC, Sinus Rhythm    Access:  Current line(s): PIV     Genitourinary:   Urinary status: external catheter    Respiratory:   O2 Device: Nasal cannula  Chronic home O2 use?: NO  Incentive spirometer at bedside: NO       GI:     Current diet:  ADULT DIET Regular  Passing flatus: YES  Tolerating current diet: YES       Pain Management:   Patient states pain is manageable on current regimen: N/A    Skin:  Isidoro Score: 19  Interventions: float heels and increase time out of bed    Patient Safety:  Fall Score:  Total Score: 3  Interventions: bed/chair alarm, gripper socks, and pt to call before getting OOB  High Fall Risk: Yes    Length of Stay:  Expected LOS: - - -  Actual LOS: 2      Ekaterina Parr RN

## 2022-12-27 NOTE — CONSULTS
Pulmonary, Critical Care, and Sleep Medicine    Patient Consult    Name: Steven Olguin MRN: [id number]   : 1935 Hospital: [facility]   Date: 2022              IMPRESSION:   Acute Hypoxic Respiratory Failure, pox of 80% on RA prior to arrival. Was increased to 6L when she presented to ER. WAS at 15L when seen yesterday. Now down to 6L NC. Pneumonia-suspected acute community acquired pneumonia. She is feeling better. Sitting up in chair. Hx of Asthma. Acute wheezing. This is better this am.   Allergic Rhinitis on Singulair. Reported allergy, intolerance to prednisone. No acute Pulmonary Embolus  Zenkers Diverticulum, Hiatal hernia  Hepatic Cysts. T12 compression   Desires to be full code. RECOMMENDATIONS:   ON Duonebs  ON Ctx, Azithryomycin  ON Solumedrol  Oyxgen to keep pox over 90%. Has been weaning over last 24 hours. Wean as able. Pulmonary: Jovanna Henriquez  [facility]    Subjective:   22:   Pt is feeling better today compared to yesterday. NO chest pain. Has a cough which is mainly dry. Not much wheezing. She has pain at her right arm IV site. ----  CC: shortness of breath. This patient has been seen and evaluated at the request of Dr. Nathan Ramirez for above. Patient is a 80 y.o. female who had acute shortness of breath. Has been with a dry cough. No chest pain, no myalgias or arthralgias. Has been with worsening dyspnea. Has some sputum production. Has some sinus congestion. Has been seen by DR. Jovanna Henriquez in past.   Has ongoing wheezing. NO GERD. NO aspiration. No leg pain or swelling.        Past Medical History:   Diagnosis Date    Adverse effect of anesthesia     combative waking up    Anxiety     Arthritis     Asthma     as of 16 pt states under control    Diverticulitis Dx 2016    Fibromyalgia     GERD (gastroesophageal reflux disease)     Glaucoma     Hiatal hernia     Hypercholesteremia     Hypertension     Ill-defined condition neurapthy in ble    Nausea & vomiting       Past Surgical History:   Procedure Laterality Date    HX CATARACT REMOVAL Right 01/04/2017    HX HYSTERECTOMY      HX ROTATOR CUFF REPAIR Left 2008    HX TUBAL LIGATION      OH COLONOSCOPY FLX DX W/COLLJ SPEC WHEN PFRMD  11/9/2011         OH EGD TRANSORAL BIOPSY SINGLE/MULTIPLE  11/9/2011         UPPER GI ENDOSCOPY,BIOPSY  8/20/2019           Prior to Admission medications    Medication Sig Start Date End Date Taking? Authorizing Provider   azithromycin (ZITHROMAX) 250 mg tablet Take as directed 10/20/22 10/22/22  Isaac Westbrook MD   benzonatate (TESSALON) 100 mg capsule TAKE 1 CAPSULE BY MOUTH THREE TIMES A DAY AS NEEDED FOR COUGH 9/22/22   Provider, Historical   ondansetron hcl (Zofran) 4 mg tablet Take 1 Tablet by mouth every eight (8) hours as needed for Nausea. 8/1/22   Mallorie Contreras MD   senna-docusate (PERICOLACE) 8.6-50 mg per tablet Take 1 Tab by mouth daily. Patient not taking: Reported on 3/23/2022 5/7/21   Caterina Houston NP   brimonidine 0.025 % drop Apply 1 Drop to eye two (2) times a day. Right eye    Provider, Historical   dorzolamide (TRUSOPT) 2 % ophthalmic solution Administer 1 Drop to right eye two (2) times a day. Provider, Historical   metoprolol tartrate (LOPRESSOR) 25 mg tablet Take 25 mg by mouth daily. Provider, Historical   indapamide (LOZOL) 1.25 mg tablet Take 1.25 mg by mouth daily. Provider, Historical   montelukast (SINGULAIR) 10 mg tablet Take 10 mg by mouth daily. Provider, Historical   pantoprazole (PROTONIX) 40 mg tablet Take 40 mg by mouth daily. Provider, Historical   bimatoprost (LUMIGAN) 0.01 % ophthalmic drops Administer 1 Drop to right eye every evening. Provider, Historical   multivitamin (ONE A DAY) tablet Take 1 Tab by mouth daily. Provider, Historical   albuterol (PROVENTIL HFA, VENTOLIN HFA, PROAIR HFA) 90 mcg/actuation inhaler Take 1 Puff by inhalation as needed for Wheezing.     Provider, Historical   lorazepam (ATIVAN) 0.5 mg tablet Take 0.5 mg by mouth nightly. Provider, Historical     Allergies   Allergen Reactions    Codeine Other (comments)    Cymbalta [Duloxetine] Nausea Only and Other (comments)     \"within the hour I had a severe headache, and vomiting\"      Demerol [Meperidine] Nausea and Vomiting    Lidocaine Other (comments)    Morphine Nausea and Vomiting    Novocain [Procaine] Other (comments)    Penicillins Other (comments)     rash    Percocet [Oxycodone-Acetaminophen] Nausea and Vomiting    Prednisone Rash    Sulfa (Sulfonamide Antibiotics) Other (comments)      Social History     Tobacco Use    Smoking status: Never    Smokeless tobacco: Never   Substance Use Topics    Alcohol use: No      No family history on file. Current Facility-Administered Medications   Medication Dose Route Frequency    bimatoprost (LUMIGAN) 0.01 % ophthalmic drops 1 Drop (Patient Supplied)  1 Drop Right Eye QHS    Lumigan - Please contact pharmacy when eye drops are available for labeling! Thank you.   1 Each Other DAILY    cefTRIAXone (ROCEPHIN) 1 g in 0.9% sodium chloride (MBP/ADV) 50 mL MBP  1 g IntraVENous Q24H    guaiFENesin ER (MUCINEX) tablet 600 mg  600 mg Oral Q12H    azithromycin (ZITHROMAX) 500 mg in 0.9% sodium chloride 250 mL (Wscy1Hlc)  500 mg IntraVENous Q24H    latanoprost (XALATAN) 0.005 % ophthalmic solution 1 Drop  1 Drop Right Eye QHS    brimonidine (ALPHAGAN) 0.2 % ophthalmic solution 1 Drop  1 Drop Right Eye BID    dorzolamide (TRUSOPT) 2 % ophthalmic solution 1 Drop  1 Drop Right Eye BID    indapamide (LOZOL) tablet 1.25 mg  1.25 mg Oral DAILY    LORazepam (ATIVAN) tablet 0.5 mg  0.5 mg Oral QHS    metoprolol tartrate (LOPRESSOR) tablet 25 mg  25 mg Oral DAILY    montelukast (SINGULAIR) tablet 10 mg  10 mg Oral DAILY    pantoprazole (PROTONIX) tablet 40 mg  40 mg Oral DAILY    sodium chloride (NS) flush 5-40 mL  5-40 mL IntraVENous Q8H    albuterol-ipratropium (DUO-NEB) 2.5 MG-0.5 MG/3 ML  3 mL Nebulization Q6H RT    methylPREDNISolone (PF) (SOLU-MEDROL) injection 40 mg  40 mg IntraVENous Q6H    enoxaparin (LOVENOX) injection 30 mg  30 mg SubCUTAneous DAILY       Review of Systems:  Constitutional: positive for fatigue  Eyes: negative  Ears, nose, mouth, throat, and face: positive for nasal congestion  Respiratory: positive for cough, sputum, wheezing, dyspnea on exertion, or chronic bronchitis  Cardiovascular: negative  Gastrointestinal: negative  Genitourinary:negative  Integument/breast: negative  Hematologic/lymphatic: negative  Musculoskeletal:negative  Neurological: negative  Behavioral/Psych: negative  Endocrine: negative  Allergic/Immunologic: negative    Objective:   Vital Signs:    Visit Vitals  /65   Pulse 63   Temp 98.4 °F (36.9 °C)   Resp 18   Ht 5' 3\" (1.6 m)   Wt 59.6 kg (131 lb 4.8 oz)   SpO2 90%   BMI 23.26 kg/m²       O2 Device: Nasal cannula   O2 Flow Rate (L/min): 2 l/min   Temp (24hrs), Av.9 °F (36.6 °C), Min:97.3 °F (36.3 °C), Max:98.4 °F (36.9 °C)       Intake/Output:   Last shift:      No intake/output data recorded. Last 3 shifts:  1901 -  0700  In: -   Out: 3901 [Urine:3901]    Intake/Output Summary (Last 24 hours) at 2022 1105  Last data filed at 2022 0349  Gross per 24 hour   Intake --   Output 1900 ml   Net -1900 ml        Physical Exam: ON NC oxygen. General:  Alert, cooperative, no distress, appears stated age. Sitting up in chair. Head:  Normocephalic, without obvious abnormality, atraumatic. Eyes:  Conjunctivae/corneas clear. PERRL, EOMs intact. Nose: Nares normal. Septum midline. Mucosa normal. No drainage or sinus tenderness. Throat: Lips, mucosa, and tongue normal. Teeth and gums normal.   Neck: Supple, symmetrical, trachea midline, no adenopathy, thyroid: no enlargment/tenderness/nodules, no carotid bruit and no JVD. Back:   Symmetric, no curvature.  ROM normal.   Lungs:   Has good air movement, clear lung when seen,  has a dry cough. Chest wall:  No tenderness or deformity. Heart:  Regular rate and rhythm, S1, S2 normal, no murmur, click, rub or gallop. Abdomen:   Soft, non-tender. Bowel sounds normal. No masses,  No organomegaly. Extremities: Extremities normal, atraumatic, no cyanosis or edema. Pulses: 2+ and symmetric all extremities. Skin: Skin color, texture, turgor normal. No rashes or lesions   Lymph nodes: Cervical, supraclavicular, and axillary nodes normal.   Neurologic: Grossly nonfocal, motor intact. Data review:     No results found for this or any previous visit (from the past 24 hour(s)). Imaging:  I have personally reviewed the patients radiographs and have reviewed the reports:  12-24-22 CT of chest:   FINDINGS:  Few ill defined subcentimeter opacities in the periphery of the right upper lobe  (image 78). Zenker's diverticulum. The pulmonary arteries are well enhanced and no pulmonary emboli are identified. There is no mediastinal or hilar adenopathy or mass. The aorta enhances normally  without evidence of aneurysm or dissection. The visualized portions of the upper abdominal organs are remarkable for a  hiatal hernia as well as stable left hepatic hypodensities, previously  determined to represent cysts. Mild superior endplate R04 wedge deformity, stable. IMPRESSION     Right upper lobe pneumonia versus alveolitis. No pulmonary embolism  Mild stable T12 wedge deformity.  Recommend DXA              Sharlene Hsu MD

## 2022-12-27 NOTE — DISCHARGE SUMMARY
Hospitalist Discharge Summary     Patient ID:  Tyree Moreira  [id number]  58 y.o.  1935  12/24/2022    PCP on record: Louisa Cooney MD    Admit date: 12/24/2022  Discharge date and time: 12/27/2022    DISCHARGE DIAGNOSIS:    Acute respiratory failure with hypoxia/community-acquired pneumonia/asthma exacerbation/hypertension/glaucoma/anxiety/fibromyalgia/dyslipidemia/COPD    CONSULTATIONS:  IP CONSULT TO PULMONOLOGY    Excerpted HPI from H&P of Latisha Junior MD:  Chery Cruz is a 80 y.o.  female who presents with past medical history of asthma, GERD is coming the hospital chief complaint of shortness of breath, cough and phlegm. Patient reports being usual until about 3 days ago when she started having shortness of breath along with some cough and also whitish phlegm. Reports mild fever. No chills. Does not report any chest pain. No abdominal pain, nausea or vomiting. On arrival to ED, noted to have stable vitals. She was hypoxic with saturations of about 82% on room air. On labs CBC is normal.  Potassium is 2.8, creatinine 1.23. LFTs are normal.  Troponin is 12.  proBNP is 189. CTA shows right upper lobe pneumonia     We were asked to admit for work up and evaluation of the above problems. ______________________________________________________________________  DISCHARGE SUMMARY/HOSPITAL COURSE:  for full details see H&P, daily progress notes, labs, consult notes.      Patient was subsequently admitted to [facility] further evaluation as well as management, patient may need continuous telemetry monitoring, patient was started on IV steroids as well as IV antibiotics, patient's clinical status improved, patient's oxygen requirements down trended, following which as patient's clinical status improved, and after evaluation by physical therapy as well as Occupational Therapy as well as evaluation for the need for oxygen patient was deemed stable for discharge home with home health on oral steroids as well as oral antibiotics with close outpatient follow-up with primary care physician as well as pulmonology, the plan was explained to the patient in details agreeable to current plan.        _______________________________________________________________________  Patient seen and examined by me on discharge day. Pertinent Findings:  Gen:    Not in distress  Chest: Decreased air entry bilaterally in bilateral lower lung zones  CVS:   Regular rhythm, s1/s2 no m/r/g  No edema  Abd:  Soft, not distended, not tender  Neuro:  Alert, Oriented x 4  _______________________________________________________________________  DISCHARGE MEDICATIONS:   Current Discharge Medication List        START taking these medications    Details   acetaminophen (TYLENOL) 325 mg tablet Take 2 Tablets by mouth every six (6) hours as needed for Pain or Fever. Qty: 60 Tablet, Refills: 0  Start date: 12/27/2022      guaiFENesin ER (MUCINEX) 600 mg ER tablet Take 1 Tablet by mouth every twelve (12) hours. Qty: 60 Tablet, Refills: 0  Start date: 12/27/2022      melatonin 3 mg tablet Take 1 Tablet by mouth nightly as needed for Insomnia. Qty: 30 Tablet, Refills: 0  Start date: 12/27/2022      methylPREDNISolone (MEDROL DOSEPACK) 4 mg tablet Use as directed  Qty: 1 Dose Pack, Refills: 0  Start date: 12/27/2022           CONTINUE these medications which have CHANGED    Details   albuterol (PROVENTIL HFA, VENTOLIN HFA, PROAIR HFA) 90 mcg/actuation inhaler Take 1 Puff by inhalation as needed for Wheezing. Qty: 18 g, Refills: 2  Start date: 12/27/2022      benzonatate (TESSALON) 100 mg capsule TAKE 1 CAPSULE BY MOUTH THREE TIMES A DAY AS NEEDED FOR COUGH  Qty: 45 Capsule, Refills: 0  Start date: 12/27/2022           CONTINUE these medications which have NOT CHANGED    Details   ondansetron hcl (Zofran) 4 mg tablet Take 1 Tablet by mouth every eight (8) hours as needed for Nausea.   Qty: 20 Tablet, Refills: 0 brimonidine 0.025 % drop Apply 1 Drop to eye two (2) times a day. Right eye      dorzolamide (TRUSOPT) 2 % ophthalmic solution Administer 1 Drop to right eye two (2) times a day. metoprolol tartrate (LOPRESSOR) 25 mg tablet Take 25 mg by mouth daily. indapamide (LOZOL) 1.25 mg tablet Take 1.25 mg by mouth daily. montelukast (SINGULAIR) 10 mg tablet Take 10 mg by mouth daily. pantoprazole (PROTONIX) 40 mg tablet Take 40 mg by mouth daily. bimatoprost (LUMIGAN) 0.01 % ophthalmic drops Administer 1 Drop to right eye every evening.      multivitamin (ONE A DAY) tablet Take 1 Tab by mouth daily. lorazepam (ATIVAN) 0.5 mg tablet Take 0.5 mg by mouth nightly. STOP taking these medications       azithromycin (ZITHROMAX) 250 mg tablet Comments:   Reason for Stopping:         senna-docusate (PERICOLACE) 8.6-50 mg per tablet Comments:   Reason for Stopping:                 Patient Follow Up Instructions: Activity: PT/OT per Home Health  Diet: Cardiac Diet  Wound Care: As directed    Follow-up with PCP/Pulmonology in 2 weeks.   Follow-up tests/labs As per above physicians  Follow-up Information       Follow up With Specialties Details Why Contact Info    Radhames Carter MD [address]  [phone]      Radhames Carter MD Family Medicine Follow up in 2 week(s)  [address]      Nikolai Martinez MD Pulmonary Disease Follow up in 2 week(s)  [address] [place] [address]  [phone]            ________________________________________________________________    Risk of deterioration: Low    Condition at Discharge:  Stable  __________________________________________________________________    Disposition  Home with family and home health services    ____________________________________________________________________    Code Status: Full Code  ___________________________________________________________________      Total time in minutes spent coordinating this discharge (includes going over instructions, follow-up, prescriptions, and preparing report for sign off to her PCP) :  45 minutes    Signed:   Piero Francois MD

## 2022-12-28 VITALS
RESPIRATION RATE: 18 BRPM | SYSTOLIC BLOOD PRESSURE: 129 MMHG | TEMPERATURE: 97.2 F | OXYGEN SATURATION: 92 % | DIASTOLIC BLOOD PRESSURE: 67 MMHG | HEIGHT: 63 IN | WEIGHT: 131.3 LBS | BODY MASS INDEX: 23.27 KG/M2 | HEART RATE: 64 BPM

## 2022-12-28 LAB
ANION GAP SERPL CALC-SCNC: 8 MMOL/L (ref 5–15)
BUN SERPL-MCNC: 29 MG/DL (ref 6–20)
BUN/CREAT SERPL: 31 (ref 12–20)
CALCIUM SERPL-MCNC: 9.6 MG/DL (ref 8.5–10.1)
CHLORIDE SERPL-SCNC: 102 MMOL/L (ref 97–108)
CO2 SERPL-SCNC: 27 MMOL/L (ref 21–32)
CREAT SERPL-MCNC: 0.95 MG/DL (ref 0.55–1.02)
ERYTHROCYTE [DISTWIDTH] IN BLOOD BY AUTOMATED COUNT: 13.4 % (ref 11.5–14.5)
GLUCOSE SERPL-MCNC: 155 MG/DL (ref 65–100)
HCT VFR BLD AUTO: 39.7 % (ref 35–47)
HGB BLD-MCNC: 13.2 G/DL (ref 11.5–16)
MAGNESIUM SERPL-MCNC: 2 MG/DL (ref 1.6–2.4)
MCH RBC QN AUTO: 27.7 PG (ref 26–34)
MCHC RBC AUTO-ENTMCNC: 33.2 G/DL (ref 30–36.5)
MCV RBC AUTO: 83.4 FL (ref 80–99)
NRBC # BLD: 0 K/UL (ref 0–0.01)
NRBC BLD-RTO: 0 PER 100 WBC
PLATELET # BLD AUTO: 305 K/UL (ref 150–400)
PMV BLD AUTO: 10.2 FL (ref 8.9–12.9)
POTASSIUM SERPL-SCNC: 3.7 MMOL/L (ref 3.5–5.1)
RBC # BLD AUTO: 4.76 M/UL (ref 3.8–5.2)
SODIUM SERPL-SCNC: 137 MMOL/L (ref 136–145)
WBC # BLD AUTO: 7.8 K/UL (ref 3.6–11)

## 2022-12-28 PROCEDURE — 74011000250 HC RX REV CODE- 250: Performed by: INTERNAL MEDICINE

## 2022-12-28 PROCEDURE — 74011000258 HC RX REV CODE- 258: Performed by: STUDENT IN AN ORGANIZED HEALTH CARE EDUCATION/TRAINING PROGRAM

## 2022-12-28 PROCEDURE — 74011250637 HC RX REV CODE- 250/637: Performed by: STUDENT IN AN ORGANIZED HEALTH CARE EDUCATION/TRAINING PROGRAM

## 2022-12-28 PROCEDURE — 94761 N-INVAS EAR/PLS OXIMETRY MLT: CPT

## 2022-12-28 PROCEDURE — 74011250636 HC RX REV CODE- 250/636: Performed by: INTERNAL MEDICINE

## 2022-12-28 PROCEDURE — 74011250637 HC RX REV CODE- 250/637: Performed by: INTERNAL MEDICINE

## 2022-12-28 PROCEDURE — 74011250636 HC RX REV CODE- 250/636: Performed by: STUDENT IN AN ORGANIZED HEALTH CARE EDUCATION/TRAINING PROGRAM

## 2022-12-28 PROCEDURE — 85027 COMPLETE CBC AUTOMATED: CPT

## 2022-12-28 PROCEDURE — 80048 BASIC METABOLIC PNL TOTAL CA: CPT

## 2022-12-28 PROCEDURE — 94640 AIRWAY INHALATION TREATMENT: CPT

## 2022-12-28 PROCEDURE — 83735 ASSAY OF MAGNESIUM: CPT

## 2022-12-28 PROCEDURE — 77010033678 HC OXYGEN DAILY

## 2022-12-28 PROCEDURE — 36415 COLL VENOUS BLD VENIPUNCTURE: CPT

## 2022-12-28 RX ADMIN — SODIUM CHLORIDE, PRESERVATIVE FREE 10 ML: 5 INJECTION INTRAVENOUS at 05:15

## 2022-12-28 RX ADMIN — MONTELUKAST 10 MG: 10 TABLET, FILM COATED ORAL at 08:47

## 2022-12-28 RX ADMIN — IPRATROPIUM BROMIDE AND ALBUTEROL SULFATE 3 ML: .5; 3 SOLUTION RESPIRATORY (INHALATION) at 07:36

## 2022-12-28 RX ADMIN — GUAIFENESIN 600 MG: 600 TABLET, EXTENDED RELEASE ORAL at 08:59

## 2022-12-28 RX ADMIN — DORZOLAMIDE HYDROCHLORIDE 1 DROP: 20 SOLUTION/ DROPS OPHTHALMIC at 09:01

## 2022-12-28 RX ADMIN — SODIUM CHLORIDE 1 G: 900 INJECTION INTRAVENOUS at 08:47

## 2022-12-28 RX ADMIN — METHYLPREDNISOLONE SODIUM SUCCINATE 40 MG: 40 INJECTION, POWDER, FOR SOLUTION INTRAMUSCULAR; INTRAVENOUS at 05:18

## 2022-12-28 RX ADMIN — PANTOPRAZOLE SODIUM 40 MG: 40 TABLET, DELAYED RELEASE ORAL at 08:47

## 2022-12-28 RX ADMIN — BRIMONIDINE TARTRATE 1 DROP: 2 SOLUTION OPHTHALMIC at 08:59

## 2022-12-28 RX ADMIN — INDAPAMIDE 1.25 MG: 2.5 TABLET, FILM COATED ORAL at 08:47

## 2022-12-28 RX ADMIN — AZITHROMYCIN DIHYDRATE 500 MG: 500 INJECTION, POWDER, LYOPHILIZED, FOR SOLUTION INTRAVENOUS at 09:26

## 2022-12-28 RX ADMIN — METOPROLOL TARTRATE 25 MG: 25 TABLET ORAL at 08:47

## 2022-12-28 NOTE — PROGRESS NOTES
Bedside shift change report given to Vinayak Wheat (oncoming nurse) by Caleb Barrera LPN (offgoing nurse). Report included the following information SBAR, Kardex, and MAR.

## 2022-12-28 NOTE — DISCHARGE SUMMARY
Hospitalist Discharge Summary     Patient ID:  Doris Weldon  [id number]  2 y.o.  1935  12/24/2022    PCP on record: Nolan Davis MD    Admit date: 12/24/2022  Discharge date and time: 12/28/2022    DISCHARGE DIAGNOSIS:    Acute respiratory failure with hypoxia/community-acquired pneumonia/asthma exacerbation/hypertension/glaucoma/anxiety/fibromyalgia/dyslipidemia/COPD    CONSULTATIONS:  IP CONSULT TO PULMONOLOGY    Excerpted HPI from H&P of Karen Cao MD:  Maddison Rosa is a 80 y.o.  female who presents with past medical history of asthma, GERD is coming the hospital chief complaint of shortness of breath, cough and phlegm. Patient reports being usual until about 3 days ago when she started having shortness of breath along with some cough and also whitish phlegm. Reports mild fever. No chills. Does not report any chest pain. No abdominal pain, nausea or vomiting. On arrival to ED, noted to have stable vitals. She was hypoxic with saturations of about 82% on room air. On labs CBC is normal.  Potassium is 2.8, creatinine 1.23. LFTs are normal.  Troponin is 12.  proBNP is 189. CTA shows right upper lobe pneumonia     We were asked to admit for work up and evaluation of the above problems. ______________________________________________________________________  DISCHARGE SUMMARY/HOSPITAL COURSE:  for full details see H&P, daily progress notes, labs, consult notes.      Patient was subsequently admitted to [facility] further evaluation as well as management, patient may need continuous telemetry monitoring, patient was started on IV steroids as well as IV antibiotics, patient's clinical status improved, patient's oxygen requirements down trended, following which as patient's clinical status improved, and after evaluation by physical therapy as well as Occupational Therapy as well as evaluation for the need for oxygen patient was deemed stable for discharge home with home health on oral steroids as well as oral antibiotics with close outpatient follow-up with primary care physician as well as pulmonology, the plan was explained to the patient in details agreeable to current plan.        _______________________________________________________________________  Patient seen and examined by me on discharge day. Pertinent Findings:  Gen:    Not in distress  Chest: Decreased air entry bilaterally in bilateral lower lung zones  CVS:   Regular rhythm, s1/s2 no m/r/g  No edema  Abd:  Soft, not distended, not tender  Neuro:  Alert, Oriented x 4  _______________________________________________________________________  DISCHARGE MEDICATIONS:   Discharge Medication List as of 12/28/2022 12:13 PM        START taking these medications    Details   acetaminophen (TYLENOL) 325 mg tablet Take 2 Tablets by mouth every six (6) hours as needed for Pain or Fever., Normal, Disp-60 Tablet, R-0      guaiFENesin ER (MUCINEX) 600 mg ER tablet Take 1 Tablet by mouth every twelve (12) hours. , Normal, Disp-60 Tablet, R-0      melatonin 3 mg tablet Take 1 Tablet by mouth nightly as needed for Insomnia., Normal, Disp-30 Tablet, R-0      methylPREDNISolone (MEDROL DOSEPACK) 4 mg tablet Use as directed, Normal, Disp-1 Dose Pack, R-0           CONTINUE these medications which have CHANGED    Details   albuterol (PROVENTIL HFA, VENTOLIN HFA, PROAIR HFA) 90 mcg/actuation inhaler Take 1 Puff by inhalation as needed for Wheezing., Normal, Disp-18 g, R-2      benzonatate (TESSALON) 100 mg capsule TAKE 1 CAPSULE BY MOUTH THREE TIMES A DAY AS NEEDED FOR COUGH, Normal, Disp-45 Capsule, R-0           CONTINUE these medications which have NOT CHANGED    Details   ondansetron hcl (Zofran) 4 mg tablet Take 1 Tablet by mouth every eight (8) hours as needed for Nausea., Normal, Disp-20 Tablet, R-0      brimonidine 0.025 % drop Apply 1 Drop to eye two (2) times a day.  Right eye, Historical Med      dorzolamide (TRUSOPT) 2 % ophthalmic solution Administer 1 Drop to right eye two (2) times a day., Historical Med      metoprolol tartrate (LOPRESSOR) 25 mg tablet Take 25 mg by mouth daily. , Historical Med      indapamide (LOZOL) 1.25 mg tablet Take 1.25 mg by mouth daily. , Historical Med      montelukast (SINGULAIR) 10 mg tablet Take 10 mg by mouth daily. , Historical Med      pantoprazole (PROTONIX) 40 mg tablet Take 40 mg by mouth daily. , Historical Med      bimatoprost (LUMIGAN) 0.01 % ophthalmic drops Administer 1 Drop to right eye every evening., Historical Med      multivitamin (ONE A DAY) tablet Take 1 Tab by mouth daily. , Historical Med      lorazepam (ATIVAN) 0.5 mg tablet Take 0.5 mg by mouth nightly., Historical Med           STOP taking these medications       azithromycin (ZITHROMAX) 250 mg tablet Comments:   Reason for Stopping:         senna-docusate (PERICOLACE) 8.6-50 mg per tablet Comments:   Reason for Stopping:                 Patient Follow Up Instructions: Activity: PT/OT eval and treat  Diet: Cardiac Diet  Wound Care: As directed    Follow-up with PCP/Pulmonology in 2 weeks.   Follow-up tests/labs As per above physicians  Follow-up Information       Follow up With Specialties Details Why Contact Info    Briseida Interiano MD Family Medicine Follow up in 2 week(s)  [address]      Onesimo Deleon MD Pulmonary Disease Follow up in 2 week(s)  [address]  [address]  [address] [facility]  [address] [facility]  [phone]          ________________________________________________________________    Risk of deterioration: Low    Condition at Discharge:  Stable  __________________________________________________________________    Disposition  SNF    ____________________________________________________________________    Code Status: Full Code  ___________________________________________________________________      Total time in minutes spent coordinating this discharge (includes going over instructions, follow-up, prescriptions, and preparing report for sign off to her PCP) :  45 minutes    Signed:   Veronica Moses MD

## 2022-12-28 NOTE — PROGRESS NOTES
Transition of Care Plan:     RUR: 13%   Disposition: SNF- [facility],5Th Floor of [place]  Room: 204B  Call report to : [phone]     If SNF or IPR: Date FOC offered: 12/27/22  Date [address] received: 12/27/22  Date authorization started with reference number:n/a  Date authorization received and expires:n/a  Accepting facility: [address] [place] [address],[address] Floor [facility]  Follow up appointments: PCP, S-pecialist  DME needed: Has RW and can-e  Transportation at Discharge: Hospital to Home -  [phone] to provide transportation at \[facility]". [place] or means to access home:        IM Medicare Letter: 2nd IM Letter given, signed copy placed on pt chart  Is patient a  and connected with the [place]? N/A            If yes, was [employer] transfer form completed and VA notified? N/A  Caregiver Contact: Patricia Bazan- Daughter- [phone]  Discharge Caregiver contacted prior to discharge? CM discussed d/c plan with pt and daughter. Daughter and pt agreed to SNF plan. Care Conference needed?:                   Updated note 11:45am  Pt cleared for d/c from CM standpoint. Updated Note 11:30am  CM spoke with Shania Calero at [address] [place] [address],[address], bed assignment obtained      Initial note 0915    Transition of Care Plan to SNF/Rehab    SNF/Rehab Transition:  Patient has been accepted to [facility]  and meets criteria for admission. Patient will transported by Hospital to Home and expected to leave at 888. Communication to Patient/Family:  Met with patient and Daughter, Rosina Duane (identified care giver) and they are agreeable to the transition plan. Communication to SNF/Rehab:  Bedside RN, Shahnaz Shaffer, has been notified to update the transition plan to the facility and call report (phone number [phone]). Discharge information has been updated on the AVS.     Discharge instructions to be fax'd to facility at [facility] # [phone]).      Nursing Please include all hard scripts for controlled substances, med rec and dc summary, and AVS in packet. Reviewed and confirmed with facility, [facility],5Th Floor of [place], can manage the patient care needs for the following:     Ken Nguyễn with (X) only those applicable:    Medication:  [x]  Medications will be available at the facility  []  IV Antibiotics   []  Controlled Substance - hard copy to be sent with patient   []  Weekly Labs   Documents:  [] Hard RX  [] MAR  [] Kardex  [x] AVS  []Transfer Summary  []Discharge   Equipment:  []  CPAP/BiPAP  []  Wound Vacuum  []  [place] or Urinary Device  []  PICC/Central Line  []  Nebulizer  []  Ventilator   Treatment:  []Isolation (for MRSA, VRE, etc.)  []Surgical Drain Management  []Tracheostomy Care  []Dressing Changes  []Dialysis with transportation and chair time   []PEG Care  [x]Oxygen - 2L [place]  []Daily Weights for Heart Failure   Dietary:  []Any diet limitations  []Tube Feedings   []Total Parenteral Management (TPN)   Eligible for Medicaid Long Term Services and Supports  Yes:  [] Eligible for medical assistance or will become eligible within 180 days and UAI completed. [] Provider/Patient and/or support system has requested screening. [] UAI copy provided to patient or responsible party,   [] UAI unavailable at discharge will send once processed to SNF provider. [] UAI unavailable at discharged mailed to patient  No:   [x] Private pay and is not financially eligible for Medicaid within the next 180 days. [] Reside out-of-state.   [] A residents of a state owned/operated facility that is licensed  by [address] and [facility] or [facility]  [] Enrollment in [facility] hospice services  [] [address] [facility] [address]  [] Patient /Family declines to have screening completed or provide financial information for screening     Financial Resources:  Medicaid    [] Initiated and application pending   [] Full coverage     Advanced Care Plan:  []Surrogate Decision Maker of Care  []POA  [x]Communicated Code Status - Full   Other       Care Management Interventions  PCP Verified by CM:  Yes  Last Visit to PCP: 11/01/22  Palliative Care Criteria Met (RRAT>21 & CHF Dx)?: No  Mode of Transport at Discharge: [address] to Home)  Transition of Care Consult (CM Consult): SNF ([facility] [place] [facility],5[address] Floor of [place])  Support Systems: Child(kerry)  Confirm Follow Up Transport: Family  The Patient and/or Patient Representative was Provided with a Choice of Provider and Agrees with the Discharge Plan?: Yes  Name of the Patient Representative Who was Provided with a Choice of Provider and Agrees with the Discharge Plan: Shana Angulo (DTR)  Freedom of Choice List was Provided with Basic Dialogue that Supports the Patient's Individualized Plan of Care/Goals, Treatment Preferences and Shares the Quality Data Associated with the Providers?: Yes  [place] Resource Information Provided?: No  Discharge Location  Patient Expects to be Discharged to[de-identified] [facility]    [address]  Phone: [phone]

## 2023-01-03 ENCOUNTER — HOME HEALTH ADMISSION (OUTPATIENT)
Dept: HOME HEALTH SERVICES | Facility: HOME HEALTH | Age: 88
End: 2023-01-03
Payer: MEDICARE

## 2023-01-05 ENCOUNTER — HOME CARE VISIT (OUTPATIENT)
Dept: SCHEDULING | Facility: HOME HEALTH | Age: 88
End: 2023-01-05
Payer: MEDICARE

## 2023-01-05 VITALS
TEMPERATURE: 97.7 F | HEART RATE: 69 BPM | RESPIRATION RATE: 16 BRPM | OXYGEN SATURATION: 94 % | DIASTOLIC BLOOD PRESSURE: 60 MMHG | HEIGHT: 63 IN | BODY MASS INDEX: 23.26 KG/M2 | SYSTOLIC BLOOD PRESSURE: 112 MMHG

## 2023-01-05 PROCEDURE — G0299 HHS/HOSPICE OF RN EA 15 MIN: HCPCS

## 2023-01-05 PROCEDURE — G0151 HHCP-SERV OF PT,EA 15 MIN: HCPCS

## 2023-01-05 PROCEDURE — 400018 HH-NO PAY CLAIM PROCEDURE

## 2023-01-06 ENCOUNTER — HOME CARE VISIT (OUTPATIENT)
Dept: HOME HEALTH SERVICES | Facility: HOME HEALTH | Age: 88
End: 2023-01-06
Payer: MEDICARE

## 2023-01-07 VITALS
DIASTOLIC BLOOD PRESSURE: 60 MMHG | TEMPERATURE: 97.6 F | SYSTOLIC BLOOD PRESSURE: 112 MMHG | OXYGEN SATURATION: 94 % | RESPIRATION RATE: 18 BRPM | HEART RATE: 69 BPM

## 2023-01-09 ENCOUNTER — HOME CARE VISIT (OUTPATIENT)
Dept: SCHEDULING | Facility: HOME HEALTH | Age: 88
End: 2023-01-09
Payer: MEDICARE

## 2023-01-09 VITALS
OXYGEN SATURATION: 98 % | SYSTOLIC BLOOD PRESSURE: 110 MMHG | HEART RATE: 65 BPM | DIASTOLIC BLOOD PRESSURE: 60 MMHG | RESPIRATION RATE: 16 BRPM | TEMPERATURE: 97.2 F

## 2023-01-09 VITALS
HEART RATE: 65 BPM | OXYGEN SATURATION: 98 % | SYSTOLIC BLOOD PRESSURE: 110 MMHG | TEMPERATURE: 97.2 F | RESPIRATION RATE: 19 BRPM | DIASTOLIC BLOOD PRESSURE: 60 MMHG

## 2023-01-09 PROCEDURE — G0152 HHCP-SERV OF OT,EA 15 MIN: HCPCS

## 2023-01-09 PROCEDURE — G0300 HHS/HOSPICE OF LPN EA 15 MIN: HCPCS

## 2023-01-10 ENCOUNTER — HOME CARE VISIT (OUTPATIENT)
Dept: SCHEDULING | Facility: HOME HEALTH | Age: 88
End: 2023-01-10
Payer: MEDICARE

## 2023-01-10 PROCEDURE — G0151 HHCP-SERV OF PT,EA 15 MIN: HCPCS

## 2023-01-11 VITALS
HEART RATE: 68 BPM | TEMPERATURE: 97.6 F | RESPIRATION RATE: 18 BRPM | DIASTOLIC BLOOD PRESSURE: 62 MMHG | OXYGEN SATURATION: 97 % | SYSTOLIC BLOOD PRESSURE: 110 MMHG

## 2023-01-12 ENCOUNTER — HOME CARE VISIT (OUTPATIENT)
Dept: SCHEDULING | Facility: HOME HEALTH | Age: 88
End: 2023-01-12
Payer: MEDICARE

## 2023-01-12 VITALS
DIASTOLIC BLOOD PRESSURE: 64 MMHG | BODY MASS INDEX: 18.42 KG/M2 | RESPIRATION RATE: 16 BRPM | HEART RATE: 65 BPM | TEMPERATURE: 97 F | SYSTOLIC BLOOD PRESSURE: 104 MMHG | WEIGHT: 104 LBS | OXYGEN SATURATION: 95 %

## 2023-01-12 PROCEDURE — G0299 HHS/HOSPICE OF RN EA 15 MIN: HCPCS

## 2023-01-13 ENCOUNTER — HOME CARE VISIT (OUTPATIENT)
Dept: SCHEDULING | Facility: HOME HEALTH | Age: 88
End: 2023-01-13
Payer: MEDICARE

## 2023-01-13 PROCEDURE — G0151 HHCP-SERV OF PT,EA 15 MIN: HCPCS

## 2023-01-16 VITALS
TEMPERATURE: 97.8 F | DIASTOLIC BLOOD PRESSURE: 62 MMHG | OXYGEN SATURATION: 96 % | RESPIRATION RATE: 18 BRPM | SYSTOLIC BLOOD PRESSURE: 110 MMHG | HEART RATE: 66 BPM

## 2023-01-17 ENCOUNTER — HOME CARE VISIT (OUTPATIENT)
Dept: SCHEDULING | Facility: HOME HEALTH | Age: 88
End: 2023-01-17
Payer: MEDICARE

## 2023-01-17 PROCEDURE — G0151 HHCP-SERV OF PT,EA 15 MIN: HCPCS

## 2023-01-17 NOTE — HOME HEALTH
Subjective: Patient reports only using oxygen at night  Falls since last visit NO(if yes complete the Fall Tracking Form and include bsrifallreport):   Caregiver involvement changes: patient alone during vsiit. receives asst frm dtr as needed   Home health supplies by type and quantity ordered/delivered this visit include: na     Clinician asked if patient has had any physician contact since last home care visit and patient states: yes   Clinician asked if patient has any new or changed medications and patient states:  NO   If Yes, were medications reconciled? YES   Was the certifying physician notified of changes in medications? N/A     Clinical assessment (what this visit means for the patient overall and need for ongoing skilled care) and progress or lack of progress towards SPECIFIC goals: patient has been referred to a glaucoma specialist. she is only using oxygen at night. instructions patient in home ex program in supported stand. pt requires demonstration and vc for correct tech yarelis for posture and proper breathing tech.  patient tolerated increased reps well. Patient is improving in ambulation without asst device. no lob noted. vc for proper breathing tech and standing balance. will cont to work toward patient ind in home    Written Teaching Material Utilized: written instructions for hep provided   Interdisciplinary communication with: bal   Discharge planning as follows:  Will discharge when the patient has reached their maximum functional potential and maximum safety in their home and When goals are met       Specific plan for next visit: Re-instruct patient/caregiver on COPD and energy conservation, gait training, standing balance retraining, home ex program and upgrade if able

## 2023-01-18 VITALS
OXYGEN SATURATION: 98 % | SYSTOLIC BLOOD PRESSURE: 110 MMHG | HEART RATE: 66 BPM | RESPIRATION RATE: 16 BRPM | TEMPERATURE: 97.6 F | DIASTOLIC BLOOD PRESSURE: 60 MMHG

## 2023-01-19 ENCOUNTER — HOME CARE VISIT (OUTPATIENT)
Dept: SCHEDULING | Facility: HOME HEALTH | Age: 88
End: 2023-01-19
Payer: MEDICARE

## 2023-01-19 VITALS
DIASTOLIC BLOOD PRESSURE: 66 MMHG | HEART RATE: 64 BPM | TEMPERATURE: 97.5 F | OXYGEN SATURATION: 92 % | SYSTOLIC BLOOD PRESSURE: 106 MMHG | RESPIRATION RATE: 16 BRPM

## 2023-01-19 PROCEDURE — G0151 HHCP-SERV OF PT,EA 15 MIN: HCPCS

## 2023-01-19 PROCEDURE — G0299 HHS/HOSPICE OF RN EA 15 MIN: HCPCS

## 2023-01-20 VITALS
DIASTOLIC BLOOD PRESSURE: 62 MMHG | TEMPERATURE: 97.6 F | HEART RATE: 60 BPM | OXYGEN SATURATION: 91 % | RESPIRATION RATE: 18 BRPM | SYSTOLIC BLOOD PRESSURE: 104 MMHG

## 2023-01-20 NOTE — HOME HEALTH
Subjective:  I dont feel well today. I got up in the middle of the night. I am coughing and wheezing again    Falls since last visit NO(if yes complete the Fall Tracking Form and include bsrifallreport):     Caregiver involvement changes: patient alone during vsiit. receives asst frm dtr as needed   Home health supplies by type and quantity ordered/delivered this visit include: na   Clinician asked if patient has had any physician contact since last home care visit and patient states: yes   Clinician asked if patient has any new or changed medications and patient states:  NO   If Yes, were medications reconciled? YES   Was the certifying physician notified of changes in medications? N/A   Clinical assessment (what this visit means for the patient overall and need for ongoing skilled care) and progress or lack of progress towards SPECIFIC goals: patient with decreased p ox today. patient with increased wheezing, sob and fatigue today. Patient requires increased seated rest due to sob and fatigue and wheezing. pt requires vc for proper breathing tech and posture with hep and gait training. non productive cough. use of inspirometer. encouraged pt to cont with walking program and hep. will cont to work toward patient ind in home    Written Teaching Material Utilized: written instructions for hep provided   Interdisciplinary communication with: bal   Discharge planning as follows:  Will discharge when the patient has reached their maximum functional potential and maximum safety in their home and When goals are met   Specific plan for next visit: Re-instruct patient/caregiver on COPD and energy conservation, gait training, standing balance retraining, home ex program and upgrade if able

## 2023-01-23 ENCOUNTER — HOSPITAL ENCOUNTER (INPATIENT)
Age: 88
LOS: 3 days | Discharge: HOME | End: 2023-01-26
Attending: EMERGENCY MEDICINE | Admitting: INTERNAL MEDICINE
Payer: MEDICARE

## 2023-01-23 ENCOUNTER — HOME CARE VISIT (OUTPATIENT)
Dept: SCHEDULING | Facility: HOME HEALTH | Age: 88
End: 2023-01-23
Payer: MEDICARE

## 2023-01-23 ENCOUNTER — APPOINTMENT (OUTPATIENT)
Dept: GENERAL RADIOLOGY | Age: 88
End: 2023-01-23
Attending: EMERGENCY MEDICINE
Payer: MEDICARE

## 2023-01-23 DIAGNOSIS — J96.21 ACUTE ON CHRONIC RESPIRATORY FAILURE WITH HYPOXIA (HCC): Primary | ICD-10-CM

## 2023-01-23 DIAGNOSIS — J44.1 ACUTE EXACERBATION OF CHRONIC OBSTRUCTIVE PULMONARY DISEASE (COPD) (HCC): ICD-10-CM

## 2023-01-23 PROBLEM — J96.01 ACUTE RESPIRATORY FAILURE WITH HYPOXIA (HCC): Status: ACTIVE | Noted: 2023-01-23

## 2023-01-23 LAB
ALBUMIN SERPL-MCNC: 3 G/DL (ref 3.5–5)
ALBUMIN/GLOB SERPL: 1.1 (ref 1.1–2.2)
ALP SERPL-CCNC: 74 U/L (ref 45–117)
ALT SERPL-CCNC: 22 U/L (ref 12–78)
ANION GAP SERPL CALC-SCNC: 9 MMOL/L (ref 5–15)
AST SERPL-CCNC: 28 U/L (ref 15–37)
ATRIAL RATE: 83 BPM
BASOPHILS # BLD: 0.1 K/UL (ref 0–0.1)
BASOPHILS NFR BLD: 2 % (ref 0–1)
BILIRUB SERPL-MCNC: 0.5 MG/DL (ref 0.2–1)
BNP SERPL-MCNC: 105 PG/ML
BUN SERPL-MCNC: 9 MG/DL (ref 6–20)
BUN/CREAT SERPL: 13 (ref 12–20)
CALCIUM SERPL-MCNC: 7.9 MG/DL (ref 8.5–10.1)
CALCULATED P AXIS, ECG09: 100 DEGREES
CALCULATED R AXIS, ECG10: 38 DEGREES
CALCULATED T AXIS, ECG11: 54 DEGREES
CHLORIDE SERPL-SCNC: 108 MMOL/L (ref 97–108)
CO2 SERPL-SCNC: 24 MMOL/L (ref 21–32)
COMMENT, HOLDF: NORMAL
COMMENT, HOLDF: NORMAL
COVID-19 RAPID TEST, COVR: NOT DETECTED
CREAT SERPL-MCNC: 0.7 MG/DL (ref 0.55–1.02)
DIAGNOSIS, 93000: NORMAL
DIFFERENTIAL METHOD BLD: ABNORMAL
EOSINOPHIL # BLD: 1 K/UL (ref 0–0.4)
EOSINOPHIL NFR BLD: 17 % (ref 0–7)
ERYTHROCYTE [DISTWIDTH] IN BLOOD BY AUTOMATED COUNT: 14.3 % (ref 11.5–14.5)
FLUAV AG NPH QL IA: NEGATIVE
FLUBV AG NOSE QL IA: NEGATIVE
GLOBULIN SER CALC-MCNC: 2.7 G/DL (ref 2–4)
GLUCOSE BLD STRIP.AUTO-MCNC: 226 MG/DL (ref 65–117)
GLUCOSE SERPL-MCNC: 86 MG/DL (ref 65–100)
HCT VFR BLD AUTO: 36 % (ref 35–47)
HGB BLD-MCNC: 12 G/DL (ref 11.5–16)
IMM GRANULOCYTES # BLD AUTO: 0 K/UL (ref 0–0.04)
IMM GRANULOCYTES NFR BLD AUTO: 0 % (ref 0–0.5)
LYMPHOCYTES # BLD: 1.6 K/UL (ref 0.8–3.5)
LYMPHOCYTES NFR BLD: 26 % (ref 12–49)
MCH RBC QN AUTO: 28 PG (ref 26–34)
MCHC RBC AUTO-ENTMCNC: 33.3 G/DL (ref 30–36.5)
MCV RBC AUTO: 83.9 FL (ref 80–99)
MONOCYTES # BLD: 0.7 K/UL (ref 0–1)
MONOCYTES NFR BLD: 12 % (ref 5–13)
NEUTS SEG # BLD: 2.7 K/UL (ref 1.8–8)
NEUTS SEG NFR BLD: 43 % (ref 32–75)
NRBC # BLD: 0 K/UL (ref 0–0.01)
NRBC BLD-RTO: 0 PER 100 WBC
P-R INTERVAL, ECG05: 164 MS
PLATELET # BLD AUTO: 259 K/UL (ref 150–400)
PMV BLD AUTO: 9.8 FL (ref 8.9–12.9)
POTASSIUM SERPL-SCNC: 2.6 MMOL/L (ref 3.5–5.1)
PROT SERPL-MCNC: 5.7 G/DL (ref 6.4–8.2)
Q-T INTERVAL, ECG07: 384 MS
QRS DURATION, ECG06: 88 MS
QTC CALCULATION (BEZET), ECG08: 451 MS
RBC # BLD AUTO: 4.29 M/UL (ref 3.8–5.2)
RBC MORPH BLD: ABNORMAL
SAMPLES BEING HELD,HOLD: NORMAL
SAMPLES BEING HELD,HOLD: NORMAL
SERVICE CMNT-IMP: ABNORMAL
SODIUM SERPL-SCNC: 141 MMOL/L (ref 136–145)
SOURCE, COVRS: NORMAL
TROPONIN-HIGH SENSITIVITY: 13 NG/L (ref 0–51)
TROPONIN-HIGH SENSITIVITY: 13 NG/L (ref 0–51)
VENTRICULAR RATE, ECG03: 83 BPM
WBC # BLD AUTO: 6.1 K/UL (ref 3.6–11)

## 2023-01-23 PROCEDURE — 93005 ELECTROCARDIOGRAM TRACING: CPT

## 2023-01-23 PROCEDURE — 71046 X-RAY EXAM CHEST 2 VIEWS: CPT

## 2023-01-23 PROCEDURE — 94640 AIRWAY INHALATION TREATMENT: CPT

## 2023-01-23 PROCEDURE — 87804 INFLUENZA ASSAY W/OPTIC: CPT

## 2023-01-23 PROCEDURE — 96365 THER/PROPH/DIAG IV INF INIT: CPT

## 2023-01-23 PROCEDURE — 87635 SARS-COV-2 COVID-19 AMP PRB: CPT

## 2023-01-23 PROCEDURE — 84484 ASSAY OF TROPONIN QUANT: CPT

## 2023-01-23 PROCEDURE — 74011250636 HC RX REV CODE- 250/636: Performed by: EMERGENCY MEDICINE

## 2023-01-23 PROCEDURE — 82962 GLUCOSE BLOOD TEST: CPT

## 2023-01-23 PROCEDURE — 74011250637 HC RX REV CODE- 250/637: Performed by: INTERNAL MEDICINE

## 2023-01-23 PROCEDURE — 36415 COLL VENOUS BLD VENIPUNCTURE: CPT

## 2023-01-23 PROCEDURE — 83880 ASSAY OF NATRIURETIC PEPTIDE: CPT

## 2023-01-23 PROCEDURE — 85025 COMPLETE CBC W/AUTO DIFF WBC: CPT

## 2023-01-23 PROCEDURE — 65270000046 HC RM TELEMETRY

## 2023-01-23 PROCEDURE — 99285 EMERGENCY DEPT VISIT HI MDM: CPT

## 2023-01-23 PROCEDURE — 80053 COMPREHEN METABOLIC PANEL: CPT

## 2023-01-23 PROCEDURE — 74011000250 HC RX REV CODE- 250: Performed by: INTERNAL MEDICINE

## 2023-01-23 PROCEDURE — 74011250637 HC RX REV CODE- 250/637: Performed by: EMERGENCY MEDICINE

## 2023-01-23 RX ORDER — LORAZEPAM 0.5 MG/1
0.5 TABLET ORAL 2 TIMES DAILY
Status: DISCONTINUED | OUTPATIENT
Start: 2023-01-23 | End: 2023-01-26 | Stop reason: HOSPADM

## 2023-01-23 RX ORDER — HYDRALAZINE HYDROCHLORIDE 20 MG/ML
20 INJECTION INTRAMUSCULAR; INTRAVENOUS
Status: DISCONTINUED | OUTPATIENT
Start: 2023-01-23 | End: 2023-01-26 | Stop reason: HOSPADM

## 2023-01-23 RX ORDER — LATANOPROST 50 UG/ML
1 SOLUTION/ DROPS OPHTHALMIC EVERY EVENING
Status: DISCONTINUED | OUTPATIENT
Start: 2023-01-23 | End: 2023-01-24

## 2023-01-23 RX ORDER — PROMETHAZINE HYDROCHLORIDE 25 MG/1
12.5 TABLET ORAL
Status: DISCONTINUED | OUTPATIENT
Start: 2023-01-23 | End: 2023-01-26 | Stop reason: HOSPADM

## 2023-01-23 RX ORDER — ACETAMINOPHEN 325 MG/1
650 TABLET ORAL
Status: DISCONTINUED | OUTPATIENT
Start: 2023-01-23 | End: 2023-01-26 | Stop reason: HOSPADM

## 2023-01-23 RX ORDER — INDAPAMIDE 2.5 MG/1
1.25 TABLET, FILM COATED ORAL DAILY
Status: DISCONTINUED | OUTPATIENT
Start: 2023-01-24 | End: 2023-01-26 | Stop reason: HOSPADM

## 2023-01-23 RX ORDER — POLYETHYLENE GLYCOL 3350 17 G/17G
17 POWDER, FOR SOLUTION ORAL DAILY
Status: DISCONTINUED | OUTPATIENT
Start: 2023-01-23 | End: 2023-01-26 | Stop reason: HOSPADM

## 2023-01-23 RX ORDER — GUAIFENESIN 100 MG/5ML
81 LIQUID (ML) ORAL DAILY
Status: DISCONTINUED | OUTPATIENT
Start: 2023-01-24 | End: 2023-01-26 | Stop reason: HOSPADM

## 2023-01-23 RX ORDER — SODIUM CHLORIDE 0.9 % (FLUSH) 0.9 %
5-40 SYRINGE (ML) INJECTION EVERY 8 HOURS
Status: DISCONTINUED | OUTPATIENT
Start: 2023-01-23 | End: 2023-01-26 | Stop reason: HOSPADM

## 2023-01-23 RX ORDER — MONTELUKAST SODIUM 10 MG/1
10 TABLET ORAL DAILY
Status: DISCONTINUED | OUTPATIENT
Start: 2023-01-24 | End: 2023-01-26 | Stop reason: HOSPADM

## 2023-01-23 RX ORDER — SODIUM CHLORIDE 0.9 % (FLUSH) 0.9 %
5-40 SYRINGE (ML) INJECTION AS NEEDED
Status: DISCONTINUED | OUTPATIENT
Start: 2023-01-23 | End: 2023-01-26 | Stop reason: HOSPADM

## 2023-01-23 RX ORDER — BRIMONIDINE TARTRATE 2 MG/ML
1 SOLUTION/ DROPS OPHTHALMIC 2 TIMES DAILY
Status: DISCONTINUED | OUTPATIENT
Start: 2023-01-23 | End: 2023-01-26 | Stop reason: HOSPADM

## 2023-01-23 RX ORDER — ENOXAPARIN SODIUM 100 MG/ML
40 INJECTION SUBCUTANEOUS DAILY
Status: DISCONTINUED | OUTPATIENT
Start: 2023-01-24 | End: 2023-01-26 | Stop reason: HOSPADM

## 2023-01-23 RX ORDER — FACIAL-BODY WIPES
10 EACH TOPICAL DAILY PRN
Status: DISCONTINUED | OUTPATIENT
Start: 2023-01-23 | End: 2023-01-26 | Stop reason: HOSPADM

## 2023-01-23 RX ORDER — IPRATROPIUM BROMIDE AND ALBUTEROL SULFATE 2.5; .5 MG/3ML; MG/3ML
3 SOLUTION RESPIRATORY (INHALATION)
Status: DISCONTINUED | OUTPATIENT
Start: 2023-01-23 | End: 2023-01-26 | Stop reason: HOSPADM

## 2023-01-23 RX ORDER — METOPROLOL TARTRATE 25 MG/1
25 TABLET, FILM COATED ORAL DAILY
Status: DISCONTINUED | OUTPATIENT
Start: 2023-01-24 | End: 2023-01-26 | Stop reason: HOSPADM

## 2023-01-23 RX ORDER — DORZOLAMIDE HCL 20 MG/ML
1 SOLUTION/ DROPS OPHTHALMIC 3 TIMES DAILY
Status: DISCONTINUED | OUTPATIENT
Start: 2023-01-23 | End: 2023-01-26 | Stop reason: HOSPADM

## 2023-01-23 RX ORDER — MAGNESIUM SULFATE HEPTAHYDRATE 40 MG/ML
2 INJECTION, SOLUTION INTRAVENOUS ONCE
Status: COMPLETED | OUTPATIENT
Start: 2023-01-23 | End: 2023-01-23

## 2023-01-23 RX ORDER — POTASSIUM CHLORIDE 20 MEQ/1
40 TABLET, EXTENDED RELEASE ORAL EVERY 6 HOURS
Status: COMPLETED | OUTPATIENT
Start: 2023-01-23 | End: 2023-01-23

## 2023-01-23 RX ORDER — LANOLIN ALCOHOL/MO/W.PET/CERES
3 CREAM (GRAM) TOPICAL
Status: DISCONTINUED | OUTPATIENT
Start: 2023-01-23 | End: 2023-01-26 | Stop reason: HOSPADM

## 2023-01-23 RX ORDER — ONDANSETRON 2 MG/ML
4 INJECTION INTRAMUSCULAR; INTRAVENOUS
Status: DISCONTINUED | OUTPATIENT
Start: 2023-01-23 | End: 2023-01-26 | Stop reason: HOSPADM

## 2023-01-23 RX ORDER — PANTOPRAZOLE SODIUM 40 MG/1
40 TABLET, DELAYED RELEASE ORAL DAILY
Status: DISCONTINUED | OUTPATIENT
Start: 2023-01-24 | End: 2023-01-26 | Stop reason: HOSPADM

## 2023-01-23 RX ORDER — ALBUTEROL SULFATE 0.83 MG/ML
2.5 SOLUTION RESPIRATORY (INHALATION)
Status: DISCONTINUED | OUTPATIENT
Start: 2023-01-23 | End: 2023-01-26 | Stop reason: HOSPADM

## 2023-01-23 RX ORDER — ACETAMINOPHEN 650 MG/1
650 SUPPOSITORY RECTAL
Status: DISCONTINUED | OUTPATIENT
Start: 2023-01-23 | End: 2023-01-26 | Stop reason: HOSPADM

## 2023-01-23 RX ADMIN — IPRATROPIUM BROMIDE AND ALBUTEROL SULFATE 3 ML: 2.5; .5 SOLUTION RESPIRATORY (INHALATION) at 13:03

## 2023-01-23 RX ADMIN — IPRATROPIUM BROMIDE AND ALBUTEROL SULFATE 3 ML: 2.5; .5 SOLUTION RESPIRATORY (INHALATION) at 16:18

## 2023-01-23 RX ADMIN — IPRATROPIUM BROMIDE AND ALBUTEROL SULFATE 3 ML: 2.5; .5 SOLUTION RESPIRATORY (INHALATION) at 20:49

## 2023-01-23 RX ADMIN — LORAZEPAM 0.5 MG: 0.5 TABLET ORAL at 20:42

## 2023-01-23 RX ADMIN — MELATONIN 3 MG: at 20:43

## 2023-01-23 RX ADMIN — MAGNESIUM SULFATE HEPTAHYDRATE 2 G: 40 INJECTION, SOLUTION INTRAVENOUS at 09:48

## 2023-01-23 RX ADMIN — POTASSIUM BICARBONATE 50 MEQ: 391 TABLET, EFFERVESCENT ORAL at 09:47

## 2023-01-23 RX ADMIN — SODIUM CHLORIDE, PRESERVATIVE FREE 10 ML: 5 INJECTION INTRAVENOUS at 20:43

## 2023-01-23 RX ADMIN — POTASSIUM CHLORIDE 40 MEQ: 1500 TABLET, EXTENDED RELEASE ORAL at 17:43

## 2023-01-23 RX ADMIN — POTASSIUM CHLORIDE 40 MEQ: 1500 TABLET, EXTENDED RELEASE ORAL at 12:39

## 2023-01-23 RX ADMIN — SODIUM CHLORIDE, PRESERVATIVE FREE 10 ML: 5 INJECTION INTRAVENOUS at 17:39

## 2023-01-23 NOTE — ED PROVIDER NOTES
[facility] EMERGENCY DEPT  EMERGENCY DEPARTMENT ENCOUNTER       Pt Name: Alexsandra Hu  MRN: [id number]  [place] 1935  Date of evaluation: 1/23/2023  Provider: Delvin Arnett MD   PCP: Corey Urbina MD  Note Started: 7:50 AM 1/23/23     CHIEF COMPLAINT       Chief Complaint   Patient presents with    Shortness of Breath     PT. States since last June she has been feeling short of breath. States beginning a month ago she her shortness of breath began to get worse. States the breathing treatments she's been taking have been helpful and give relief, but does not last long. HISTORY OF PRESENT ILLNESS: 1 or more elements      History From: patient, EMS History limited by: none     Alexsandra Hu is a 80 y.o. female who presents shortness of breath. 55-year-old female with a history of anxiety, GERD, chronic bronchitis and chronic respiratory failure on 1 L nasal cannula at home presents emergency department with shortness of breath. Patient reports 1 month of worsening shortness of breath. Saw PCP, prescribed inhaler and antibiotics for possible \"pneumonia. \"  Patient reports worsening shortness of breath, cough and increased sputum production with change in color. Denies chest pain, fevers or chills. Denies abdominal pain, nausea, vomiting or diarrhea. No leg swelling. Patient reports that she called EMS today due to her symptoms. On their arrival she was saturating 85% on 2 L nasal cannula. Audibly wheezing. Patient was given a DuoNeb and IM Decadron. Patient states she is \"allergic\" to steroids due to a history of glaucoma and refusing additional steroid administration. Nursing Notes were all reviewed and agreed with or any disagreements were addressed in the HPI. REVIEW OF SYSTEMS        Positives and Pertinent negatives as per HPI.     PAST HISTORY     Past Medical History:  Past Medical History:   Diagnosis Date    Adverse effect of anesthesia     combative waking up    Anxiety Arthritis     Asthma     as of 12/30/16 pt states under control    Diverticulitis Dx 11/2016    Fibromyalgia     GERD (gastroesophageal reflux disease)     Glaucoma     Hiatal hernia     Hypercholesteremia     Hypertension     Ill-defined condition     neurapthy in ble    Nausea & vomiting        Past Surgical History:  Past Surgical History:   Procedure Laterality Date    HX CATARACT REMOVAL Right 01/04/2017    HX HYSTERECTOMY      HX ROTATOR CUFF REPAIR Left 2008    HX TUBAL LIGATION      OH COLONOSCOPY FLX DX W/COLLJ SPEC WHEN PFRMD  11/9/2011         OH EGD TRANSORAL BIOPSY SINGLE/MULTIPLE  11/9/2011         UPPER GI ENDOSCOPY,BIOPSY  8/20/2019            Family History:  History reviewed. No pertinent family history. Social History:  Social History     Tobacco Use    Smoking status: Never    Smokeless tobacco: Never   Substance Use Topics    Alcohol use: No    Drug use: No       Allergies: Allergies   Allergen Reactions    Codeine Other (comments)    Cymbalta [Duloxetine] Nausea Only and Other (comments)     \"within the hour I had a severe headache, and vomiting\"      Demerol [Meperidine] Nausea and Vomiting    Lidocaine Other (comments)    Morphine Nausea and Vomiting    Novocain [Procaine] Other (comments)    Penicillins Other (comments)     rash    Percocet [Oxycodone-Acetaminophen] Nausea and Vomiting    Prednisone Rash    Sulfa (Sulfonamide Antibiotics) Other (comments)       CURRENT MEDICATIONS      Previous Medications    ACETAMINOPHEN (TYLENOL) 325 MG TABLET    Take 2 Tablets by mouth every six (6) hours as needed for Pain or Fever. ALBUTEROL (PROVENTIL HFA, VENTOLIN HFA, PROAIR HFA) 90 MCG/ACTUATION INHALER    Take 1 Puff by inhalation as needed for Wheezing. ALBUTEROL (PROVENTIL VENTOLIN) 2.5 MG /3 ML (0.083 %) NEBU    2.5 mg by Nebulization route two (2) times a day.  Indications: bronchospasm prevention    APRACLONIDINE (IOPIDINE) 0.5 % OPHTHALMIC SOLUTION    Administer 1 Drop to right eye every eight (8) hours. Indications: wide-angle glaucoma    ASPIRIN 81 MG CHEWABLE TABLET    Take 81 mg by mouth daily. Indications: treatment to prevent a heart attack    BENZONATATE (TESSALON) 100 MG CAPSULE    TAKE 1 CAPSULE BY MOUTH THREE TIMES A DAY AS NEEDED FOR COUGH    BIMATOPROST (LUMIGAN) 0.01 % OPHTHALMIC DROPS    Administer 1 Drop to right eye nightly. BRIMONIDINE 0.025 % DROP    Apply 1 Drop to eye two (2) times a day. Right eye    DORZOLAMIDE (TRUSOPT) 2 % OPHTHALMIC SOLUTION    Administer 1 Drop to right eye three (3) times daily. GUAIFENESIN ER (MUCINEX) 600 MG ER TABLET    Take 1 Tablet by mouth every twelve (12) hours. INDAPAMIDE (LOZOL) 1.25 MG TABLET    Take 1.25 mg by mouth daily. LORAZEPAM (ATIVAN) 0.5 MG TABLET    Take 0.5 mg by mouth nightly. MELATONIN 3 MG TABLET    Take 1 Tablet by mouth nightly as needed for Insomnia. METHYLPREDNISOLONE (MEDROL DOSEPACK) 4 MG TABLET    Use as directed    METOPROLOL TARTRATE (LOPRESSOR) 25 MG TABLET    Take 25 mg by mouth daily. MONTELUKAST (SINGULAIR) 10 MG TABLET    Take 10 mg by mouth daily. MULTIVITAMIN (ONE A DAY) TABLET    Take 1 Tab by mouth daily. ONDANSETRON HCL (ZOFRAN) 4 MG TABLET    Take 1 Tablet by mouth every eight (8) hours as needed for Nausea. OXYGEN-AIR DELIVERY SYSTEMS    2 L/min by IntraNASal route as needed (shortness of breath). Indications: Shortness of breath    PANTOPRAZOLE (PROTONIX) 40 MG TABLET    Take 40 mg by mouth daily. SCREENINGS               No data recorded         PHYSICAL EXAM      ED Triage Vitals [01/23/23 0737]   ED Encounter Vitals Group      /65      Pulse (Heart Rate) 90      Resp Rate 18      Temp 98.7 °F (37.1 °C)      Temp src       O2 Sat (%) 94 %      Weight 110 lb      Height 5' 3\"        Physical Exam  Vitals and nursing note reviewed.    Constitutional:       Comments: 68-year-old female, resting in klein bed, mild tachypnea, but nontoxic   HENT:      Head: Normocephalic and atraumatic. Cardiovascular:      Rate and Rhythm: Regular rhythm. Tachycardia present. Pulmonary:      Effort: Pulmonary effort is normal.      Breath sounds: Examination of the right-lower field reveals wheezing. Examination of the left-lower field reveals wheezing. Wheezing present. Abdominal:      General: Abdomen is flat. Tenderness: There is no abdominal tenderness. Musculoskeletal:      Right lower leg: No edema. Left lower leg: No edema. Skin:     General: Skin is warm. Neurological:      General: No focal deficit present. Mental Status: She is alert. Psychiatric:         Mood and Affect: Mood normal.        DIAGNOSTIC RESULTS   LABS:     Recent Results (from the past 12 hour(s))   SAMPLES BEING HELD    Collection Time: 01/23/23  7:40 AM   Result Value Ref Range    SAMPLES BEING HELD LAC, BLOOD CULTURES     COMMENT        Add-on orders for these samples will be processed based on acceptable specimen integrity and analyte stability, which may vary by analyte. CBC WITH AUTOMATED DIFF    Collection Time: 01/23/23  7:44 AM   Result Value Ref Range    WBC 6.1 3.6 - 11.0 K/uL    RBC 4.29 3.80 - 5.20 M/uL    HGB 12.0 11.5 - 16.0 g/dL    HCT 36.0 35.0 - 47.0 %    MCV 83.9 80.0 - 99.0 FL    MCH 28.0 26.0 - 34.0 PG    MCHC 33.3 30.0 - 36.5 g/dL    RDW 14.3 11.5 - 14.5 %    PLATELET 988 366 - 076 K/uL    MPV 9.8 8.9 - 12.9 FL    NRBC 0.0 0  WBC    ABSOLUTE NRBC 0.00 0.00 - 0.01 K/uL    NEUTROPHILS 43 32 - 75 %    LYMPHOCYTES 26 12 - 49 %    MONOCYTES 12 5 - 13 %    EOSINOPHILS 17 (H) 0 - 7 %    BASOPHILS 2 (H) 0 - 1 %    IMMATURE GRANULOCYTES 0 0.0 - 0.5 %    ABS. NEUTROPHILS 2.7 1.8 - 8.0 K/UL    ABS. LYMPHOCYTES 1.6 0.8 - 3.5 K/UL    ABS. MONOCYTES 0.7 0.0 - 1.0 K/UL    ABS. EOSINOPHILS 1.0 (H) 0.0 - 0.4 K/UL    ABS. BASOPHILS 0.1 0.0 - 0.1 K/UL    ABS. IMM.  GRANS. 0.0 0.00 - 0.04 K/UL    DF SMEAR SCANNED      RBC COMMENTS NORMOCYTIC, NORMOCHROMIC     METABOLIC PANEL, COMPREHENSIVE    Collection Time: 01/23/23  7:44 AM   Result Value Ref Range    Sodium 141 136 - 145 mmol/L    Potassium 2.6 (LL) 3.5 - 5.1 mmol/L    Chloride 108 97 - 108 mmol/L    CO2 24 21 - 32 mmol/L    Anion gap 9 5 - 15 mmol/L    Glucose 86 65 - 100 mg/dL    BUN 9 6 - 20 MG/DL    Creatinine 0.70 0.55 - 1.02 MG/DL    BUN/Creatinine ratio 13 12 - 20      eGFR >60 >60 ml/min/1.73m2    Calcium 7.9 (L) 8.5 - 10.1 MG/DL    Bilirubin, total 0.5 0.2 - 1.0 MG/DL    ALT (SGPT) 22 12 - 78 U/L    AST (SGOT) 28 15 - 37 U/L    Alk. phosphatase 74 45 - 117 U/L    Protein, total 5.7 (L) 6.4 - 8.2 g/dL    Albumin 3.0 (L) 3.5 - 5.0 g/dL    Globulin 2.7 2.0 - 4.0 g/dL    A-G Ratio 1.1 1.1 - 2.2     NT-PRO BNP    Collection Time: 01/23/23  7:44 AM   Result Value Ref Range    NT pro- <450 PG/ML   TROPONIN-HIGH SENSITIVITY    Collection Time: 01/23/23  7:44 AM   Result Value Ref Range    Troponin-High Sensitivity 13 0 - 51 ng/L   SAMPLES BEING HELD    Collection Time: 01/23/23  7:44 AM   Result Value Ref Range    SAMPLES BEING HELD PST     COMMENT        Add-on orders for these samples will be processed based on acceptable specimen integrity and analyte stability, which may vary by analyte.    COVID-19 RAPID TEST    Collection Time: 01/23/23  8:04 AM   Result Value Ref Range    Specimen source Nasopharyngeal      COVID-19 rapid test Not detected NOTD     INFLUENZA A+B VIRAL AGS    Collection Time: 01/23/23  8:04 AM   Result Value Ref Range    Influenza A Antigen Negative NEG      Influenza B Antigen Negative NEG     EKG, 12 LEAD, INITIAL    Collection Time: 01/23/23  9:47 AM   Result Value Ref Range    Ventricular Rate 83 BPM    Atrial Rate 83 BPM    P-R Interval 164 ms    QRS Duration 88 ms    Q-T Interval 384 ms    QTC Calculation (Bezet) 451 ms    Calculated P Axis 100 degrees    Calculated R Axis 38 degrees    Calculated T Axis 54 degrees    Diagnosis       Normal sinus rhythm  Normal ECG  When compared with ECG of 24-DEC-2022 17:09,  premature ventricular complexes are no longer present  premature supraventricular complexes are no longer present  Nonspecific T wave abnormality no longer evident in Lateral leads     TROPONIN-HIGH SENSITIVITY    Collection Time: 01/23/23  9:56 AM   Result Value Ref Range    Troponin-High Sensitivity 13 0 - 51 ng/L        EKG: If performed, independent interpretation documented below in the MDM section     RADIOLOGY:  Non-plain film images such as CT, Ultrasound and MRI are read by the radiologist. Plain radiographic images are visualized and preliminarily interpreted by the ED Provider with the findings documented in the MDM section. Interpretation per the Radiologist below, if available at the time of this note:     XR CHEST PA LAT    Result Date: 1/23/2023  EXAM: XR CHEST PA LAT INDICATION: Shortness of breath COMPARISON: 12/24/2022 TECHNIQUE: PA and lateral chest views FINDINGS: The cardiac size is within normal limits. The pulmonary vasculature is within normal limits. The lungs and pleural spaces are clear. Levoconvex scoliosis of the thoracic spine is noted. No acute abnormality.         PROCEDURES   Unless otherwise noted below, none  Procedures     0    EMERGENCY DEPARTMENT COURSE and DIFFERENTIAL DIAGNOSIS/MDM   Vitals:    Vitals:    01/23/23 0737 01/23/23 1019 01/23/23 1244 01/23/23 1304   BP: 118/65 112/62 108/66    Pulse: 90 69 80    Resp: 18 16 18    Temp: 98.7 °F (37.1 °C) 98.4 °F (36.9 °C) 98.4 °F (36.9 °C)    SpO2: 94% 97%  96%   Weight: 49.9 kg (110 lb)      Height: 5' 3\" (1.6 m)           Patient was given the following medications:  Medications   sodium chloride (NS) flush 5-40 mL (has no administration in time range)   sodium chloride (NS) flush 5-40 mL (has no administration in time range)   acetaminophen (TYLENOL) tablet 650 mg (has no administration in time range)     Or   acetaminophen (TYLENOL) suppository 650 mg (has no administration in time range) polyethylene glycol (MIRALAX) packet 17 g (0 g Oral Held 1/23/23 1039)   promethazine (PHENERGAN) tablet 12.5 mg (has no administration in time range)     Or   ondansetron (ZOFRAN) injection 4 mg (has no administration in time range)   enoxaparin (LOVENOX) injection 40 mg (has no administration in time range)   bisacodyL (DULCOLAX) suppository 10 mg (has no administration in time range)   hydrALAZINE (APRESOLINE) 20 mg/mL injection 20 mg (has no administration in time range)   melatonin tablet 3 mg (has no administration in time range)   albuterol-ipratropium (DUO-NEB) 2.5 MG-0.5 MG/3 ML (3 mL Nebulization Given 1/23/23 1303)   albuterol (PROVENTIL VENTOLIN) nebulizer solution 2.5 mg (has no administration in time range)   potassium chloride (K-DUR, KLOR-CON M20) SR tablet 40 mEq (40 mEq Oral Given 1/23/23 1239)   aspirin chewable tablet 81 mg (has no administration in time range)   dorzolamide (TRUSOPT) 2 % ophthalmic solution 1 Drop (has no administration in time range)   indapamide (LOZOL) tablet 1.25 mg (has no administration in time range)   pantoprazole (PROTONIX) tablet 40 mg (has no administration in time range)   metoprolol tartrate (LOPRESSOR) tablet 25 mg (has no administration in time range)   montelukast (SINGULAIR) tablet 10 mg (has no administration in time range)   LORazepam (ATIVAN) tablet 0.5 mg (has no administration in time range)   latanoprost (XALATAN) 0.005 % ophthalmic solution 1 Drop (has no administration in time range)   brimonidine (ALPHAGAN) 0.2 % ophthalmic solution 1 Drop (has no administration in time range)   potassium bicarb-citric acid (EFFER-K) tablet 50 mEq (50 mEq Oral Given 1/23/23 0944)   magnesium sulfate 2 g/50 ml IVPB (premix or compounded) (0 g IntraVENous Stopped 1/23/23 1030)       Medical Decision Making  72-year-old female presents with acute on chronic hypoxic respiratory failure likely secondary to acute exacerbation of chronic bronchitis.   Will check chest x-ray to rule out pneumonia. Lower suspicion for CHF as patient appears euvolemic. Will check EKG. Basic labs and troponin and BNP. Patient given Decadron by EMS which frankly I believe is probably beneficial to the patient but she is refusing any additional steroids due to history of glaucoma. We will continue DuoNebs as needed. Anticipate patient will require admission due to her symptoms. Amount and/or Complexity of Data Reviewed  Labs: ordered. Radiology: ordered and independent interpretation performed. Decision-making details documented in ED Course. ECG/medicine tests: ordered and independent interpretation performed. Decision-making details documented in ED Course. Risk  Prescription drug management. Decision regarding hospitalization. ED Course as of 01/23/23 1422   Mon Jan 23, 2023   0903 CXR as interpreted by me shows normal cardiac silhouette, no focal infiltrates. [MB]   [id number] Labs unremarkable, potassium repleted. Agree with CXR read by radiology. [MB]   0950 EKG interpreted by me. Shows NSR with a HR of 83. No ST elevations or depressions concerning for ischemia. Normal intervals. [MB]      ED Course User Index  [MB] Roberta Alvarez MD     Updates as above, discussed with Dr. Martina Lange, hospitalist, for admission given acute on chronic hypoxic respiratory failure likely in setting of COPD exacerbation. FINAL IMPRESSION     1. Acute on chronic respiratory failure with hypoxia (HCC)    2. Acute exacerbation of chronic obstructive pulmonary disease (COPD) ([facility] [address]          DISPOSITION/PLAN   Lm Byrne  results have been reviewed with her. She has been counseled regarding her diagnosis, treatment, and plan. She verbally conveys understanding and agreement of the signs, symptoms, diagnosis, treatment and prognosis and additionally agrees to follow up as discussed. She also agrees with the care-plan and conveys that all of her questions have been answered.   I have also provided discharge instructions for her that include: educational information regarding their diagnosis and treatment, and list of reasons why they would want to return to the ED prior to their follow-up appointment, should her condition change. CLINICAL IMPRESSION    Admitted     I am the Primary Clinician of Record. Delvin Arnett MD (electronically signed)    (Please note that parts of this dictation were completed with voice recognition software. Quite often unanticipated grammatical, syntax, homophones, and other interpretive errors are inadvertently transcribed by the computer software. Please disregards these errors.  Please excuse any errors that have escaped final proofreading.)

## 2023-01-23 NOTE — H&P
Hospitalist Admission Note    NAME:  Kristen Cox   :   1935   MRN:   [id number]     Date of admit: 2023    PCP: Yessenia Anderson MD    Assessment/Plan:     Acute on chronic respiratory failure with hypoxia  POA  Asthma with acute exacerbation POA  Eosinophilia POA elevated 1000 to 1500 range last 3 admits  Presents with several day increasing cough and SOB   PCP start azithromycin, completed 2 days  Known asthma with Dr Gray See wears 1liter O2 NC during day and 2 liters at night  Now hypoxic on 2 liters when EMS came 85%, wheezing  pCXR reviewed, no ASD  O2 currently 4 liters   Still with scattered wheezes  Nebs, montelukast  Trouble tolerating steroids    1. Glaucoma, risk of  increased eye pressure    2. Reaction to an unknown steroid last admit  IV decadron reportedly given with EMS    If tolerates, may be option   ? Inhaled steroid  Pulmonary consult   Spoke with Dr. 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 No angiographic epicardial coronary disease  2. Elevated LVEDP  3. Mildly to moderately reduced LVEF 40%  4. Likely TakoTsubo cardiomyopathy  5. Mild MR    Glaucoma POA  Continue home eyedrops  Concerned about using steroids as it may increase her pressure    Anxiety POA  Continue home Ativan     Code Status: Full code     Body mass index is 19.49 kg/m². Given the patient's current clinical presentation, I have a high level of concern for decompensation if discharged from the emergency department. My assessment of this patient's clinical condition and my plan of care is as noted above.     DVT prophylaxis with lovenox    Code status: Full code  NOK:    History     CHIEF COMPLAINT: \"I have have had increased cough and SOB for several days\"    HISTORY OF PRESENT ILLNESS:    80 Y. O. female    Known asthma since age 48 per her report    Lifelong non smoker    Since June 2022 respiratory status has been worsening   Limited by GALAN    This is her 3rd admit since Oct 2022   Follows with Dr Franklin Chow in the pulmonology clinic  Uses home nebulizer and inhalers    Trouble tolerating steroids   Ophthalmology has raised concerns about her right eye pressure with glaucoma increasing   Apparently had a reaction to prednisone with Rash after Oct 2022 admit    She was discharged on prednisone after that admission   December 2022 admit received Solu-Medrol without any documented problems      Diagnosed with Takotsubo's cardiomyopathy 10/2022   Presented with elevated HS troponin 4390  Last heart cath 10/18/22 with Dr Mee Parks showed:   1. No angiographic epicardial coronary disease  2. Elevated LVEDP  3. Mildly to moderately reduced LVEF 40%  4. Likely TakoTsubo cardiomyopathy  5.  Mild MR  Managed medically with ASA, Lopressor, Lipitor    Reports that \"my breathing has not being good since June\"  In between hospitalizations she has never fully recovered  Past few days has had increasing cough   Especially severe overnight \"it would not stop\" kept me up all night  Increasing shortness of breath especially the past 24 hours, even wearing her baseline oxygen  No fevers, sore throat, chest pain, nausea vomiting, diarrhea   Mildly generalized soreness in abdomen \"from coughing so much\"  Positive diffuse myalgias    EMS  Sats 85% on baseline oxygen, increased to 4 L  Actively wheezing  Note documents giving 10 mg of IV Decadron    ER   Still actively wheezing, but improving  Nebs  CXR with no ASD or edema  Normal WBC but eosinophil count 1,000  Normal HS troponin 13 --> 13  pBNP 105  Rapid COVID-negative  Influenza A/B negative  We were called to admit the patient    Past Medical History:   Diagnosis Date    Adverse effect of anesthesia     combative waking up    Anxiety     Arthritis     Asthma     as of 16 pt states under control    Diverticulitis Dx 2016    Fibromyalgia     GERD (gastroesophageal reflux disease)     Glaucoma     Hiatal hernia     Hypercholesteremia     Hypertension     Ill-defined condition     neurapthy in ble    Nausea & vomiting         Past Surgical History:   Procedure Laterality Date    HX CATARACT REMOVAL Right 2017    HX HYSTERECTOMY      HX ROTATOR CUFF REPAIR Left     HX TUBAL LIGATION      ID COLONOSCOPY FLX DX W/COLLJ SPEC WHEN PFRMD  2011         ID EGD TRANSORAL BIOPSY SINGLE/MULTIPLE  2011         UPPER GI ENDOSCOPY,BIOPSY  2019            Social History     Tobacco Use    Smoking status: Never    Smokeless tobacco: Never   Substance Use Topics    Alcohol use: No        Family history:  Son with cerebral palsy,  in 45s  Daughter healthy  Sister COPD  No asthma    Allergies   Allergen Reactions    Codeine Other (comments)    Cymbalta [Duloxetine] Nausea Only and Other (comments)     \"within the hour I had a severe headache, and vomiting\"      Demerol [Meperidine] Nausea and Vomiting    Lidocaine Other (comments)    Morphine Nausea and Vomiting    Novocain [Procaine] Other (comments)    Penicillins Other (comments)     rash    Percocet [Oxycodone-Acetaminophen] Nausea and Vomiting    Prednisone Rash    Sulfa (Sulfonamide Antibiotics) Other (comments)        Prior to Admission medications    Medication Sig Start Date End Date Taking? Authorizing Provider   aspirin 81 mg chewable tablet Take 81 mg by mouth daily. Indications: treatment to prevent a heart attack    Provider, Historical   apraclonidine (IOPIDINE) 0.5 % ophthalmic solution Administer 1 Drop to right eye every eight (8) hours.  Indications: wide-angle glaucoma    Provider, Historical   bimatoprost (LUMIGAN) 0.01 % ophthalmic drops Administer 1 Drop to right eye nightly. Provider, Historical   OXYGEN-AIR DELIVERY SYSTEMS 2 L/min by IntraNASal route as needed (shortness of breath). Indications: Shortness of breath    Provider, Historical   albuterol (PROVENTIL VENTOLIN) 2.5 mg /3 mL (0.083 %) nebu 2.5 mg by Nebulization route two (2) times a day. Indications: bronchospasm prevention    Provider, Historical   albuterol (PROVENTIL HFA, VENTOLIN HFA, PROAIR HFA) 90 mcg/actuation inhaler Take 1 Puff by inhalation as needed for Wheezing. Patient taking differently: Take 1 Puff by inhalation every four (4) hours as needed for Shortness of Breath or Wheezing. 12/27/22   Sera Heard MD   benzonatate (TESSALON) 100 mg capsule TAKE 1 CAPSULE BY MOUTH THREE TIMES A DAY AS NEEDED FOR COUGH  Patient not taking: Reported on 1/5/2023 12/27/22   Sera Heard MD   acetaminophen (TYLENOL) 325 mg tablet Take 2 Tablets by mouth every six (6) hours as needed for Pain or Fever. 12/27/22   Sera Heard MD   guaiFENesin ER [facility]) 600 mg ER tablet Take 1 Tablet by mouth every twelve (12) hours. 12/27/22   Sera Heard MD   melatonin 3 mg tablet Take 1 Tablet by mouth nightly as needed for Insomnia. Patient not taking: Reported on 1/5/2023 12/27/22   Sera Heard MD   methylPREDNISolone ([facility]) 4 mg tablet Use as directed  Patient not taking: Reported on 1/5/2023 12/27/22   Sera Heard MD   ondansetron hcl (Zofran) 4 mg tablet Take 1 Tablet by mouth every eight (8) hours as needed for Nausea. Patient not taking: Reported on 1/5/2023 8/1/22   Omar Calloway MD   brimonidine 0.025 % drop Apply 1 Drop to eye two (2) times a day. Right eye    Provider, Historical   dorzolamide (TRUSOPT) 2 % ophthalmic solution Administer 1 Drop to right eye three (3) times daily. Provider, Historical   metoprolol tartrate (LOPRESSOR) 25 mg tablet Take 25 mg by mouth daily.     Provider, Historical indapamide (LOZOL) 1.25 mg tablet Take 1.25 mg by mouth daily. Provider, Historical   montelukast (SINGULAIR) 10 mg tablet Take 10 mg by mouth daily. Provider, Historical   pantoprazole (PROTONIX) 40 mg tablet Take 40 mg by mouth daily. Provider, Historical   multivitamin (ONE A DAY) tablet Take 1 Tab by mouth daily. Provider, Historical   lorazepam (ATIVAN) 0.5 mg tablet Take 0.5 mg by mouth nightly.     Provider, Historical       Review of symptoms:     POSITIVE= Bold  Negative = not bold  General:  fever, chills, sweats, generalized weakness  Eyes:    blurred vision, eye pain, double vision  ENT:    Coryza, sore throat, trouble swallowing  Respiratory:   cough, sputum, SOB  Cardiology:   chest pain, orthopnea, PND, edema  Gastrointestinal:  abdominal pain , N/V, diarrhea, constipation, melena or BRBPR  Genitourinary:  Urgency, dysuria, hematuria  Muskuloskeletal :  Joint redness, swelling or acute joint pain, myalgias  Hematology:  easy bruising, nose or gum bleeding  Dermatological: rash, ulceration  Endocrine:   Polyuria or polydipsia, heat or hold intolerance  Neurological:  Headache, focal motor or sensory changes     Speech difficulties, memory loss  Psychological: depression, agitation      Objective:   VITALS:    Patient Vitals for the past 24 hrs:   Temp Pulse Resp BP SpO2   23 0737 98.7 °F (37.1 °C) 90 18 118/65 94 %     Temp (24hrs), Av.7 °F (37.1 °C), Min:98.7 °F (37.1 °C), Max:98.7 °F (37.1 °C)    O2 Flow Rate (L/min): 4 l/min O2 Device: Nasal cannula    Wt Readings from Last 12 Encounters:   23 49.9 kg (110 lb)   23 47.2 kg (104 lb)   22 59.6 kg (131 lb 4.8 oz)   10/17/22 55.3 kg (122 lb)   22 55.3 kg (122 lb)   22 61.4 kg (135 lb 6.4 oz)   21 62.8 kg (138 lb 7.2 oz)   21 62.6 kg (138 lb)   20 62.6 kg (138 lb)   19 63.6 kg (140 lb 3 oz)   19 63.3 kg (139 lb 8.8 oz)   18 64.9 kg (143 lb)         PHYSICAL EXAM: I had a face to face encounter and independently examined this patient on 01/23/23  as outlined below:    General:    Alert, cooperative in no distress     HEENT: Normocephalic, atraumatic    PERRL,  Sclera no icterus    Nasal mucosa without masses or discharge  Hearing intact to voice    Oropharynx without erythema or exudate   Neck:  No meningismus, trachea midline, no carotid bruits     Thyroid not enlarged, no nodules or tenderness  Lungs:   Decreased BS bilaterally. Few scattered wheezes    No accessory muscle use or retractions. Heart:   Regular rate and rhythm,  no murmur or gallop. No LE edema  Abdomen:   Soft, non-tender. Not distended. Bowel sounds normal.     No masses, No Hepatosplenomegaly, No Rebound or guarding  Lymph nodes: No cervical or inguinal XENIA  Musculoskeletal:  No Joint swelling, erythema, warmth. No Cyanosis or clubbing  Skin:      No rashes    Not Jaundiced   No nodules or thickening  Neurologic: Alert and oriented X 3, follows commands     Cranial nerves 2 to 12 intact    Symmetric motor strength bilaterally       LAB DATA REVIEWED:    Recent Results (from the past 12 hour(s))   SAMPLES BEING HELD    Collection Time: 01/23/23  7:40 AM   Result Value Ref Range    SAMPLES BEING HELD LAC, BLOOD CULTURES     COMMENT        Add-on orders for these samples will be processed based on acceptable specimen integrity and analyte stability, which may vary by analyte.    CBC WITH AUTOMATED DIFF    Collection Time: 01/23/23  7:44 AM   Result Value Ref Range    WBC 6.1 3.6 - 11.0 K/uL    RBC 4.29 3.80 - 5.20 M/uL    HGB 12.0 11.5 - 16.0 g/dL    HCT 36.0 35.0 - 47.0 %    MCV 83.9 80.0 - 99.0 FL    MCH 28.0 26.0 - 34.0 PG    MCHC 33.3 30.0 - 36.5 g/dL    RDW 14.3 11.5 - 14.5 %    PLATELET 235 268 - 693 K/uL    MPV 9.8 8.9 - 12.9 FL    NRBC 0.0 0  WBC    ABSOLUTE NRBC 0.00 0.00 - 0.01 K/uL    NEUTROPHILS 43 32 - 75 %    LYMPHOCYTES 26 12 - 49 %    MONOCYTES 12 5 - 13 %    EOSINOPHILS 17 (H) 0 - 7 %    BASOPHILS 2 (H) 0 - 1 %    IMMATURE GRANULOCYTES 0 0.0 - 0.5 %    ABS. NEUTROPHILS 2.7 1.8 - 8.0 K/UL    ABS. LYMPHOCYTES 1.6 0.8 - 3.5 K/UL    ABS. MONOCYTES 0.7 0.0 - 1.0 K/UL    ABS. EOSINOPHILS 1.0 (H) 0.0 - 0.4 K/UL    ABS. BASOPHILS 0.1 0.0 - 0.1 K/UL    ABS. IMM. GRANS. 0.0 0.00 - 0.04 K/UL    DF SMEAR SCANNED      RBC COMMENTS NORMOCYTIC, NORMOCHROMIC     METABOLIC PANEL, COMPREHENSIVE    Collection Time: 01/23/23  7:44 AM   Result Value Ref Range    Sodium 141 136 - 145 mmol/L    Potassium 2.6 (LL) 3.5 - 5.1 mmol/L    Chloride 108 97 - 108 mmol/L    CO2 24 21 - 32 mmol/L    Anion gap 9 5 - 15 mmol/L    Glucose 86 65 - 100 mg/dL    BUN 9 6 - 20 MG/DL    Creatinine 0.70 0.55 - 1.02 MG/DL    BUN/Creatinine ratio 13 12 - 20      eGFR >60 >60 ml/min/1.73m2    Calcium 7.9 (L) 8.5 - 10.1 MG/DL    Bilirubin, total 0.5 0.2 - 1.0 MG/DL    ALT (SGPT) 22 12 - 78 U/L    AST (SGOT) 28 15 - 37 U/L    Alk. phosphatase 74 45 - 117 U/L    Protein, total 5.7 (L) 6.4 - 8.2 g/dL    Albumin 3.0 (L) 3.5 - 5.0 g/dL    Globulin 2.7 2.0 - 4.0 g/dL    A-G Ratio 1.1 1.1 - 2.2     NT-PRO BNP    Collection Time: 01/23/23  7:44 AM   Result Value Ref Range    NT pro- <450 PG/ML   TROPONIN-HIGH SENSITIVITY    Collection Time: 01/23/23  7:44 AM   Result Value Ref Range    Troponin-High Sensitivity 13 0 - 51 ng/L   SAMPLES BEING HELD    Collection Time: 01/23/23  7:44 AM   Result Value Ref Range    SAMPLES BEING HELD PST     COMMENT        Add-on orders for these samples will be processed based on acceptable specimen integrity and analyte stability, which may vary by analyte.    COVID-19 RAPID TEST    Collection Time: 01/23/23  8:04 AM   Result Value Ref Range    Specimen source Nasopharyngeal      COVID-19 rapid test Not detected NOTD     INFLUENZA A+B VIRAL AGS    Collection Time: 01/23/23  8:04 AM   Result Value Ref Range    Influenza A Antigen Negative NEG      Influenza B Antigen Negative NEG           CT Results  (Last 48 hours)      None              XR CHEST PA LAT    Result Date: 1/23/2023  No acute abnormality. I saw the patient personally, took a history and did a complete physical exam at the bedside. I performed complex decision making in coming up with a diagnostic and treatment plan for the patient. I reviewed the patient's past medical records, current laboratory and radiology results, and actual Xray films/EKG. I have also discussed this case with the involved ED physician.     Care Plan discussed with:    Patient, ED Doc, Dr Arias Paci of deterioration:  High    Total Time Coordinating Admission:  65   minutes    Total Critical Care Time:         Jasmin Menendez MD

## 2023-01-23 NOTE — PROGRESS NOTES
PT note:     Orders received and acknowledged. Chart reviewed and noted PT orders to start 1/24/23 at 0000. Will defer and follow up tomorrow per orders.      Tylor Level, PT, DPT

## 2023-01-23 NOTE — ED NOTES
Pt. States that beginning last June she began to have respiratory issues, states dx breathing, mucous production, etc. Pt. States for the last month it has gotten worse and was prescribed a breathing treatment over the weekend. Pt. States the breathing treatment has offered short relief. Pt. Was 85% on 2L at home, was given a breathing treatment by EMS. PT. Is at 94% on 4L at this time. Pt. VS are stable, but denies any other complaints at this time.

## 2023-01-23 NOTE — PROGRESS NOTES
Orders received for OT services. Pts order start time is for tomorrow. Will defer OT today and have therapy follow up tomorrow when order is active.

## 2023-01-24 ENCOUNTER — HOME CARE VISIT (OUTPATIENT)
Dept: HOME HEALTH SERVICES | Facility: HOME HEALTH | Age: 88
End: 2023-01-24
Payer: MEDICARE

## 2023-01-24 LAB
ALBUMIN SERPL-MCNC: 3.5 G/DL (ref 3.5–5)
ALBUMIN/GLOB SERPL: 1.1 (ref 1.1–2.2)
ALP SERPL-CCNC: 89 U/L (ref 45–117)
ALT SERPL-CCNC: 22 U/L (ref 12–78)
ANION GAP SERPL CALC-SCNC: 7 MMOL/L (ref 5–15)
AST SERPL-CCNC: 32 U/L (ref 15–37)
BASOPHILS # BLD: 0 K/UL (ref 0–0.1)
BASOPHILS NFR BLD: 1 % (ref 0–1)
BILIRUB SERPL-MCNC: 0.5 MG/DL (ref 0.2–1)
BUN SERPL-MCNC: 12 MG/DL (ref 6–20)
BUN/CREAT SERPL: 15 (ref 12–20)
CALCIUM SERPL-MCNC: 9.5 MG/DL (ref 8.5–10.1)
CHLORIDE SERPL-SCNC: 105 MMOL/L (ref 97–108)
CO2 SERPL-SCNC: 26 MMOL/L (ref 21–32)
CREAT SERPL-MCNC: 0.81 MG/DL (ref 0.55–1.02)
DIFFERENTIAL METHOD BLD: ABNORMAL
EOSINOPHIL # BLD: 0 K/UL (ref 0–0.4)
EOSINOPHIL NFR BLD: 1 % (ref 0–7)
ERYTHROCYTE [DISTWIDTH] IN BLOOD BY AUTOMATED COUNT: 14.2 % (ref 11.5–14.5)
GLOBULIN SER CALC-MCNC: 3.2 G/DL (ref 2–4)
GLUCOSE SERPL-MCNC: 96 MG/DL (ref 65–100)
HCT VFR BLD AUTO: 35.3 % (ref 35–47)
HGB BLD-MCNC: 11.8 G/DL (ref 11.5–16)
IMM GRANULOCYTES # BLD AUTO: 0 K/UL (ref 0–0.04)
IMM GRANULOCYTES NFR BLD AUTO: 0 % (ref 0–0.5)
LYMPHOCYTES # BLD: 1.4 K/UL (ref 0.8–3.5)
LYMPHOCYTES NFR BLD: 19 % (ref 12–49)
MCH RBC QN AUTO: 27.8 PG (ref 26–34)
MCHC RBC AUTO-ENTMCNC: 33.4 G/DL (ref 30–36.5)
MCV RBC AUTO: 83.3 FL (ref 80–99)
MONOCYTES # BLD: 1.2 K/UL (ref 0–1)
MONOCYTES NFR BLD: 16 % (ref 5–13)
NEUTS SEG # BLD: 4.5 K/UL (ref 1.8–8)
NEUTS SEG NFR BLD: 63 % (ref 32–75)
NRBC # BLD: 0 K/UL (ref 0–0.01)
NRBC BLD-RTO: 0 PER 100 WBC
PLATELET # BLD AUTO: 285 K/UL (ref 150–400)
PMV BLD AUTO: 10.2 FL (ref 8.9–12.9)
POTASSIUM SERPL-SCNC: 4.2 MMOL/L (ref 3.5–5.1)
PROCALCITONIN SERPL-MCNC: <0.05 NG/ML
PROT SERPL-MCNC: 6.7 G/DL (ref 6.4–8.2)
RBC # BLD AUTO: 4.24 M/UL (ref 3.8–5.2)
SODIUM SERPL-SCNC: 138 MMOL/L (ref 136–145)
WBC # BLD AUTO: 7.1 K/UL (ref 3.6–11)

## 2023-01-24 PROCEDURE — 74011250636 HC RX REV CODE- 250/636: Performed by: INTERNAL MEDICINE

## 2023-01-24 PROCEDURE — 97116 GAIT TRAINING THERAPY: CPT

## 2023-01-24 PROCEDURE — 65270000046 HC RM TELEMETRY

## 2023-01-24 PROCEDURE — 94640 AIRWAY INHALATION TREATMENT: CPT

## 2023-01-24 PROCEDURE — 74011250637 HC RX REV CODE- 250/637: Performed by: INTERNAL MEDICINE

## 2023-01-24 PROCEDURE — 97535 SELF CARE MNGMENT TRAINING: CPT

## 2023-01-24 PROCEDURE — 36415 COLL VENOUS BLD VENIPUNCTURE: CPT

## 2023-01-24 PROCEDURE — 85025 COMPLETE CBC W/AUTO DIFF WBC: CPT

## 2023-01-24 PROCEDURE — 84145 PROCALCITONIN (PCT): CPT

## 2023-01-24 PROCEDURE — 74011250636 HC RX REV CODE- 250/636: Performed by: STUDENT IN AN ORGANIZED HEALTH CARE EDUCATION/TRAINING PROGRAM

## 2023-01-24 PROCEDURE — 74011000250 HC RX REV CODE- 250: Performed by: INTERNAL MEDICINE

## 2023-01-24 PROCEDURE — 97165 OT EVAL LOW COMPLEX 30 MIN: CPT

## 2023-01-24 PROCEDURE — 80053 COMPREHEN METABOLIC PANEL: CPT

## 2023-01-24 PROCEDURE — 97161 PT EVAL LOW COMPLEX 20 MIN: CPT

## 2023-01-24 RX ORDER — DIPHENHYDRAMINE HYDROCHLORIDE 50 MG/ML
50 INJECTION, SOLUTION INTRAMUSCULAR; INTRAVENOUS
Status: COMPLETED | OUTPATIENT
Start: 2023-01-24 | End: 2023-01-24

## 2023-01-24 RX ORDER — DEXAMETHASONE 4 MG/1
4 TABLET ORAL DAILY
Status: DISCONTINUED | OUTPATIENT
Start: 2023-01-24 | End: 2023-01-26 | Stop reason: HOSPADM

## 2023-01-24 RX ORDER — APRACLONIDINE HYDROCHLORIDE 5 MG/ML
1 SOLUTION/ DROPS OPHTHALMIC 3 TIMES DAILY
Status: DISCONTINUED | OUTPATIENT
Start: 2023-01-24 | End: 2023-01-26 | Stop reason: HOSPADM

## 2023-01-24 RX ORDER — HYDROXYZINE 25 MG/1
25 TABLET, FILM COATED ORAL ONCE
Status: ACTIVE | OUTPATIENT
Start: 2023-01-24 | End: 2023-01-25

## 2023-01-24 RX ORDER — GUAIFENESIN/DEXTROMETHORPHAN 100-10MG/5
10 SYRUP ORAL 4 TIMES DAILY
Status: DISCONTINUED | OUTPATIENT
Start: 2023-01-24 | End: 2023-01-26 | Stop reason: HOSPADM

## 2023-01-24 RX ADMIN — IPRATROPIUM BROMIDE AND ALBUTEROL SULFATE 3 ML: 2.5; .5 SOLUTION RESPIRATORY (INHALATION) at 20:32

## 2023-01-24 RX ADMIN — GUAIFENESIN AND DEXTROMETHORPHAN 10 ML: 100; 10 SYRUP ORAL at 21:01

## 2023-01-24 RX ADMIN — SODIUM CHLORIDE, PRESERVATIVE FREE 10 ML: 5 INJECTION INTRAVENOUS at 14:18

## 2023-01-24 RX ADMIN — DEXAMETHASONE 4 MG: 4 TABLET ORAL at 09:45

## 2023-01-24 RX ADMIN — METOPROLOL TARTRATE 25 MG: 25 TABLET, FILM COATED ORAL at 07:57

## 2023-01-24 RX ADMIN — LORAZEPAM 0.5 MG: 0.5 TABLET ORAL at 18:06

## 2023-01-24 RX ADMIN — DORZOLAMIDE HYDROCHLORIDE 1 DROP: 20 SOLUTION/ DROPS OPHTHALMIC at 17:54

## 2023-01-24 RX ADMIN — BRIMONIDINE TARTRATE 1 DROP: 2 SOLUTION OPHTHALMIC at 08:02

## 2023-01-24 RX ADMIN — PANTOPRAZOLE SODIUM 40 MG: 40 TABLET, DELAYED RELEASE ORAL at 08:01

## 2023-01-24 RX ADMIN — APRACLONIDINE HYDROCHLORIDE 1 DROP: 5 SOLUTION/ DROPS OPHTHALMIC at 17:54

## 2023-01-24 RX ADMIN — GUAIFENESIN AND DEXTROMETHORPHAN 10 ML: 100; 10 SYRUP ORAL at 18:06

## 2023-01-24 RX ADMIN — DIPHENHYDRAMINE HYDROCHLORIDE 50 MG: 50 INJECTION, SOLUTION INTRAMUSCULAR; INTRAVENOUS at 05:11

## 2023-01-24 RX ADMIN — GUAIFENESIN AND DEXTROMETHORPHAN 10 ML: 100; 10 SYRUP ORAL at 09:45

## 2023-01-24 RX ADMIN — DORZOLAMIDE HYDROCHLORIDE 1 DROP: 20 SOLUTION/ DROPS OPHTHALMIC at 08:02

## 2023-01-24 RX ADMIN — SODIUM CHLORIDE, PRESERVATIVE FREE 10 ML: 5 INJECTION INTRAVENOUS at 14:19

## 2023-01-24 RX ADMIN — APRACLONIDINE HYDROCHLORIDE 1 DROP: 5 SOLUTION/ DROPS OPHTHALMIC at 22:00

## 2023-01-24 RX ADMIN — POLYETHYLENE GLYCOL 3350 17 G: 17 POWDER, FOR SOLUTION ORAL at 08:01

## 2023-01-24 RX ADMIN — BRIMONIDINE TARTRATE 1 DROP: 2 SOLUTION OPHTHALMIC at 17:53

## 2023-01-24 RX ADMIN — GUAIFENESIN AND DEXTROMETHORPHAN 10 ML: 100; 10 SYRUP ORAL at 14:18

## 2023-01-24 RX ADMIN — DORZOLAMIDE HYDROCHLORIDE 1 DROP: 20 SOLUTION/ DROPS OPHTHALMIC at 21:03

## 2023-01-24 RX ADMIN — IPRATROPIUM BROMIDE AND ALBUTEROL SULFATE 3 ML: 2.5; .5 SOLUTION RESPIRATORY (INHALATION) at 11:21

## 2023-01-24 RX ADMIN — MONTELUKAST 10 MG: 10 TABLET, FILM COATED ORAL at 07:58

## 2023-01-24 RX ADMIN — ALBUTEROL SULFATE 2.5 MG: 2.5 SOLUTION RESPIRATORY (INHALATION) at 04:45

## 2023-01-24 RX ADMIN — ASPIRIN 81 MG: 81 TABLET, CHEWABLE ORAL at 08:02

## 2023-01-24 RX ADMIN — IPRATROPIUM BROMIDE AND ALBUTEROL SULFATE 3 ML: 2.5; .5 SOLUTION RESPIRATORY (INHALATION) at 09:02

## 2023-01-24 NOTE — ED NOTES
This rn went to assess pt as pt pulse ox not reading, pt provided new pulse ox, and noted to have opened ginger ale and dumped on all belongings, belongings moved to chair, evs to come mop room. Pt reducated to please not dump drinks on floor, pt states \"I do what I want. \" Pt room cleansed and pt provided with additional pillow and blanket.

## 2023-01-24 NOTE — PROGRESS NOTES
TRANSFER - OUT REPORT:    Verbal report given to Juan Graff (name) on Fe Og  being transferred to Oncology (unit) for routine progression of care       Report consisted of patients Situation, Background, Assessment and   Recommendations(SBAR). Information from the following report(s) SBAR, Procedure Summary, Intake/Output, Med Rec Status, Cardiac Rhythm NS/ST, and Quality Measures was reviewed with the receiving nurse. Lines:   Peripheral IV 01/23/23 Left Forearm (Active)   Site Assessment Clean, dry, & intact 01/23/23 0748        Opportunity for questions and clarification was provided.       Patient transported with:   [employer]

## 2023-01-24 NOTE — ED NOTES
This Tech sat with pt when she began to feel dizzy around 2130. Pt was pleasant at that time. Later, this tech entered the pt's room to draw morning labs and she was immediately agitated. The pt stated \"no you are not taking any blood\". When this tech asked why, pt stated \"because you're not\". Pt stated that Herb Escamilla will get the labs in the morning\". When this tech asked who is they, pt stated \"I don't know, I'm just a crazy woman\". Pt became increasingly rude and agitated. Pt stated that this tech \"did not care about her as much as Jesica\". When asked why she thought that, the pt stated \"Because I know you, and I have asked for your forgiveness and you will not give it\" This tech does not know this patient and has had no contact with her before tonight. Pt requested Prakash Gusman and asked her Jayro Yo that tech was in here\". Pt continued to request Irish Valdez RN and this tech did not enter the pt's room again.

## 2023-01-24 NOTE — ED NOTES
Pt states she doesn't want iv benadryl, states she cant take po benadryl as she is allergic to the dye in it. This rn educated pt that she can receive iv benadryl, but pt denies.

## 2023-01-24 NOTE — PROGRESS NOTES
TRANSFER - IN REPORT:    Verbal report received from [address] [place] [address],[address], RN (ext. [phone]) on Deidra Carey  being received from ED Rm # [id number] for routine progression of care      Report consisted of patients Situation, Background, Assessment and   Recommendations(SBAR). Information from the following report(s) SBAR, Kardex, and MAR was reviewed with the receiving nurse. Opportunity for questions and clarification was provided. Assessment completed upon patients arrival to unit and care assumed.

## 2023-01-24 NOTE — CONSULTS
Pulmonary, Critical Care, and Sleep Medicine      Name: Alma Conner MRN: [id number]   : 1935 Hospital: [facility]   Date: 2023  Admission date: 2023 Hospital Day: 1       History:   Pt is unstable and acutely ill. Medical records and data reviewed. Pt seen in Consultation    Hospital Problems  Date Reviewed: 2019            Codes Class Noted POA    Acute respiratory failure with hypoxia [facility]) ICD-10-CM: J96.01  ICD-9-CM: 518.81  2023 Unknown           IMPRESSION:   Acute respiratory failure with Hypoxia   Severe persistent asthma with acute exacerbation: Awaiting insurance authorization for biologic therapy  Intolerant to many steroid formulations  Peripheral eosinophilia  Allergic Rhinitis on Singulair. Zenkers Diverticulum, Hiatal hernia patient encouraged to see GI for follow-up   hepatic Cysts. T12 compression  History of Takotsubo cardiomyopathy 2022 treated by Dr. Juan Holland  Body mass index is 19.49 kg/m². Prognosis guarded       RECOMMENDATIONS/PLAN:   Monitor for reaction to Decadron if none would continue Decadron daily   labs to follow electrolytes, renal function and and blood counts  Bronchial hygiene with respiratory therapy techniques, bronchodilators  DVT prophylaxis  Prescription drug management with home med reconciliation reviewed          [x] High complexity decision making was performed  [x] See my orders for details      Subjective/Initial History:     I was asked by Hetal Trinh MD to see Alma Conner  a 80 y.o.  female in consultation for a chief complaint of severe asthma    Excerpts from admission 2023 or consult notes as follows:     \"  Reports that \"my breathing has not being good since \"  In between hospitalizations she has never fully recovered  Past few days has had increasing cough .  Especially severe overnight \"it would not stop\" kept me up all night  Increasing shortness of breath especially the past 24 hours, even wearing her baseline oxygen. No fevers, sore throat, chest pain, nausea vomiting, diarrhea. Mildly generalized soreness in abdomen \"from coughing so much\"    Patient with diffuse myalgias. Found by rescue squad to be hypoxic. He required 4 L/min to treat her saturations of 85%. Given 10 mg IV Decadron. In the past the patient has been given IV Solu-Medrol and sent home with taper. Would develop rash on the medications. Also intolerant of prednisone in the past.    Now in the emergency room on a stretcher. Wheezing is calm down. Has received nebulizer treatments. COVID test was negative. Influenza AMB negative. Patient admitted. Discussed with Dr. Christie Felix. Chest x-ray reviewed. No obvious pneumonia or pulmonary edema. Elevated eosinophil count of 1000. Normal cardiac enzymes. Patient to have some sort of surgery on her right eye. Evidently glaucoma increasing. Was advised avoid all steroids. Ophthalmologist aware of her life-threatening asthma exacerbations.       Allergies   Allergen Reactions    Codeine Other (comments)    Cymbalta [Duloxetine] Nausea Only and Other (comments)     \"within the hour I had a severe headache, and vomiting\"      Demerol [Meperidine] Nausea and Vomiting    Lidocaine Other (comments)    Morphine Nausea and Vomiting    Novocain [Procaine] Other (comments)    Penicillins Other (comments)     rash    Percocet [Oxycodone-Acetaminophen] Nausea and Vomiting    Prednisone Rash    Sulfa (Sulfonamide Antibiotics) Other (comments)        MAR reviewed and pertinent medications noted or modified as needed       Current Facility-Administered Medications   Medication    sodium chloride (NS) flush 5-40 mL    sodium chloride (NS) flush 5-40 mL    acetaminophen (TYLENOL) tablet 650 mg    Or    acetaminophen (TYLENOL) suppository 650 mg    polyethylene glycol (MIRALAX) packet 17 g    promethazine (PHENERGAN) tablet 12.5 mg    Or    ondansetron (ZOFRAN) injection 4 mg    [START ON 1/24/2023] enoxaparin (LOVENOX) injection 40 mg    bisacodyL (DULCOLAX) suppository 10 mg    hydrALAZINE (APRESOLINE) 20 mg/mL injection 20 mg    melatonin tablet 3 mg    albuterol-ipratropium (DUO-NEB) 2.5 MG-0.5 MG/3 ML    albuterol (PROVENTIL VENTOLIN) nebulizer solution 2.5 mg    [START ON 1/24/2023] aspirin chewable tablet 81 mg    dorzolamide (TRUSOPT) 2 % ophthalmic solution 1 Drop    [START ON 1/24/2023] indapamide (LOZOL) tablet 1.25 mg    [START ON 1/24/2023] pantoprazole (PROTONIX) tablet 40 mg    [START ON 1/24/2023] metoprolol tartrate (LOPRESSOR) tablet 25 mg    [START ON 1/24/2023] montelukast (SINGULAIR) tablet 10 mg    LORazepam (ATIVAN) tablet 0.5 mg    latanoprost (XALATAN) 0.005 % ophthalmic solution 1 Drop    brimonidine (ALPHAGAN) 0.2 % ophthalmic solution 1 Drop     Current Outpatient Medications   Medication Sig    aspirin 81 mg chewable tablet Take 81 mg by mouth daily. Indications: treatment to prevent a heart attack    apraclonidine (IOPIDINE) 0.5 % ophthalmic solution Administer 1 Drop to right eye every eight (8) hours. Indications: wide-angle glaucoma    bimatoprost (LUMIGAN) 0.01 % ophthalmic drops Administer 1 Drop to right eye nightly. OXYGEN-AIR DELIVERY SYSTEMS 2 L/min by IntraNASal route as needed (shortness of breath). Indications: Shortness of breath    albuterol (PROVENTIL VENTOLIN) 2.5 mg /3 mL (0.083 %) nebu 2.5 mg by Nebulization route two (2) times a day. Indications: bronchospasm prevention    albuterol (PROVENTIL HFA, VENTOLIN HFA, PROAIR HFA) 90 mcg/actuation inhaler Take 1 Puff by inhalation as needed for Wheezing.  (Patient taking differently: Take 1 Puff by inhalation every four (4) hours as needed for Shortness of Breath or Wheezing.)    benzonatate (TESSALON) 100 mg capsule TAKE 1 CAPSULE BY MOUTH THREE TIMES A DAY AS NEEDED FOR COUGH (Patient not taking: Reported on 1/5/2023)    acetaminophen (TYLENOL) 325 mg tablet Take 2 Tablets by mouth every six (6) hours as needed for Pain or Fever. guaiFENesin ER (MUCINEX) 600 mg ER tablet Take 1 Tablet by mouth every twelve (12) hours. melatonin 3 mg tablet Take 1 Tablet by mouth nightly as needed for Insomnia. (Patient not taking: Reported on 1/5/2023)    methylPREDNISolone (MEDROL DOSEPACK) 4 mg tablet Use as directed (Patient not taking: Reported on 1/5/2023)    ondansetron hcl (Zofran) 4 mg tablet Take 1 Tablet by mouth every eight (8) hours as needed for Nausea. (Patient not taking: Reported on 1/5/2023)    brimonidine 0.025 % drop Apply 1 Drop to eye two (2) times a day. Right eye    dorzolamide (TRUSOPT) 2 % ophthalmic solution Administer 1 Drop to right eye three (3) times daily. metoprolol tartrate (LOPRESSOR) 25 mg tablet Take 25 mg by mouth daily. indapamide (LOZOL) 1.25 mg tablet Take 1.25 mg by mouth daily. montelukast (SINGULAIR) 10 mg tablet Take 10 mg by mouth daily. pantoprazole (PROTONIX) 40 mg tablet Take 40 mg by mouth daily. multivitamin (ONE A DAY) tablet Take 1 Tab by mouth daily. lorazepam (ATIVAN) 0.5 mg tablet Take 0.5 mg by mouth nightly. Patient PCP: Yu Villa MD  PMH:  has a past medical history of Adverse effect of anesthesia, Anxiety, Arthritis, Asthma, Diverticulitis (Dx 11/2016), Fibromyalgia, GERD (gastroesophageal reflux disease), Glaucoma, Hiatal hernia, Hypercholesteremia, Hypertension, Ill-defined condition, and Nausea & vomiting. PSH:   has a past surgical history that includes pr egd transoral biopsy single/multiple (11/9/2011); pr colonoscopy flx dx w/collj spec when pfrmd (11/9/2011); hx tubal ligation; hx hysterectomy; hx rotator cuff repair (Left, 2008); hx cataract removal (Right, 01/04/2017); and upper gi endoscopy,biopsy (8/20/2019). FHX: family history is not on file. SHX:  reports that she has never smoked. She has never used smokeless tobacco. She reports that she does not drink alcohol and does not use drugs. ROS:A comprehensive review of systems was negative except for that written in the HPI. Objective:     Vital Signs: Telemetry:     Intake/Output:   Visit Vitals  /62   Pulse 85   Temp 98.2 °F (36.8 °C)   Resp 18   Ht 5' 3\" (1.6 m)   Wt 49.9 kg (110 lb)   SpO2 94%   BMI 19.49 kg/m²       Temp (24hrs), Av.4 °F (36.9 °C), Min:98.2 °F (36.8 °C), Max:98.7 °F (37.1 °C)        O2 Device: Nasal cannula O2 Flow Rate (L/min): 3 l/min       Wt Readings from Last 4 Encounters:   23 49.9 kg (110 lb)   23 47.2 kg (104 lb)   22 59.6 kg (131 lb 4.8 oz)   10/17/22 55.3 kg (122 lb)        No intake or output data in the 24 hours ending 23    Last shift:      No intake/output data recorded. Last 3 shifts: No intake/output data recorded. Physical Exam:   General:  female; alert, oriented times 3, cooperative, and moderately ill;  NC;  HEENT: NCAT,  dentition, lips and mucosa dry  Eyes: anicteric; conjunctiva clear  Neck: no nodes, no accessory MM use. Chest: no deformity,   Cardiac: regular rate, rhythm; no murmur  Lungs: distant breath sounds; few wheezes, no rales, no rhonchi  Abd: soft, NT, hypoactive BS, ,  Ext: no edema; no joint swelling; No clubbing  : No voss,   Neuro: fluent,  follows commands; generalized weakness  Psych- no agitation, oriented to person;   Skin: warm, dry, no cyanosis;   Pulses: 1-2+ Bilateral pedal, radial  Capillary: brisk; pale    Labs:    Recent Labs     23  0744   WBC 6.1   HGB 12.0        Recent Labs     23  0744      K 2.6*      CO2 24   GLU 86   BUN 9   CREA 0.70   CA 7.9*   ALB 3.0*   ALT 22     No results for input(s): PH, PCO2, PO2, HCO3, FIO2 in the last 72 hours. No results for input(s): CPK, CKNDX, TROIQ in the last 72 hours.     No lab exists for component: CPKMB  No results found for: BNPP, BNP   Lab Results   Component Value Date/Time    Culture result: NO GROWTH 6 DAYS 10/17/2022 09:30 AM Culture result: MIXED UROGENITAL LOU ISOLATED 11/26/2017 02:45 PM    Culture result: PROTEUS MIRABILIS 12/18/2011 11:30 AM     Lab Results   Component Value Date/Time    TSH 1.80 08/31/2018 08:38 PM       Imaging:    CXR Results  (Last 48 hours)                 01/23/23 0843  XR CHEST PA LAT Final result    Impression:      No acute abnormality. Narrative:  EXAM: XR CHEST PA LAT       INDICATION: Shortness of breath       COMPARISON: 12/24/2022       TECHNIQUE: PA and lateral chest views       FINDINGS: The cardiac size is within normal limits. The pulmonary vasculature is   within normal limits. The lungs and pleural spaces are clear. Levoconvex scoliosis of the thoracic   spine is noted. Results from Hospital Encounter encounter on 01/23/23    XR CHEST PA LAT    Narrative  EXAM: XR CHEST PA LAT    INDICATION: Shortness of breath    COMPARISON: 12/24/2022    TECHNIQUE: PA and lateral chest views    FINDINGS: The cardiac size is within normal limits. The pulmonary vasculature is  within normal limits. The lungs and pleural spaces are clear. Levoconvex scoliosis of the thoracic  spine is noted. Impression  No acute abnormality. Results from [place] encounter on 12/24/22    XR CHEST PORT    Narrative  EXAM: XR CHEST PORT    DATE: 12/24/2022 6:06 PM    INDICATION: Shortness of breath    COMPARISON: Chest October 17, 2022    FINDINGS: AP portable chest radiograph. The heart size is normal. Lungs are  well-inflated. Mild opacity along the LEFT hemidiaphragm favors atelectasis. No  consolidation is seen. The vascular clarity is normal. There is no evidence of  effusion or pneumothorax. Impression  No acute cardiopulmonary findings. Results from [place] encounter on 10/17/22    XR CHEST PORT    Narrative  PORTABLE CHEST RADIOGRAPH/S: 10/17/2022 8:20 AM    INDICATION: Dyspnea. COMPARISON: 8/22/2022, CT 9/29/2022.     TECHNIQUE: Portable frontal upright radiograph/s of the chest.    FINDINGS:  The lungs are clear. The central airways are patent. No pneumothorax or pleural  effusion. There is a moderate hiatal hernia. Impression  Clear lungs. Results from [place] encounter on 12/24/22    CTA CHEST W OR W WO CONT    Narrative  EXAM: CTA CHEST W OR W WO CONT    INDICATION: Hypoxia. Evaluation for pulmonary embolism    COMPARISON: 9/29/2022. CONTRAST: 100 mL of Isovue-370. TECHNIQUE:  Precontrast  images were obtained to localize the volume for acquisition. Multislice helical CT arteriography was performed from the diaphragm to the  thoracic inlet during uneventful rapid bolus intravenous contrast  administration. Lung and soft tissue windows were generated. Coronal and  sagittal images were generated and 3D post processing consisting of coronal  maximum intensity images was performed. CT dose reduction was achieved through  use of a standardized protocol tailored for this examination and automatic  exposure control for dose modulation. FINDINGS:  Few ill defined subcentimeter opacities in the periphery of the right upper lobe  (image 66). Zenker's diverticulum. The pulmonary arteries are well enhanced and no pulmonary emboli are identified. There is no mediastinal or hilar adenopathy or mass. The aorta enhances normally  without evidence of aneurysm or dissection. The visualized portions of the upper abdominal organs are remarkable for a  hiatal hernia as well as stable left hepatic hypodensities, previously  determined to represent cysts. Mild superior endplate J27 wedge deformity, stable. Impression  Right upper lobe pneumonia versus alveolitis. No pulmonary embolism  Mild stable T12 wedge deformity. Recommend DXA      Please note that this dictation was completed with [employer], the [employer] voice recognition software.   Quite often unanticipated grammatical, syntax, homophones, and other interpretive errors are inadvertently transcribed by the computer software. Please disregard these errors. Please excuse any errors that have escaped final proofreading  ______________________________________________________________________      Thank you for allowing us to participate in the care of this patient.       This care involved high complexity medical decision making: I personally:  Reviewed the flowsheet and previous days notes  Reviewed and summarized records or history from previous days note or discussions with staff, family  High Risk Drug therapy requiring intensive monitoring for toxicity: eg steroids, antibiotics  Reviewed and/or ordered Clinical lab tests  Reviewed images and/or ordered Radiology tests    discussed my assessment/management with : Nursing, for coordination of care      Sylvie Huynh MD

## 2023-01-24 NOTE — PROGRESS NOTES
Problem: Mobility Impaired (Adult and Pediatric)  Goal: *Acute Goals and Plan of Care (Insert Text)  Description: FUNCTIONAL STATUS PRIOR TO ADMISSION: Pt lives alone in an apartment in [address] [place] [address]. She states she has a dtr who comes to help frequently with some IADLs. She was scattered in her report and initially said she did not use anything to amb then said a cane but then said a rolling walker. She says she did her own ADLs. She states she uses 1L of O2 at home at night and prn otherwise and then also when she goes out. She talks without stopping and needs cues to break her stream of conversation for any questioning. HOME SUPPORT PRIOR TO ADMISSION: The patient lived alone with dtr to provide assistance. Physical Therapy Goals  Initiated 1/24/2023  1. Patient will scoot up and down in bed with independence within 7 day(s). 2.  Patient will transfer from bed to chair and chair to bed with independence using the least restrictive device within 7 day(s). 3.  Patient will perform sit to stand with independence within 7 day(s). 4.  Patient will ambulate with modified independence for 150 feet with the least restrictive device within 7 day(s). Outcome: Not Met   PHYSICAL THERAPY EVALUATION  Patient: Cherie Chong (29 y.o. female)  Date: 1/24/2023  Primary Diagnosis: Acute respiratory failure with hypoxia (HCC) [J96.01]       Precautions:  Fall, Bed Alarm (1L O2 PTA), 4L at eval    ASSESSMENT  Based on the objective data described below, the patient presents with decreased attn to task, decreased insight and safety awareness with good ability to move but needing S to CGA for OOB activity due to her cognition/impulsivity and decreased attn to task. She uses a rolling walker and was able to walk short distance with occasional cues. She was on 4L for session and sats remained in 90s.    Pt may benefit from HHPT/OT evals to assess her function and safety in her environment but ultimately it appears she may need S upon d/c for safety. Current Level of Function Impacting Discharge (mobility/balance): S to CGA for OOB activity for safety cues and management of O2 and small spaces    Functional Outcome Measure: The patient scored 75/100 on the barthel outcome measure which is indicative of 75% functional impairment. Other factors to consider for discharge: pt lives alone, safety/attention to task/insight issues     Patient will benefit from skilled therapy intervention to address the above noted impairments. PLAN :  Recommendations and Planned Interventions: bed mobility training, transfer training, gait training, therapeutic exercises, patient and family training/education, and therapeutic activities      Frequency/Duration: Patient will be followed by physical therapy:  3 times a week to address goals. Recommendation for discharge: (in order for the patient to meet his/her long term goals)  Physical therapy at least 2 days/week in the home AND ensure assist and/or supervision for safety with mobility/gait    This discharge recommendation:  A follow-up discussion with the attending provider and/or case management is planned    IF patient discharges home will need the following DME: patient owns DME required for discharge         SUBJECTIVE:   Patient stated [facility] so far away.     OBJECTIVE DATA SUMMARY:   HISTORY:    Past Medical History:   Diagnosis Date    Adverse effect of anesthesia     combative waking up    Anxiety     Arthritis     Asthma     as of 12/30/16 pt states under control    Diverticulitis Dx 11/2016    Fibromyalgia     GERD (gastroesophageal reflux disease)     Glaucoma     Hiatal hernia     Hypercholesteremia     Hypertension     Ill-defined condition     neurapthy in ble    Nausea & vomiting      Past Surgical History:   Procedure Laterality Date    HX CATARACT REMOVAL Right 01/04/2017    HX HYSTERECTOMY      HX ROTATOR CUFF REPAIR Left 2008    HX TUBAL LIGATION DC COLONOSCOPY FLX DX W/COLLJ SPEC WHEN PFRMD  11/9/2011         DC EGD TRANSORAL BIOPSY SINGLE/MULTIPLE  11/9/2011         UPPER GI ENDOSCOPY,BIOPSY  8/20/2019            Personal factors and/or comorbidities impacting plan of care:     Home Situation  Home Environment: Apartment ([address]+ community)  # Steps to Enter: 0  One/Two Story Residence: One story  Living Alone: Yes  Support Systems: Child(kerry)  Patient Expects to be Discharged to[de-identified] Home  Current DME Used/Available at Home: Walker, rolling, Walker, rollator, Cane, straight, Grab bars  Tub or Shower Type: Tub/Shower combination (deep tub)    EXAMINATION/PRESENTATION/DECISION MAKING:   Critical Behavior:  Neurologic State: Alert  Orientation Level: Oriented X4  Cognition: Follows commands  Safety/Judgement: Decreased insight into deficits, Fall prevention, Decreased awareness of need for safety, Decreased awareness of need for assistance  Hearing:   Auditory  Auditory Impairment: None    Range Of Motion:  AROM: Within functional limits           PROM: Within functional limits           Strength:    Strength: Generally decreased, functional                    Tone & Sensation:   Tone: Normal              Sensation: Intact               Coordination:  Coordination: Within functional limits  Vision:   Acuity:  (WFL)  Corrective Lenses: Glasses (glacoma R eye; needs new glasses)  Functional Mobility:  Bed Mobility:  Rolling: Independent  Supine to Sit: Independent  Sit to Supine: Independent  Scooting: Supervision  Transfers:  Sit to Stand: Supervision  Stand to Sit: Supervision        Bed to Chair: Stand-by assistance              Balance:   Sitting: Intact  Standing: Impaired  Standing - Static: Fair  Standing - Dynamic : Fair  Ambulation/Gait Training:  Distance (ft): 30 Feet (ft)  Assistive Device: Gait belt;Walker, rolling  Ambulation - Level of Assistance: Stand-by assistance;Contact guard assistance        Gait Abnormalities: Decreased step clearance Base of Support: Narrowed     Speed/Aspen: Slow  Step Length: Right shortened;Left shortened          Functional Measure:  Barthel Index:    Bathin  Bladder: 10  Bowels: 10  Groomin  Dressin  Feeding: 10  Mobility: 10  Stairs: 5  Toilet Use: 5  Transfer (Bed to Chair and Back): 10  Total: 75/100       The Barthel ADL Index: Guidelines  1. The index should be used as a record of what a patient does, not as a record of what a patient could do. 2. The main aim is to establish degree of independence from any help, physical or verbal, however minor and for whatever reason. 3. The need for supervision renders the patient not independent. 4. A patient's performance should be established using the best available evidence. Asking the patient, friends/relatives and nurses are the usual sources, but direct observation and common sense are also important. However direct testing is not needed. 5. Usually the patient's performance over the preceding 24-48 hours is important, but occasionally longer periods will be relevant. 6. Middle categories imply that the patient supplies over 50 per cent of the effort. 7. Use of aids to be independent is allowed. Score Interpretation (from [address] [address])    Independent   60-79 Minimally independent   40-59 Partially dependent   20-39 Very dependent   <20 Totally dependent     -Aimee Eisenberg., Barthel, D.W. (1965). Functional evaluation: the Barthel Index. [address] [facility] ([address] [facility] [address] (1997). The Barthel activities of daily living index: self-reporting versus actual performance in the old (> or = 75 years). Journal of [address] [place] [address] 45(7), [address] [facility], VALENTINOAmbrocio, Lita Wilkins., [facility]. (1999). Measuring the change in disability after inpatient rehabilitation; comparison of the responsiveness of the Barthel Index and Functional [place] Measure.  Journal of Neurology, Neurosurgery, and Psychiatry, 66(9), 794-500. PREM Hill, DAVIN Her, & Abimael Donahue M.A. (2004) Assessment of post-stroke quality of life in cost-effectiveness studies: The usefulness of the Barthel Index and the EuroQoL-5D. Quality of Life Research, 15, 389-36           Physical Therapy Evaluation Charge Determination   History Examination Presentation Decision-Making   HIGH Complexity :3+ comorbidities / personal factors will impact the outcome/ POC  HIGH Complexity : 4+ Standardized tests and measures addressing body structure, function, activity limitation and / or participation in recreation  LOW Complexity : Stable, uncomplicated  LOW Complexity : FOTO score of       Based on the above components, the patient evaluation is determined to be of the following complexity level: LOW     Pain Rating:  No c/o    Activity Tolerance:   Fair    After treatment patient left in no apparent distress:   Supine in bed, Call bell within reach, and stretcher rails up    COMMUNICATION/EDUCATION:   The patients plan of care was discussed with: Occupational therapist and Registered nurse. Fall prevention education was provided and the patient/caregiver indicated understanding., Patient/family have participated as able in goal setting and plan of care. , and Patient/family agree to work toward stated goals and plan of care.     Thank you for this referral.  Karishma Stark, PT   Time Calculation: 27 mins

## 2023-01-24 NOTE — ED NOTES
This rn was notified by Adalgisa Storm that when Nelson went to obtain am labs, tech asked rn to come. This rn went to bedside to speak with pt regarding labs, pt first asked Qiancally Andre do you send the tech to get my labs that is the nurses job? \" This rn then educated pt that I was assisting another pt and in order to expedite pt care and lab results, the tech was asked to obtain labs as that is the ed tech scope of practice. Pt then states\"I need to see the orders and I need to know why these are ordered, \" pt then educated that she was treated yesterday for hypokalemia and her am labs were to check to see where her blood counts and electrolytes were. Pt then educated on the importance of these labs and the time it was ordered. Pt then states we can get the labs at 0700 when she sees the provider. Pt then again educated that the labs were ordered for 0400 in order to expedite pt care and results that can improve pt admission time. Pt then states she wants to see the doc and if refusing all am labs at this time, this rn to notify provider.

## 2023-01-24 NOTE — PROGRESS NOTES
Problem: Self Care Deficits Care Plan (Adult)  Goal: *Acute Goals and Plan of Care (Insert Text)  Description: FUNCTIONAL STATUS PRIOR TO ADMISSION: Patient was modified independent using a rolling walker for functional mobility. Pt lives alone in an apartment in [address] [place] [address]. She states she has a dtr who comes to help frequently with some IADLs. She was scattered in her report and initially said she did not use anything to amb then said a cane but then said a rolling walker. With tennis balls. No bathroom DME except grab bars; Has garden tub. Aware it is a fall risk. She says she did her own ADLs. She states she uses 1L of O2 at home at night and prn otherwise and then also when she goes out. She talks without stopping and needs cues to break her stream of conversation for any questioning. Reports she does not drive. Manages her meds and finances; need to clarify with daughter      HOME SUPPORT: The patient lived alone with local daughter to provide assistance. Occupational Therapy Goals  Initiated 1/24/2023  1. Patient will perform grooming in standing VSS with supervision/set-up within 7 day(s). 2.  Patient will perform bathing with supervision/set-up within 7 day(s). 3.  Patient will perform upper body dressing and lower body dressing with supervision/set-up within 7 day(s). 4.  Patient will perform toilet transfers with supervision/set-up within 7 day(s). 5.  Patient will perform all aspects of toileting with supervision/set-up within 7 day(s). 6.  Patient will participate in upper extremity therapeutic exercise/activities with supervision/set-up for 5 minutes within 7 day(s). 7.  Patient will utilize energy conservation, fall prevention, PLB techniques during functional activities with verbal cues within 7 day(s).    Outcome: Progressing Towards Goal   OCCUPATIONAL THERAPY EVALUATION  Patient: Veldon Aase (12 y.o. female)  Date: 1/24/2023  Primary Diagnosis: Acute respiratory failure with hypoxia ([facility] [address] [J96.01]       Precautions:   Fall, Bed Alarm (1L O2 PTA)    ASSESSMENT  Based on the objective data described below, the patient presents with general debility, admitted 1-23 with hypoxia, needing 4L O2 with EMS; uses 1L PTA PRN. Patient with variable presentation, CGA-S with decreased functional endurance and willingness to follow all instructions, to very irritable and refusing requests of staff. Patient is VERBOSE- but Ox3, recommend cog screen as she does live alone. Did not report pain this session and reports breathing has improved; VSS in supine, sitting and standing with 3 L O2 in use. Current Level of Function Impacting Discharge (ADLs/self-care): S-CGA self care, with increased time; CGA functional mobility with use of RW    Functional Outcome Measure: The patient scored Total: 75/100 on the Barthel Index outcome measure which is indicative of being minimally independent in basic self-care. Other factors to consider for discharge:   Vitals:    01/24/23 1121 01/24/23 1142 01/24/23 1200 01/24/23 1540   BP:  (!) 120/50  (!) 136/57   BP 1 Location:  Right upper arm     BP Patient Position:  Supine     Pulse:  84 85 92   Temp:    98.1 °F (36.7 °C)   Resp:    18   Height:       Weight:       SpO2: 95% 95%  93%         Patient will benefit from skilled therapy intervention to address the above noted impairments. PLAN :  Recommendations and Planned Interventions: self care training, functional mobility training, therapeutic exercise, balance training, therapeutic activities, cognitive retraining, endurance activities, patient education, home safety training, and family training/education    Frequency/Duration: Patient will be followed by occupational therapy 3 times a week to address goals.     Recommendation for discharge: (in order for the patient to meet his/her long term goals)  Occupational therapy at least 2 days/week in the home AND ensure assist and/or supervision for safety with IADLs and bathing    This discharge recommendation:  Has been made in collaboration with the attending provider and/or case management    IF patient discharges home will need the following DME: patient owns DME required for discharge       SUBJECTIVE:   Patient stated I want to get out of here.     OBJECTIVE DATA SUMMARY:   HISTORY:   Past Medical History:   Diagnosis Date    Adverse effect of anesthesia     combative waking up    Anxiety     Arthritis     Asthma     as of 12/30/16 pt states under control    Diverticulitis Dx 11/2016    Fibromyalgia     GERD (gastroesophageal reflux disease)     Glaucoma     Hiatal hernia     Hypercholesteremia     Hypertension     Ill-defined condition     neurapthy in ble    Nausea & vomiting      Past Surgical History:   Procedure Laterality Date    HX CATARACT REMOVAL Right 01/04/2017    HX HYSTERECTOMY      HX ROTATOR CUFF REPAIR Left 2008    HX TUBAL LIGATION      UT COLONOSCOPY FLX DX W/COLLJ SPEC WHEN PFRMD  11/9/2011         UT EGD TRANSORAL BIOPSY SINGLE/MULTIPLE  11/9/2011         UPPER GI ENDOSCOPY,BIOPSY  8/20/2019            Expanded or extensive additional review of patient history:     Home Situation  Home Environment: Apartment (+ [facility])  # Steps to Enter: 0  One/Two Story Residence: One story  Living Alone: Yes  Support Systems: Child(kerry)  Patient Expects to be Discharged to[de-identified] Home  Current DME Used/Available at Home: Justine Favia, rolling, Walker, rollator, Cane, straight, Grab bars  Tub or Shower Type: Tub/Shower combination (deep tub)    Hand dominance: Right    EXAMINATION OF PERFORMANCE DEFICITS:  Cognitive/Behavioral Status:  Neurologic State: Alert  Orientation Level: Oriented X4  Cognition: Follows commands        Safety/Judgement: Decreased insight into deficits; Fall prevention;Decreased awareness of need for safety;Decreased awareness of need for assistance    Skin: intact, R eye red @ rim/lid    Edema: none    Hearing:   Auditory  Auditory Impairment: None    Vision/Perceptual:                           Acuity:  (WFL)    Corrective Lenses: Glasses (glacoma R eye; needs new glasses)    Range of Motion:  B UE  AROM: Within functional limits  PROM: Within functional limits                      Strength:  B UE  Strength: Generally decreased, functional                Coordination:  Coordination: Within functional limits  Fine Motor Skills-Upper: Left Intact; Right Intact (able to manage fasteners)    Gross Motor Skills-Upper: Left Intact; Right Intact    Tone & Sensation:  B UE  Tone: Normal  Sensation: Intact                      Balance:  Sitting: Intact  Standing: Impaired  Standing - Static: Fair  Standing - Dynamic : Fair    Functional Mobility and Transfers for ADLs:  Bed Mobility:  Rolling: Independent  Supine to Sit: Independent  Sit to Supine: Independent  Scooting: Supervision    Transfers:  Sit to Stand: Supervision  Stand to Sit: Supervision  Bed to Chair: Stand-by assistance  Toilet Transfer : Stand-by assistance (inferred; recommend use of O2 while toileting)    ADL Assessment:  Feeding: Independent;Supervision (limited by behavior; refusing ADL tasks at times; recommend S for safety until noted to be consistently mod I)    Oral Facial Hygiene/Grooming: Stand-by assistance;Setup    Bathing: Stand-by assistance;Setup (simulated seated)         Upper Body Dressing: Stand-by assistance;Setup (limited today by lines)    Lower Body Dressing: Stand-by assistance    Toileting: Stand by assistance;Supervision                ADL Intervention and task modifications:        Patient instructed and indicated understanding the benefits of maintaining activity tolerance, functional mobility, and independence with self care tasks during acute stay  to ensure safe return home and to baseline.  Encouraged patient to increase frequency and duration OOB, be out of bed for all meals, perform daily ADLs (as approved by RN/MD regarding bathing etc), and performing functional mobility to/from bathroom. Initiated training of PLB, fall prevention yarelis w/bathroom urgency    Cognitive Retraining  Safety/Judgement: Decreased insight into deficits; Fall prevention;Decreased awareness of need for safety;Decreased awareness of need for assistance    Functional Measure:    Barthel Index:  Bathin  Bladder: 10  Bowels: 10  Groomin  Dressin  Feeding: 10  Mobility: 10  Stairs: 5  Toilet Use: 5  Transfer (Bed to Chair and Back): 10  Total: 75/100      The Barthel ADL Index: Guidelines  1. The index should be used as a record of what a patient does, not as a record of what a patient could do. 2. The main aim is to establish degree of independence from any help, physical or verbal, however minor and for whatever reason. 3. The need for supervision renders the patient not independent. 4. A patient's performance should be established using the best available evidence. Asking the patient, friends/relatives and nurses are the usual sources, but direct observation and common sense are also important. However direct testing is not needed. 5. Usually the patient's performance over the preceding 24-48 hours is important, but occasionally longer periods will be relevant. 6. Middle categories imply that the patient supplies over 50 per cent of the effort. 7. Use of aids to be independent is allowed. Score Interpretation (from [address] [address])    Independent   60-79 Minimally independent   40-59 Partially dependent   20-39 Very dependent   <20 Totally dependent     -Aimee Eisenberg., Barthel, D.W. (1965). Functional evaluation: the Barthel Index. [address] [facility] ([address] (). The Barthel activities of daily living index: self-reporting versus actual performance in the old (> or = 75 years). Journal of [address] [place] [address] 45(7), [facility], J.RANDAF, Augustus Mckenna., Carmen Méndez. (1999).  Measuring the change in disability after inpatient rehabilitation; comparison of the responsiveness of the Barthel Index and Functional [place] Measure. Journal of Neurology, Neurosurgery, and Psychiatry, 66(4), 077-455. EDWIN Hough.TOR, DAVIN Her, & Cornelia Purcell M.A. (2004) Assessment of post-stroke quality of life in cost-effectiveness studies: The usefulness of the Barthel Index and the EuroQoL-5D. Quality of Life Research, 15, 341-74     Occupational Therapy Evaluation Charge Determination   History Examination Decision-Making   LOW Complexity : Brief history review  MEDIUM Complexity : 3-5 performance deficits relating to physical, cognitive , or psychosocial skils that result in activity limitations and / or participation restrictions MEDIUM Complexity : Patient may present with comorbidities that affect occupational performnce. Miniml to moderate modification of tasks or assistance (eg, physical or verbal ) with assesment(s) is necessary to enable patient to complete evaluation       Based on the above components, the patient evaluation is determined to be of the following complexity level: LOW   Pain Rating:  No pain reported    Activity Tolerance:   Fair, desaturates with exertion and requires oxygen, requires frequent rest breaks, and observed SOB with activity    After treatment patient left in no apparent distress:    Call bell within reach and B rails up in ER stretcher,   non skid socks in place, phone in reach    COMMUNICATION/EDUCATION:   The patients plan of care was discussed with: Physical therapist, Registered nurse, and Case management. Home safety education was provided and the patient/caregiver indicated understanding., Patient/family have participated as able in goal setting and plan of care. , and Patient/family agree to work toward stated goals and plan of care. This patients plan of care is appropriate for delegation to [facility].     Thank you for this referral.  Rocio Asif OTR/L  Time Calculation: 28 mins

## 2023-01-24 NOTE — ED NOTES
Pt provided with warm TV dinner.  Pt assisted with opening snacks and sat up in bed for eating, pt door cracked per pt request, pt denies any further needs or wants at this time time, call bel in reach

## 2023-01-24 NOTE — ED NOTES
Pt also refusing meds that doc recommended, and states no one else is to touch her until she talks to a doc, provider notified

## 2023-01-24 NOTE — ED NOTES
Pt states she is feeling dizzy and faint, this rn at bedside to assess pt.  Pt just received neb tx and night time meds, pt vitals and bs stable, provider notified

## 2023-01-24 NOTE — PROGRESS NOTES
Pulmonary, Critical Care, and Sleep Medicine      Name: Stephanie Figueroa MRN: [id number]   : 1935 Hospital: [facility]   Date: 2023  Admission date: 2023 Hospital Day: 2       History:   2023: Patient still acutely ill. Now in the emergency room. She is spent the night in hospital bed. Nursing notes reviewed. Daughter now at bedside. Medical records and data reviewed. Remains on 4 L/min nasal cannula oxygen. At home she has 2 L/min. Anxious to go home. Says she has home health nursing there. Explained to them how it is important to make sure we ensure some degree of instability before discharging home. Her increasing oxygen needs is somewhat perplexing. Still has significant cough. Less wheeze on exam today. Our office has submitted paperwork to seek insurance coverage of biologic treatment for asthma. Notably on lab work her 17% eosinophils on 2023 has now almost resolved on blood work early this morning. Patient given 1 dose of IV 10 mg of Decadron. 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 She had some thick discolored sputum leading up to this admission. Does not feel like there is any congestion today. Explained the potential risks and side effects of systemic steroids. She has not tolerated prednisone, inhaled corticosteroids, methylprednisolone. Seems to have tolerated the IV Decadron given to the patient by EMS on route to the hospital.  Will give patient cough medicine. Encouraged her to comply with treatment although some of her concerns are fear of an allergic reaction which is reasonable. We will give low daily dose of Decadron and try to wean her oxygen.     Hospital Problems  Date Reviewed: 2019            Codes Class Noted POA    Acute respiratory failure with hypoxia [facility]) ICD-10-CM: J96.01  ICD-9-CM: 518.81  2023 Unknown         IMPRESSION:   Acute respiratory failure with Hypoxia   Severe persistent asthma with acute exacerbation: Awaiting insurance authorization for biologic therapy  Intolerant to many steroid formulations  Peripheral eosinophilia  Allergic Rhinitis on Singulair. Zenkers Diverticulum, Hiatal hernia patient encouraged to see GI for follow-up   hepatic Cysts. T12 compression  History of Takotsubo cardiomyopathy October 2022 treated by Dr. Annelise Vanegas  Body mass index is 19.49 kg/m². Prognosis guarded       RECOMMENDATIONS/PLAN:   Monitor for reaction to Decadron if none would continue Decadron daily   Will stay away from inhaled corticosteroids as she has had reactions to bees as well. Wean oxygen to baseline 2 L/min if possible. Mobilize  Antitussives  Bronchial hygiene with respiratory therapy techniques, bronchodilators  DVT prophylaxis  Prescription drug management with home med reconciliation reviewed          [x] High complexity decision making was performed  [x] See my orders for details      Subjective/Initial History:     I was asked by Paula Gonzalez MD to see Eva Zee  a 80 y.o.  female in consultation for a chief complaint of severe asthma    Excerpts from admission 1/23/2023 or consult notes as follows:     \"  Reports that \"my breathing has not being good since June\"  In between hospitalizations she has never fully recovered  Past few days has had increasing cough . Especially severe overnight \"it would not stop\" kept me up all night  Increasing shortness of breath especially the past 24 hours, even wearing her baseline oxygen. No fevers, sore throat, chest pain, nausea vomiting, diarrhea. Mildly generalized soreness in abdomen \"from coughing so much\"    Patient with diffuse myalgias. Found by rescue squad to be hypoxic. He required 4 L/min to treat her saturations of 85%. Given 10 mg IV Decadron. In the past the patient has been given IV Solu-Medrol and sent home with taper. Would develop rash on the medications.   Also intolerant of prednisone in the past.    Now in the emergency room on a stretcher. Wheezing is calm down. Has received nebulizer treatments. COVID test was negative. Influenza AMB negative. Patient admitted. Discussed with Dr. Corina Brannon. Chest x-ray reviewed. No obvious pneumonia or pulmonary edema. Elevated eosinophil count of 1000. Normal cardiac enzymes. Patient to have some sort of surgery on her right eye. Evidently glaucoma increasing. Was advised avoid all steroids. Ophthalmologist aware of her life-threatening asthma exacerbations.       Allergies   Allergen Reactions    Codeine Other (comments)    Cymbalta [Duloxetine] Nausea Only and Other (comments)     \"within the hour I had a severe headache, and vomiting\"      Demerol [Meperidine] Nausea and Vomiting    Lidocaine Other (comments)    Morphine Nausea and Vomiting    Novocain [Procaine] Other (comments)    Penicillins Other (comments)     rash    Percocet [Oxycodone-Acetaminophen] Nausea and Vomiting    Prednisone Rash    Sulfa (Sulfonamide Antibiotics) Other (comments)        MAR reviewed and pertinent medications noted or modified as needed       Current Facility-Administered Medications   Medication    dexAMETHasone (DECADRON) tablet 4 mg    guaiFENesin-dextromethorphan (ROBITUSSIN DM) 100-10 mg/5 mL syrup 10 mL    bimatoprost (LUMIGAN) 0.01 % ophthalmic drops 1 Drop (Patient Supplied)    apraclonidine (IOPIDINE) 0.5 % ophthalmic solution 1 Drop (Patient Supplied)    sodium chloride (NS) flush 5-40 mL    sodium chloride (NS) flush 5-40 mL    acetaminophen (TYLENOL) tablet 650 mg    Or    acetaminophen (TYLENOL) suppository 650 mg    polyethylene glycol (MIRALAX) packet 17 g    promethazine (PHENERGAN) tablet 12.5 mg    Or    ondansetron (ZOFRAN) injection 4 mg    enoxaparin (LOVENOX) injection 40 mg    bisacodyL (DULCOLAX) suppository 10 mg    hydrALAZINE (APRESOLINE) 20 mg/mL injection 20 mg    melatonin tablet 3 mg    albuterol-ipratropium (DUO-NEB) 2.5 MG-0.5 MG/3 ML    albuterol (PROVENTIL VENTOLIN) nebulizer solution 2.5 mg    aspirin chewable tablet 81 mg    dorzolamide (TRUSOPT) 2 % ophthalmic solution 1 Drop (Patient Supplied)    indapamide (LOZOL) tablet 1.25 mg    pantoprazole (PROTONIX) tablet 40 mg    metoprolol tartrate (LOPRESSOR) tablet 25 mg    montelukast (SINGULAIR) tablet 10 mg    LORazepam (ATIVAN) tablet 0.5 mg    brimonidine (ALPHAGAN) 0.2 % ophthalmic solution 1 Drop (Patient Supplied)     Current Outpatient Medications   Medication Sig    aspirin 81 mg chewable tablet Take 81 mg by mouth daily. Indications: treatment to prevent a heart attack    apraclonidine (IOPIDINE) 0.5 % ophthalmic solution Administer 1 Drop to right eye every eight (8) hours. Indications: wide-angle glaucoma    bimatoprost (LUMIGAN) 0.01 % ophthalmic drops Administer 1 Drop to right eye nightly. OXYGEN-AIR DELIVERY SYSTEMS 2 L/min by IntraNASal route as needed (shortness of breath). Indications: Shortness of breath    albuterol (PROVENTIL VENTOLIN) 2.5 mg /3 mL (0.083 %) nebu 2.5 mg by Nebulization route two (2) times a day. Indications: bronchospasm prevention    albuterol (PROVENTIL HFA, VENTOLIN HFA, PROAIR HFA) 90 mcg/actuation inhaler Take 1 Puff by inhalation as needed for Wheezing. (Patient taking differently: Take 1 Puff by inhalation every four (4) hours as needed for Shortness of Breath or Wheezing.)    benzonatate (TESSALON) 100 mg capsule TAKE 1 CAPSULE BY MOUTH THREE TIMES A DAY AS NEEDED FOR COUGH (Patient not taking: Reported on 1/5/2023)    acetaminophen (TYLENOL) 325 mg tablet Take 2 Tablets by mouth every six (6) hours as needed for Pain or Fever. guaiFENesin ER (MUCINEX) 600 mg ER tablet Take 1 Tablet by mouth every twelve (12) hours. melatonin 3 mg tablet Take 1 Tablet by mouth nightly as needed for Insomnia.  (Patient not taking: Reported on 1/5/2023)    methylPREDNISolone (MEDROL DOSEPACK) 4 mg tablet Use as directed (Patient not taking: Reported on 1/5/2023)    ondansetron hcl (Zofran) 4 mg tablet Take 1 Tablet by mouth every eight (8) hours as needed for Nausea. (Patient not taking: Reported on 1/5/2023)    brimonidine 0.025 % drop Apply 1 Drop to eye two (2) times a day. Right eye    dorzolamide (TRUSOPT) 2 % ophthalmic solution Administer 1 Drop to right eye three (3) times daily. metoprolol tartrate (LOPRESSOR) 25 mg tablet Take 25 mg by mouth daily. indapamide (LOZOL) 1.25 mg tablet Take 1.25 mg by mouth daily. montelukast (SINGULAIR) 10 mg tablet Take 10 mg by mouth daily. pantoprazole (PROTONIX) 40 mg tablet Take 40 mg by mouth daily. multivitamin (ONE A DAY) tablet Take 1 Tab by mouth daily. lorazepam (ATIVAN) 0.5 mg tablet Take 0.5 mg by mouth nightly. Patient PCP: Brooklyn Lynn MD  PMH:  has a past medical history of Adverse effect of anesthesia, Anxiety, Arthritis, Asthma, Diverticulitis (Dx 11/2016), Fibromyalgia, GERD (gastroesophageal reflux disease), Glaucoma, Hiatal hernia, Hypercholesteremia, Hypertension, Ill-defined condition, and Nausea & vomiting. PSH:   has a past surgical history that includes pr egd transoral biopsy single/multiple (11/9/2011); pr colonoscopy flx dx w/collj spec when pfrmd (11/9/2011); hx tubal ligation; hx hysterectomy; hx rotator cuff repair (Left, 2008); hx cataract removal (Right, 01/04/2017); and upper gi endoscopy,biopsy (8/20/2019). FHX: family history is not on file. SHX:  reports that she has never smoked. She has never used smokeless tobacco. She reports that she does not drink alcohol and does not use drugs. ROS:A comprehensive review of systems was negative except for that written in the HPI.     Objective:     Vital Signs: Telemetry:     Intake/Output:   Visit Vitals  BP (!) (P) 120/50 (BP 1 Location: Right upper arm, BP Patient Position: Supine)   Pulse (P) 84   Temp 98.2 °F (36.8 °C)   Resp 23   Ht 5' 3\" (1.6 m)   Wt 49.9 kg (110 lb)   SpO2 (P) 95%   BMI 19.49 kg/m²       Temp (24hrs), Av.3 °F (36.8 °C), Min:98.2 °F (36.8 °C), Max:98.4 °F (36.9 °C)        O2 Device: (P) Nasal cannula O2 Flow Rate (L/min): 2 l/min       Wt Readings from Last 4 Encounters:   23 49.9 kg (110 lb)   23 47.2 kg (104 lb)   22 59.6 kg (131 lb 4.8 oz)   10/17/22 55.3 kg (122 lb)        No intake or output data in the 24 hours ending 23 1242    Last shift:      No intake/output data recorded. Last 3 shifts: No intake/output data recorded. Physical Exam:   General:  female; alert, oriented times 3, cooperative, and moderately ill;  NC;  HEENT: NCAT,  dentition, lips and mucosa dry  Eyes: anicteric; conjunctiva clear  Neck: no nodes, no accessory MM use. Chest: no deformity,   Cardiac: regular rate, rhythm; no murmur  Lungs: distant breath sounds; few wheezes, no rales, no rhonchi  Abd: soft, NT, hypoactive BS, ,  Ext: no edema; no joint swelling; No clubbing  : No voss,   Neuro: fluent,  follows commands; generalized weakness  Psych- no agitation, oriented to person;   Skin: warm, dry, no cyanosis;   Pulses: 1-2+ Bilateral pedal, radial  Capillary: brisk; pale    Labs:    Recent Labs     23  0226 23  0744   WBC 7.1 6.1   HGB 11.8 12.0    259       Recent Labs     23  0226 23  0744    141   K 4.2 2.6*    108   CO2 26 24   GLU 96 86   BUN 12 9   CREA 0.81 0.70   CA 9.5 7.9*   ALB 3.5 3.0*   ALT 22 22       No results for input(s): PH, PCO2, PO2, HCO3, FIO2 in the last 72 hours. No results for input(s): CPK, CKNDX, TROIQ in the last 72 hours.     No lab exists for component: CPKMB  No results found for: BNPP, BNP   Lab Results   Component Value Date/Time    Culture result: NO GROWTH 6 DAYS 10/17/2022 09:30 AM    Culture result: MIXED UROGENITAL LOU ISOLATED 2017 02:45 PM    Culture result: PROTEUS MIRABILIS 2011 11:30 AM     Lab Results   Component Value Date/Time    TSH 1.80 2018 08:38 PM Imaging:    CXR Results  (Last 48 hours)                 01/23/23 0843  XR CHEST PA LAT Final result    Impression:      No acute abnormality. Narrative:  EXAM: XR CHEST PA LAT       INDICATION: Shortness of breath       COMPARISON: 12/24/2022       TECHNIQUE: PA and lateral chest views       FINDINGS: The cardiac size is within normal limits. The pulmonary vasculature is   within normal limits. The lungs and pleural spaces are clear. Levoconvex scoliosis of the thoracic   spine is noted. Results from Hospital Encounter encounter on 01/23/23    XR CHEST PA LAT    Narrative  EXAM: XR CHEST PA LAT    INDICATION: Shortness of breath    COMPARISON: 12/24/2022    TECHNIQUE: PA and lateral chest views    FINDINGS: The cardiac size is within normal limits. The pulmonary vasculature is  within normal limits. The lungs and pleural spaces are clear. Levoconvex scoliosis of the thoracic  spine is noted. Impression  No acute abnormality. Results from [place] encounter on 12/24/22    XR CHEST PORT    Narrative  EXAM: XR CHEST PORT    DATE: 12/24/2022 6:06 PM    INDICATION: Shortness of breath    COMPARISON: Chest October 17, 2022    FINDINGS: AP portable chest radiograph. The heart size is normal. Lungs are  well-inflated. Mild opacity along the LEFT hemidiaphragm favors atelectasis. No  consolidation is seen. The vascular clarity is normal. There is no evidence of  effusion or pneumothorax. Impression  No acute cardiopulmonary findings. Results from [place] encounter on 10/17/22    XR CHEST PORT    Narrative  PORTABLE CHEST RADIOGRAPH/S: 10/17/2022 8:20 AM    INDICATION: Dyspnea. COMPARISON: 8/22/2022, CT 9/29/2022. TECHNIQUE: Portable frontal upright radiograph/s of the chest.    FINDINGS:  The lungs are clear. The central airways are patent. No pneumothorax or pleural  effusion. There is a moderate hiatal hernia.     Impression  Clear lungs. Results from [place] encounter on 12/24/22    CTA CHEST W OR W WO CONT    Narrative  EXAM: CTA CHEST W OR W WO CONT    INDICATION: Hypoxia. Evaluation for pulmonary embolism    COMPARISON: 9/29/2022. CONTRAST: 100 mL of Isovue-370. TECHNIQUE:  Precontrast  images were obtained to localize the volume for acquisition. Multislice helical CT arteriography was performed from the diaphragm to the  thoracic inlet during uneventful rapid bolus intravenous contrast  administration. Lung and soft tissue windows were generated. Coronal and  sagittal images were generated and 3D post processing consisting of coronal  maximum intensity images was performed. CT dose reduction was achieved through  use of a standardized protocol tailored for this examination and automatic  exposure control for dose modulation. FINDINGS:  Few ill defined subcentimeter opacities in the periphery of the right upper lobe  (image 66). Zenker's diverticulum. The pulmonary arteries are well enhanced and no pulmonary emboli are identified. There is no mediastinal or hilar adenopathy or mass. The aorta enhances normally  without evidence of aneurysm or dissection. The visualized portions of the upper abdominal organs are remarkable for a  hiatal hernia as well as stable left hepatic hypodensities, previously  determined to represent cysts. Mild superior endplate S83 wedge deformity, stable. Impression  Right upper lobe pneumonia versus alveolitis. No pulmonary embolism  Mild stable T12 wedge deformity. Recommend DXA      Please note that this dictation was completed with AMEE, the computer voice recognition software. Quite often unanticipated grammatical, syntax, homophones, and other interpretive errors are inadvertently transcribed by the computer software. Please disregard these errors.   Please excuse any errors that have escaped final proofreading  ______________________________________________________________________      Thank you for allowing us to participate in the care of this patient.       This care involved high complexity medical decision making: I personally:  Reviewed the flowsheet and previous days notes  Reviewed and summarized records or history from previous days note or discussions with staff, family  High Risk Drug therapy requiring intensive monitoring for toxicity: eg steroids, antibiotics  Reviewed and/or ordered Clinical lab tests  Reviewed images and/or ordered Radiology tests    discussed my assessment/management with : Nursing, for coordination of care      Aureliano Sanz MD

## 2023-01-24 NOTE — ED NOTES
Pt refusing to take iv benadryl and prn hydralzine, pt demanding she take her own meds. This rn removed pt personal meds as pt is threatening to take them and wont listen to this rn when it comes to pt not taking her own meds and she has to take her meds here [facility]  controlled enviroment.  Pharmacist notified and this rn to take meds to pharmacist

## 2023-01-24 NOTE — ED NOTES
Provider notified of red rash on pt cheeks and face, no new orders given.  Per provider will watch pt and ensure an allergic reaction isnt occurring

## 2023-01-25 LAB
ANION GAP SERPL CALC-SCNC: 7 MMOL/L (ref 5–15)
BUN SERPL-MCNC: 13 MG/DL (ref 6–20)
BUN/CREAT SERPL: 15 (ref 12–20)
CALCIUM SERPL-MCNC: 9.4 MG/DL (ref 8.5–10.1)
CHLORIDE SERPL-SCNC: 104 MMOL/L (ref 97–108)
CO2 SERPL-SCNC: 26 MMOL/L (ref 21–32)
CREAT SERPL-MCNC: 0.86 MG/DL (ref 0.55–1.02)
GLUCOSE SERPL-MCNC: 113 MG/DL (ref 65–100)
POTASSIUM SERPL-SCNC: 4 MMOL/L (ref 3.5–5.1)
SODIUM SERPL-SCNC: 137 MMOL/L (ref 136–145)

## 2023-01-25 PROCEDURE — 74011000250 HC RX REV CODE- 250: Performed by: INTERNAL MEDICINE

## 2023-01-25 PROCEDURE — 74011250637 HC RX REV CODE- 250/637: Performed by: INTERNAL MEDICINE

## 2023-01-25 PROCEDURE — 74011250637 HC RX REV CODE- 250/637: Performed by: NURSE PRACTITIONER

## 2023-01-25 PROCEDURE — 94761 N-INVAS EAR/PLS OXIMETRY MLT: CPT

## 2023-01-25 PROCEDURE — 36415 COLL VENOUS BLD VENIPUNCTURE: CPT

## 2023-01-25 PROCEDURE — 94640 AIRWAY INHALATION TREATMENT: CPT

## 2023-01-25 PROCEDURE — 94760 N-INVAS EAR/PLS OXIMETRY 1: CPT

## 2023-01-25 PROCEDURE — 77010033678 HC OXYGEN DAILY

## 2023-01-25 PROCEDURE — 74011250636 HC RX REV CODE- 250/636: Performed by: INTERNAL MEDICINE

## 2023-01-25 PROCEDURE — 80048 BASIC METABOLIC PNL TOTAL CA: CPT

## 2023-01-25 PROCEDURE — 65270000046 HC RM TELEMETRY

## 2023-01-25 RX ADMIN — GUAIFENESIN AND DEXTROMETHORPHAN 10 ML: 100; 10 SYRUP ORAL at 10:36

## 2023-01-25 RX ADMIN — DEXAMETHASONE 4 MG: 4 TABLET ORAL at 10:35

## 2023-01-25 RX ADMIN — LORAZEPAM 0.5 MG: 0.5 TABLET ORAL at 18:43

## 2023-01-25 RX ADMIN — MONTELUKAST 10 MG: 10 TABLET, FILM COATED ORAL at 10:35

## 2023-01-25 RX ADMIN — METOPROLOL TARTRATE 25 MG: 25 TABLET, FILM COATED ORAL at 10:35

## 2023-01-25 RX ADMIN — APRACLONIDINE HYDROCHLORIDE 1 DROP: 5 SOLUTION/ DROPS OPHTHALMIC at 10:43

## 2023-01-25 RX ADMIN — DORZOLAMIDE HYDROCHLORIDE 1 DROP: 20 SOLUTION/ DROPS OPHTHALMIC at 10:42

## 2023-01-25 RX ADMIN — POLYETHYLENE GLYCOL 3350 17 G: 17 POWDER, FOR SOLUTION ORAL at 10:33

## 2023-01-25 RX ADMIN — IPRATROPIUM BROMIDE AND ALBUTEROL SULFATE 3 ML: 2.5; .5 SOLUTION RESPIRATORY (INHALATION) at 16:40

## 2023-01-25 RX ADMIN — DORZOLAMIDE HYDROCHLORIDE 1 DROP: 20 SOLUTION/ DROPS OPHTHALMIC at 21:38

## 2023-01-25 RX ADMIN — IPRATROPIUM BROMIDE AND ALBUTEROL SULFATE 3 ML: 2.5; .5 SOLUTION RESPIRATORY (INHALATION) at 07:30

## 2023-01-25 RX ADMIN — DORZOLAMIDE HYDROCHLORIDE 1 DROP: 20 SOLUTION/ DROPS OPHTHALMIC at 16:16

## 2023-01-25 RX ADMIN — SODIUM CHLORIDE, PRESERVATIVE FREE 10 ML: 5 INJECTION INTRAVENOUS at 05:34

## 2023-01-25 RX ADMIN — SODIUM CHLORIDE, PRESERVATIVE FREE 10 ML: 5 INJECTION INTRAVENOUS at 21:40

## 2023-01-25 RX ADMIN — ASPIRIN 81 MG: 81 TABLET, CHEWABLE ORAL at 10:35

## 2023-01-25 RX ADMIN — SODIUM CHLORIDE, PRESERVATIVE FREE 10 ML: 5 INJECTION INTRAVENOUS at 01:34

## 2023-01-25 RX ADMIN — GUAIFENESIN AND DEXTROMETHORPHAN 10 ML: 100; 10 SYRUP ORAL at 18:43

## 2023-01-25 RX ADMIN — INDAPAMIDE 1.25 MG: 2.5 TABLET, FILM COATED ORAL at 10:35

## 2023-01-25 RX ADMIN — BRIMONIDINE TARTRATE 1 DROP: 2 SOLUTION OPHTHALMIC at 18:45

## 2023-01-25 RX ADMIN — BRIMONIDINE TARTRATE 1 DROP: 2 SOLUTION OPHTHALMIC at 10:42

## 2023-01-25 RX ADMIN — IPRATROPIUM BROMIDE AND ALBUTEROL SULFATE 3 ML: 2.5; .5 SOLUTION RESPIRATORY (INHALATION) at 20:29

## 2023-01-25 RX ADMIN — IPRATROPIUM BROMIDE AND ALBUTEROL SULFATE 3 ML: 2.5; .5 SOLUTION RESPIRATORY (INHALATION) at 13:50

## 2023-01-25 RX ADMIN — GUAIFENESIN AND DEXTROMETHORPHAN 10 ML: 100; 10 SYRUP ORAL at 14:01

## 2023-01-25 RX ADMIN — APRACLONIDINE HYDROCHLORIDE 1 DROP: 5 SOLUTION/ DROPS OPHTHALMIC at 16:16

## 2023-01-25 RX ADMIN — SODIUM CHLORIDE, PRESERVATIVE FREE 10 ML: 5 INJECTION INTRAVENOUS at 14:01

## 2023-01-25 RX ADMIN — GUAIFENESIN AND DEXTROMETHORPHAN 10 ML: 100; 10 SYRUP ORAL at 21:38

## 2023-01-25 RX ADMIN — PANTOPRAZOLE SODIUM 40 MG: 40 TABLET, DELAYED RELEASE ORAL at 10:35

## 2023-01-25 RX ADMIN — LORAZEPAM 0.5 MG: 0.5 TABLET ORAL at 10:35

## 2023-01-25 NOTE — PROGRESS NOTES
End of Shift Note    Bedside shift change report given to Myra Scott (oncoming nurse) by Fernando Bell RN (offgoing nurse). Report included the following information SBAR, Kardex, and MAR    Shift worked:  7a - 7:30p     Shift summary and any significant changes:     Pt admitted to [facility] [id number] from ED     Pt's daughter present at bedside     Pt denied having pain     Pt quite anxious. Scheduled ativan given; it doesn't appear to help very much. Spoke w/ pharmacy. Pt has ativan prescription that she brought from home. Verified by pharmacy that her prescription is being held in the pharmacy until pt discharges, that pt is not to take her own home ativan prescription while here but rather take the ativan supplied by oncology pyxis. Pt on 4L O2 via NC. Pt reports normally being on room air during the day and only using 2L O2 at night while she's sleeping. Concerns for physician to address:       Zone phone for oncoming shift:   [phone]       Activity:  Activity Level: Up with Assistance  Number times ambulated in hallways past shift: 0  Number of times OOB to chair past shift: 2    Cardiac:   Cardiac Monitoring: No      Cardiac Rhythm: Sinus Rhythm    Access:  Current line(s): PIV     Genitourinary:   Urinary status: voiding    Respiratory:   O2 Device: Nasal cannula  Chronic home O2 use?: YES  Incentive spirometer at bedside: NO       GI:  Last Bowel Movement Date: 01/23/23  Current diet:  ADULT DIET Regular; Low Fat/Low Chol/High Fiber/MAGGIE  Passing flatus: YES  Tolerating current diet: YES       Pain Management:   Patient states pain is manageable on current regimen: YES    Skin:  Isidoro Score: 19  Interventions: speciality bed, float heels, increase time out of bed, PT/OT consult, and nutritional support     Patient Safety:  Fall Score:  Total Score: 2  Interventions: bed/chair alarm, assistive device (walker, cane, etc), gripper socks, pt to call before getting OOB, stay with me (per policy), and gait belt       Length of Stay:  Expected LOS: - - -  Actual LOS: 1      Jarred Fallon RN

## 2023-01-25 NOTE — H&P
Hospitalist Progress Note    NAME: Scotty Cantu   :  1935   MRN:  [id number]     Admit date: 2023    Today's date: 23    PCP: Imelda Narvaez MD      Anticipated discharge date: 2023    Barriers:  clinical improvement    Assessment / Plan:    Acute on chronic respiratory failure with hypoxia  POA  Asthma with acute exacerbation POA  Eosinophilia POA elevated 1000 to 1500 range last 3 admits  Presents with several day increasing cough and SOB              PCP start azithromycin, completed 2 days  Known asthma with Dr Nadine Bravo wears 1liter O2 NC during day and 2 liters at night  Now hypoxic on 2 liters when EMS came 85%, wheezing  pCXR reviewed, no ASD  O2 currently 4 liters  Subjectively less SOB  Nebs, montelukast  Trouble tolerating steroids                          1. Glaucoma, risk of  increased eye pressure                          2. Reaction to an unknown steroid last admit  IV decadron reportedly given with EMS  Seems to be tolerating dexamethasone  Pulmonary consult Dr Montoya Free following  Procalcitonin < 0.05  PT consult     Hypokalemia K 2.6 --> 4.2  PO KCl  resolved        Essential hypertension POA  Dyslipidemia POA  Probable Takotsubo cardiomyopathy 10/2022 LVEF 40%  Continue home metoprolol, indapamide  PRN hydralazine  Continue lipitor and ASA  Admit 10/2022 elevated HS troponin 4390  Last heart cath 10/18/22 with Dr Amisha Quiñones showed:   1. No angiographic epicardial coronary disease  2. Elevated LVEDP  3. Mildly to moderately reduced LVEF 40%  4. Likely TakoTsubo cardiomyopathy  5. Mild MR     Glaucoma POA  Continue home eyedrops  Concerned about using steroids as it may increase her pressure     Anxiety POA  Continue home Ativan     Code Status: Full code      Body mass index is 19.49 kg/m². Given the patient's current clinical presentation, I have a high level of concern for decompensation if discharged from the emergency department.   My assessment of this patient's clinical condition and my plan of care is as noted above. DVT prophylaxis with lovenox     Code status: Full code  NOK:    Body mass index is 19.49 kg/m². Code status: Full  Prophylaxis: Lovenox  Recommended Disposition: Home w/Family    Subjective:     Chief Complaint / Reason for Physician Visit  \"My breathing feels more comfortable\". Discussed with RN events overnight. Remains on 4 liters NC, less SOB  Tolerating the decadron  No cough or CP or HA    Review of Systems:  Symptom Y/N Comments  Symptom Y/N Comments   Fever/Chills n   Chest Pain n    Poor Appetite    Edema     Cough y less  Abdominal Pain n    Sputum    Joint Pain     SOB/GALAN y less  Headache     Nausea/vomit n   Tolerating PT/OT     Diarrhea    Tolerating Diet y    Constipation    Other       Could NOT obtain due to:      Objective:     VITALS:   Last 24hrs VS reviewed since prior progress note.  Most recent are:  Patient Vitals for the past 24 hrs:   Temp Pulse Resp BP SpO2   01/24/23 2056 (!) 95.4 °F (35.2 °C) 84 20 132/67 94 %   01/24/23 2032 -- -- -- -- 96 %   01/24/23 1540 98.1 °F (36.7 °C) 92 18 (!) 136/57 93 %   01/24/23 1200 -- 85 -- -- --   01/24/23 1142 -- 84 -- (!) 120/50 95 %   01/24/23 1121 -- -- -- -- 95 %   01/24/23 0902 -- -- -- -- 93 %   01/24/23 0800 -- (!) 116 -- -- --   01/24/23 0742 -- 81 23 (!) 146/80 97 %   01/24/23 0658 -- (!) 109 27 -- 94 %   01/24/23 0519 -- -- -- -- 93 %   01/24/23 0511 -- (!) 113 25 (!) 165/77 91 %   01/24/23 0445 -- -- -- -- 90 %   01/24/23 0438 -- 94 18 (!) 183/112 95 %   01/24/23 0437 -- 99 17 (!) 171/148 91 %   01/24/23 0356 -- 82 19 -- 96 %     No intake or output data in the 24 hours ending 01/24/23 2310     Wt Readings from Last 12 Encounters:   01/23/23 49.9 kg (110 lb)   01/12/23 47.2 kg (104 lb)   12/26/22 59.6 kg (131 lb 4.8 oz)   10/17/22 55.3 kg (122 lb)   08/01/22 55.3 kg (122 lb)   03/23/22 61.4 kg (135 lb 6.4 oz)   05/06/21 62.8 kg (138 lb 7.2 oz) 04/27/21 62.6 kg (138 lb)   06/11/20 62.6 kg (138 lb)   08/20/19 63.6 kg (140 lb 3 oz)   06/08/19 63.3 kg (139 lb 8.8 oz)   05/14/18 64.9 kg (143 lb)       PHYSICAL EXAM:    I had a face to face encounter and independently examined this patient on 01/24/23 as outlined below:    General: WD, WN. Alert, cooperative, no acute distress    EENT:  PERRL. Anicteric sclerae. MMM  Resp:  Decreased BS bilaterally, no wheezing or rales. No accessory muscle use  CV:  Regular  rhythm,  No edema  GI:  Soft, Non distended, Non tender. +Bowel sounds, no rebound  Neurologic:  Alert and oriented X 3, normal speech, non focal motor exam  Psych:   Good insight. Not anxious nor agitated  Skin:  No rashes. No jaundice    Reviewed most current lab test results and cultures  YES  Reviewed most current radiology test results   YES  Review and summation of old records today    NO  Reviewed patient's current orders and MAR    YES  PMH/SH reviewed - no change compared to H&P  ________________________________________________________________________  Care Plan discussed with:    Comments   Patient x    Family      RN x    Care Manager     Consultant                        Multidiciplinary team rounds were held today with , nursing, pharmacist and clinical coordinator. Patient's plan of care was discussed; medications were reviewed and discharge planning was addressed. ________________________________________________________________________      Comments   >50% of visit spent in counseling and coordination of care     ________________________________________________________________________  Adrien Aparicio MD     Procedures: see electronic medical records for all procedures/Xrays and details which were not copied into this note but were reviewed prior to creation of Plan. LABS:  I reviewed today's most current labs and imaging studies.   Pertinent labs include:  Recent Labs     01/24/23  0226 01/23/23  0744   WBC 7.1 6.1   HGB 11.8 12.0   HCT 35.3 36.0    259     Recent Labs     01/24/23  0226 01/23/23  0744    141   K 4.2 2.6*    108   CO2 26 24   GLU 96 86   BUN 12 9   CREA 0.81 0.70   CA 9.5 7.9*   ALB 3.5 3.0*   TBILI 0.5 0.5   ALT 22 22

## 2023-01-25 NOTE — PROGRESS NOTES
Pulmonary, Critical Care, and Sleep Medicine      Name: Mague Robles MRN: [id number]   : 1935 Hospital: [facility]   Date: 2023  Admission date: 2023 Hospital Day: 3       History:   2023: Patient still acutely ill. Now in the emergency room. She is spent the night in hospital bed. Nursing notes reviewed. Daughter now at bedside. Medical records and data reviewed. Remains on 4 L/min nasal cannula oxygen. At home she has 2 L/min. Anxious to go home. Says she has home health nursing there. Explained to them how it is important to make sure we ensure some degree of instability before discharging home. Her increasing oxygen needs is somewhat perplexing. Still has significant cough. Less wheeze on exam today. Our office has submitted paperwork to seek insurance coverage of biologic treatment for asthma. Notably on lab work her 17% eosinophils on 2023 has now almost resolved on blood work early this morning. Patient given 1 dose of IV 10 mg of Decadron. 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 She had some thick discolored sputum leading up to this admission. Does not feel like there is any congestion today. Explained the potential risks and side effects of systemic steroids. She has not tolerated prednisone, inhaled corticosteroids, methylprednisolone. Seems to have tolerated the IV Decadron given to the patient by EMS on route to the hospital.  Will give patient cough medicine. Encouraged her to comply with treatment although some of her concerns are fear of an allergic reaction which is reasonable. We will give low daily dose of Decadron and try to wean her oxygen. 2023: Patient now in room. Bed alarms in place. Patient somewhat fidgety. Coughing up pale yellow sputum. Feels better. Still with wheeze on examination. Less congested. No fever. On 4 mg Decadron per day. Has nebulizers at home.   No rash.  Spoke with my office. We are trying to collect samples of benralizumab to initiate treatment week after discharge. Currently on 3 and half liters per minute. Normally on 2 L at home. Hospital Problems  Date Reviewed: 8/20/2019            Codes Class Noted POA    Acute respiratory failure with hypoxia [facility]) ICD-10-CM: J96.01  ICD-9-CM: 518.81  1/23/2023 Unknown         IMPRESSION:   Acute respiratory failure with Hypoxia   Severe persistent asthma with acute exacerbation: Awaiting insurance authorization for biologic therapy  Intolerant to many steroid formulations  Peripheral eosinophilia  Allergic Rhinitis on Singulair. Zenkers Diverticulum, Hiatal hernia patient encouraged to see GI for follow-up   hepatic Cysts. T12 compression  History of Takotsubo cardiomyopathy October 2022 treated by Dr. Mee Parks  Body mass index is 19.49 kg/m². Prognosis guarded       RECOMMENDATIONS/PLAN:   Monitor for reaction to Decadron if none would continue Decadron daily  Continue Decadron. Eventually would send home on taper over the course of 7 to 10 days  Will stay away from inhaled corticosteroids as she has had reactions to these as well. Wean oxygen to baseline 2 L/min if possible. Mobilize with O2 challenge before discharge  Discharge planning  Antitussives  Bronchial hygiene with respiratory therapy techniques, bronchodilators  DVT prophylaxis  Prescription drug management with home med reconciliation reviewed          [x] High complexity decision making was performed  [x] See my orders for details      Subjective/Initial History:     I was asked by Marina Mcclure MD to see Terri Kumar  a 80 y.o.  female in consultation for a chief complaint of severe asthma    Excerpts from admission 1/23/2023 or consult notes as follows:     \"  Reports that \"my breathing has not being good since June\"  In between hospitalizations she has never fully recovered  Past few days has had increasing cough . Especially severe overnight \"it would not stop\" kept me up all night  Increasing shortness of breath especially the past 24 hours, even wearing her baseline oxygen. No fevers, sore throat, chest pain, nausea vomiting, diarrhea. Mildly generalized soreness in abdomen \"from coughing so much\"    Patient with diffuse myalgias. Found by rescue squad to be hypoxic. He required 4 L/min to treat her saturations of 85%. Given 10 mg IV Decadron. In the past the patient has been given IV Solu-Medrol and sent home with taper. Would develop rash on the medications. Also intolerant of prednisone in the past.    Now in the emergency room on a stretcher. Wheezing is calm down. Has received nebulizer treatments. COVID test was negative. Influenza AMB negative. Patient admitted. Discussed with Dr. Rodrick Koehler. Chest x-ray reviewed. No obvious pneumonia or pulmonary edema. Elevated eosinophil count of 1000. Normal cardiac enzymes. Patient to have some sort of surgery on her right eye. Evidently glaucoma increasing. Was advised avoid all steroids. Ophthalmologist aware of her life-threatening asthma exacerbations.       Allergies   Allergen Reactions    Codeine Other (comments)    Cymbalta [Duloxetine] Nausea Only and Other (comments)     \"within the hour I had a severe headache, and vomiting\"      Demerol [Meperidine] Nausea and Vomiting    Lidocaine Other (comments)    Morphine Nausea and Vomiting    Novocain [Procaine] Other (comments)    Penicillins Other (comments)     rash    Percocet [Oxycodone-Acetaminophen] Nausea and Vomiting    Prednisone Rash    Sulfa (Sulfonamide Antibiotics) Other (comments)        MAR reviewed and pertinent medications noted or modified as needed       Current Facility-Administered Medications   Medication    dexAMETHasone (DECADRON) tablet 4 mg    guaiFENesin-dextromethorphan (ROBITUSSIN DM) 100-10 mg/5 mL syrup 10 mL    bimatoprost (LUMIGAN) 0.01 % ophthalmic drops 1 Drop (Patient Supplied)    apraclonidine (IOPIDINE) 0.5 % ophthalmic solution 1 Drop (Patient Supplied)    sodium chloride (NS) flush 5-40 mL    sodium chloride (NS) flush 5-40 mL    acetaminophen (TYLENOL) tablet 650 mg    Or    acetaminophen (TYLENOL) suppository 650 mg    polyethylene glycol (MIRALAX) packet 17 g    promethazine (PHENERGAN) tablet 12.5 mg    Or    ondansetron (ZOFRAN) injection 4 mg    enoxaparin (LOVENOX) injection 40 mg    bisacodyL (DULCOLAX) suppository 10 mg    hydrALAZINE (APRESOLINE) 20 mg/mL injection 20 mg    melatonin tablet 3 mg    albuterol-ipratropium (DUO-NEB) 2.5 MG-0.5 MG/3 ML    albuterol (PROVENTIL VENTOLIN) nebulizer solution 2.5 mg    aspirin chewable tablet 81 mg    dorzolamide (TRUSOPT) 2 % ophthalmic solution 1 Drop (Patient Supplied)    indapamide (LOZOL) tablet 1.25 mg    pantoprazole (PROTONIX) tablet 40 mg    metoprolol tartrate (LOPRESSOR) tablet 25 mg    montelukast (SINGULAIR) tablet 10 mg    LORazepam (ATIVAN) tablet 0.5 mg    brimonidine (ALPHAGAN) 0.2 % ophthalmic solution 1 Drop (Patient Supplied)      Patient PCP: Raulito Lundberg MD  PMH:  has a past medical history of Adverse effect of anesthesia, Anxiety, Arthritis, Asthma, Diverticulitis (Dx 11/2016), Fibromyalgia, GERD (gastroesophageal reflux disease), Glaucoma, Hiatal hernia, Hypercholesteremia, Hypertension, Ill-defined condition, and Nausea & vomiting. PSH:   has a past surgical history that includes pr egd transoral biopsy single/multiple (11/9/2011); pr colonoscopy flx dx w/collj spec when pfrmd (11/9/2011); hx tubal ligation; hx hysterectomy; hx rotator cuff repair (Left, 2008); hx cataract removal (Right, 01/04/2017); and upper gi endoscopy,biopsy (8/20/2019). FHX: family history is not on file. SHX:  reports that she has never smoked. She has never used smokeless tobacco. She reports that she does not drink alcohol and does not use drugs.      ROS:A comprehensive review of systems was negative except for that written in the HPI. Objective:     Vital Signs: Telemetry:     Intake/Output:   Visit Vitals  /69   Pulse 93   Temp 98.8 °F (37.1 °C)   Resp 19   Ht 5' 3\" (1.6 m)   Wt 49.9 kg (110 lb)   SpO2 97%   BMI 19.49 kg/m²       Temp (24hrs), Av.4 °F (36.3 °C), Min:95.4 °F (35.2 °C), Max:98.8 °F (37.1 °C)        O2 Device: Nasal cannula O2 Flow Rate (L/min): 3.5 l/min       Wt Readings from Last 4 Encounters:   23 49.9 kg (110 lb)   23 47.2 kg (104 lb)   22 59.6 kg (131 lb 4.8 oz)   10/17/22 55.3 kg (122 lb)        No intake or output data in the 24 hours ending 23 1223    Last shift:      No intake/output data recorded. Last 3 shifts: No intake/output data recorded. Physical Exam:   General:  female; alert, oriented times 3, cooperative, and moderately ill;  NC;  HEENT: NCAT,  dentition, lips and mucosa dry  Eyes: anicteric; conjunctiva clear  Neck: no nodes, no accessory MM use. Chest: no deformity,   Cardiac: regular rate, rhythm; no murmur  Lungs: distant breath sounds; few wheezes, no rales, no rhonchi  Abd: soft, NT, hypoactive BS, ,  Ext: no edema; no joint swelling; No clubbing  : No voss,   Neuro: fluent,  follows commands; generalized weakness  Psych- no agitation, oriented to person;   Skin: warm, dry, no cyanosis;   Pulses: 1-2+ Bilateral pedal, radial  Capillary: brisk; pale    Labs:    Recent Labs     23  0226 23  0744   WBC 7.1 6.1   HGB 11.8 12.0    259       Recent Labs     23  0148 23  0226 23  0744    138 141   K 4.0 4.2 2.6*    105 108   CO2 26 26 24   * 96 86   BUN 13 12 9   CREA 0.86 0.81 0.70   CA 9.4 9.5 7.9*   ALB  --  3.5 3.0*   ALT  --  22 22       No results for input(s): PH, PCO2, PO2, HCO3, FIO2 in the last 72 hours. No results for input(s): CPK, CKNDX, TROIQ in the last 72 hours.     No lab exists for component: CPKMB  No results found for: BNPP, BNP   Lab Results   Component Value Date/Time    Culture result: NO GROWTH 6 DAYS 10/17/2022 09:30 AM    Culture result: MIXED UROGENITAL LOU ISOLATED 11/26/2017 02:45 PM    Culture result: PROTEUS MIRABILIS 12/18/2011 11:30 AM     Lab Results   Component Value Date/Time    TSH 1.80 08/31/2018 08:38 PM       Imaging:    CXR Results  (Last 48 hours)      None          Results from Hospital Encounter encounter on 01/23/23    XR CHEST PA LAT    Narrative  EXAM: XR CHEST PA LAT    INDICATION: Shortness of breath    COMPARISON: 12/24/2022    TECHNIQUE: PA and lateral chest views    FINDINGS: The cardiac size is within normal limits. The pulmonary vasculature is  within normal limits. The lungs and pleural spaces are clear. Levoconvex scoliosis of the thoracic  spine is noted. Impression  No acute abnormality. Results from [place] encounter on 12/24/22    XR CHEST PORT    Narrative  EXAM: XR CHEST PORT    DATE: 12/24/2022 6:06 PM    INDICATION: Shortness of breath    COMPARISON: Chest October 17, 2022    FINDINGS: AP portable chest radiograph. The heart size is normal. Lungs are  well-inflated. Mild opacity along the LEFT hemidiaphragm favors atelectasis. No  consolidation is seen. The vascular clarity is normal. There is no evidence of  effusion or pneumothorax. Impression  No acute cardiopulmonary findings. Results from [place] encounter on 10/17/22    XR CHEST PORT    Narrative  PORTABLE CHEST RADIOGRAPH/S: 10/17/2022 8:20 AM    INDICATION: Dyspnea. COMPARISON: 8/22/2022, CT 9/29/2022. TECHNIQUE: Portable frontal upright radiograph/s of the chest.    FINDINGS:  The lungs are clear. The central airways are patent. No pneumothorax or pleural  effusion. There is a moderate hiatal hernia. Impression  Clear lungs. Results from [place] encounter on 12/24/22    CTA CHEST W OR W WO CONT    Narrative  EXAM: CTA CHEST W OR W WO CONT    INDICATION: Hypoxia.  Evaluation for pulmonary embolism    COMPARISON: 9/29/2022. CONTRAST: 100 mL of Isovue-370. TECHNIQUE:  Precontrast  images were obtained to localize the volume for acquisition. Multislice helical CT arteriography was performed from the diaphragm to the  thoracic inlet during uneventful rapid bolus intravenous contrast  administration. Lung and soft tissue windows were generated. Coronal and  sagittal images were generated and 3D post processing consisting of coronal  maximum intensity images was performed. CT dose reduction was achieved through  use of a standardized protocol tailored for this examination and automatic  exposure control for dose modulation. FINDINGS:  Few ill defined subcentimeter opacities in the periphery of the right upper lobe  (image 66). Zenker's diverticulum. The pulmonary arteries are well enhanced and no pulmonary emboli are identified. There is no mediastinal or hilar adenopathy or mass. The aorta enhances normally  without evidence of aneurysm or dissection. The visualized portions of the upper abdominal organs are remarkable for a  hiatal hernia as well as stable left hepatic hypodensities, previously  determined to represent cysts. Mild superior endplate U64 wedge deformity, stable. Impression  Right upper lobe pneumonia versus alveolitis. No pulmonary embolism  Mild stable T12 wedge deformity. Recommend DXA      Please note that this dictation was completed with [employer], the computer voice recognition software. Quite often unanticipated grammatical, syntax, homophones, and other interpretive errors are inadvertently transcribed by the computer software. Please disregard these errors. Please excuse any errors that have escaped final proofreading  ______________________________________________________________________      Thank you for allowing us to participate in the care of this patient.       This care involved high complexity medical decision making: I personally:  Reviewed the flowsheet and previous days notes  Reviewed and summarized records or history from previous days note or discussions with staff, family  High Risk Drug therapy requiring intensive monitoring for toxicity: eg steroids, antibiotics  Reviewed and/or ordered Clinical lab tests  Reviewed images and/or ordered Radiology tests    discussed my assessment/management with : Nursing, for coordination of care      Zoey Delgado MD

## 2023-01-25 NOTE — PROGRESS NOTES
Received notification from bedside RN about patient with regards to: requesting medication to help with sleep and anxiety, had a dose of PO Ativan at approximately 1800  VS: /67, HR 84, RR 20, O2 sat 94% on NC 4 L    Intervention given: Atarax 25 mg PO x 1 dose ordered

## 2023-01-25 NOTE — PROGRESS NOTES
Transition of Care Plan  RUR: 15%  DISPOSITION: The disposition plan is pending medical progression  F/U with PCP/Specialist    Transport: BLS vs Family        Reason for Readmission:     Acute respiratory failure          RUR Score/Risk Level:    15%     PCP: First and Last name:  Rebeca Nissen, MD   Name of Practice:    Are you a current patient: Yes/No:    Approximate date of last visit:    Can you participate in a virtual visit with your PCP:     Is a Care Conference indicated:  no      Did you attend your follow up appointment (s): If not, why not: yes          Resources/supports as identified by patient/family:   family       Top Challenges facing patient (as identified by patient/family and CM):   none       Finances/Medication cost?       Transportation        Support system or lack thereof? Living arrangements? Self-care/ADLs/Cognition? Current Advanced Directive/Advance Care Plan:             Plan for utilizing home health:   TBD             Transition of Care Plan:    Based on readmission, the patient's previous Plan of Care   has been evaluated and/or modified. The current Transition of Care Plan is:             Patient discharged 12/28/22 and transitioned to [facility]. Patient lives alone and is independent with ADLs/IADLs. She rarely drives and has RW/cane available to use at home. Care Management Interventions  PCP Verified by CM:  Yes  Palliative Care Criteria Met (RRAT>21 & CHF Dx)?: No  Mode of Transport at Discharge: BLS  Transition of Care Consult (CM Consult): Discharge Planning  MyChart Signup: No  Discharge Durable Medical Equipment: No  Health Maintenance Reviewed: Yes  Physical Therapy Consult: Yes  Occupational Therapy Consult: Yes  Speech Therapy Consult: No  Support Systems: Child(kerry)  Confirm Follow Up Transport: Family  [employer] Information Provided?: No  Discharge Location  Patient Expects to be Discharged to[de-identified] Unable to determine at this time    Readmission Assessment  Number of days since last admission?: 8-30 days  Previous disposition: SNF  Who is being interviewed?: Patient  What was the patient's/caregiver's perception as to why they think they needed to return back to the hospital?: Other (Comment)  Did you visit your Primary Care Physician after you left the hospital, before you returned this time?: Yes  Did you see a specialist, such as Cardiac, Pulmonary, Orthopedic Physician, etc. after you left the hospital?: Yes  Who advised the patient to return to the hospital?: Self-referral  Does the patient report anything that got in the way of taking their medications?: No  In our efforts to provide the best possible care to you and others like you, can you think of anything that we could have done to help you after you left the hospital the first time, so that you might not have needed to return so soon?: Other (Comment)    3:03 PM  Daisy Liao, CRM

## 2023-01-26 VITALS
BODY MASS INDEX: 19.49 KG/M2 | TEMPERATURE: 97.2 F | DIASTOLIC BLOOD PRESSURE: 57 MMHG | WEIGHT: 110 LBS | HEIGHT: 63 IN | OXYGEN SATURATION: 95 % | RESPIRATION RATE: 18 BRPM | SYSTOLIC BLOOD PRESSURE: 137 MMHG | HEART RATE: 71 BPM

## 2023-01-26 PROCEDURE — 74011000250 HC RX REV CODE- 250: Performed by: INTERNAL MEDICINE

## 2023-01-26 PROCEDURE — 97535 SELF CARE MNGMENT TRAINING: CPT | Performed by: OCCUPATIONAL THERAPIST

## 2023-01-26 PROCEDURE — 77010033678 HC OXYGEN DAILY

## 2023-01-26 PROCEDURE — 74011250637 HC RX REV CODE- 250/637: Performed by: INTERNAL MEDICINE

## 2023-01-26 PROCEDURE — 94761 N-INVAS EAR/PLS OXIMETRY MLT: CPT

## 2023-01-26 PROCEDURE — 94640 AIRWAY INHALATION TREATMENT: CPT

## 2023-01-26 PROCEDURE — 74011250636 HC RX REV CODE- 250/636: Performed by: INTERNAL MEDICINE

## 2023-01-26 RX ORDER — GUAIFENESIN/DEXTROMETHORPHAN 100-10MG/5
10 SYRUP ORAL 4 TIMES DAILY
Qty: 400 ML | Refills: 0 | Status: SHIPPED | OUTPATIENT
Start: 2023-01-26 | End: 2023-01-26 | Stop reason: SDUPTHER

## 2023-01-26 RX ORDER — DEXAMETHASONE 1 MG/1
TABLET ORAL
Qty: 42 TABLET | Refills: 0 | Status: SHIPPED | OUTPATIENT
Start: 2023-01-26 | End: 2023-01-26 | Stop reason: SDUPTHER

## 2023-01-26 RX ORDER — GUAIFENESIN/DEXTROMETHORPHAN 100-10MG/5
10 SYRUP ORAL 4 TIMES DAILY
Qty: 400 ML | Refills: 0 | Status: SHIPPED | OUTPATIENT
Start: 2023-01-26 | End: 2023-02-05

## 2023-01-26 RX ORDER — DEXAMETHASONE 1 MG/1
TABLET ORAL
Qty: 42 TABLET | Refills: 0 | Status: SHIPPED | OUTPATIENT
Start: 2023-01-26 | End: 2023-02-16

## 2023-01-26 RX ORDER — METOLAZONE 5 MG/1
5 TABLET ORAL
Status: ON HOLD | COMMUNITY
End: 2023-01-26

## 2023-01-26 RX ADMIN — MONTELUKAST 10 MG: 10 TABLET, FILM COATED ORAL at 09:41

## 2023-01-26 RX ADMIN — LORAZEPAM 0.5 MG: 0.5 TABLET ORAL at 09:42

## 2023-01-26 RX ADMIN — BRIMONIDINE TARTRATE 1 DROP: 2 SOLUTION OPHTHALMIC at 09:44

## 2023-01-26 RX ADMIN — DORZOLAMIDE HYDROCHLORIDE 1 DROP: 20 SOLUTION/ DROPS OPHTHALMIC at 09:44

## 2023-01-26 RX ADMIN — PANTOPRAZOLE SODIUM 40 MG: 40 TABLET, DELAYED RELEASE ORAL at 09:41

## 2023-01-26 RX ADMIN — APRACLONIDINE HYDROCHLORIDE 1 DROP: 5 SOLUTION/ DROPS OPHTHALMIC at 09:43

## 2023-01-26 RX ADMIN — GUAIFENESIN AND DEXTROMETHORPHAN 10 ML: 100; 10 SYRUP ORAL at 14:36

## 2023-01-26 RX ADMIN — DEXAMETHASONE 4 MG: 4 TABLET ORAL at 09:41

## 2023-01-26 RX ADMIN — IPRATROPIUM BROMIDE AND ALBUTEROL SULFATE 3 ML: 2.5; .5 SOLUTION RESPIRATORY (INHALATION) at 11:24

## 2023-01-26 RX ADMIN — GUAIFENESIN AND DEXTROMETHORPHAN 10 ML: 100; 10 SYRUP ORAL at 09:43

## 2023-01-26 RX ADMIN — IPRATROPIUM BROMIDE AND ALBUTEROL SULFATE 3 ML: 2.5; .5 SOLUTION RESPIRATORY (INHALATION) at 07:27

## 2023-01-26 RX ADMIN — INDAPAMIDE 1.25 MG: 2.5 TABLET, FILM COATED ORAL at 09:41

## 2023-01-26 RX ADMIN — ASPIRIN 81 MG: 81 TABLET, CHEWABLE ORAL at 09:41

## 2023-01-26 RX ADMIN — POLYETHYLENE GLYCOL 3350 17 G: 17 POWDER, FOR SOLUTION ORAL at 09:39

## 2023-01-26 RX ADMIN — SODIUM CHLORIDE, PRESERVATIVE FREE 10 ML: 5 INJECTION INTRAVENOUS at 06:33

## 2023-01-26 RX ADMIN — METOPROLOL TARTRATE 25 MG: 25 TABLET, FILM COATED ORAL at 09:40

## 2023-01-26 NOTE — PROGRESS NOTES
Pulmonary, Critical Care, and Sleep Medicine      Name: Cherie Chong MRN: [id number]   : 1935 Hospital: [facility]   Date: 2023  Admission date: 2023 Hospital Day: 4       History:   2023: Patient still acutely ill. Now in the emergency room. She is spent the night in hospital bed. Nursing notes reviewed. Daughter now at bedside. Medical records and data reviewed. Remains on 4 L/min nasal cannula oxygen. At home she has 2 L/min. Anxious to go home. Says she has home health nursing there. Explained to them how it is important to make sure we ensure some degree of instability before discharging home. Her increasing oxygen needs is somewhat perplexing. Still has significant cough. Less wheeze on exam today. Our office has submitted paperwork to seek insurance coverage of biologic treatment for asthma. Notably on lab work her 17% eosinophils on 2023 has now almost resolved on blood work early this morning. Patient given 1 dose of IV 10 mg of Decadron. 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 She had some thick discolored sputum leading up to this admission. Does not feel like there is any congestion today. Explained the potential risks and side effects of systemic steroids. She has not tolerated prednisone, inhaled corticosteroids, methylprednisolone. Seems to have tolerated the IV Decadron given to the patient by EMS on route to the hospital.  Will give patient cough medicine. Encouraged her to comply with treatment although some of her concerns are fear of an allergic reaction which is reasonable. We will give low daily dose of Decadron and try to wean her oxygen. 2023: Patient now in room. Bed alarms in place. Patient somewhat fidgety. Coughing up pale yellow sputum. Feels better. Still with wheeze on examination. Less congested. No fever. On 4 mg Decadron per day. Has nebulizers at home.   No rash.  Spoke with my office. We are trying to collect samples of benralizumab to initiate treatment week after discharge. Currently on 3 and half liters per minute. Normally on 2 L at home. 1/26/2023: Patient had a good night. No reaction to Decadron. Still some grayish sputum this morning. Still with wheezing but much better air movement and in no distress. Daughter present during our discussion. Patient has appointment to see me next Thursday. Will need EpiPen. Hospital Problems  Date Reviewed: 8/20/2019            Codes Class Noted POA    Acute respiratory failure with hypoxia [facility]) ICD-10-CM: J96.01  ICD-9-CM: 518.81  1/23/2023 Unknown       IMPRESSION:   Acute respiratory failure with Hypoxia   Severe persistent asthma with acute exacerbation: Awaiting insurance authorization for biologic therapy  Intolerant to many steroid formulations  Peripheral eosinophilia  Allergic Rhinitis on Singulair. Zenkers Diverticulum, Hiatal hernia patient encouraged to see GI for follow-up   hepatic Cysts. T12 compression  History of Takotsubo cardiomyopathy October 2022 treated by Dr. Kayla Bautista  Body mass index is 19.49 kg/m². Prognosis guarded       RECOMMENDATIONS/PLAN:   Monitor for reaction to Decadron   May discharge home on Decadron taper   if none would continue Decadron daily  Patient to be sent home on Decadron taper with 30 mg for 1 week followed by 2 mg for 1 week followed by 1 mg for 1 week. Patient will follow up in the office   We hope to start benralizumab injections with close monitoring next week if possible pending insurance authorization  Care was discussed with both the patient and her daughter at bedside. Patient to be discharged on 3 L nasal cannula  Wean oxygen to baseline 2 L/min if possible.   Daughter has a pulse oximeter  Bronchial hygiene with respiratory therapy techniques, bronchodilators  DVT prophylaxis  Prescription drug management with home med reconciliation reviewed [x] High complexity decision making was performed  [x] See my orders for details      Subjective/Initial History:     I was asked by Yesica Mehta MD to see Raquel Viramontes  a 80 y.o.  female in consultation for a chief complaint of severe asthma    Excerpts from admission 1/23/2023 or consult notes as follows:     \"  Reports that \"my breathing has not being good since June\"  In between hospitalizations she has never fully recovered  Past few days has had increasing cough . Especially severe overnight \"it would not stop\" kept me up all night  Increasing shortness of breath especially the past 24 hours, even wearing her baseline oxygen. No fevers, sore throat, chest pain, nausea vomiting, diarrhea. Mildly generalized soreness in abdomen \"from coughing so much\"    Patient with diffuse myalgias. Found by rescue squad to be hypoxic. He required 4 L/min to treat her saturations of 85%. Given 10 mg IV Decadron. In the past the patient has been given IV Solu-Medrol and sent home with taper. Would develop rash on the medications. Also intolerant of prednisone in the past.    Now in the emergency room on a stretcher. Wheezing is calm down. Has received nebulizer treatments. COVID test was negative. Influenza AMB negative. Patient admitted. Discussed with Dr. Preethi Rivera. Chest x-ray reviewed. No obvious pneumonia or pulmonary edema. Elevated eosinophil count of 1000. Normal cardiac enzymes. Patient to have some sort of surgery on her right eye. Evidently glaucoma increasing. Was advised avoid all steroids. Ophthalmologist aware of her life-threatening asthma exacerbations.       Allergies   Allergen Reactions    Codeine Other (comments)    Cymbalta [Duloxetine] Nausea Only and Other (comments)     \"within the hour I had a severe headache, and vomiting\"      Demerol [Meperidine] Nausea and Vomiting    Lidocaine Other (comments)    Morphine Nausea and Vomiting    Novocain [Procaine] Other (comments)    Penicillins Other (comments)     rash    Percocet [Oxycodone-Acetaminophen] Nausea and Vomiting    Prednisone Rash    Sulfa (Sulfonamide Antibiotics) Other (comments)        MAR reviewed and pertinent medications noted or modified as needed       Current Facility-Administered Medications   Medication    dexAMETHasone (DECADRON) tablet 4 mg    guaiFENesin-dextromethorphan (ROBITUSSIN DM) 100-10 mg/5 mL syrup 10 mL    bimatoprost (LUMIGAN) 0.01 % ophthalmic drops 1 Drop (Patient Supplied)    apraclonidine (IOPIDINE) 0.5 % ophthalmic solution 1 Drop (Patient Supplied)    sodium chloride (NS) flush 5-40 mL    sodium chloride (NS) flush 5-40 mL    acetaminophen (TYLENOL) tablet 650 mg    Or    acetaminophen (TYLENOL) suppository 650 mg    polyethylene glycol (MIRALAX) packet 17 g    promethazine (PHENERGAN) tablet 12.5 mg    Or    ondansetron (ZOFRAN) injection 4 mg    enoxaparin (LOVENOX) injection 40 mg    bisacodyL (DULCOLAX) suppository 10 mg    hydrALAZINE (APRESOLINE) 20 mg/mL injection 20 mg    melatonin tablet 3 mg    albuterol-ipratropium (DUO-NEB) 2.5 MG-0.5 MG/3 ML    albuterol (PROVENTIL VENTOLIN) nebulizer solution 2.5 mg    aspirin chewable tablet 81 mg    dorzolamide (TRUSOPT) 2 % ophthalmic solution 1 Drop (Patient Supplied)    indapamide (LOZOL) tablet 1.25 mg    pantoprazole (PROTONIX) tablet 40 mg    metoprolol tartrate (LOPRESSOR) tablet 25 mg    montelukast (SINGULAIR) tablet 10 mg    LORazepam (ATIVAN) tablet 0.5 mg    brimonidine (ALPHAGAN) 0.2 % ophthalmic solution 1 Drop (Patient Supplied)      Patient PCP: Rebeca Nissen, MD  PMH:  has a past medical history of Adverse effect of anesthesia, Anxiety, Arthritis, Asthma, Diverticulitis (Dx 11/2016), Fibromyalgia, GERD (gastroesophageal reflux disease), Glaucoma, Hiatal hernia, Hypercholesteremia, Hypertension, Ill-defined condition, and Nausea & vomiting.   PSH:   has a past surgical history that includes pr egd transoral biopsy single/multiple (2011); pr colonoscopy flx dx w/collj spec when pfrmd (2011); hx tubal ligation; hx hysterectomy; hx rotator cuff repair (Left, ); hx cataract removal (Right, 2017); and upper gi endoscopy,biopsy (2019). FHX: family history is not on file. SHX:  reports that she has never smoked. She has never used smokeless tobacco. She reports that she does not drink alcohol and does not use drugs. ROS:A comprehensive review of systems was negative except for that written in the HPI. Objective:     Vital Signs: Telemetry:     Intake/Output:   Visit Vitals  BP (!) 137/57   Pulse 71   Temp 97.2 °F (36.2 °C)   Resp 18   Ht 5' 3\" (1.6 m)   Wt 49.9 kg (110 lb)   SpO2 95%   BMI 19.49 kg/m²       Temp (24hrs), Av.6 °F (36.4 °C), Min:97.2 °F (36.2 °C), Max:97.9 °F (36.6 °C)        O2 Device: Nasal cannula O2 Flow Rate (L/min): 3 l/min       Wt Readings from Last 4 Encounters:   23 49.9 kg (110 lb)   23 47.2 kg (104 lb)   22 59.6 kg (131 lb 4.8 oz)   10/17/22 55.3 kg (122 lb)        No intake or output data in the 24 hours ending 23 1533    Last shift:      No intake/output data recorded. Last 3 shifts: No intake/output data recorded. Physical Exam:   General:  female; alert, oriented times 3, cooperative, and moderately ill;  NC;  HEENT: NCAT,  dentition, lips and mucosa dry  Eyes: anicteric; conjunctiva clear  Neck: no nodes, no accessory MM use. Chest: no deformity,   Cardiac: regular rate, rhythm; no murmur  Lungs: distant breath sounds; few wheezes, no rales, no rhonchi  Abd: soft, NT, hypoactive BS, ,  Ext: no edema; no joint swelling;  No clubbing  : No voss,   Neuro: fluent,  follows commands; generalized weakness  Psych- no agitation, oriented to person;   Skin: warm, dry, no cyanosis;   Pulses: 1-2+ Bilateral pedal, radial  Capillary: brisk; pale    Labs:    Recent Labs     23  0226   WBC 7.1   HGB 11.8          Recent Labs     01/25/23  0148 01/24/23  0226    138   K 4.0 4.2    105   CO2 26 26   * 96   BUN 13 12   CREA 0.86 0.81   CA 9.4 9.5   ALB  --  3.5   ALT  --  22       No results for input(s): PH, PCO2, PO2, HCO3, FIO2 in the last 72 hours. No results for input(s): CPK, CKNDX, TROIQ in the last 72 hours. No lab exists for component: CPKMB  No results found for: BNPP, BNP   Lab Results   Component Value Date/Time    Culture result: NO GROWTH 6 DAYS 10/17/2022 09:30 AM    Culture result: MIXED UROGENITAL LOU ISOLATED 11/26/2017 02:45 PM    Culture result: PROTEUS MIRABILIS 12/18/2011 11:30 AM     Lab Results   Component Value Date/Time    TSH 1.80 08/31/2018 08:38 PM       Imaging:    CXR Results  (Last 48 hours)      None          Results from Hospital Encounter encounter on 01/23/23    XR CHEST PA LAT    Narrative  EXAM: XR CHEST PA LAT    INDICATION: Shortness of breath    COMPARISON: 12/24/2022    TECHNIQUE: PA and lateral chest views    FINDINGS: The cardiac size is within normal limits. The pulmonary vasculature is  within normal limits. The lungs and pleural spaces are clear. Levoconvex scoliosis of the thoracic  spine is noted. Impression  No acute abnormality. Results from [place] encounter on 12/24/22    XR CHEST PORT    Narrative  EXAM: XR CHEST PORT    DATE: 12/24/2022 6:06 PM    INDICATION: Shortness of breath    COMPARISON: Chest October 17, 2022    FINDINGS: AP portable chest radiograph. The heart size is normal. Lungs are  well-inflated. Mild opacity along the LEFT hemidiaphragm favors atelectasis. No  consolidation is seen. The vascular clarity is normal. There is no evidence of  effusion or pneumothorax. Impression  No acute cardiopulmonary findings. Results from [place] encounter on 10/17/22    XR CHEST PORT    Narrative  PORTABLE CHEST RADIOGRAPH/S: 10/17/2022 8:20 AM    INDICATION: Dyspnea.     COMPARISON: 8/22/2022, CT 9/29/2022. TECHNIQUE: Portable frontal upright radiograph/s of the chest.    FINDINGS:  The lungs are clear. The central airways are patent. No pneumothorax or pleural  effusion. There is a moderate hiatal hernia. Impression  Clear lungs. Results from [place] encounter on 12/24/22    CTA CHEST W OR W WO CONT    Narrative  EXAM: CTA CHEST W OR W WO CONT    INDICATION: Hypoxia. Evaluation for pulmonary embolism    COMPARISON: 9/29/2022. CONTRAST: 100 mL of Isovue-370. TECHNIQUE:  Precontrast  images were obtained to localize the volume for acquisition. Multislice helical CT arteriography was performed from the diaphragm to the  thoracic inlet during uneventful rapid bolus intravenous contrast  administration. Lung and soft tissue windows were generated. Coronal and  sagittal images were generated and 3D post processing consisting of coronal  maximum intensity images was performed. CT dose reduction was achieved through  use of a standardized protocol tailored for this examination and automatic  exposure control for dose modulation. FINDINGS:  Few ill defined subcentimeter opacities in the periphery of the right upper lobe  (image 66). Zenker's diverticulum. The pulmonary arteries are well enhanced and no pulmonary emboli are identified. There is no mediastinal or hilar adenopathy or mass. The aorta enhances normally  without evidence of aneurysm or dissection. The visualized portions of the upper abdominal organs are remarkable for a  hiatal hernia as well as stable left hepatic hypodensities, previously  determined to represent cysts. Mild superior endplate D73 wedge deformity, stable. Impression  Right upper lobe pneumonia versus alveolitis. No pulmonary embolism  Mild stable T12 wedge deformity. Recommend DXA      Please note that this dictation was completed with [employer], the [employer] voice recognition software.   Quite often unanticipated grammatical, syntax, homophones, and other interpretive errors are inadvertently transcribed by the computer software. Please disregard these errors. Please excuse any errors that have escaped final proofreading  ______________________________________________________________________      Thank you for allowing us to participate in the care of this patient.       This care involved high complexity medical decision making: I personally:  Reviewed the flowsheet and previous days notes  Reviewed and summarized records or history from previous days note or discussions with staff, family  High Risk Drug therapy requiring intensive monitoring for toxicity: eg steroids, antibiotics  Reviewed and/or ordered Clinical lab tests  Reviewed images and/or ordered Radiology tests    discussed my assessment/management with : Nursing, for coordination of care      Aman Apodaca MD

## 2023-01-26 NOTE — PROGRESS NOTES
Attempted to schedule PCP hospital follow up appointment. Unable to reach anyone, left voicemail. . Office  will contact the patient with appointment information per office protocol. Pending patient discharge.  Sofie Bela, Care Management Assistant

## 2023-01-26 NOTE — PROGRESS NOTES
Problem: Self Care Deficits Care Plan (Adult)  Goal: *Acute Goals and Plan of Care (Insert Text)  Description: FUNCTIONAL STATUS PRIOR TO ADMISSION: Patient was modified independent using a rolling walker for functional mobility. Pt lives alone in an apartment in [address] [place] [address]. She states she has a dtr who comes to help frequently with some IADLs. She was scattered in her report and initially said she did not use anything to amb then said a cane but then said a rolling walker. No bathroom DME except grab bars; Has garden tub. Aware it is a fall risk. She says she did her own ADLs. She states she uses 1L of O2 at home at night and prn otherwise and then also when she goes out. She talks without stopping and needs cues to break her stream of conversation for any questioning. Reports she does not drive. Manages her meds and finances; need to clarify with daughter      HOME SUPPORT: The patient lived alone with local daughter to provide assistance. Occupational Therapy Goals  Initiated 1/24/2023  1. Patient will perform grooming in standing VSS with supervision/set-up within 7 day(s). 2.  Patient will perform bathing with supervision/set-up within 7 day(s). 3.  Patient will perform upper body dressing and lower body dressing with supervision/set-up within 7 day(s). 4.  Patient will perform toilet transfers with supervision/set-up within 7 day(s). 5.  Patient will perform all aspects of toileting with supervision/set-up within 7 day(s). 6.  Patient will participate in upper extremity therapeutic exercise/activities with supervision/set-up for 5 minutes within 7 day(s). 7.  Patient will utilize energy conservation, fall prevention, PLB techniques during functional activities with verbal cues within 7 day(s).    Outcome: Progressing Towards Goal    OCCUPATIONAL THERAPY TREATMENT  Patient: Jammie Claros (67 y.o. female)  Date: 1/26/2023  Diagnosis: Acute respiratory failure with hypoxia ([facility] [J96.01] <principal problem not specified>      Precautions: Fall, Bed Alarm (1L O2 PTA)  Chart, occupational therapy assessment, plan of care, and goals were reviewed. ASSESSMENT  Patient continues with skilled OT services and is progressing towards goals. Educated patient and daughter on energy conservation, compensatory techniques for ADL, safety and fall prevention. She indicates understanding. Patient is now on 3 liters O2, wondering if she can return to 1 liter at home. SpO2 on 3 liters at rest is 96-97%. Attempted to decrease to 2 liters, just to demonstrate to the patient that the oxygen is needed. SpO2 dropped slightly to 90-91% at rest on 2 liters. Encouraged her to use a pulse ox to monitor at home, and reminded her that her SpO2 needs to stay above 90%. Emphasized that she should get guidance from her pulmonary MD prior to attempting to wean O2. She indicates understanding. Discussed safety with oxygen tubing since she typically only uses it at night. PLAN :  Patient continues to benefit from skilled intervention to address the above impairments. Continue treatment per established plan of care to address goals. Recommendation for discharge: (in order for the patient to meet his/her long term goals)  Occupational therapy at least 2 days/week in the home and increased assistance/supervision for safety (Patient may decline home health OT; however,she wants PT.)    This discharge recommendation:  Has been made in collaboration with the attending provider and/or case management    IF patient discharges home will need the following DME: patient owns DME required for discharge       SUBJECTIVE:   Patient stated I usually use only 1 liter at home. Is it okay to go back to that?     OBJECTIVE DATA SUMMARY:   Cognitive/Behavioral Status:  Neurologic State: Alert  Orientation Level: Oriented X4  Cognition: Follows commands  Perception: Appears intact  Perseveration: No perseveration noted  Safety/Judgement: Decreased insight into deficits; Decreased awareness of need for safety      ADL Intervention:  Educated patient on energy conservation. 1. Deep breathing, including pursed lip breathing  2. Educated on pacing and making sure she takes short frequent breaks   3. Educated on re-arranging her routine to allow for rest breaks in the morning routine  4. Educated on looking at the consequences of her actions before deciding she needs to take on a task (e.g not over exerting, resulting in exhaustion). 5. Educated on DME used to help conserve energy, such as a shower seat, a stool or chair in the kitchen, and pushing or pulling items instead of carrying them     Educated patient and daughter compensatory techniques for ADL, safety and fall prevention. She indicates understanding. Patient is now on 3 liters O2, wondering if she can return to 1 liter at home. SpO2 on 3 liters at rest is 96-97%. Attempted to decrease to 2 liters, just to demonstrate to the patient that the oxygen is needed. SpO2 dropped slightly to 90-91% at rest on 2 liters. Encouraged her to use a pulse ox to monitor at home, and reminded her that her SpO2 needs to stay above 90%. Emphasized that she should get guidance from her pulmonary MD prior to attempting to wean O2. She indicates understanding. Discussed safety with oxygen tubing since she typically only uses it at night. Patient reports going in and out of the bathroom by herself and sitting to wash herself up this morning. Discussed bathroom safety. Pain:  Pain was not a factor this session. Activity Tolerance:   Fair, desaturates with exertion and requires oxygen, and requires rest breaks    After treatment patient left in no apparent distress:   Sitting in chair and Call bell within reach    COMMUNICATION/COLLABORATION:   The patients plan of care was discussed with: Physical therapist and Registered nurse.      POOJA George/L  Time Calculation: 24 mins

## 2023-01-26 NOTE — PROGRESS NOTES
End of Shift Note    Bedside shift change report given to Adrianne Caceres (oncoming nurse) by Ailin Gray RN (offgoing nurse). Report included the following information SBAR, Kardex, and MAR    Shift worked:  7 am to 7:30 pm     Shift summary and any significant changes:     Patient very anxious during shift, but cooperative. All scheduled medications administered during shift. Patient denied pain during shift. Patient assisted to bathroom with 1 assist and walker; patient educated on the need to call for assistance with ambulating to prevent her from getting tangled in oxygen tubing. Bed alarm engaged. Patient resting on 3 L oxygen at 97%; patient normally wears 1 L oxygen at home in the day time and 2 L at night. IV flushed and patent. Nursing rounds and education completed. Concerns for physician to address:       Zone phone for oncoming shift:          Activity:  Activity Level: Up with Assistance  Number times ambulated in hallways past shift: 0  Number of times OOB to chair past shift: 0 (sat on side of bed)    Cardiac:   Cardiac Monitoring: No      Cardiac Rhythm: Sinus Rhythm    Access:  Current line(s): PIV     Genitourinary:   Urinary status: voiding    Respiratory:   O2 Device: Nasal cannula  Chronic home O2 use?: YES  Incentive spirometer at bedside: NO       GI:  Last Bowel Movement Date: 01/23/23 (per pt)  Current diet:  ADULT DIET Regular; Low Fat/Low Chol/High Fiber/MAGGIE  Passing flatus: YES  Tolerating current diet: YES       Pain Management:   Patient states pain is manageable on current regimen: YES    Skin:  Isidoro Score: 19  Interventions: increase time out of bed and PT/OT consult    Patient Safety:  Fall Score:  Total Score: 2  Interventions: bed/chair alarm, assistive device (walker, cane, etc), gripper socks, and pt to call before getting OOB       Length of Stay:  Expected LOS: 3d 12h  Actual LOS: [address], RN

## 2023-01-26 NOTE — PROGRESS NOTES
I have reviewed discharge instructions with the PATIENT PARENT GUARDIAN: patient and daughter. The patient and daughter verbalized understanding. Discharge medications reviewed with patient and daughter and appropriate educational materials and side effects teaching were provided. Follow-up appointments reviewed. Opportunity for questions and clarification was provided. Venous access removed without difficulty. Patient's belongings gathered and sent with patient. Patient is ready for discharge.      Mahnaz Loja RN

## 2023-01-26 NOTE — PROGRESS NOTES
Hospitalist Progress Note    NAME: Veldon Aase   :  1935   MRN:  [id number]       Assessment / Plan:  Acute on chronic respiratory failure with hypoxia  POA  Asthma with acute exacerbation POA  Eosinophilia POA elevated 1000 to 1500 range last 3 admits  Presents with several day increasing cough and SOB              PCP start azithromycin, completed 2 days  Known asthma with Dr Neno Carpenter wears 1liter O2 NC during day and 2 liters at night  Now hypoxic on 2 liters when EMS came 85%, wheezing  pCXR reviewed, no ASD  O2 currently 4 liters, continue to wean  Subjectively less SOB  Nebs, montelukast  Trouble tolerating steroids                          1. Glaucoma, risk of  increased eye pressure                          2. 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 No angiographic epicardial coronary disease  2. Elevated LVEDP  3. Mildly to moderately reduced LVEF 40%  4. Likely TakoTsubo cardiomyopathy  5. Mild MR     Glaucoma POA  Continue home eyedrops  Concerned about using steroids as it may increase her pressure     Anxiety POA  Continue home Ativan     Code Status: Full code      Body mass index is 19.49 kg/m². DVT prophylaxis with lovenox     Code status: Full code  NOK:     Body mass index is 19.49 kg/m². Code status: Full  Prophylaxis: Lovenox  Recommended Disposition: Home w/Family     Subjective:     Chief Complaint / Reason for Physician Visit  Patient seen and examined.   Says she is feeling much better. Discussed with RN events overnight. Review of Systems:  Symptom Y/N Comments  Symptom Y/N Comments   Fever/Chills    Chest Pain     Poor Appetite    Edema     Cough    Abdominal Pain     Sputum    Joint Pain     SOB/GALAN    Pruritis/Rash     Nausea/vomit    Tolerating PT/OT     Diarrhea    Tolerating Diet     Constipation    Other       Could NOT obtain due to:      Objective:     VITALS:   Last 24hrs VS reviewed since prior progress note. Most recent are:  Patient Vitals for the past 24 hrs:   Temp Pulse Resp BP SpO2   01/25/23 2022 -- -- -- -- 94 %   01/25/23 1948 97.9 °F (36.6 °C) 80 17 121/66 97 %   01/25/23 1640 -- -- -- -- 97 %   01/25/23 1350 -- -- -- -- 98 %   01/25/23 0813 98.8 °F (37.1 °C) 93 19 119/69 97 %   01/25/23 0730 -- -- -- -- 93 %   01/24/23 2056 (!) 95.4 °F (35.2 °C) 84 20 132/67 94 %     No intake or output data in the 24 hours ending 01/25/23 2045     I had a face to face encounter and independently examined this patient on 1/25/2023, as outlined below:  PHYSICAL EXAM:  General: WD, WN. Alert, cooperative, no acute distress    EENT:  EOMI. Anicteric sclerae. MMM  Resp:  Bilateral expiratory wheezing. No accessory muscle use  CV:  Regular  rhythm,  No edema  GI:  Soft, Non distended, Non tender. +Bowel sounds  Neurologic:  Alert and oriented X 3, normal speech,   Psych:   Good insight. Not anxious nor agitated  Skin:  No rashes.   No jaundice    Reviewed most current lab test results and cultures  YES  Reviewed most current radiology test results   YES  Review and summation of old records today    NO  Reviewed patient's current orders and MAR    YES  PMH/SH reviewed - no change compared to H&P  ________________________________________________________________________  Care Plan discussed with:    Comments   Patient     Family      RN     Care Manager     Consultant                        [facility] team rounds were held today with , nursing, pharmacist and clinical coordinator. Patient's plan of care was discussed; medications were reviewed and discharge planning was addressed. ________________________________________________________________________  Total NON critical care TIME:  34   Minutes    Total CRITICAL CARE TIME Spent:   Minutes non procedure based      Comments   >50% of visit spent in counseling and coordination of care     ________________________________________________________________________  Shaun Tse MD     Procedures: see electronic medical records for all procedures/Xrays and details which were not copied into this note but were reviewed prior to creation of Plan. LABS:  I reviewed today's most current labs and imaging studies.   Pertinent labs include:  Recent Labs     01/24/23 0226 01/23/23  0744   WBC 7.1 6.1   HGB 11.8 12.0   HCT 35.3 36.0    259     Recent Labs     01/25/23  0148 01/24/23 0226 01/23/23  0744    138 141   K 4.0 4.2 2.6*    105 108   CO2 26 26 24   * 96 86   BUN 13 12 9   CREA 0.86 0.81 0.70   CA 9.4 9.5 7.9*   ALB  --  3.5 3.0*   TBILI  --  0.5 0.5   ALT  --  22 22       Signed: Shaun Tse MD

## 2023-01-26 NOTE — DISCHARGE SUMMARY
Hospitalist Discharge Summary     Patient ID:  Gaurav Corea  [id number]  3 y.o.  1935    PCP on record: Hunter Martinez MD    Admit date: 1/23/2023  Discharge date and time: 1/26/2023      DISCHARGE DIAGNOSIS:      Asthma exacerbation    CONSULTATIONS:  IP CONSULT TO PULMONOLOGY    Excerpted HPI from H&P of Enedina Dorman MD:    80 Y. O. female     Known asthma since age 48 per her report     Lifelong non smoker     Since June 2022 respiratory status has been worsening              Limited by GALAN              This is her 3rd admit since Oct 2022              Follows with Dr Raimundo Menendez in the pulmonology clinic  Uses home nebulizer and inhalers     Trouble tolerating steroids              Ophthalmology has raised concerns about her right eye pressure with glaucoma increasing              Apparently had a reaction to prednisone with Rash after Oct 2022 admit                          She was discharged on prednisone after that admission              December 2022 admit received Solu-Medrol without any documented problems                            Diagnosed with Takotsubo's cardiomyopathy 10/2022              Presented with elevated HS troponin 4390  Last heart cath 10/18/22 with Dr Margarita Lees showed:   1. No angiographic epicardial coronary disease  2. Elevated LVEDP  3. Mildly to moderately reduced LVEF 40%  4. Likely TakoTsubo cardiomyopathy  5.  Mild MR  Managed medically with ASA, Lopressor, Lipitor     Reports that \"my breathing has not being good since June\"  In between hospitalizations she has never fully recovered  Past few days has had increasing cough              Especially severe overnight \"it would not stop\" kept me up all night  Increasing shortness of breath especially the past 24 hours, even wearing her baseline oxygen  No fevers, sore throat, chest pain, nausea vomiting, diarrhea              Mildly generalized soreness in abdomen \"from coughing so much\"  Positive diffuse myalgias     EMS  Sats 85% on baseline oxygen, increased to 4 L  Actively wheezing  Note documents giving 10 mg of IV Decadron  ______________________________________________________________________  DISCHARGE SUMMARY/HOSPITAL COURSE:  for full details see H&P, daily progress notes, labs, consult notes. Acute on chronic respiratory failure with hypoxia  POA  Asthma with acute exacerbation POA  Eosinophilia POA elevated 1000 to 1500 range last 3 admits  Presents with several day increasing cough and SOB              PCP start azithromycin, completed 2 days  Known asthma with Dr Neno Carpenter wears 1liter O2 NC during day and 2 liters at night  Now hypoxic on 2 liters when EMS came 85%, wheezing  pCXR reviewed, no ASD  O2 currently 4 liters, continue to wean  Subjectively less SOB  Nebs, montelukast  Trouble tolerating steroids                          1. Glaucoma, risk of  increased eye pressure                          2. Reaction to an unknown steroid last admit  IV decadron reportedly given with EMS  tolerating dexamethasone  Pulmonary consult Dr Yaz Nichols following  Procalcitonin < 0.05  PT consult  Patient to be sent home on Decadron taper with 30 mg for 1 week followed by 2 mg for 1 week followed by 1 mg for 1 week. Patient will follow up with Dr. Yaz Nichols in the office to consider biologic. Care was discussed with both the patient and her daughter at bedside. 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 No angiographic epicardial coronary disease  2. Elevated LVEDP  3. Mildly to moderately reduced LVEF 40%  4. Likely TakoTsubo cardiomyopathy  5.  Mild MR     Glaucoma POA  Continue home eyedrops  Concerned about using steroids as it may increase her pressure     Anxiety POA  Continue home Ativan           _______________________________________________________________________  Patient seen and examined by me on discharge day. Pertinent Findings:  Gen:    Not in distress  Chest: Minor expiratory wheezing bilaterally but much improved since admission  CVS:   Regular rhythm. No edema  Abd:  Soft, not distended, not tender  Neuro:  Alert, Oriented x 4, grossly non focal exam  _______________________________________________________________________  DISCHARGE MEDICATIONS:   Current Discharge Medication List        START taking these medications    Details   guaiFENesin-dextromethorphan (ROBITUSSIN DM) 100-10 mg/5 mL syrup Take 10 mL by mouth four (4) times daily for 10 days. Qty: 400 mL, Refills: 0  Start date: 1/26/2023, End date: 2/5/2023      dexAMETHasone (DECADRON) 1 mg tablet Take 3 Tablets by mouth Daily (before breakfast) for 7 days, THEN 2 Tablets Daily (before breakfast) for 7 days, THEN 1 Tablet Daily (before breakfast) for 7 days. Qty: 42 Tablet, Refills: 0  Start date: 1/26/2023, End date: 2/16/2023           CONTINUE these medications which have NOT CHANGED    Details   aspirin 81 mg chewable tablet Take 81 mg by mouth daily. Indications: treatment to prevent a heart attack      apraclonidine (IOPIDINE) 0.5 % ophthalmic solution Administer 1 Drop to right eye every eight (8) hours. Indications: wide-angle glaucoma      bimatoprost (LUMIGAN) 0.01 % ophthalmic drops Administer 1 Drop to right eye nightly. OXYGEN-AIR DELIVERY SYSTEMS 2 L/min by IntraNASal route as needed (shortness of breath). Indications: Shortness of breath      albuterol (PROVENTIL VENTOLIN) 2.5 mg /3 mL (0.083 %) nebu 2.5 mg by Nebulization route two (2) times a day. Indications: bronchospasm prevention      albuterol (PROVENTIL HFA, VENTOLIN HFA, PROAIR HFA) 90 mcg/actuation inhaler Take 1 Puff by inhalation as needed for Wheezing.   Qty: 18 g, Refills: 2      benzonatate (TESSALON) 100 mg capsule TAKE 1 CAPSULE BY MOUTH THREE TIMES A DAY AS NEEDED FOR COUGH  Qty: 45 Capsule, Refills: 0      acetaminophen (TYLENOL) 325 mg tablet Take 2 Tablets by mouth every six (6) hours as needed for Pain or Fever. Qty: 60 Tablet, Refills: 0      guaiFENesin ER (MUCINEX) 600 mg ER tablet Take 1 Tablet by mouth every twelve (12) hours. Qty: 60 Tablet, Refills: 0      melatonin 3 mg tablet Take 1 Tablet by mouth nightly as needed for Insomnia. Qty: 30 Tablet, Refills: 0      ondansetron hcl (Zofran) 4 mg tablet Take 1 Tablet by mouth every eight (8) hours as needed for Nausea. Qty: 20 Tablet, Refills: 0      brimonidine 0.025 % drop Apply 1 Drop to eye two (2) times a day. Right eye      dorzolamide (TRUSOPT) 2 % ophthalmic solution Administer 1 Drop to right eye three (3) times daily. metoprolol tartrate (LOPRESSOR) 25 mg tablet Take 25 mg by mouth daily. indapamide (LOZOL) 1.25 mg tablet Take 1.25 mg by mouth daily. montelukast (SINGULAIR) 10 mg tablet Take 10 mg by mouth daily. pantoprazole (PROTONIX) 40 mg tablet Take 40 mg by mouth daily. multivitamin (ONE A DAY) tablet Take 1 Tab by mouth daily. lorazepam (ATIVAN) 0.5 mg tablet Take 0.5 mg by mouth nightly. STOP taking these medications       methylPREDNISolone (MEDROL DOSEPACK) 4 mg tablet Comments:   Reason for Stopping:               My Recommended Diet, Activity, Wound Care, and follow-up labs are listed in the patient's Discharge Insturctions which I have personally completed and reviewed.   Risk of deterioration: Moderate    Condition at Discharge:  Stable  _____________________________________________________________________    Disposition  Home with family, no needs  ____________________________________________________________________    Care Plan discussed with:   Patient, Family, RN, Care Manager, Consultant    ____________________________________________________________________    Code Status: Full Code  ____________________________________________________________________      Condition at Discharge:  Stable  _____________________________________________________________________  Follow up with:   PCP : Chirag Jones MD  Follow-up Information       Follow up With Specialties Details Why Contact Info    [facility] Follow up Medications brought to pharmacy for [address] [place] [address], stored in CII safe    RN to come to pharmacy to retreive home meds at discharge    - Brimonidine  - Dorzolamide  - Lumigan  - Indapamide  - Atorvastatin  - Metoprolol ER  - Pantoprazole  - Apraclonidine  - Monteluclast  - Bayere ASA 81 gm ER  - Azithromycin 250 mg   - LORAZEPAM 0.5 MG TABS  #146, COUNTED IN PRESENCE OF ER RN [facility] [address]  [address] [place] [address], 601 [facility]   [id number] [place] [address] 310 [facility]      Ara Mackey MD Internal Medicine Physician, Pulmonary Disease Follow up  [address] [phone]      Lev Lassiter. [address] [facility].  [phone]                  Total time in minutes spent coordinating this discharge (includes going over instructions, follow-up, prescriptions, and preparing report for sign off to her PCP) :  35 minutes    Signed:  Susan Richard MD

## 2023-01-26 NOTE — PROGRESS NOTES
Problem: Patient Education: Go to Patient Education Activity  Goal: Patient/Family Education  Outcome: Resolved/Met     Problem: Patient Education: Go to Patient Education Activity  Goal: Patient/Family Education  Outcome: Resolved/Met     Problem: Falls - Risk of  Goal: *Absence of Falls  Description: Document Doretha Minium Fall Risk and appropriate interventions in the flowsheet.   Outcome: Resolved/Met     Problem: Patient Education: Go to Patient Education Activity  Goal: Patient/Family Education  Outcome: Resolved/Met

## 2023-01-27 NOTE — PROGRESS NOTES
FARAZ received a call from [facility] with [facility] for [address] orders- CM reviewed chart and orders placed for [facility] SN and PT.  NALINI Nance

## 2023-01-28 ENCOUNTER — HOME CARE VISIT (OUTPATIENT)
Dept: SCHEDULING | Facility: HOME HEALTH | Age: 88
End: 2023-01-28
Payer: MEDICARE

## 2023-01-28 VITALS
OXYGEN SATURATION: 97 % | HEART RATE: 63 BPM | RESPIRATION RATE: 16 BRPM | DIASTOLIC BLOOD PRESSURE: 64 MMHG | TEMPERATURE: 98.7 F | SYSTOLIC BLOOD PRESSURE: 122 MMHG | WEIGHT: 110 LBS | HEIGHT: 63 IN | BODY MASS INDEX: 19.49 KG/M2

## 2023-01-28 PROCEDURE — G0299 HHS/HOSPICE OF RN EA 15 MIN: HCPCS

## 2023-01-30 ENCOUNTER — HOME CARE VISIT (OUTPATIENT)
Dept: SCHEDULING | Facility: HOME HEALTH | Age: 88
End: 2023-01-30
Payer: MEDICARE

## 2023-01-30 VITALS
WEIGHT: 107 LBS | RESPIRATION RATE: 18 BRPM | OXYGEN SATURATION: 97 % | BODY MASS INDEX: 18.95 KG/M2 | HEART RATE: 62 BPM | TEMPERATURE: 98.5 F | DIASTOLIC BLOOD PRESSURE: 60 MMHG | SYSTOLIC BLOOD PRESSURE: 118 MMHG

## 2023-01-30 PROCEDURE — G0151 HHCP-SERV OF PT,EA 15 MIN: HCPCS

## 2023-01-30 PROCEDURE — G0299 HHS/HOSPICE OF RN EA 15 MIN: HCPCS

## 2023-01-30 NOTE — HOME HEALTH
Subjective: \"I'm hoping to be weaned off of this oxygen. The nebulizer treatment and cough syrup is helping clear up some of this congestion. \"  Falls since last visit NO(if yes complete the Fall Tracking Form and include bsrifallreport):   Caregiver involvement changes: none  Home health supplies by type and quantity ordered/delivered this visit include: none    Clinician asked if patient has had any physician contact since last home care visit and patient states: NO  Clinician asked if patient has any new or changed medications and patient states:  NO   If Yes, were medications reconciled? N/A   Was the certifying physician notified of changes in medications? N/A     Clinical assessment (what this visit means for the patient overall and need for ongoing skilled care) and progress or lack of progress towards SPECIFIC goals: Pt. seen for education on pneumonia, fall prevention, and nebulizer/inhaler medication. Pt. reports having appointment with PCP this Wednesday for hospital follow-up and appointment Thursday with pulmonologist. O2 sats this visit 97% on 2L NC. Visits are needed to meet educational goals. Written Teaching Material Utilized: care plan intervention teaching on pneumonia relapse prevention    Interdisciplinary communication with: N/A    Discharge planning as follows:  When goals are met    Specific plan for next visit: education on medication regimen

## 2023-01-31 VITALS
OXYGEN SATURATION: 97 % | RESPIRATION RATE: 18 BRPM | DIASTOLIC BLOOD PRESSURE: 60 MMHG | HEART RATE: 54 BPM | TEMPERATURE: 97.6 F | SYSTOLIC BLOOD PRESSURE: 110 MMHG

## 2023-01-31 NOTE — HOME HEALTH
Skilled reason for admission/summary of clinical condition: reason for admission/summary of clinical condition: 80year old female patient of Dr. Deedee Rao admitted to home care after [facility] to 1/26 due to acute respiratory failure with hypoxia. Patient had recent hospitalization at [facility] from 12/24-12/28/22 for COPD exacerbation and acute respiratory failure with hypoxia, community-acquired pneumonia and asthma exacerbation. Patient discharged home on 3LPM of oxygen via NC. PMH of hypertension, glaucoma right eye. anxiety, fibromyalgia, dyslipidemia, COPD. Patient lives alone in senior living apartment on ground floor. Diagnosis: Acute respiratory failure with hypoxia   Patient admitted to home care for PT. Physical Therapy needed for le strength training, transfer training, gait training, standing balance retraining, monitoring vitals with activity. Subjective: I am much weaker than the last time you saw me. I need my walker now  Caregiver: daughter  Caregiver assists with: Medications, Meals, IADL, Transportation and Housekeeping Caregiver unable to assist with: na. Caregiver is available Regularly Caregiver is not present at this visit and did not participate with clinician. Medications reconciled and all medications are available in the home this visit. The following education was provided regarding medications: medication interactions and look alike medications: . Patient/CG able to demonstrate knowledge through teach back with 75 percent accuracy. Medications are effective at this time. No notified of any discrepancies/medication interactions . A list of reconciled medications has been given to the patient/caregiver and a copy has been uploaded to media. Home health supplies by type and quantity ordered/delivered this visit include: na    Patient/caregiver instructed on plan of care and are agreeable to plan of care at this time.       Clinician reviewed orientation to home health booklet with patient/caregiver including agency phone number, agency complaint process, state hotline number, as well as joint commission's quality hotline number. Consent forms signed. Patient at risk for falls Yes:      Discharge planning discussed with patient and caregiver. Discharge planning as follows: Will discharge when the patient has reached their maximum functional potential and maximum safety in their home and When goals are met Patient/caregiver did verbalize agreement with discharge planning. Clinical Assessment (What this means for the patient overall and need for ongoing skilled care):Patient present with sob, le weakness limiting her transfers and ambulation. Patient lives alone and receives limited assistance from her daughter. prior to hospital she was independent in all aspects of her care doing her laundry and retrieving her mail. Patient goals are to get back to taking care of herself and retrieving her own mail and doing her own laundry. Patient is at an increased risk for falls evidenced by TUG score is 41 seconds and tinetti score is 13 out of 28      Written Teaching Material Utilized: written instructions for hep provided    Specific plan for next visit: Re-instruct patient/caregiver on hep and upgrade if able, transfer training, gait training, standing balance retraining, monitor p ox with activity      Plan of care and admission to home health status called to attending physician. The following practitioner has agreed to sign the ongoing POC , and was notified of the following:  admission to home health status, plan of care including visit frequency of PT      Interdisciplinary communication with: Jean-Claude Hernandez RN for the purpose of [employer] collaboration and POC collaboration      PCP: Summer Bose    Next scheduled doctor appointment:  Wednesday with PCP  Patient/Caregiver instructed to keep follow up appointment because lack of follow through with physician appointments could result in discontinuation of home care services for non-compliance. Patient/Caregiver verbalize knowledge of above through teach back with 75 percent accuracy. Emergency Preparedness: Patient/Caregiver instructed in the following:    Have one gallon of water per person for at least 3 days on hand. Have non-perishable food for at least 3 days that do not need to be cooked. Have flashlights and batteries. Charge your cell phones and any back up lithium batteries for your cell phones. Have 3+ days of back up oxygen in your home. Have a phone in your home that is hard wired and does not require power. Have medication for a week in your home. Make sure you have a caregiver in the home to provide care in case your home health nurse cannot get to your house. Make sure you have all of your paperwork i.e. written emergency preparedness plan, Identification, insurance cards, DME phone number, physician and pharmacy phone number, agency phone number, and your medications in one place for easy access and in a zip lock bag to protect them. Take your Admission Handbook, written emergency preparedness plan, written medication list and folder if you relocate in the event of an emergency, if possible. Call agency if you relocate so we can contact you. Patient/Caregiver verbalize knowledge of above through teach back with 75 percent accuracy.

## 2023-02-01 ENCOUNTER — HOME CARE VISIT (OUTPATIENT)
Dept: SCHEDULING | Facility: HOME HEALTH | Age: 88
End: 2023-02-01
Payer: MEDICARE

## 2023-02-01 VITALS
WEIGHT: 111 LBS | DIASTOLIC BLOOD PRESSURE: 62 MMHG | OXYGEN SATURATION: 96 % | TEMPERATURE: 98 F | SYSTOLIC BLOOD PRESSURE: 98 MMHG | RESPIRATION RATE: 16 BRPM | HEART RATE: 53 BPM | BODY MASS INDEX: 19.66 KG/M2

## 2023-02-01 PROCEDURE — G0299 HHS/HOSPICE OF RN EA 15 MIN: HCPCS

## 2023-02-03 ENCOUNTER — HOME CARE VISIT (OUTPATIENT)
Dept: SCHEDULING | Facility: HOME HEALTH | Age: 88
End: 2023-02-03
Payer: MEDICARE

## 2023-02-03 VITALS
DIASTOLIC BLOOD PRESSURE: 61 MMHG | RESPIRATION RATE: 16 BRPM | OXYGEN SATURATION: 97 % | SYSTOLIC BLOOD PRESSURE: 104 MMHG | HEART RATE: 56 BPM | TEMPERATURE: 97 F

## 2023-02-03 PROCEDURE — G0151 HHCP-SERV OF PT,EA 15 MIN: HCPCS

## 2023-02-03 PROCEDURE — G0299 HHS/HOSPICE OF RN EA 15 MIN: HCPCS

## 2023-02-04 VITALS
TEMPERATURE: 98 F | SYSTOLIC BLOOD PRESSURE: 100 MMHG | HEART RATE: 60 BPM | OXYGEN SATURATION: 96 % | DIASTOLIC BLOOD PRESSURE: 60 MMHG | RESPIRATION RATE: 18 BRPM

## 2023-02-04 NOTE — HOME HEALTH
Subjective: patient reports feeling stronger today. out to both Dr Sarai Pratt and Dr Harvey Smalls this week. blood pressure medication decreased   Falls since last visit NO(if yes complete the Fall Tracking Form and include bsrifallreport):    Caregiver involvement changes: patient alone during vsiit. receives asst frm dtr as needed    Home health supplies by type and quantity ordered/delivered this visit include: na      Clinician asked if patient has had any physician contact since last home care visit and patient states:yes   Clinician asked if patient has any new or changed medications and patient states:yes   If Yes, were medications reconciled? YES    Was the certifying physician notified of changes in medications? N/A      Clinical assessment (what this visit means for the patient overall and need for ongoing skilled care) and progress or lack of progress towards SPECIFIC goals:  Patient reports MD appointments went well. changes to metoprolol by Dr Sarai Pratt made to med recAmbrocio Solitario for fall prevention, oxygen safety and energy conservation tech provided. patient with good understanding during verbal teach back. pt tolerated upgraded reps in hep well. Gait training with spc today. pt progressing in all areas. will cont to work towards patient ind in home     Written Teaching Material Utilized:reviewed  written instructions for hep provided    Interdisciplinary communication with: bal    Discharge planning as follows:  Will discharge when the patient has reached their maximum functional potential and maximum safety in their home and When goals are met      Specific plan for next visit: Re-instruct patient/caregiver on COPD and energy conservation, gait training, standing balance retraining, home ex program and upgrade if able

## 2023-02-06 ENCOUNTER — HOME CARE VISIT (OUTPATIENT)
Dept: SCHEDULING | Facility: HOME HEALTH | Age: 88
End: 2023-02-06
Payer: MEDICARE

## 2023-02-06 PROCEDURE — G0299 HHS/HOSPICE OF RN EA 15 MIN: HCPCS

## 2023-02-07 ENCOUNTER — HOME CARE VISIT (OUTPATIENT)
Dept: SCHEDULING | Facility: HOME HEALTH | Age: 88
End: 2023-02-07
Payer: MEDICARE

## 2023-02-07 VITALS
SYSTOLIC BLOOD PRESSURE: 100 MMHG | TEMPERATURE: 98.6 F | DIASTOLIC BLOOD PRESSURE: 57 MMHG | WEIGHT: 112 LBS | HEART RATE: 56 BPM | OXYGEN SATURATION: 98 % | BODY MASS INDEX: 19.84 KG/M2 | RESPIRATION RATE: 16 BRPM

## 2023-02-07 PROCEDURE — G0151 HHCP-SERV OF PT,EA 15 MIN: HCPCS

## 2023-02-08 ENCOUNTER — HOME CARE VISIT (OUTPATIENT)
Dept: SCHEDULING | Facility: HOME HEALTH | Age: 88
End: 2023-02-08
Payer: MEDICARE

## 2023-02-08 VITALS
HEART RATE: 60 BPM | TEMPERATURE: 97.5 F | SYSTOLIC BLOOD PRESSURE: 130 MMHG | OXYGEN SATURATION: 98 % | RESPIRATION RATE: 18 BRPM | DIASTOLIC BLOOD PRESSURE: 68 MMHG

## 2023-02-08 VITALS
SYSTOLIC BLOOD PRESSURE: 111 MMHG | WEIGHT: 113 LBS | BODY MASS INDEX: 20.02 KG/M2 | HEART RATE: 61 BPM | DIASTOLIC BLOOD PRESSURE: 61 MMHG | TEMPERATURE: 97.8 F | RESPIRATION RATE: 16 BRPM | OXYGEN SATURATION: 96 %

## 2023-02-08 PROCEDURE — G0299 HHS/HOSPICE OF RN EA 15 MIN: HCPCS

## 2023-02-08 NOTE — HOME HEALTH
Subjective: patient reports only wearing oxygen at night  Falls since last visit NO(if yes complete the Fall Tracking Form and include bsrifallreport):   Caregiver involvement changes: patient alone during vsiit. receives asst frm dtr as needed   Home health supplies by type and quantity ordered/delivered this visit include: na   Clinician asked if patient has had any physician contact since last home care visit and patient states:no  Clinician asked if patient has any new or changed medications and patient states:no   If Yes, were medications reconciled? YES    Was the certifying physician notified of changes in medications? N/A   Clinical assessment (what this visit means for the patient overall and need for ongoing skilled care) and progress or lack of progress towards SPECIFIC goals:  Patient reports having more energy. She is able to do more for herself. patient is ind and safe with shower transfers. patient tolerated increaesd ambulation distance today with spc. p ox raned from 98 to 95 with activity off of oxygen. Patient is improving in use of inspirometer and hep. pt is improving in dynamic standing balance. patient has scheduled eye surgery 2/14 discussed change in vision next week may effect her balance  Written Teaching Material Utilized:reviewed  written instructions for hep provided   Interdisciplinary communication with: [facility] RN  Discharge planning as follows:  Will discharge when the patient has reached their maximum functional potential and maximum safety in their home and When goals are met   Specific plan for next visit: Re-instruct patient/caregiver on COPD and energy conservation, gait training, standing balance retraining, home ex program and upgrade if able

## 2023-02-08 NOTE — PROGRESS NOTES
Physician Progress Note      PATIENT:               Rajesh Boyd  CSN #:                  [id number]  :                       1935  ADMIT DATE:       2023 7:19 AM  DISCH DATE:        2023 3:30 PM  RESPONDING  PROVIDER #:        Rosa Isela Benavidez MD          QUERY TEXT:    Dr Souleymane Meredith  Patient admitted with acute asthma exacerbation. Noted documentation of acute respiratory failure. In order to support the diagnosis of acute respiratory failure, please include additional clinical indicators in your documentation. Or please document if the diagnosis of acute respiratory failure has been ruled out after further study. The medical record reflects the following:  Risk Factors: presents with severe persistent asthma. Clinical Indicators: noted sats on 2 liters at home of 85%, respiratory rates 27-25, noted pulmonary efforts normal;  Treatment: Decadron, Biologic treatment, oxygen as needed,            Acute Respiratory Failure Clinical Indicators per  MS-DRG Training Guide and Quick Reference Guide:  pO2 < 60 mmHg or SpO2 (pulse oximetry) < 91% breathing room air  pCO2 > 50 and pH < 7.35  P/F ratio (pO2 / FIO2) < 300  pO2 decrease or pCO2 increase by 10 mmHg from baseline (if known)  Supplemental oxygen of 40% or more  Presence of respiratory distress, tachypnea, dyspnea, shortness of breath, wheezing  Unable to speak in complete sentences  Use of accessory muscles to breathe  Extreme anxiety and feeling of impending doom  Tripod position  Confusion/altered mental status/obtunded  Options provided:  -- Acute Respiratory Failure as evidenced by, Please document evidence.   -- Acute Respiratory Failure ruled out after study  -- Acute Respiratory Failure ruled out after study and Chronic Respiratory Failure confirmed  -- Other - I will add my own diagnosis  -- Disagree - Not applicable / Not valid  -- Disagree - Clinically unable to determine / Unknown  -- Refer to Clinical Documentation Reviewer    PROVIDER RESPONSE TEXT:    This patient is in acute respiratory failure as evidenced by 85% pulse ox at home    Query created by:  Anabela Sow on 2/1/2023 1:24 PM      Electronically signed by:  Thomas Chamorro MD 2/8/2023 7:37 AM

## 2023-02-10 ENCOUNTER — HOME CARE VISIT (OUTPATIENT)
Dept: SCHEDULING | Facility: HOME HEALTH | Age: 88
End: 2023-02-10
Payer: MEDICARE

## 2023-02-10 VITALS
BODY MASS INDEX: 20.37 KG/M2 | HEART RATE: 60 BPM | DIASTOLIC BLOOD PRESSURE: 60 MMHG | TEMPERATURE: 98.1 F | SYSTOLIC BLOOD PRESSURE: 105 MMHG | OXYGEN SATURATION: 99 % | RESPIRATION RATE: 16 BRPM | WEIGHT: 115 LBS

## 2023-02-10 PROCEDURE — G0151 HHCP-SERV OF PT,EA 15 MIN: HCPCS

## 2023-02-10 PROCEDURE — G0299 HHS/HOSPICE OF RN EA 15 MIN: HCPCS

## 2023-02-11 VITALS
OXYGEN SATURATION: 98 % | RESPIRATION RATE: 16 BRPM | DIASTOLIC BLOOD PRESSURE: 60 MMHG | HEART RATE: 60 BPM | SYSTOLIC BLOOD PRESSURE: 116 MMHG | TEMPERATURE: 97.6 F

## 2023-02-11 NOTE — HOME HEALTH
Subjective: patient reports feeling much stronger and able to accomplish more during the day  her shot at Dr Gómez Magana office yesterday went well. no complaints  has scheduled eye surgery on Tuesday    Falls since last visit NO(if yes complete the Fall Tracking Form and include bsrifallreport):   Caregiver involvement changes: patient alone during vsiit. receives asst frm dtr as needed   Home health supplies by type and quantity ordered/delivered this visit include: na   Clinician asked if patient has had any physician contact since last home care visit and patient states: yes  Clinician asked if patient has any new or changed medications and patient states:no   If Yes, were medications reconciled? YES   Was the certifying physician notified of changes in medications? N/A     Clinical assessment (what this visit means for the patient overall and need for ongoing skilled care) and progress or lack of progress towards SPECIFIC goals:  Patient reports having more energy. She is able to do more for herself. patient is improving in ambulaiton. gait training today without asst device. patient is improving in dynamic standing balance and strength. patient tolerated upgraded hep well. patient has scheduled eye surgery 2/14 discussed change in vision next week may effect her balance    Written Teaching Material Utilized:reviewed  written instructions for hep provided     Interdisciplinary communication with: [facility] RN    Discharge planning as follows:  Will discharge when the patient has reached their maximum functional potential and maximum safety in their home and When goals are met   Specific plan for next visit: Re-instruct patient/caregiver on COPD and energy conservation, gait training, standing balance retraining, home ex program and upgrade if able

## 2023-02-13 ENCOUNTER — HOME CARE VISIT (OUTPATIENT)
Dept: SCHEDULING | Facility: HOME HEALTH | Age: 88
End: 2023-02-13
Payer: MEDICARE

## 2023-02-13 VITALS
DIASTOLIC BLOOD PRESSURE: 62 MMHG | SYSTOLIC BLOOD PRESSURE: 122 MMHG | TEMPERATURE: 97.6 F | OXYGEN SATURATION: 97 % | HEART RATE: 60 BPM | RESPIRATION RATE: 16 BRPM

## 2023-02-13 PROCEDURE — G0151 HHCP-SERV OF PT,EA 15 MIN: HCPCS

## 2023-02-14 ENCOUNTER — HOME CARE VISIT (OUTPATIENT)
Dept: SCHEDULING | Facility: HOME HEALTH | Age: 88
End: 2023-02-14
Payer: MEDICARE

## 2023-02-14 VITALS
RESPIRATION RATE: 20 BRPM | DIASTOLIC BLOOD PRESSURE: 62 MMHG | OXYGEN SATURATION: 98 % | HEART RATE: 63 BPM | TEMPERATURE: 97.9 F | SYSTOLIC BLOOD PRESSURE: 118 MMHG

## 2023-02-14 PROCEDURE — G0299 HHS/HOSPICE OF RN EA 15 MIN: HCPCS

## 2023-02-16 ENCOUNTER — HOME CARE VISIT (OUTPATIENT)
Dept: SCHEDULING | Facility: HOME HEALTH | Age: 88
End: 2023-02-16
Payer: MEDICARE

## 2023-02-16 PROCEDURE — G0300 HHS/HOSPICE OF LPN EA 15 MIN: HCPCS

## 2023-02-17 ENCOUNTER — HOME CARE VISIT (OUTPATIENT)
Dept: HOME HEALTH SERVICES | Facility: HOME HEALTH | Age: 88
End: 2023-02-17
Payer: MEDICARE

## 2023-02-20 VITALS
TEMPERATURE: 98.7 F | HEART RATE: 61 BPM | RESPIRATION RATE: 20 BRPM | SYSTOLIC BLOOD PRESSURE: 115 MMHG | OXYGEN SATURATION: 98 % | DIASTOLIC BLOOD PRESSURE: 58 MMHG

## 2023-02-20 NOTE — HOME HEALTH
Subjective: Pt states she had surgery for glaucoma recently   Falls since last visit NO(if yes complete the Fall Tracking Form and include bsrifallreport):   Caregiver involvement changes: Pt is independent with care   Home health supplies by type and quantity ordered/delivered this visit include: none     Clinician asked if patient has had any physician contact since last home care visit and patient states: NO  Clinician asked if patient has any new or changed medications and patient states:  NO   If Yes, were medications reconciled? NO   Was the certifying physician notified of changes in medications? NO     Clinical assessment (what this visit means for the patient overall and need for ongoing skilled care) and progress or lack of progress towards SPECIFIC goals: Pt continues to require SN for education     Written Teaching Material Utilized: N/A    Interdisciplinary communication with: N/A for the purpose of NA     Discharge planning as follows:  Is no longer homebound    Specific plan for next visit: Continue with education

## 2023-02-21 ENCOUNTER — HOME CARE VISIT (OUTPATIENT)
Dept: SCHEDULING | Facility: HOME HEALTH | Age: 88
End: 2023-02-21
Payer: MEDICARE

## 2023-02-21 ENCOUNTER — HOME CARE VISIT (OUTPATIENT)
Dept: HOME HEALTH SERVICES | Facility: HOME HEALTH | Age: 88
End: 2023-02-21
Payer: MEDICARE

## 2023-02-21 VITALS
WEIGHT: 114 LBS | HEART RATE: 63 BPM | BODY MASS INDEX: 20.19 KG/M2 | SYSTOLIC BLOOD PRESSURE: 110 MMHG | OXYGEN SATURATION: 95 % | DIASTOLIC BLOOD PRESSURE: 54 MMHG | RESPIRATION RATE: 18 BRPM | TEMPERATURE: 99.7 F

## 2023-02-21 PROCEDURE — G0299 HHS/HOSPICE OF RN EA 15 MIN: HCPCS

## 2023-02-24 ENCOUNTER — HOME CARE VISIT (OUTPATIENT)
Dept: SCHEDULING | Facility: HOME HEALTH | Age: 88
End: 2023-02-24
Payer: MEDICARE

## 2023-02-24 PROCEDURE — G0151 HHCP-SERV OF PT,EA 15 MIN: HCPCS

## 2023-02-24 PROCEDURE — G0300 HHS/HOSPICE OF LPN EA 15 MIN: HCPCS

## 2023-02-25 VITALS
TEMPERATURE: 97.6 F | HEART RATE: 66 BPM | SYSTOLIC BLOOD PRESSURE: 110 MMHG | DIASTOLIC BLOOD PRESSURE: 60 MMHG | OXYGEN SATURATION: 96 % | RESPIRATION RATE: 16 BRPM

## 2023-02-25 NOTE — HOME HEALTH
Subjective: patient reports only wearing oxygen at night. vision is improving and patient is cleared to resume home PT per eye surgeon    Falls since last visit NO(if yes complete the Fall Tracking Form and include bsrifallreport):   Caregiver involvement changes: patient alone during vsiit. receives asst frm dtr as needed   Home health supplies by type and quantity ordered/delivered this visit include: na   Clinician asked if patient has had any physician contact since last home care visit and patient states:yes  Clinician asked if patient has any new or changed medications and patient states:yes  If Yes, were medications reconciled? YES   Was the certifying physician notified of changes in medications? N/A - SN already updated    Clinical assessment (what this visit means for the patient overall and need for ongoing skilled care) and progress or lack of progress towards SPECIFIC goals:  Patient reports her eye is healing well. eye surgeon said it was safe for her to resume home PT. Patient reports already vision is improving after her surgery. patient has had a few PT missed visits over an abundance of caution due to eye surgery  reviewed fall prevention and oxygen safety. no issues noted. resumed home PT. instructed pt to resume performing 2 x daily. pt is improving in dynamic standing balance and ambulaiton wiithout asst device. due to eye surgery patient was using rw in home but feels safe encough to start gait training without asst device    Written Teaching Material Utilized:reviewed  written instructions for hep provided     Interdisciplinary communication with: bal    Discharge planning as follows:  Will discharge when the patient has reached their maximum functional potential and maximum safety in their home and When goals are met     Specific plan for next visit: Re-instruct patient/caregiver on COPD and energy conservation, gait training, standing balance retraining, home ex program and upgrade if able

## 2023-02-27 ENCOUNTER — HOME CARE VISIT (OUTPATIENT)
Dept: SCHEDULING | Facility: HOME HEALTH | Age: 88
End: 2023-02-27
Payer: MEDICARE

## 2023-02-27 VITALS
SYSTOLIC BLOOD PRESSURE: 105 MMHG | RESPIRATION RATE: 20 BRPM | DIASTOLIC BLOOD PRESSURE: 55 MMHG | OXYGEN SATURATION: 98 % | BODY MASS INDEX: 20.46 KG/M2 | WEIGHT: 115.5 LBS | HEART RATE: 64 BPM | TEMPERATURE: 98.3 F

## 2023-02-27 PROCEDURE — G0151 HHCP-SERV OF PT,EA 15 MIN: HCPCS

## 2023-02-27 NOTE — HOME HEALTH
Subjective: Pt states she was extremely fatigued yesterday after being out for a long time   Falls since last visit NO(if yes complete the Fall Tracking Form and include bsrifallreport):   Caregiver involvement changes: Pt is independent with care   Home health supplies by type and quantity ordered/delivered this visit include: none     Clinician asked if patient has had any physician contact since last home care visit and patient states: NO  Clinician asked if patient has any new or changed medications and patient states:  NO  If Yes, were medications reconciled? NO  Was the certifying physician notified of changes in medications? NO     Clinical assessment (what this visit means for the patient overall and need for ongoing skilled care) and progress or lack of progress towards SPECIFIC goals: Continue with education     Written Teaching Material Utilized: N/A    Interdisciplinary communication with: N/A for the purpose of NA     Discharge planning as follows:  Is no longer homebound    Specific plan for next visit: Continue with medication education

## 2023-02-28 ENCOUNTER — HOME CARE VISIT (OUTPATIENT)
Dept: SCHEDULING | Facility: HOME HEALTH | Age: 88
End: 2023-02-28
Payer: MEDICARE

## 2023-02-28 VITALS
OXYGEN SATURATION: 97 % | HEART RATE: 83 BPM | SYSTOLIC BLOOD PRESSURE: 107 MMHG | TEMPERATURE: 98.1 F | RESPIRATION RATE: 22 BRPM | DIASTOLIC BLOOD PRESSURE: 62 MMHG

## 2023-02-28 VITALS
TEMPERATURE: 97.6 F | OXYGEN SATURATION: 98 % | HEART RATE: 60 BPM | RESPIRATION RATE: 16 BRPM | DIASTOLIC BLOOD PRESSURE: 62 MMHG | SYSTOLIC BLOOD PRESSURE: 122 MMHG

## 2023-02-28 PROCEDURE — G0299 HHS/HOSPICE OF RN EA 15 MIN: HCPCS

## 2023-02-28 NOTE — HOME HEALTH
Subjective: patient reports did not wear oxygen last night. she has a follow up appointment with Dr Oneida Hall since last visit NO(if yes complete the Fall Tracking Form and include bsrifallreport):   Caregiver involvement changes: patient alone during vsiit. receives asst frm dtr as needed   Home health supplies by type and quantity ordered/delivered this visit include: bal   Clinician asked if patient has had any physician contact since last home care visit and patient states:yes  Clinician asked if patient has any new or changed medications and patient states:yes  If Yes, were medications reconciled? YES   Was the certifying physician notified of changes in medications? N/A - SN already updated    Clinical assessment (what this visit means for the patient overall and need for ongoing skilled care) and progress or lack of progress towards SPECIFIC goals:  Patient reports her eye is healing well. patient is improving in home ex program and required less vc. reports did perform daily over the weekend. pt is improving in dynamic standing balance and ambulaiton wiithout asst device. Written Teaching Material Utilized:reviewed  written instructions for hep provided   Interdisciplinary communication with: bal  Discharge planning as follows:  Will discharge when the patient has reached their maximum functional potential and maximum safety in their home and When goals are met   Specific plan for next visit: Re-instruct patient/caregiver on  gait training, standing balance retraining, home ex program and upgrade if able

## 2023-03-03 ENCOUNTER — HOME CARE VISIT (OUTPATIENT)
Dept: SCHEDULING | Facility: HOME HEALTH | Age: 88
End: 2023-03-03
Payer: MEDICARE

## 2023-03-03 PROCEDURE — G0151 HHCP-SERV OF PT,EA 15 MIN: HCPCS

## 2023-03-05 VITALS
TEMPERATURE: 97.6 F | DIASTOLIC BLOOD PRESSURE: 60 MMHG | RESPIRATION RATE: 16 BRPM | OXYGEN SATURATION: 98 % | HEART RATE: 60 BPM | SYSTOLIC BLOOD PRESSURE: 110 MMHG

## 2023-03-05 NOTE — HOME HEALTH
Subjective: I really am going to miss you and Tiffani. I know I am much better  Falls since last visit NO(if yes complete the Fall Tracking Form and include bsrifallreport):   Caregiver involvement changes: patient alone during vsiit. receives asst frm dtr as needed   Home health supplies by type and quantity ordered/delivered this visit include: na   Clinician asked if patient has had any physician contact since last home care visit and patient states:yes  Clinician asked if patient has any new or changed medications and patient states:yes  If Yes, were medications reconciled? YES   Was the certifying physician notified of changes in medications? N/A - SN already updated    Clinical assessment (what this visit means for the patient overall and need for ongoing skilled care) and progress or lack of progress towards SPECIFIC goals:  Patient seen today for PT re assessment visit. patient has made signficant progress in all horacio. she did have a rehospitalization and eye surgery during this episode. she is ind in transfers, ind in ambulation without asst device on all surfaces and is ind in managing walking up and down curbs. pt can ind retrieve her mail at [employer] mail box. patient ambualtes 8 min ind with VSS. TUG improved frtom 31 to 14 seconds. tinetti improved from 16 to 27 out of 28. patient is ind in hep. reviewed dc instructions with patient.  all goals met and ready for discharge

## 2023-06-16 ENCOUNTER — HOSPITAL ENCOUNTER (EMERGENCY)
Facility: HOSPITAL | Age: 88
Discharge: HOME | End: 2023-06-16
Attending: EMERGENCY MEDICINE
Payer: MEDICARE

## 2023-06-16 ENCOUNTER — APPOINTMENT (OUTPATIENT)
Facility: HOSPITAL | Age: 88
End: 2023-06-16
Payer: MEDICARE

## 2023-06-16 VITALS
TEMPERATURE: 98 F | RESPIRATION RATE: 18 BRPM | OXYGEN SATURATION: 96 % | HEART RATE: 64 BPM | SYSTOLIC BLOOD PRESSURE: 181 MMHG | DIASTOLIC BLOOD PRESSURE: 70 MMHG

## 2023-06-16 DIAGNOSIS — R00.2 PALPITATIONS: Primary | ICD-10-CM

## 2023-06-16 DIAGNOSIS — R07.89 CHEST PRESSURE: ICD-10-CM

## 2023-06-16 LAB
ALBUMIN SERPL-MCNC: 3.4 G/DL (ref 3.5–5)
ALBUMIN/GLOB SERPL: 0.9 (ref 1.1–2.2)
ALP SERPL-CCNC: 91 U/L (ref 45–117)
ALT SERPL-CCNC: 36 U/L (ref 12–78)
ANION GAP SERPL CALC-SCNC: 4 MMOL/L (ref 5–15)
AST SERPL-CCNC: 33 U/L (ref 15–37)
BASOPHILS # BLD: 0 K/UL (ref 0–0.1)
BASOPHILS NFR BLD: 0 % (ref 0–1)
BILIRUB SERPL-MCNC: 0.4 MG/DL (ref 0.2–1)
BUN SERPL-MCNC: 25 MG/DL (ref 6–20)
BUN/CREAT SERPL: 25 (ref 12–20)
CALCIUM SERPL-MCNC: 9.6 MG/DL (ref 8.5–10.1)
CHLORIDE SERPL-SCNC: 104 MMOL/L (ref 97–108)
CO2 SERPL-SCNC: 31 MMOL/L (ref 21–32)
CREAT SERPL-MCNC: 0.99 MG/DL (ref 0.55–1.02)
D DIMER PPP FEU-MCNC: 1.37 MG/L FEU (ref 0–0.65)
DIFFERENTIAL METHOD BLD: NORMAL
EKG ATRIAL RATE: 56 BPM
EKG DIAGNOSIS: NORMAL
EKG P-R INTERVAL: 160 MS
EKG Q-T INTERVAL: 412 MS
EKG QRS DURATION: 88 MS
EKG QTC CALCULATION (BAZETT): 397 MS
EKG R AXIS: 12 DEGREES
EKG T AXIS: 58 DEGREES
EKG VENTRICULAR RATE: 56 BPM
EOSINOPHIL # BLD: 0 K/UL (ref 0–0.4)
EOSINOPHIL NFR BLD: 0 % (ref 0–7)
ERYTHROCYTE [DISTWIDTH] IN BLOOD BY AUTOMATED COUNT: 12.2 % (ref 11.5–14.5)
GLOBULIN SER CALC-MCNC: 3.6 G/DL (ref 2–4)
GLUCOSE SERPL-MCNC: 97 MG/DL (ref 65–100)
HCT VFR BLD AUTO: 40.6 % (ref 35–47)
HGB BLD-MCNC: 12.7 G/DL (ref 11.5–16)
IMM GRANULOCYTES # BLD AUTO: 0 K/UL
IMM GRANULOCYTES NFR BLD AUTO: 0 %
LYMPHOCYTES # BLD: 1.3 K/UL (ref 0.8–3.5)
LYMPHOCYTES NFR BLD: 32 % (ref 12–49)
MCH RBC QN AUTO: 27 PG (ref 26–34)
MCHC RBC AUTO-ENTMCNC: 31.3 G/DL (ref 30–36.5)
MCV RBC AUTO: 86.4 FL (ref 80–99)
MONOCYTES # BLD: 0.5 K/UL (ref 0–1)
MONOCYTES NFR BLD: 12 % (ref 5–13)
NEUTS BAND NFR BLD MANUAL: 1 % (ref 0–6)
NEUTS SEG # BLD: 2.4 K/UL (ref 1.8–8)
NEUTS SEG NFR BLD: 55 % (ref 32–75)
NRBC # BLD: 0 K/UL (ref 0–0.01)
NRBC BLD-RTO: 0 PER 100 WBC
NT PRO BNP: 218 PG/ML
PLATELET # BLD AUTO: 219 K/UL (ref 150–400)
PMV BLD AUTO: 10.1 FL (ref 8.9–12.9)
POTASSIUM SERPL-SCNC: 3.5 MMOL/L (ref 3.5–5.1)
PROT SERPL-MCNC: 7 G/DL (ref 6.4–8.2)
RBC # BLD AUTO: 4.7 M/UL (ref 3.8–5.2)
RBC MORPH BLD: NORMAL
SODIUM SERPL-SCNC: 139 MMOL/L (ref 136–145)
TROPONIN I SERPL HS-MCNC: 10 NG/L (ref 0–37)
WBC # BLD AUTO: 4.2 K/UL (ref 3.6–11)

## 2023-06-16 PROCEDURE — 80053 COMPREHEN METABOLIC PANEL: CPT

## 2023-06-16 PROCEDURE — 93005 ELECTROCARDIOGRAM TRACING: CPT | Performed by: EMERGENCY MEDICINE

## 2023-06-16 PROCEDURE — 71045 X-RAY EXAM CHEST 1 VIEW: CPT

## 2023-06-16 PROCEDURE — 36415 COLL VENOUS BLD VENIPUNCTURE: CPT

## 2023-06-16 PROCEDURE — 6370000000 HC RX 637 (ALT 250 FOR IP): Performed by: EMERGENCY MEDICINE

## 2023-06-16 PROCEDURE — 85379 FIBRIN DEGRADATION QUANT: CPT

## 2023-06-16 PROCEDURE — 83880 ASSAY OF NATRIURETIC PEPTIDE: CPT

## 2023-06-16 PROCEDURE — 71275 CT ANGIOGRAPHY CHEST: CPT

## 2023-06-16 PROCEDURE — 84484 ASSAY OF TROPONIN QUANT: CPT

## 2023-06-16 PROCEDURE — 6360000004 HC RX CONTRAST MEDICATION: Performed by: RADIOLOGY

## 2023-06-16 PROCEDURE — 85025 COMPLETE CBC W/AUTO DIFF WBC: CPT

## 2023-06-16 PROCEDURE — 99285 EMERGENCY DEPT VISIT HI MDM: CPT

## 2023-06-16 RX ORDER — METOPROLOL SUCCINATE 25 MG/1
25 TABLET, EXTENDED RELEASE ORAL DAILY
Qty: 30 TABLET | Refills: 0 | Status: SHIPPED | OUTPATIENT
Start: 2023-06-16

## 2023-06-16 RX ORDER — ACETAMINOPHEN 325 MG/1
650 TABLET ORAL ONCE
Status: COMPLETED | OUTPATIENT
Start: 2023-06-16 | End: 2023-06-16

## 2023-06-16 RX ADMIN — IOPAMIDOL 70 ML: 755 INJECTION, SOLUTION INTRAVENOUS at 12:17

## 2023-06-16 RX ADMIN — ACETAMINOPHEN 650 MG: 325 TABLET ORAL at 14:04

## 2023-06-16 ASSESSMENT — ENCOUNTER SYMPTOMS
COUGH: 0
SINUS PAIN: 0
BLOOD IN STOOL: 0
CHEST TIGHTNESS: 0
BACK PAIN: 0
TROUBLE SWALLOWING: 0
NAUSEA: 0
SHORTNESS OF BREATH: 0
WHEEZING: 0
COLOR CHANGE: 0
RHINORRHEA: 0
EYES NEGATIVE: 1
SINUS PRESSURE: 0
VOMITING: 0
SORE THROAT: 0
STRIDOR: 0
ABDOMINAL PAIN: 0
DIARRHEA: 0

## 2023-06-16 ASSESSMENT — PAIN - FUNCTIONAL ASSESSMENT: PAIN_FUNCTIONAL_ASSESSMENT: 0-10

## 2023-06-16 ASSESSMENT — PAIN DESCRIPTION - ORIENTATION: ORIENTATION: ANTERIOR

## 2023-06-16 ASSESSMENT — PAIN SCALES - GENERAL
PAINLEVEL_OUTOF10: 10
PAINLEVEL_OUTOF10: 4

## 2023-06-16 ASSESSMENT — PAIN DESCRIPTION - LOCATION
LOCATION: HEAD
LOCATION: CHEST

## 2023-06-16 ASSESSMENT — HEART SCORE: ECG: 1

## 2023-06-16 ASSESSMENT — PAIN DESCRIPTION - DESCRIPTORS: DESCRIPTORS: ACHING

## 2023-06-16 NOTE — CONSULTS
atypical, enzymes and EKG is fine. With elevated D-Dimer we will rule out PE. Given her normal coronaries from the last cath, I doubt ischemia.   Palpitations; this is symptom accompanying her CP, so I am concerned that maybe she has an arrhythmia  Cardiomyopathy; stress cardiomyopathy per last cath; no evidence of heart failure, BNP is normal today  HTN; recurrent  Mitral insufficiency, mild from the last cath     Plan:     Serial enzyme  CTA of chest to rule out PE  Start beta blocker  Will arrange event monitor, and she will follow up with Dr. Víctor Cerda MD

## 2023-06-16 NOTE — DISCHARGE INSTRUCTIONS
You will need to use the medications as directed by Dr. Cas Aldana. Drink plenty of fluids and follow-up with Dr. Karissa Pittman within the next 2 weeks. Call the office today for an appointment. Call them sooner if any other difficulty.

## 2023-06-16 NOTE — ED PROVIDER NOTES
176/76   Pulse: 62   Resp: 12   Temp: 97.6 °F (36.4 °C)   TempSrc: Temporal   SpO2: 97%           Medical Decision Making  This is an 80-year-old female with a 3-month history of intermittent palpitations which she describes as rapid heartbeat usually when she is up during the day. It lasts only 15 or 20 minutes. She does have a little tightness associated with it. Symptoms are fleeting and usually disappear. She had a recurrence of those symptoms this morning which woke her up. It is the first time it does wake her up. She has had no other associated symptoms with it. She just felt she needed to be checked. On arrival here her vital signs are all normal.  Her rhythm is normal.  Her EKG does not show any arrhythmia. She does have mild hypertension. Will evaluate with cardiac markers, chest x-ray and basic labs to rule out cardiac etiology of symptoms, metabolic abnormalities, infection. Patient is resting comfortably and requires no acute intervention. Bradycardia of the chest will also be ordered. Amount and/or Complexity of Data Reviewed  Labs: ordered. Decision-making details documented in ED Course. Radiology: ordered and independent interpretation performed. Decision-making details documented in ED Course. ECG/medicine tests: ordered and independent interpretation performed. Decision-making details documented in ED Course. Risk  OTC drugs. Prescription drug management. REASSESSMENT     ED Course as of 06/16/23 1333   Fri Jun 16, 2023   1234 CT scan is being done presently. [RP]      ED Course User Index  [RP] Rosibel Hua MD       A consult was called with Dr. Sidra Otoole on-call for Dr. Ena Pepper. Patient's D-dimer came back slightly elevated. We will obtain a CTA of the chest to rule out PE. The cardiac markers appear normal.  It does not appear that her cysts cardiac cath 6 months ago showed any significant abnormality.     It is felt that if the CTA is negative, the patient

## 2023-06-16 NOTE — ED TRIAGE NOTES
Pt arrives via [facility] from home w/ complaint of chest pain/ pressure. Pt states it started x3 months ago and intermittent however it woke her from sleep today. Pt see dr. Renaldo Dia for cards.  EKG in route NSR. VSS BP: 188/93, O2 97% RA, HR: 77, BS: 106

## 2023-09-04 NOTE — ED NOTES
Pt noted to have a coughing fit after receiving tx, pt sat up in bed 02 increased to 6L due to o2 sats and resp rate, pt provided with emesis bag for spitting up. Call bell in reach. Mother

## 2023-09-20 NOTE — PERIOP NOTES
[facility]  Ambulatory Surgery Unit  Pre-operative Instructions    Surgery/Procedure Date  1/11/17            Tentative Arrival Time 08:15 am      1. On the day of your surgery/procedure, please report to the Ambulatory Surgery Unit Registration Desk and sign in at your designated time. The Ambulatory Surgery Unit is located in [facility] on the [facility] side of the [facility] across from the [address] [place] [address]. Please have all of your health insurance cards and a photo ID. 2. You must have someone with you to drive you home, as you should not drive a car for 24 hours following anesthesia. Please make arrangements for a responsible adult friend or family member to stay with you for at least the first 24 hours after your surgery. 3. Do not have anything to eat or drink (including water, gum, mints, coffee, juice) after midnight   1/10/17. This may not apply to medications prescribed by your physician. (Please note below the special instructions with medications to take the morning of surgery, if applicable.)    4. We recommend you do not drink any alcoholic beverages for 24 hours before and after your surgery. 5. Stop all Aspirin, non-steroidal anti-inflammatory drugs (i.e. Advil, Aleve), vitamins, and supplements as directed by your surgeon's office. **If you are currently taking Plavix, Coumadin, or other blood-thinning agents, contact your surgeon for instructions. **    6. In an effort to help prevent surgical site infection, we ask that you shower with an anti-bacterial soap (i.e. Dial or Safeguard) for 3 days prior to and on the morning of surgery, using a fresh towel after each shower. (Please begin this process with fresh bed linens.) Do not apply any lotions, powders, or deodorants after the shower on the day of your procedure. If applicable, please do not shave the operative site for 48 hours prior to surgery. 7. Wear comfortable clothes.  Wear glasses instead of contacts. Do not bring any jewelry or money (other than copays or fees as instructed). Do not wear make-up, particularly mascara, the morning of your surgery. Do not wear nail polish, particularly if you are having foot /hand surgery. Wear your hair loose or down, no ponytails, buns, edyta pins or clips. All body piercings must be removed. 8. You should understand that if you do not follow these instructions your surgery may be cancelled. If your physical condition changes (i.e. fever, cold or flu) please contact your surgeon as soon as possible. 9. It is important that you be on time. If a situation occurs where you may be late, or if you have any questions or problems, please call [phone].    10. Your surgery time may be subject to change. You will receive a phone call the day prior to surgery to confirm your arrival time. 11. Pediatric patients: please bring a change of clothes, diapers, bottle/sippy cup, pacifier, etc.      Special Instructions:    MEDICATIONS TO TAKE THE MORNING OF SURGERY WITH A SIP OF WATER: Vigamox eye drop. Celebrex, Lozol, inhaler      I understand a pre-operative phone call will be made to verify my surgery time. In the event that I am not available, I give permission for a message to be left on my answering service and/or with another person? yes      Instructions given verbally during phone assessment - patient voiced understanding.  1/9/17  14:55   ___________________      ___________________      ________________  (Signature of Patient)          (Witness)                   (Date and Time) Klisyri Counseling:  I discussed with the patient the risks of Klisyri including but not limited to erythema, scaling, itching, weeping, crusting, and pain.

## 2023-11-26 NOTE — ED PROVIDER NOTES
EMERGENCY DEPARTMENT HISTORY AND PHYSICAL EXAM      Date: 10/17/2022  Patient Name: Ameena Proctor    History of Presenting Illness     Chief Complaint   Patient presents with    Shortness of Breath     Sudden onset of short of air this morning took inhaler and albuterol treatment with no relief, hx chronic bronchitis and asthma. On EMS arrival pt was 89% on RA, duoneb given. Pt AOX4, sinus tach on monitor, wheezing throughout, placed on 4L nasal cannula by respiratory-O2 97%. History Provided By: Patient and EMS    HPI: Ameena Proctor, 80 y.o. female presents to the ED with cc of respiratory distress. At the time of initial eval hx limited secondary to acuity pt only speak 1-2 words. Hx of chronic bronchitis. She states sudden onset this morning of severe shortness of breath, worse with exertion. She states productive cough for 2 weeks. Sputum brownish-yellow. She denies any fever. She denies any ab pain, n/v/d. She states brief episode of chest pressure this morning. Pt brought in by EMS, pt has used neb tx at home and was given additional DuoNeb by EMS. There are no other complaints, changes, or physical findings at this time. PCP: Wilfredo Laird MD    No current facility-administered medications on file prior to encounter. Current Outpatient Medications on File Prior to Encounter   Medication Sig Dispense Refill    ondansetron hcl (Zofran) 4 mg tablet Take 1 Tablet by mouth every eight (8) hours as needed for Nausea. 20 Tablet 0    azithromycin (Zithromax Z-Evelio) 250 mg tablet As directed 6 Tablet 0    predniSONE (DELTASONE) 5 mg tablet Take 5 mg by mouth daily. ondansetron (ZOFRAN ODT) 4 mg disintegrating tablet Take 1 Tab by mouth every eight (8) hours as needed for Nausea. (Patient not taking: Reported on 3/23/2022) 20 Tab 0    senna-docusate (PERICOLACE) 8.6-50 mg per tablet Take 1 Tab by mouth daily.  (Patient not taking: Reported on 3/23/2022) 30 Tab 0    brimonidine 0.025 % drop Apply 1 Drop to eye two (2) times a day. Right eye      dorzolamide (TRUSOPT) 2 % ophthalmic solution Administer 1 Drop to right eye two (2) times a day. metoprolol tartrate (LOPRESSOR) 25 mg tablet Take 25 mg by mouth daily. indapamide (LOZOL) 1.25 mg tablet Take 1.25 mg by mouth daily. montelukast (SINGULAIR) 10 mg tablet Take 10 mg by mouth daily. pantoprazole (PROTONIX) 40 mg tablet Take 40 mg by mouth daily. bimatoprost (LUMIGAN) 0.01 % ophthalmic drops Administer 1 Drop to right eye every evening.      multivitamin (ONE A DAY) tablet Take 1 Tab by mouth daily. albuterol (PROVENTIL HFA, VENTOLIN HFA, PROAIR HFA) 90 mcg/actuation inhaler Take 1 Puff by inhalation as needed for Wheezing.      lorazepam (ATIVAN) 0.5 mg tablet Take 0.5 mg by mouth nightly. Past History     Past Medical History:  Past Medical History:   Diagnosis Date    Adverse effect of anesthesia     combative waking up    Anxiety     Arthritis     Asthma     as of 12/30/16 pt states under control    Diverticulitis Dx 11/2016    Fibromyalgia     GERD (gastroesophageal reflux disease)     Glaucoma     Hiatal hernia     Hypercholesteremia     Hypertension     Ill-defined condition     neurapthy in ble    Nausea & vomiting        Past Surgical History:  Past Surgical History:   Procedure Laterality Date    HX CATARACT REMOVAL Right 01/04/2017    HX HYSTERECTOMY      HX ROTATOR CUFF REPAIR Left 2008    HX TUBAL LIGATION      MI COLONOSCOPY FLX DX W/COLLJ SPEC WHEN PFRMD  11/9/2011         MI EGD TRANSORAL BIOPSY SINGLE/MULTIPLE  11/9/2011         UPPER GI ENDOSCOPY,BIOPSY  8/20/2019            Family History:  unkown    Social History:  Social History     Tobacco Use    Smoking status: Never    Smokeless tobacco: Never   Substance Use Topics    Alcohol use: No    Drug use: No       Allergies:   Allergies   Allergen Reactions    Codeine Other (comments)    Cymbalta [Duloxetine] Nausea Only and Other (comments) \"within the hour I had a severe headache, and vomiting\"      Demerol [Meperidine] Nausea and Vomiting    Lidocaine Other (comments)    Morphine Nausea and Vomiting    Novocain [Procaine] Other (comments)    Penicillins Other (comments)     rash    Percocet [Oxycodone-Acetaminophen] Nausea and Vomiting    Sulfa (Sulfonamide Antibiotics) Other (comments)         Review of Systems   Review of Systems   Unable to perform ROS: Severe respiratory distress     Physical Exam   Physical Exam  Vitals and nursing note reviewed. Constitutional:       General: She is in acute distress. Appearance: She is well-developed. She is ill-appearing. She is not diaphoretic. HENT:      Head: Normocephalic and atraumatic. Mouth/Throat:      Mouth: Mucous membranes are moist.      Pharynx: No oropharyngeal exudate. Eyes:      Extraocular Movements: Extraocular movements intact. Conjunctiva/sclera: Conjunctivae normal.      Pupils: Pupils are equal, round, and reactive to light. Neck:      Vascular: No JVD. Trachea: No tracheal deviation. Cardiovascular:      Rate and Rhythm: Regular rhythm. Tachycardia present. Heart sounds: Normal heart sounds. No murmur heard. Pulmonary:      Effort: Respiratory distress present. Breath sounds: No stridor. Wheezing and rhonchi present. No rales. Comments: Increase WOB, accessory muscle use   Chest:      Chest wall: No tenderness. Abdominal:      General: There is no distension. Palpations: Abdomen is soft. Tenderness: There is no abdominal tenderness. There is no guarding or rebound. Musculoskeletal:         General: Normal range of motion. Cervical back: Normal range of motion and neck supple. Right lower leg: No edema. Left lower leg: No edema. Skin:     General: Skin is warm and dry. Capillary Refill: Capillary refill takes less than 2 seconds.    Neurological:      Mental Status: She is alert and oriented to person, place, and time. Cranial Nerves: No cranial nerve deficit. Comments: No gross motor or sensory deficits    Psychiatric:         Behavior: Behavior normal.      Comments: Anxious       Diagnostic Study Results     Labs -     Recent Results (from the past 12 hour(s))   EKG, 12 LEAD, INITIAL    Collection Time: 10/17/22  7:32 AM   Result Value Ref Range    Ventricular Rate 104 BPM    Atrial Rate 104 BPM    P-R Interval 168 ms    QRS Duration 124 ms    Q-T Interval 392 ms    QTC Calculation (Bezet) 515 ms    Calculated R Axis -59 degrees    Calculated T Axis 87 degrees    Diagnosis       Sinus tachycardia  Left axis deviation  Left bundle branch block  When compared with ECG of 01-AUG-2022 09:49,  Vent. rate has increased BY  53 BPM  Left bundle branch block is now present     CBC WITH AUTOMATED DIFF    Collection Time: 10/17/22  7:50 AM   Result Value Ref Range    WBC 6.4 3.6 - 11.0 K/uL    RBC 4.79 3.80 - 5.20 M/uL    HGB 13.7 11.5 - 16.0 g/dL    HCT 40.6 35.0 - 47.0 %    MCV 84.8 80.0 - 99.0 FL    MCH 28.6 26.0 - 34.0 PG    MCHC 33.7 30.0 - 36.5 g/dL    RDW 13.4 11.5 - 14.5 %    PLATELET 708 169 - 063 K/uL    MPV 10.0 8.9 - 12.9 FL    NRBC 0.0 0  WBC    ABSOLUTE NRBC 0.00 0.00 - 0.01 K/uL    NEUTROPHILS 35 32 - 75 %    LYMPHOCYTES 34 12 - 49 %    MONOCYTES 11 5 - 13 %    EOSINOPHILS 18 (H) 0 - 7 %    BASOPHILS 2 (H) 0 - 1 %    IMMATURE GRANULOCYTES 0 0.0 - 0.5 %    ABS. NEUTROPHILS 2.2 1.8 - 8.0 K/UL    ABS. LYMPHOCYTES 2.2 0.8 - 3.5 K/UL    ABS. MONOCYTES 0.7 0.0 - 1.0 K/UL    ABS. EOSINOPHILS 1.2 (H) 0.0 - 0.4 K/UL    ABS. BASOPHILS 0.1 0.0 - 0.1 K/UL    ABS. IMM.  GRANS. 0.0 0.00 - 0.04 K/UL    DF SMEAR SCANNED      RBC COMMENTS NORMOCYTIC, NORMOCHROMIC      WBC COMMENTS REACTIVE LYMPHS     NT-PRO BNP    Collection Time: 10/17/22  7:50 AM   Result Value Ref Range    NT pro- <700 PG/ML   METABOLIC PANEL, COMPREHENSIVE    Collection Time: 10/17/22  7:50 AM   Result Value Ref Range    Sodium 137 136 - 145 mmol/L    Potassium 3.2 (L) 3.5 - 5.1 mmol/L    Chloride 100 97 - 108 mmol/L    CO2 29 21 - 32 mmol/L    Anion gap 8 5 - 15 mmol/L    Glucose 126 (H) 65 - 100 mg/dL    BUN 16 6 - 20 MG/DL    Creatinine 0.99 0.55 - 1.02 MG/DL    BUN/Creatinine ratio 16 12 - 20      eGFR 55 (L) >60 ml/min/1.73m2    Calcium 10.3 (H) 8.5 - 10.1 MG/DL    Bilirubin, total 0.4 0.2 - 1.0 MG/DL    ALT (SGPT) 28 12 - 78 U/L    AST (SGOT) 35 15 - 37 U/L    Alk. phosphatase 75 45 - 117 U/L    Protein, total 7.0 6.4 - 8.2 g/dL    Albumin 3.7 3.5 - 5.0 g/dL    Globulin 3.3 2.0 - 4.0 g/dL    A-G Ratio 1.1 1.1 - 2.2     TROPONIN-HIGH SENSITIVITY    Collection Time: 10/17/22  7:50 AM   Result Value Ref Range    Troponin-High Sensitivity 407 (HH) 0 - 51 ng/L   MAGNESIUM    Collection Time: 10/17/22  7:50 AM   Result Value Ref Range    Magnesium 2.0 1.6 - 2.4 mg/dL   COVID-19 RAPID TEST    Collection Time: 10/17/22  7:50 AM   Result Value Ref Range    Specimen source Nasopharyngeal      COVID-19 rapid test Not detected NOTD     INFLUENZA A+B VIRAL AGS    Collection Time: 10/17/22  7:50 AM   Result Value Ref Range    Influenza A Antigen Negative NEG      Influenza B Antigen Negative NEG         Radiologic Studies -   XR CHEST PORT   Final Result   Clear lungs. CT Results  (Last 48 hours)      None          CXR Results  (Last 48 hours)                 10/17/22 0820  XR CHEST PORT Final result    Impression:  Clear lungs. Narrative:  PORTABLE CHEST RADIOGRAPH/S: 10/17/2022 8:20 AM       INDICATION: Dyspnea. COMPARISON: 8/22/2022, CT 9/29/2022. TECHNIQUE: Portable frontal upright radiograph/s of the chest.       FINDINGS:    The lungs are clear. The central airways are patent. No pneumothorax or pleural   effusion. There is a moderate hiatal hernia. Medical Decision Making   I am the first provider for this patient.     I reviewed the vital signs, available nursing notes, past medical history, past surgical history, family history and social history. Vital Signs-Reviewed the patient's vital signs. Patient Vitals for the past 12 hrs:   Pulse Resp BP SpO2   10/17/22 0913 98 19 -- 95 %   10/17/22 0912 98 22 -- 91 %   10/17/22 0911 99 24 -- 93 %   10/17/22 0908 (!) 101 23 -- (!) 88 %   10/17/22 0900 (!) 101 23 105/72 94 %   10/17/22 0830 (!) 111 19 (!) 103/59 94 %   10/17/22 0818 (!) 110 -- (!) 109/97 --   10/17/22 0800 (!) 107 21 (!) 109/97 95 %   10/17/22 0730 (!) 102 18 119/79 95 %   10/17/22 0715 (!) 103 26 119/79 97 %       EKG interpretation: (Preliminary)  Sinus tach, rate 104, left axis deviation, left bundle branch block, normal MD. Records Reviewed: Nursing Notes, Old Medical Records, Ambulance Run Sheet, Previous Radiology Studies, and Previous Laboratory Studies, patient is followed by pulmonology with chronic bronchitis. Provider Notes (Medical Decision Making):   DDx- Acute resp failure, Acute on chronic bronchitis, pneumonia, New onset heart failure, NSTEMI,     ED Course:   Initial assessment performed. The patients presenting problems have been discussed, and they are in agreement with the care plan formulated and outlined with them. I have encouraged them to ask questions as they arise throughout their visit. Patient given an additional DuoNeb and albuterol treatment here in the emergency department. Patient states that she feels significantly better. Oxygen saturations of 88% on room air patient was placed on 2 L of oxygen by nasal cannula with improvement of O2 sats to 95%. Patient has have an elevated troponin however she has no active chest pain likely elevated secondary to her respiratory distress. She states that she is seeing Dr. Jayce Dillard in the past but is not had any prior heart attacks. After patient's respiratory distress was improved was able to speak with her more adeptly.   She states that she is being followed by Dr. Alcides Carolina, over the past 2 days she has had increase in cough. She has not had any fevers. Patient's chest x-ray unremarkable. Given productive cough of brown sputum will place patient on Rocephin and azithromycin. Pt Trop elevated, pt with no active CP, she states did have an episode earlier but resolved spontaneously. Pt dosed with ASA, Heparin bolus and started on Heparing gtt. Consult:  Case discussed with Dr. Jero Ramos, he will see pt in consult. Consult:  Case discussed with hospitalist, pt will be evaluated and admitted. Consult note:  Case discussed with the hospitalist patient will be evaluated admitted. Critical Care Time:   CRITICAL CARE NOTE :    9:30 AM    IMPENDING DETERIORATION -Respiratory and Cardiovascular  ASSOCIATED RISK FACTORS - Hypoxia  MANAGEMENT- Bedside Assessment and Supervision of Care  INTERPRETATION -  Xrays, ECG, Blood Pressure, Cardiac Output Measures , and labs  INTERVENTIONS - Nebulizer tx's, IV Mag, Solumedrol, IV Ab, ASA, Heparin  CASE REVIEW - Hospitalist/Intensivist and Nursing  TREATMENT RESPONSE -Improved  PERFORMED BY - Self    NOTES   :  I have spent 90 minutes of critical care time involved in lab review, consultations with specialist, family decision- making, bedside attention and documentation. This time excludes time spent in any separate billed procedures. During this entire length of time I was immediately available to the patient . Lissy Phan DO      Disposition:  Admit    Diagnosis     Clinical Impression:   1. Acute respiratory failure with hypoxia ([address]    2. Acute exacerbation of chronic bronchitis (HCC)    3. Elevated troponin        Attestations:    Lissy Phan DO        Please note that this dictation was completed with [employer], the computer voice recognition software. Quite often unanticipated grammatical, syntax, homophones, and other interpretive errors are inadvertently transcribed by the computer software. Please disregard these errors.   Please excuse any errors that have escaped final proofreading. Thank you. 104.3

## 2025-03-15 ENCOUNTER — APPOINTMENT (OUTPATIENT)
Facility: HOSPITAL | Age: 89
DRG: 185 | End: 2025-03-15
Payer: MEDICARE

## 2025-03-15 ENCOUNTER — HOSPITAL ENCOUNTER (INPATIENT)
Facility: HOSPITAL | Age: 89
LOS: 2 days | Discharge: NURSING, REHAB OR LONG-TERM CARE | DRG: 185 | End: 2025-03-18
Attending: EMERGENCY MEDICINE | Admitting: STUDENT IN AN ORGANIZED HEALTH CARE EDUCATION/TRAINING PROGRAM
Payer: MEDICARE

## 2025-03-15 DIAGNOSIS — S22.41XA CLOSED FRACTURE OF MULTIPLE RIBS OF RIGHT SIDE, INITIAL ENCOUNTER: Primary | ICD-10-CM

## 2025-03-15 PROBLEM — Z87.81 HISTORY OF RIB FRACTURE: Status: ACTIVE | Noted: 2025-03-15

## 2025-03-15 PROBLEM — S22.39XA TRAUMATIC CLOSED DISPLACED FRACTURE OF ONE RIB: Status: ACTIVE | Noted: 2025-03-15

## 2025-03-15 LAB
ALBUMIN SERPL-MCNC: 3.6 G/DL (ref 3.5–5)
ALBUMIN/GLOB SERPL: 1.1 (ref 1.1–2.2)
ALP SERPL-CCNC: 80 U/L (ref 45–117)
ALT SERPL-CCNC: 24 U/L (ref 12–78)
ANION GAP SERPL CALC-SCNC: 9 MMOL/L (ref 2–12)
AST SERPL-CCNC: 33 U/L (ref 15–37)
BASOPHILS # BLD: 0.01 K/UL (ref 0–0.1)
BASOPHILS NFR BLD: 0.1 % (ref 0–1)
BILIRUB SERPL-MCNC: 0.3 MG/DL (ref 0.2–1)
BUN SERPL-MCNC: 20 MG/DL (ref 6–20)
BUN/CREAT SERPL: 21 (ref 12–20)
CALCIUM SERPL-MCNC: 9.5 MG/DL (ref 8.5–10.1)
CHLORIDE SERPL-SCNC: 102 MMOL/L (ref 97–108)
CO2 SERPL-SCNC: 24 MMOL/L (ref 21–32)
CREAT SERPL-MCNC: 0.94 MG/DL (ref 0.55–1.02)
DIFFERENTIAL METHOD BLD: ABNORMAL
EOSINOPHIL # BLD: 0 K/UL (ref 0–0.4)
EOSINOPHIL NFR BLD: 0 % (ref 0–7)
ERYTHROCYTE [DISTWIDTH] IN BLOOD BY AUTOMATED COUNT: 13.3 % (ref 11.5–14.5)
GLOBULIN SER CALC-MCNC: 3.2 G/DL (ref 2–4)
GLUCOSE SERPL-MCNC: 95 MG/DL (ref 65–100)
HCT VFR BLD AUTO: 36.9 % (ref 35–47)
HGB BLD-MCNC: 12 G/DL (ref 11.5–16)
IMM GRANULOCYTES # BLD AUTO: 0.08 K/UL (ref 0–0.04)
IMM GRANULOCYTES NFR BLD AUTO: 0.8 % (ref 0–0.5)
LYMPHOCYTES # BLD: 1.23 K/UL (ref 0.8–3.5)
LYMPHOCYTES NFR BLD: 12.4 % (ref 12–49)
MCH RBC QN AUTO: 27.5 PG (ref 26–34)
MCHC RBC AUTO-ENTMCNC: 32.5 G/DL (ref 30–36.5)
MCV RBC AUTO: 84.4 FL (ref 80–99)
MONOCYTES # BLD: 0.74 K/UL (ref 0–1)
MONOCYTES NFR BLD: 7.5 % (ref 5–13)
NEUTS SEG # BLD: 7.82 K/UL (ref 1.8–8)
NEUTS SEG NFR BLD: 79.2 % (ref 32–75)
NRBC # BLD: 0 K/UL (ref 0–0.01)
NRBC BLD-RTO: 0 PER 100 WBC
PLATELET # BLD AUTO: 229 K/UL (ref 150–400)
PMV BLD AUTO: 9.8 FL (ref 8.9–12.9)
POTASSIUM SERPL-SCNC: 3.8 MMOL/L (ref 3.5–5.1)
PROT SERPL-MCNC: 6.8 G/DL (ref 6.4–8.2)
RBC # BLD AUTO: 4.37 M/UL (ref 3.8–5.2)
SODIUM SERPL-SCNC: 135 MMOL/L (ref 136–145)
WBC # BLD AUTO: 9.9 K/UL (ref 3.6–11)

## 2025-03-15 PROCEDURE — G0378 HOSPITAL OBSERVATION PER HR: HCPCS

## 2025-03-15 PROCEDURE — 85025 COMPLETE CBC W/AUTO DIFF WBC: CPT

## 2025-03-15 PROCEDURE — 36415 COLL VENOUS BLD VENIPUNCTURE: CPT

## 2025-03-15 PROCEDURE — 6370000000 HC RX 637 (ALT 250 FOR IP): Performed by: STUDENT IN AN ORGANIZED HEALTH CARE EDUCATION/TRAINING PROGRAM

## 2025-03-15 PROCEDURE — 6360000002 HC RX W HCPCS: Performed by: EMERGENCY MEDICINE

## 2025-03-15 PROCEDURE — 73030 X-RAY EXAM OF SHOULDER: CPT

## 2025-03-15 PROCEDURE — 73502 X-RAY EXAM HIP UNI 2-3 VIEWS: CPT

## 2025-03-15 PROCEDURE — 2500000003 HC RX 250 WO HCPCS: Performed by: STUDENT IN AN ORGANIZED HEALTH CARE EDUCATION/TRAINING PROGRAM

## 2025-03-15 PROCEDURE — 96374 THER/PROPH/DIAG INJ IV PUSH: CPT

## 2025-03-15 PROCEDURE — 80053 COMPREHEN METABOLIC PANEL: CPT

## 2025-03-15 PROCEDURE — 6360000002 HC RX W HCPCS: Performed by: STUDENT IN AN ORGANIZED HEALTH CARE EDUCATION/TRAINING PROGRAM

## 2025-03-15 PROCEDURE — 6370000000 HC RX 637 (ALT 250 FOR IP): Performed by: EMERGENCY MEDICINE

## 2025-03-15 PROCEDURE — 71250 CT THORAX DX C-: CPT

## 2025-03-15 PROCEDURE — 99285 EMERGENCY DEPT VISIT HI MDM: CPT

## 2025-03-15 RX ORDER — SODIUM CHLORIDE 9 MG/ML
INJECTION, SOLUTION INTRAVENOUS PRN
Status: DISCONTINUED | OUTPATIENT
Start: 2025-03-15 | End: 2025-03-18 | Stop reason: HOSPADM

## 2025-03-15 RX ORDER — ONDANSETRON 4 MG/1
4 TABLET, ORALLY DISINTEGRATING ORAL EVERY 8 HOURS PRN
Status: DISCONTINUED | OUTPATIENT
Start: 2025-03-15 | End: 2025-03-18 | Stop reason: HOSPADM

## 2025-03-15 RX ORDER — ONDANSETRON 2 MG/ML
4 INJECTION INTRAMUSCULAR; INTRAVENOUS EVERY 4 HOURS PRN
Status: DISCONTINUED | OUTPATIENT
Start: 2025-03-15 | End: 2025-03-18 | Stop reason: HOSPADM

## 2025-03-15 RX ORDER — POTASSIUM CHLORIDE 1500 MG/1
40 TABLET, EXTENDED RELEASE ORAL PRN
Status: ACTIVE | OUTPATIENT
Start: 2025-03-15 | End: 2025-03-16

## 2025-03-15 RX ORDER — POTASSIUM CHLORIDE 7.45 MG/ML
10 INJECTION INTRAVENOUS PRN
Status: ACTIVE | OUTPATIENT
Start: 2025-03-15 | End: 2025-03-16

## 2025-03-15 RX ORDER — METOPROLOL SUCCINATE 25 MG/1
25 TABLET, EXTENDED RELEASE ORAL DAILY
Status: DISCONTINUED | OUTPATIENT
Start: 2025-03-15 | End: 2025-03-15

## 2025-03-15 RX ORDER — APRACLONIDINE HYDROCHLORIDE 5 MG/ML
1 SOLUTION/ DROPS OPHTHALMIC EVERY 8 HOURS
Status: DISCONTINUED | OUTPATIENT
Start: 2025-03-15 | End: 2025-03-15

## 2025-03-15 RX ORDER — GUAIFENESIN 600 MG/1
600 TABLET, EXTENDED RELEASE ORAL EVERY 12 HOURS
Status: DISCONTINUED | OUTPATIENT
Start: 2025-03-15 | End: 2025-03-18 | Stop reason: HOSPADM

## 2025-03-15 RX ORDER — ALBUTEROL SULFATE 0.83 MG/ML
2.5 SOLUTION RESPIRATORY (INHALATION) EVERY 6 HOURS PRN
Status: DISCONTINUED | OUTPATIENT
Start: 2025-03-15 | End: 2025-03-18 | Stop reason: HOSPADM

## 2025-03-15 RX ORDER — ASPIRIN 81 MG/1
81 TABLET, CHEWABLE ORAL DAILY
Status: DISCONTINUED | OUTPATIENT
Start: 2025-03-15 | End: 2025-03-18 | Stop reason: HOSPADM

## 2025-03-15 RX ORDER — LIDOCAINE 4 G/G
1 PATCH TOPICAL DAILY
Status: DISCONTINUED | OUTPATIENT
Start: 2025-03-15 | End: 2025-03-18 | Stop reason: HOSPADM

## 2025-03-15 RX ORDER — FENTANYL CITRATE 50 UG/ML
25 INJECTION, SOLUTION INTRAMUSCULAR; INTRAVENOUS
Refills: 0 | Status: COMPLETED | OUTPATIENT
Start: 2025-03-15 | End: 2025-03-15

## 2025-03-15 RX ORDER — HYDROCHLOROTHIAZIDE 25 MG/1
25 TABLET ORAL DAILY
Status: DISCONTINUED | OUTPATIENT
Start: 2025-03-15 | End: 2025-03-18 | Stop reason: HOSPADM

## 2025-03-15 RX ORDER — BROMFENAC 1.03 MG/ML
1 SOLUTION/ DROPS OPHTHALMIC DAILY
Status: DISCONTINUED | OUTPATIENT
Start: 2025-03-15 | End: 2025-03-15

## 2025-03-15 RX ORDER — MAGNESIUM SULFATE IN WATER 40 MG/ML
2000 INJECTION, SOLUTION INTRAVENOUS PRN
Status: DISCONTINUED | OUTPATIENT
Start: 2025-03-15 | End: 2025-03-18 | Stop reason: HOSPADM

## 2025-03-15 RX ORDER — PANTOPRAZOLE SODIUM 40 MG/1
40 TABLET, DELAYED RELEASE ORAL DAILY
Status: DISCONTINUED | OUTPATIENT
Start: 2025-03-15 | End: 2025-03-18 | Stop reason: HOSPADM

## 2025-03-15 RX ORDER — ACETAMINOPHEN 650 MG/1
650 SUPPOSITORY RECTAL EVERY 6 HOURS PRN
Status: DISCONTINUED | OUTPATIENT
Start: 2025-03-15 | End: 2025-03-18 | Stop reason: HOSPADM

## 2025-03-15 RX ORDER — DORZOLAMIDE HCL 20 MG/ML
1 SOLUTION/ DROPS OPHTHALMIC 3 TIMES DAILY
Status: DISCONTINUED | OUTPATIENT
Start: 2025-03-15 | End: 2025-03-18 | Stop reason: HOSPADM

## 2025-03-15 RX ORDER — POLYETHYLENE GLYCOL 3350 17 G/17G
17 POWDER, FOR SOLUTION ORAL 2 TIMES DAILY
Status: DISCONTINUED | OUTPATIENT
Start: 2025-03-15 | End: 2025-03-18 | Stop reason: HOSPADM

## 2025-03-15 RX ORDER — LATANOPROST 50 UG/ML
1 SOLUTION/ DROPS OPHTHALMIC NIGHTLY
Status: DISCONTINUED | OUTPATIENT
Start: 2025-03-15 | End: 2025-03-18 | Stop reason: HOSPADM

## 2025-03-15 RX ORDER — ONDANSETRON 2 MG/ML
4 INJECTION INTRAMUSCULAR; INTRAVENOUS EVERY 6 HOURS PRN
Status: DISCONTINUED | OUTPATIENT
Start: 2025-03-15 | End: 2025-03-18 | Stop reason: HOSPADM

## 2025-03-15 RX ORDER — ACETAMINOPHEN 325 MG/1
650 TABLET ORAL EVERY 6 HOURS PRN
Status: DISCONTINUED | OUTPATIENT
Start: 2025-03-15 | End: 2025-03-18 | Stop reason: HOSPADM

## 2025-03-15 RX ORDER — ACETAMINOPHEN 325 MG/1
650 TABLET ORAL EVERY 4 HOURS PRN
Status: DISCONTINUED | OUTPATIENT
Start: 2025-03-15 | End: 2025-03-15

## 2025-03-15 RX ORDER — HYDROCODONE BITARTRATE AND ACETAMINOPHEN 5; 325 MG/1; MG/1
1 TABLET ORAL EVERY 6 HOURS PRN
Qty: 12 TABLET | Refills: 0 | Status: SHIPPED | OUTPATIENT
Start: 2025-03-15 | End: 2025-03-18 | Stop reason: HOSPADM

## 2025-03-15 RX ORDER — SODIUM CHLORIDE 0.9 % (FLUSH) 0.9 %
5-40 SYRINGE (ML) INJECTION EVERY 12 HOURS SCHEDULED
Status: DISCONTINUED | OUTPATIENT
Start: 2025-03-15 | End: 2025-03-18 | Stop reason: HOSPADM

## 2025-03-15 RX ORDER — ENOXAPARIN SODIUM 100 MG/ML
40 INJECTION SUBCUTANEOUS DAILY
Status: DISCONTINUED | OUTPATIENT
Start: 2025-03-16 | End: 2025-03-18 | Stop reason: HOSPADM

## 2025-03-15 RX ORDER — SODIUM CHLORIDE 0.9 % (FLUSH) 0.9 %
5-40 SYRINGE (ML) INJECTION PRN
Status: DISCONTINUED | OUTPATIENT
Start: 2025-03-15 | End: 2025-03-18 | Stop reason: HOSPADM

## 2025-03-15 RX ORDER — BRIMONIDINE TARTRATE 2 MG/ML
1 SOLUTION/ DROPS OPHTHALMIC 2 TIMES DAILY
Status: DISCONTINUED | OUTPATIENT
Start: 2025-03-15 | End: 2025-03-15

## 2025-03-15 RX ORDER — HYDROCODONE BITARTRATE AND ACETAMINOPHEN 5; 325 MG/1; MG/1
1 TABLET ORAL
Refills: 0 | Status: COMPLETED | OUTPATIENT
Start: 2025-03-15 | End: 2025-03-15

## 2025-03-15 RX ORDER — KETOROLAC TROMETHAMINE 30 MG/ML
15 INJECTION, SOLUTION INTRAMUSCULAR; INTRAVENOUS EVERY 6 HOURS PRN
Status: DISCONTINUED | OUTPATIENT
Start: 2025-03-15 | End: 2025-03-18 | Stop reason: HOSPADM

## 2025-03-15 RX ORDER — MONTELUKAST SODIUM 10 MG/1
10 TABLET ORAL DAILY
Status: DISCONTINUED | OUTPATIENT
Start: 2025-03-15 | End: 2025-03-18 | Stop reason: HOSPADM

## 2025-03-15 RX ORDER — POLYETHYLENE GLYCOL 3350 17 G/17G
17 POWDER, FOR SOLUTION ORAL DAILY PRN
Status: DISCONTINUED | OUTPATIENT
Start: 2025-03-15 | End: 2025-03-15

## 2025-03-15 RX ADMIN — FENTANYL CITRATE 25 MCG: 50 INJECTION INTRAMUSCULAR; INTRAVENOUS at 15:39

## 2025-03-15 RX ADMIN — SODIUM CHLORIDE, PRESERVATIVE FREE 10 ML: 5 INJECTION INTRAVENOUS at 22:29

## 2025-03-15 RX ADMIN — HYDROCODONE BITARTRATE AND ACETAMINOPHEN 1 TABLET: 5; 325 TABLET ORAL at 16:55

## 2025-03-15 RX ADMIN — POLYETHYLENE GLYCOL 3350 17 G: 17 POWDER, FOR SOLUTION ORAL at 22:00

## 2025-03-15 RX ADMIN — ONDANSETRON 4 MG: 2 INJECTION INTRAMUSCULAR; INTRAVENOUS at 22:28

## 2025-03-15 RX ADMIN — KETOROLAC TROMETHAMINE 15 MG: 30 INJECTION, SOLUTION INTRAMUSCULAR at 21:57

## 2025-03-15 ASSESSMENT — PAIN DESCRIPTION - DESCRIPTORS: DESCRIPTORS: ACHING;SORE

## 2025-03-15 ASSESSMENT — PAIN - FUNCTIONAL ASSESSMENT
PAIN_FUNCTIONAL_ASSESSMENT: PREVENTS OR INTERFERES SOME ACTIVE ACTIVITIES AND ADLS
PAIN_FUNCTIONAL_ASSESSMENT: 0-10

## 2025-03-15 ASSESSMENT — PAIN DESCRIPTION - LOCATION
LOCATION: RIB CAGE;BACK
LOCATION: ARM

## 2025-03-15 ASSESSMENT — PAIN SCALES - GENERAL
PAINLEVEL_OUTOF10: 8
PAINLEVEL_OUTOF10: 7
PAINLEVEL_OUTOF10: 4

## 2025-03-15 ASSESSMENT — PAIN DESCRIPTION - ORIENTATION
ORIENTATION: RIGHT
ORIENTATION: RIGHT

## 2025-03-15 NOTE — ED PROVIDER NOTES
displacement, no pneumothorax or evidence of lung injury, and no hypoxia I feel it is reasonable to keep her at this hospital for pain control and possible placement in a rehab.    ED Course as of 03/15/25 1814   Sat Mar 15, 2025   1746 Daughter expresses concerns that patient lives alone and needs additional assistance or placement in rehab [LALO]   1753 Discussed with Dr. Figueroa, hospitalist, for admission [LALO]      ED Course User Index  [LALO] Ramila Bhagat MD           FINAL IMPRESSION     1. Closed fracture of multiple ribs of right side, initial encounter          DISPOSITION/PLAN     CLINICAL IMPRESSION    Admit Note: Pt is being admitted by Dr. Figueroa. The results of their tests and reason(s) for their admission have been discussed with pt and/or available family. They convey agreement and understanding for the need to be admitted and for the admission diagnosis.       I am the Primary Clinician of Record.   Ramila Bhagat MD (electronically signed)    (Please note that parts of this dictation were completed with voice recognition software. Quite often unanticipated grammatical, syntax, homophones, and other interpretive errors are inadvertently transcribed by the computer software. Please disregards these errors. Please excuse any errors that have escaped final proofreading.)         Ramila Bhagat MD  03/15/25 1816

## 2025-03-15 NOTE — H&P
reconstructions were performed. CT dose reduction was achieved through use of a standardized protocol tailored for this examination and automatic exposure control for dose modulation. FINDINGS: The patient was scanned with the arm(s) at side(s), causing scatter and photon starvation artifact over the examination. Left distal radial screw and plate fixation causes further scatter artifact. Incidental note is made of a lipoma in the deep, proximal, left forearm. Right, lateral, sixth-eighth rib fractures are mildly displaced. The lungs are clear. The central airways are patent. No pneumothorax or pleural effusion. The heart size is normal. Mitral annular calcification is mild. No coronary artery calcification. No thoracic lymphadenopathy. 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 Incidental note is made of hepatic cysts. A left renal cyst appears hazy due to respiratory motion (no follow-up required). 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 Incidental note is made of a gallstone.     1. Mildly displaced, acute, right, lateral, sixth-eighth rib fractures. 2. 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 Electronically signed by Alberto Adan     _______________________________________________________________________    TOTAL TIME:  76 Minutes    Critical Care Provided     Minutes non procedure based    Signed: Sheyla Mims MD    Procedures: see electronic medical records for all procedures/Xrays and details which were not copied into this note but were reviewed prior to creation of Plan.

## 2025-03-15 NOTE — ED NOTES
Bedside and Verbal shift change report given to Mina (oncoming nurse) by Martha (offgoing nurse). Report included the following information ED SBAR, MAR, Recent Results, Neuro Assessment, and Event Log.

## 2025-03-16 LAB
ANION GAP SERPL CALC-SCNC: 11 MMOL/L (ref 2–12)
BASOPHILS # BLD: 0.02 K/UL (ref 0–0.1)
BASOPHILS NFR BLD: 0.3 % (ref 0–1)
BUN SERPL-MCNC: 19 MG/DL (ref 6–20)
BUN/CREAT SERPL: 22 (ref 12–20)
CALCIUM SERPL-MCNC: 9.2 MG/DL (ref 8.5–10.1)
CHLORIDE SERPL-SCNC: 100 MMOL/L (ref 97–108)
CO2 SERPL-SCNC: 24 MMOL/L (ref 21–32)
CREAT SERPL-MCNC: 0.88 MG/DL (ref 0.55–1.02)
DIFFERENTIAL METHOD BLD: ABNORMAL
EOSINOPHIL # BLD: 0 K/UL (ref 0–0.4)
EOSINOPHIL NFR BLD: 0 % (ref 0–7)
ERYTHROCYTE [DISTWIDTH] IN BLOOD BY AUTOMATED COUNT: 13.1 % (ref 11.5–14.5)
GLUCOSE SERPL-MCNC: 106 MG/DL (ref 65–100)
HCT VFR BLD AUTO: 36 % (ref 35–47)
HGB BLD-MCNC: 11.4 G/DL (ref 11.5–16)
IMM GRANULOCYTES # BLD AUTO: 0.03 K/UL (ref 0–0.04)
IMM GRANULOCYTES NFR BLD AUTO: 0.4 % (ref 0–0.5)
LYMPHOCYTES # BLD: 1.15 K/UL (ref 0.8–3.5)
LYMPHOCYTES NFR BLD: 14.8 % (ref 12–49)
MCH RBC QN AUTO: 27.2 PG (ref 26–34)
MCHC RBC AUTO-ENTMCNC: 31.7 G/DL (ref 30–36.5)
MCV RBC AUTO: 85.9 FL (ref 80–99)
MONOCYTES # BLD: 0.61 K/UL (ref 0–1)
MONOCYTES NFR BLD: 7.9 % (ref 5–13)
NEUTS SEG # BLD: 5.94 K/UL (ref 1.8–8)
NEUTS SEG NFR BLD: 76.6 % (ref 32–75)
NRBC # BLD: 0 K/UL (ref 0–0.01)
NRBC BLD-RTO: 0 PER 100 WBC
PLATELET # BLD AUTO: 204 K/UL (ref 150–400)
PMV BLD AUTO: 10.2 FL (ref 8.9–12.9)
POTASSIUM SERPL-SCNC: 4 MMOL/L (ref 3.5–5.1)
RBC # BLD AUTO: 4.19 M/UL (ref 3.8–5.2)
SODIUM SERPL-SCNC: 135 MMOL/L (ref 136–145)
WBC # BLD AUTO: 7.8 K/UL (ref 3.6–11)

## 2025-03-16 PROCEDURE — 36415 COLL VENOUS BLD VENIPUNCTURE: CPT

## 2025-03-16 PROCEDURE — 6360000002 HC RX W HCPCS: Performed by: STUDENT IN AN ORGANIZED HEALTH CARE EDUCATION/TRAINING PROGRAM

## 2025-03-16 PROCEDURE — 97535 SELF CARE MNGMENT TRAINING: CPT | Performed by: OCCUPATIONAL THERAPIST

## 2025-03-16 PROCEDURE — 80048 BASIC METABOLIC PNL TOTAL CA: CPT

## 2025-03-16 PROCEDURE — 96372 THER/PROPH/DIAG INJ SC/IM: CPT

## 2025-03-16 PROCEDURE — 85025 COMPLETE CBC W/AUTO DIFF WBC: CPT

## 2025-03-16 PROCEDURE — 2500000003 HC RX 250 WO HCPCS: Performed by: STUDENT IN AN ORGANIZED HEALTH CARE EDUCATION/TRAINING PROGRAM

## 2025-03-16 PROCEDURE — 97530 THERAPEUTIC ACTIVITIES: CPT | Performed by: OCCUPATIONAL THERAPIST

## 2025-03-16 PROCEDURE — 1100000000 HC RM PRIVATE

## 2025-03-16 PROCEDURE — 97165 OT EVAL LOW COMPLEX 30 MIN: CPT | Performed by: OCCUPATIONAL THERAPIST

## 2025-03-16 PROCEDURE — 6370000000 HC RX 637 (ALT 250 FOR IP): Performed by: STUDENT IN AN ORGANIZED HEALTH CARE EDUCATION/TRAINING PROGRAM

## 2025-03-16 RX ORDER — LORAZEPAM 2 MG/ML
2 INJECTION INTRAMUSCULAR ONCE
Status: COMPLETED | OUTPATIENT
Start: 2025-03-16 | End: 2025-03-16

## 2025-03-16 RX ADMIN — ACETAMINOPHEN 650 MG: 325 TABLET ORAL at 08:44

## 2025-03-16 RX ADMIN — LORAZEPAM 2 MG: 2 INJECTION INTRAMUSCULAR; INTRAVENOUS at 23:35

## 2025-03-16 RX ADMIN — POLYETHYLENE GLYCOL 3350 17 G: 17 POWDER, FOR SOLUTION ORAL at 08:36

## 2025-03-16 RX ADMIN — ASPIRIN 81 MG: 81 TABLET, CHEWABLE ORAL at 08:35

## 2025-03-16 RX ADMIN — POLYETHYLENE GLYCOL 3350 17 G: 17 POWDER, FOR SOLUTION ORAL at 21:17

## 2025-03-16 RX ADMIN — GUAIFENESIN 600 MG: 600 TABLET, EXTENDED RELEASE ORAL at 18:23

## 2025-03-16 RX ADMIN — SODIUM CHLORIDE, PRESERVATIVE FREE 10 ML: 5 INJECTION INTRAVENOUS at 21:20

## 2025-03-16 RX ADMIN — GUAIFENESIN 600 MG: 600 TABLET, EXTENDED RELEASE ORAL at 04:45

## 2025-03-16 RX ADMIN — SODIUM CHLORIDE, PRESERVATIVE FREE 5 ML: 5 INJECTION INTRAVENOUS at 08:37

## 2025-03-16 RX ADMIN — KETOROLAC TROMETHAMINE 15 MG: 30 INJECTION, SOLUTION INTRAMUSCULAR at 21:16

## 2025-03-16 RX ADMIN — KETOROLAC TROMETHAMINE 15 MG: 30 INJECTION, SOLUTION INTRAMUSCULAR at 12:54

## 2025-03-16 RX ADMIN — HYDROCHLOROTHIAZIDE 25 MG: 25 TABLET ORAL at 08:35

## 2025-03-16 RX ADMIN — PANTOPRAZOLE SODIUM 40 MG: 40 TABLET, DELAYED RELEASE ORAL at 08:35

## 2025-03-16 RX ADMIN — KETOROLAC TROMETHAMINE 15 MG: 30 INJECTION, SOLUTION INTRAMUSCULAR at 04:53

## 2025-03-16 RX ADMIN — ENOXAPARIN SODIUM 40 MG: 100 INJECTION SUBCUTANEOUS at 08:36

## 2025-03-16 RX ADMIN — ACETAMINOPHEN 650 MG: 325 TABLET ORAL at 16:03

## 2025-03-16 RX ADMIN — MONTELUKAST 10 MG: 10 TABLET, FILM COATED ORAL at 08:35

## 2025-03-16 ASSESSMENT — PAIN DESCRIPTION - DESCRIPTORS
DESCRIPTORS: ACHING

## 2025-03-16 ASSESSMENT — PAIN DESCRIPTION - ORIENTATION
ORIENTATION: RIGHT
ORIENTATION: RIGHT;POSTERIOR

## 2025-03-16 ASSESSMENT — PAIN SCALES - GENERAL
PAINLEVEL_OUTOF10: 6
PAINLEVEL_OUTOF10: 5
PAINLEVEL_OUTOF10: 3
PAINLEVEL_OUTOF10: 7
PAINLEVEL_OUTOF10: 10
PAINLEVEL_OUTOF10: 0
PAINLEVEL_OUTOF10: 7

## 2025-03-16 ASSESSMENT — PAIN - FUNCTIONAL ASSESSMENT
PAIN_FUNCTIONAL_ASSESSMENT: ACTIVITIES ARE NOT PREVENTED
PAIN_FUNCTIONAL_ASSESSMENT: PREVENTS OR INTERFERES SOME ACTIVE ACTIVITIES AND ADLS
PAIN_FUNCTIONAL_ASSESSMENT: PREVENTS OR INTERFERES SOME ACTIVE ACTIVITIES AND ADLS

## 2025-03-16 ASSESSMENT — PAIN DESCRIPTION - LOCATION
LOCATION: RIB CAGE
LOCATION: ARM

## 2025-03-16 NOTE — CARE COORDINATION
Care Management Initial Assessment       RUR: 11% (low)  Readmission? No  1st IM letter given? Yes - 3/15/2025  1st  letter given: N/A, not /[facility] affiliated    Initial Assessment: Chart reviewed. CM met with pt at bedside, introduced role, confirmed demographic information is up-to-date in the chart and review needs for discharge planning.     Review DNR status with next of kin: child, Sita Argueta [phone].    Daughter is requesting recommendations for IPR ([facility]) or SNF ([facility]) because pt is alone in apartment. Daughter reports pt is not wanting LTC     Pt resides alone in a single story entry level apartment at the Independent Living section at the [facility].      Daughter informs pt is independent with ADLs without device prior to fall.       Daughter will transport at discharge.    PCP: JUSTIN Valencia at [facility], [facility] (seen every 6 months)    Pharmacy of choice: [facility] at [address] [place] [address].    History of HH after discharged from [facility]. History of SNF with [facility]. No history of IPR.     DME at home: cane (uses) and walker (uses occasionally)     CM will continue to follow for discharge planning. Full assessment below:     03/16/25 0856   Service Assessment   Patient Orientation Alert and Oriented;Person;Place;Situation;Self   Cognition Alert   History Provided By Patient;Child/Family   Primary Caregiver Self   Support Systems Children;Friends/Neighbors   Patient's Healthcare Decision Maker is: Legal Next of Kin   PCP Verified by CM Yes   Last Visit to PCP Within last 6 months   Prior Functional Level Independent in ADLs/IADLs   Current Functional Level Assistance with the following:;Bathing;Housework;Cooking;Toileting;Dressing;Shopping;Mobility   Can patient return to prior living arrangement Yes   Ability to make needs known: Good   Family able to assist with home care needs: No  (juanadeshawner reports her home is not set up to have pt - pt

## 2025-03-17 LAB
ANION GAP SERPL CALC-SCNC: 5 MMOL/L (ref 2–12)
BASOPHILS # BLD: 0.01 K/UL (ref 0–0.1)
BASOPHILS NFR BLD: 0.2 % (ref 0–1)
BUN SERPL-MCNC: 27 MG/DL (ref 6–20)
BUN/CREAT SERPL: 27 (ref 12–20)
CALCIUM SERPL-MCNC: 8.9 MG/DL (ref 8.5–10.1)
CHLORIDE SERPL-SCNC: 102 MMOL/L (ref 97–108)
CO2 SERPL-SCNC: 28 MMOL/L (ref 21–32)
CREAT SERPL-MCNC: 1 MG/DL (ref 0.55–1.02)
DIFFERENTIAL METHOD BLD: ABNORMAL
EOSINOPHIL # BLD: 0 K/UL (ref 0–0.4)
EOSINOPHIL NFR BLD: 0 % (ref 0–7)
ERYTHROCYTE [DISTWIDTH] IN BLOOD BY AUTOMATED COUNT: 13.2 % (ref 11.5–14.5)
GLUCOSE SERPL-MCNC: 89 MG/DL (ref 65–100)
HCT VFR BLD AUTO: 33 % (ref 35–47)
HGB BLD-MCNC: 10.6 G/DL (ref 11.5–16)
IMM GRANULOCYTES # BLD AUTO: 0.02 K/UL (ref 0–0.04)
IMM GRANULOCYTES NFR BLD AUTO: 0.4 % (ref 0–0.5)
LYMPHOCYTES # BLD: 1.36 K/UL (ref 0.8–3.5)
LYMPHOCYTES NFR BLD: 24.2 % (ref 12–49)
MCH RBC QN AUTO: 27.8 PG (ref 26–34)
MCHC RBC AUTO-ENTMCNC: 32.1 G/DL (ref 30–36.5)
MCV RBC AUTO: 86.6 FL (ref 80–99)
MONOCYTES # BLD: 0.66 K/UL (ref 0–1)
MONOCYTES NFR BLD: 11.7 % (ref 5–13)
NEUTS SEG # BLD: 3.57 K/UL (ref 1.8–8)
NEUTS SEG NFR BLD: 63.5 % (ref 32–75)
NRBC # BLD: 0 K/UL (ref 0–0.01)
NRBC BLD-RTO: 0 PER 100 WBC
PLATELET # BLD AUTO: 182 K/UL (ref 150–400)
PMV BLD AUTO: 10.4 FL (ref 8.9–12.9)
POTASSIUM SERPL-SCNC: 3.5 MMOL/L (ref 3.5–5.1)
RBC # BLD AUTO: 3.81 M/UL (ref 3.8–5.2)
SODIUM SERPL-SCNC: 135 MMOL/L (ref 136–145)
WBC # BLD AUTO: 5.6 K/UL (ref 3.6–11)

## 2025-03-17 PROCEDURE — 97162 PT EVAL MOD COMPLEX 30 MIN: CPT

## 2025-03-17 PROCEDURE — 85025 COMPLETE CBC W/AUTO DIFF WBC: CPT

## 2025-03-17 PROCEDURE — 2500000003 HC RX 250 WO HCPCS: Performed by: STUDENT IN AN ORGANIZED HEALTH CARE EDUCATION/TRAINING PROGRAM

## 2025-03-17 PROCEDURE — 1100000000 HC RM PRIVATE

## 2025-03-17 PROCEDURE — 97116 GAIT TRAINING THERAPY: CPT

## 2025-03-17 PROCEDURE — 6360000002 HC RX W HCPCS: Performed by: STUDENT IN AN ORGANIZED HEALTH CARE EDUCATION/TRAINING PROGRAM

## 2025-03-17 PROCEDURE — 80048 BASIC METABOLIC PNL TOTAL CA: CPT

## 2025-03-17 PROCEDURE — 97530 THERAPEUTIC ACTIVITIES: CPT

## 2025-03-17 PROCEDURE — 6370000000 HC RX 637 (ALT 250 FOR IP): Performed by: STUDENT IN AN ORGANIZED HEALTH CARE EDUCATION/TRAINING PROGRAM

## 2025-03-17 PROCEDURE — 36415 COLL VENOUS BLD VENIPUNCTURE: CPT

## 2025-03-17 PROCEDURE — 97535 SELF CARE MNGMENT TRAINING: CPT

## 2025-03-17 RX ADMIN — PANTOPRAZOLE SODIUM 40 MG: 40 TABLET, DELAYED RELEASE ORAL at 09:35

## 2025-03-17 RX ADMIN — GUAIFENESIN 600 MG: 600 TABLET, EXTENDED RELEASE ORAL at 18:20

## 2025-03-17 RX ADMIN — KETOROLAC TROMETHAMINE 15 MG: 30 INJECTION, SOLUTION INTRAMUSCULAR at 15:19

## 2025-03-17 RX ADMIN — ENOXAPARIN SODIUM 40 MG: 100 INJECTION SUBCUTANEOUS at 09:35

## 2025-03-17 RX ADMIN — SODIUM CHLORIDE, PRESERVATIVE FREE 10 ML: 5 INJECTION INTRAVENOUS at 09:39

## 2025-03-17 RX ADMIN — DORZOLAMIDE HYDROCHLORIDE 1 DROP: 20 SOLUTION/ DROPS OPHTHALMIC at 11:05

## 2025-03-17 RX ADMIN — DORZOLAMIDE HYDROCHLORIDE 1 DROP: 20 SOLUTION/ DROPS OPHTHALMIC at 20:42

## 2025-03-17 RX ADMIN — POLYETHYLENE GLYCOL 3350 17 G: 17 POWDER, FOR SOLUTION ORAL at 09:38

## 2025-03-17 RX ADMIN — SODIUM CHLORIDE, PRESERVATIVE FREE 10 ML: 5 INJECTION INTRAVENOUS at 20:46

## 2025-03-17 RX ADMIN — HYDROCHLOROTHIAZIDE 25 MG: 25 TABLET ORAL at 09:36

## 2025-03-17 RX ADMIN — KETOROLAC TROMETHAMINE 15 MG: 30 INJECTION, SOLUTION INTRAMUSCULAR at 09:37

## 2025-03-17 RX ADMIN — MONTELUKAST 10 MG: 10 TABLET, FILM COATED ORAL at 09:36

## 2025-03-17 RX ADMIN — ASPIRIN 81 MG: 81 TABLET, CHEWABLE ORAL at 09:37

## 2025-03-17 ASSESSMENT — PAIN SCALES - GENERAL
PAINLEVEL_OUTOF10: 9
PAINLEVEL_OUTOF10: 4
PAINLEVEL_OUTOF10: 5
PAINLEVEL_OUTOF10: 7
PAINLEVEL_OUTOF10: 9
PAINLEVEL_OUTOF10: 5

## 2025-03-17 ASSESSMENT — PAIN DESCRIPTION - LOCATION
LOCATION: RIB CAGE

## 2025-03-17 ASSESSMENT — PAIN DESCRIPTION - ORIENTATION
ORIENTATION: RIGHT

## 2025-03-17 ASSESSMENT — PAIN DESCRIPTION - DESCRIPTORS
DESCRIPTORS: ACHING
DESCRIPTORS: ACHING
DESCRIPTORS: STABBING;SHARP
DESCRIPTORS: ACHING
DESCRIPTORS: ACHING

## 2025-03-17 ASSESSMENT — PAIN - FUNCTIONAL ASSESSMENT
PAIN_FUNCTIONAL_ASSESSMENT: PREVENTS OR INTERFERES SOME ACTIVE ACTIVITIES AND ADLS

## 2025-03-17 NOTE — CARE COORDINATION
1524 - 3 Day Waiver approved, pt notified at bedside, Sita notified by phone. Spoke with [facility] admissions coordinator, planning for pt to admit tomorrow, will coordinate tomorrow morning for time and room assignment.    1150 - Met with pt and Sita at bedside to discuss SNF recommendation, both verbalized understanding of and agreement with recommendation. Pt confirmed preference for [facility]. CM reported that [facility] has accepted, bed availability pending. Reviewed Medicare 3 midnight rule and current pending waiver request, pt and Sita verbalized understanding of status.    Initial note - Care team recommending SNF rehab. Chart reviewed, pt's daughter Sita informed assessing CM that preference would be for [facility], CM sent referral. CM reached out to 3 Midnight Waiver Program team for review, if eligible pt may admit to [facility] prior to completion of three inpatient midnights. If not eligible, anticipated discharge will be 3/19 at soonest.      Xenia Rosenberg, [facility]  Care Management  [phone]

## 2025-03-18 ENCOUNTER — CARE COORDINATION (OUTPATIENT)
Dept: CARE COORDINATION | Age: 89
End: 2025-03-18

## 2025-03-18 VITALS
BODY MASS INDEX: 21.53 KG/M2 | WEIGHT: 117 LBS | HEIGHT: 62 IN | TEMPERATURE: 98.2 F | RESPIRATION RATE: 16 BRPM | SYSTOLIC BLOOD PRESSURE: 155 MMHG | DIASTOLIC BLOOD PRESSURE: 72 MMHG | OXYGEN SATURATION: 96 % | HEART RATE: 63 BPM

## 2025-03-18 LAB
ANION GAP SERPL CALC-SCNC: 6 MMOL/L (ref 2–12)
BASOPHILS # BLD: 0.01 K/UL (ref 0–0.1)
BASOPHILS NFR BLD: 0.2 % (ref 0–1)
BUN SERPL-MCNC: 23 MG/DL (ref 6–20)
BUN/CREAT SERPL: 25 (ref 12–20)
CALCIUM SERPL-MCNC: 9.1 MG/DL (ref 8.5–10.1)
CHLORIDE SERPL-SCNC: 103 MMOL/L (ref 97–108)
CO2 SERPL-SCNC: 27 MMOL/L (ref 21–32)
CREAT SERPL-MCNC: 0.92 MG/DL (ref 0.55–1.02)
DIFFERENTIAL METHOD BLD: ABNORMAL
EOSINOPHIL # BLD: 0 K/UL (ref 0–0.4)
EOSINOPHIL NFR BLD: 0 % (ref 0–7)
ERYTHROCYTE [DISTWIDTH] IN BLOOD BY AUTOMATED COUNT: 13.3 % (ref 11.5–14.5)
GLUCOSE SERPL-MCNC: 91 MG/DL (ref 65–100)
HCT VFR BLD AUTO: 33.7 % (ref 35–47)
HGB BLD-MCNC: 10.8 G/DL (ref 11.5–16)
IMM GRANULOCYTES # BLD AUTO: 0.02 K/UL (ref 0–0.04)
IMM GRANULOCYTES NFR BLD AUTO: 0.3 % (ref 0–0.5)
LYMPHOCYTES # BLD: 1.37 K/UL (ref 0.8–3.5)
LYMPHOCYTES NFR BLD: 21.5 % (ref 12–49)
MCH RBC QN AUTO: 27.6 PG (ref 26–34)
MCHC RBC AUTO-ENTMCNC: 32 G/DL (ref 30–36.5)
MCV RBC AUTO: 86 FL (ref 80–99)
MONOCYTES # BLD: 0.86 K/UL (ref 0–1)
MONOCYTES NFR BLD: 13.5 % (ref 5–13)
NEUTS SEG # BLD: 4.1 K/UL (ref 1.8–8)
NEUTS SEG NFR BLD: 64.5 % (ref 32–75)
NRBC # BLD: 0 K/UL (ref 0–0.01)
NRBC BLD-RTO: 0 PER 100 WBC
PLATELET # BLD AUTO: 188 K/UL (ref 150–400)
PMV BLD AUTO: 10.2 FL (ref 8.9–12.9)
POTASSIUM SERPL-SCNC: 3.7 MMOL/L (ref 3.5–5.1)
RBC # BLD AUTO: 3.92 M/UL (ref 3.8–5.2)
SODIUM SERPL-SCNC: 136 MMOL/L (ref 136–145)
WBC # BLD AUTO: 6.4 K/UL (ref 3.6–11)

## 2025-03-18 PROCEDURE — 85025 COMPLETE CBC W/AUTO DIFF WBC: CPT

## 2025-03-18 PROCEDURE — 36415 COLL VENOUS BLD VENIPUNCTURE: CPT

## 2025-03-18 PROCEDURE — 6370000000 HC RX 637 (ALT 250 FOR IP): Performed by: STUDENT IN AN ORGANIZED HEALTH CARE EDUCATION/TRAINING PROGRAM

## 2025-03-18 PROCEDURE — 80048 BASIC METABOLIC PNL TOTAL CA: CPT

## 2025-03-18 PROCEDURE — 6360000002 HC RX W HCPCS: Performed by: STUDENT IN AN ORGANIZED HEALTH CARE EDUCATION/TRAINING PROGRAM

## 2025-03-18 RX ORDER — OXYCODONE AND ACETAMINOPHEN 5; 325 MG/1; MG/1
1 TABLET ORAL EVERY 6 HOURS PRN
Qty: 1 TABLET | Refills: 0 | Status: SHIPPED | OUTPATIENT
Start: 2025-03-18 | End: 2025-03-19

## 2025-03-18 RX ORDER — LIDOCAINE 4 G/G
1 PATCH TOPICAL DAILY
Qty: 30 EACH | Refills: 0 | Status: SHIPPED | OUTPATIENT
Start: 2025-03-19 | End: 2025-04-18

## 2025-03-18 RX ADMIN — PANTOPRAZOLE SODIUM 40 MG: 40 TABLET, DELAYED RELEASE ORAL at 09:05

## 2025-03-18 RX ADMIN — ENOXAPARIN SODIUM 40 MG: 100 INJECTION SUBCUTANEOUS at 09:04

## 2025-03-18 RX ADMIN — KETOROLAC TROMETHAMINE 15 MG: 30 INJECTION, SOLUTION INTRAMUSCULAR at 00:43

## 2025-03-18 RX ADMIN — GUAIFENESIN 600 MG: 600 TABLET, EXTENDED RELEASE ORAL at 06:03

## 2025-03-18 RX ADMIN — HYDROCHLOROTHIAZIDE 25 MG: 25 TABLET ORAL at 09:05

## 2025-03-18 RX ADMIN — KETOROLAC TROMETHAMINE 15 MG: 30 INJECTION, SOLUTION INTRAMUSCULAR at 09:04

## 2025-03-18 RX ADMIN — DORZOLAMIDE HYDROCHLORIDE 1 DROP: 20 SOLUTION/ DROPS OPHTHALMIC at 09:06

## 2025-03-18 RX ADMIN — ASPIRIN 81 MG: 81 TABLET, CHEWABLE ORAL at 09:06

## 2025-03-18 RX ADMIN — MONTELUKAST 10 MG: 10 TABLET, FILM COATED ORAL at 09:05

## 2025-03-18 ASSESSMENT — PAIN DESCRIPTION - ONSET: ONSET: GRADUAL

## 2025-03-18 ASSESSMENT — PAIN SCALES - GENERAL
PAINLEVEL_OUTOF10: 10
PAINLEVEL_OUTOF10: 4
PAINLEVEL_OUTOF10: 4

## 2025-03-18 ASSESSMENT — PAIN DESCRIPTION - DESCRIPTORS: DESCRIPTORS: ACHING;STABBING;THROBBING

## 2025-03-18 ASSESSMENT — PAIN DESCRIPTION - ORIENTATION: ORIENTATION: RIGHT

## 2025-03-18 ASSESSMENT — PAIN DESCRIPTION - LOCATION: LOCATION: RIB CAGE

## 2025-03-18 ASSESSMENT — PAIN DESCRIPTION - FREQUENCY: FREQUENCY: INTERMITTENT

## 2025-03-18 ASSESSMENT — PAIN DESCRIPTION - PAIN TYPE: TYPE: ACUTE PAIN

## 2025-03-18 ASSESSMENT — PAIN - FUNCTIONAL ASSESSMENT: PAIN_FUNCTIONAL_ASSESSMENT: PREVENTS OR INTERFERES WITH MANY ACTIVE NOT PASSIVE ACTIVITIES

## 2025-03-18 NOTE — PROGRESS NOTES
Advance Care Planning Note      NAME: Bhavna Lopez   :  3/25/1935   MRN:  [id number]     Date/Time:  3/16/2025 8:44 AM    Active Diagnoses:  Traumatic fracture of right 6th-8th ribs  Chest pain due to above    These active diagnoses are of sufficient risk that focused discussion on advance care planning is indicated in order to allow the patient to thoughtfully consider personal goals of care, and if situations arise that prevent the ability to personally give input, to ensure appropriate representation of their personal desires for different levels and aggressiveness of care.     Discussion:   Code status addressed and wants to be a Do Not Resuscitate.  Patient wants central line and vasopressors if needed. Patient would also want a feeding tube, if needed, for nutritional support.  Patient  would like to assign linda Buckner as the surrogate decision maker.    Persons present and participating in discussion: Bhavna Lopez, Sheyla Mims MD, linda Buckner      Time Spent:   Total time spent face-to-face in education and discussion:   15  minutes.         Sheyla Mims MD   Hospitalist   
      Hospitalist Progress Note    NAME:   Bhavna Lopez   : 3/25/1935   MRN: [id number]     Date/Time: 3/17/2025 8:09 AM  Patient PCP: Adla Perez MD    Estimated discharge date:likely on 3/17  Barriers:likely rehab     Assessment / Plan:  Traumatic fracture of right 6th-8th ribs  Chest pain due to above  Patient admitted to observation service  CT chest without contrast done on 3/15/2025-1. Mildly displaced, acute, right, lateral, sixth-eighth rib fractures. 2. 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  Continue inhaler as needed  Continue metoprolol, hydrochlorothiazide  Continue eyedrops        Medical Decision Making:   I personally reviewed labs: CBC BMP  I personally reviewed imaging:none   I personally reviewed EKG:none   Toxic drug monitoring: Monitor for bleeding while on enoxaparin  Discussed case with: patient , rn , family -daughter at bedside     Code Status: dnr   DVT Prophylaxis: Enoxaparin  Baseline: Independent ambulatory       Subjective:     Chief Complaint / Reason for Physician Visit  Patient currently being treated for right 6, 7 and 8th  rib fracture likely post due to a fall.    Labs and chart reviewed patient examined overnight events noted.  Patient appeared to be comfortable however mentioned  IT still hurt when she taking deep breath      Objective:     VITALS:   Last 24hrs VS reviewed 
DISCHARGE NOTE FROM ORTHO NURSE    Patient determined to be stable for discharge by attending provider. I have reviewed the discharge instructions with the patient. They verbalized understanding and all questions were answered to their satisfaction. No complaints or further questions were expressed.      Medications sent to pharmacy. Appropriate educational materials and medication side effect teaching were provided.      PIV were removed prior to discharge.     Patient did not discharge with any line, mejia, or drain.    Personal items and valuables accounted for at discharge by patient and/or family: Yes    Post-op patient: No    Della Regan LPN    
End of Shift Note    Bedside shift change report given to *** (oncoming nurse) by Mami Barajas RN (offgoing nurse).  Report included the following information SBAR, Kardex, Intake/Output, MAR, and Recent Results    Shift worked: 7pm-7am     Shift summary and any significant changes:    Pt A7Ox4 on RA.looks anxious  Voiding,  Pain managed with Toradol 15mg.  No acute distress or complain noticed.       Concerns for physician to address:  ..     Zone phone for oncoming shift:  ..       Activity:  Level of Assistance: Minimal assist, patient does 75% or more  Number times ambulated in hallways past shift: 0  Number of times OOB to chair past shift: 0    Cardiac:   Cardiac Monitoring:        Access:  Current line(s): PIV     Genitourinary:   Urinary Status: Voiding, External catheter    Respiratory:   O2 Device: None (Room air)  Chronic home O2 use?: NO  Incentive spirometer at bedside: YES    GI:  Last BM (including prior to admit): 03/15/25  Current diet:  ADULT DIET; Regular  Passing flatus: YES    Pain Management:   Patient states pain is manageable on current regimen: YES    Skin:  Matt Scale Score: 20  Interventions: Wound Offloading (Prevention Methods): Bed, pressure redistribution/air, Pillows, Repositioning    Patient Safety:  Fall Risk: Nursing Judgement-Fall Risk High(Add Comments): Yes  Fall Risk Interventions  Nursing Judgement-Fall Risk High(Add Comments): Yes  Toilet Every 2 Hours-In Advance of Need: Yes  Hourly Visual Checks: Awake, In bed  Fall Visual Posted: Armband, Fall sign posted  Room Door Open: Yes  Alarm On: Bed  Patient Moved Closer to Nursing Station: No    Active Consults:   IP CONSULT TO HOSPITALIST  IP CONSULT TO SPIRITUAL CARE    Length of Stay:  Expected LOS: 3  Actual LOS: 2    Mami Barajas RN                            
End of Shift Note    Bedside shift change report given to Mami COTO (oncoming nurse) by Breann Kaur RN (offgoing nurse).  Report included the following information SBAR    Shift worked:  7am -7pm     Shift summary and any significant changes:     Patient is doing quite well with no complaints this evening, pain well controlled this shift.     Concerns for physician to address:       Zone phone for oncoming shift:          Activity:  Level of Assistance: Minimal assist, patient does 75% or more  Number times ambulated in hallways past shift: 0  Number of times OOB to chair past shift: 1    Cardiac:   Cardiac Monitoring: No           Access:  Current line(s): PIV    Genitourinary:   Urinary Status: Voiding, External catheter    Respiratory:   O2 Device: None (Room air)  Chronic home O2 use?: YES  Incentive spirometer at bedside: YES    GI:  Last BM (including prior to admit): 03/15/25  Current diet:  ADULT DIET; Regular  Passing flatus: YES    Pain Management:   Patient states pain is manageable on current regimen: YES    Skin:  Matt Scale Score: 20  Interventions: Wound Offloading (Prevention Methods): Bed, pressure reduction mattress, Pillows, Turning    Patient Safety:  Fall Risk: Nursing Judgement-Fall Risk High(Add Comments): Yes  Fall Risk Interventions  Nursing Judgement-Fall Risk High(Add Comments): Yes  Toilet Every 2 Hours-In Advance of Need: Yes  Hourly Visual Checks: Awake, In bed  Fall Visual Posted: Armband, Fall sign posted, Socks  Room Door Open: Yes  Alarm On: Bed, Chair  Patient Moved Closer to Nursing Station: No    Active Consults:   IP CONSULT TO HOSPITALIST  IP CONSULT TO SPIRITUAL CARE    Length of Stay:  Expected LOS: 3  Actual LOS: 1    Breann Kaur RN     
Orders received, chart reviewed and patient evaluated by occupational therapy. Pending progression with skilled acute occupational therapy, recommend:  Pt lives alone and doesn't have assist for mobility and ADLs at discharge. Pt currently lives in [facility].      Moderate intensity short-term skilled occupational therapy up to 5x/week    Recommend with nursing patient to complete as able in order to maintain strength, endurance and independence: OOB to chair 3x/day, ADLs with CGA to min assist  and performing toileting with min assist. Thank you for your assistance.     Full evaluation to follow.          03/16/25 1006 03/16/25 1011 03/16/25 1014   Vital Signs   Pulse 58 70 85   /64 (!) 160/74 123/88   MAP (Calculated) 83 103 100   MAP (mmHg) 79 98 99   BP Location Left upper arm Left upper arm Left upper arm   BP Method  --   --   --    Patient Position Supine Sitting Standing      03/16/25 1017 03/16/25 1019   Vital Signs   Pulse  --  79   BP  --  (!) 141/69   MAP (Calculated)  --  93   MAP (mmHg) 99 90   BP Location  --  Left upper arm   BP Method  --  Automatic   Patient Position  --  Sitting     
Spiritual Health History and Assessment/Progress Note  [facility]    Attempted Encounter,  ,  ,      Name: Bhavna Lopez MRN: [id number]    Age: 89 y.o.     Sex: female   Language: English   Worship: [employer]   History of rib fracture     Date: 3/17/2025            Total Time Calculated: 10 min              Spiritual Assessment began in [facility]            Encounter Overview/Reason: Attempted Encounter       Eva, Belief, Meaning:   Patient unable to assess at this time  Family/Friends No family/friends present      Importance and Influence:  Patient unable to assess at this time  Family/Friends No family/friends present    Community:  Patient Other:    Family/Friends No family/friends present    Assessment and Plan of Care:     Patient Interventions include: Other: Responded to staff consult/orders request for spiritual/emotional support. Staff were attending to patient at this time.   Family/Friends Interventions include: No family/friends present    Patient Plan of Care: Other: Spiritual health available upon request.   Family/Friends Plan of Care: No family/friends present    Electronically signed by ELIZABETH Whitehead on 3/17/2025 at 10:20 AM   
Og Red, [facility] on 3/18/2025 at 10:28 AM   
Plan.      LABS:  I reviewed today's most current labs and imaging studies.  Pertinent labs include:  Recent Labs     03/15/25  1544 03/16/25  0330   WBC 9.9 7.8   HGB 12.0 11.4*   HCT 36.9 36.0    204     Recent Labs     03/15/25  1544 03/16/25  0330   * 135*   K 3.8 4.0    100   CO2 24 24   GLUCOSE 95 106*   BUN 20 19   CREATININE 0.94 0.88   CALCIUM 9.5 9.2   BILITOT 0.3  --    AST 33  --    ALT 24  --        Signed: Sheyla Mims MD

## 2025-03-18 NOTE — PLAN OF CARE
Problem: ABCDS Injury Assessment  Goal: Absence of physical injury  3/17/2025 0058 by Ritesh Harrington RN  Outcome: Progressing  3/16/2025 1444 by Maryellen Combs RN  Outcome: Progressing  Flowsheets (Taken 3/16/2025 0830)  Absence of Physical Injury: Implement safety measures based on patient assessment     Problem: Safety - Adult  Goal: Free from fall injury  3/17/2025 0058 by Ritesh Harrington RN  Outcome: Progressing  3/16/2025 1444 by Maryellen Combs RN  Outcome: Progressing  Flowsheets (Taken 3/16/2025 0830)  Free From Fall Injury: Instruct family/caregiver on patient safety     Problem: Pain  Goal: Verbalizes/displays adequate comfort level or baseline comfort level  3/17/2025 0058 by Ritesh Harrington RN  Outcome: Progressing  3/16/2025 1444 by Maryellen Combs RN  Outcome: Progressing  Flowsheets (Taken 3/16/2025 0830)  Verbalizes/displays adequate comfort level or baseline comfort level: Encourage patient to monitor pain and request assistance     
  Problem: Pain  Goal: Verbalizes/displays adequate comfort level or baseline comfort level  3/17/2025 1139 by Breann Kaur, RN  Outcome: Progressing  3/17/2025 0058 by Ritesh Harrington, RN  Outcome: Progressing     Problem: Safety - Adult  Goal: Free from fall injury  3/17/2025 1139 by Breann Kaur, RN  Outcome: Progressing  3/17/2025 0058 by Ritesh Harrington, RN  Outcome: Progressing     
  Problem: Pain  Goal: Verbalizes/displays adequate comfort level or baseline comfort level  3/17/2025 2229 by Mami Barajas RN  Outcome: Progressing  3/17/2025 1139 by Breann Kaur RN  Outcome: Progressing     Problem: Safety - Adult  Goal: Free from fall injury  3/17/2025 2229 by Mami Barajas RN  Outcome: Progressing  3/17/2025 1139 by Breann Kaur RN  Outcome: Progressing     Problem: ABCDS Injury Assessment  Goal: Absence of physical injury  Outcome: Progressing     
  Problem: Pain  Goal: Verbalizes/displays adequate comfort level or baseline comfort level  3/18/2025 1013 by Della Regan LPN  Outcome: Completed  Flowsheets (Taken 3/18/2025 0904)  Verbalizes/displays adequate comfort level or baseline comfort level:   Encourage patient to monitor pain and request assistance   Assess pain using appropriate pain scale   Administer analgesics based on type and severity of pain and evaluate response   Implement non-pharmacological measures as appropriate and evaluate response   Consider cultural and social influences on pain and pain management   Notify Licensed Independent Practitioner if interventions unsuccessful or patient reports new pain  3/17/2025 2229 by Mami Barajas, RN  Outcome: Progressing     Problem: Safety - Adult  Goal: Free from fall injury  3/18/2025 1013 by Della Regan LPN  Outcome: Completed  Flowsheets (Taken 3/18/2025 0904)  Free From Fall Injury: Instruct family/caregiver on patient safety  3/17/2025 2229 by Mami Barajas, RN  Outcome: Progressing     Problem: ABCDS Injury Assessment  Goal: Absence of physical injury  3/18/2025 1013 by Della Regan LPN  Outcome: Completed  3/17/2025 2229 by Mami Barajas, RN  Outcome: Progressing     
  Problem: Pain  Goal: Verbalizes/displays adequate comfort level or baseline comfort level  Outcome: Progressing     Problem: Safety - Adult  Goal: Free from fall injury  Outcome: Progressing     
day(s).  3.  Patient will perform toileting with Modified Ruffin within 7 day(s).  4.  Patient will perform toilet transfers with Modified Ruffin  within 7 day(s).  5.  Patient will perform one light IADL task in standing with Modified Ruffin within 7 day(s).    3/16/2025 1314 by Samantha Cohen, OTR/L  Outcome: Progressing     
Appears intact    Functional Mobility and Transfers for ADLs:  Bed Mobility:  Bed Mobility Training  Bed Mobility Training: Yes  Sit to Supine: Partial/Moderate assistance     Transfers:   Transfer Training  Transfer Training: Yes  Interventions: Verbal cues;Tactile cues;Safety awareness training  Sit to Stand: Contact guard assistance  Stand to Sit: Contact guard assistance  Bed to Chair: Minimal assistance           Balance:     Balance  Sitting: Impaired  Sitting - Static: Good (unsupported)  Sitting - Dynamic: Fair (occasional)  Standing: Impaired  Standing - Static: Fair;Constant support;Good  Standing - Dynamic: Fair;Constant support      ADL Intervention:                                                    LE Dressing: Maximum assistance  LE Dressing Skilled Clinical Factors: doffing shoes                   Functional Mobility: Minimal assistance  Functional Mobility Skilled Clinical Factors: with RW; recliner<>bed, assist for walker management                  Pain Rating:  10/10 R upper back  Pain Intervention(s):   nursing notified and addressing, ice, and repositioning      Activity Tolerance:   Fair   Please refer to the flowsheet for vital signs taken during this treatment.    After treatment:   Patient left in no apparent distress in bed, Call bell within reach, Bed/ chair alarm activated, and Side rails x3    COMMUNICATION/EDUCATION:   The patient's plan of care was discussed with: registered nurse    Patient Education  Education Given To: Patient  Education Provided: Role of Therapy;Plan of Care;ADL Adaptive Strategies;Family Education;Mobility Training;Fall Prevention Strategies;Transfer Training;Energy Conservation;Equipment;Precautions  Education Method: Verbal  Barriers to Learning: None  Education Outcome: Verbalized understanding;Continued education needed    Thank you for this referral.  Li Bonilla OT  Minutes: 23    
X4  Cognition  Overall Cognitive Status: Exceptions  Arousal/Alertness: Appears intact  Following Commands: Appears intact  Attention Span: Attends with cues to redirect  Memory: Appears intact  Safety Judgement: Decreased awareness of need for safety  Problem Solving: Appears intact  Insights: Decreased awareness of deficits  Initiation: Appears intact  Sequencing: Appears intact    Hearing:   Hearing  Hearing: Within functional limits    Vision/Perceptual:          Vision  Vision: Impaired  Vision Exceptions: Wears glasses for reading (Blind R eye)           Strength:    Strength: Generally decreased, functional    Tone & Sensation:   Tone: Normal  Sensation: Impaired (occasional numbness/tingling R hand)    Coordination:  Coordination: Generally decreased, functional    Range Of Motion:  AROM: Within functional limits       Functional Mobility:  Bed Mobility:     Bed Mobility Training  Bed Mobility Training: No (received patient sitting up in chair)  Transfers:     Transfer Training  Transfer Training: Yes  Interventions: Verbal cues;Safety awareness training  Sit to Stand: Contact guard assistance (additional time)  Stand to Sit: Contact guard assistance (additional time)  Balance:     Balance  Sitting: Impaired  Sitting - Static: Good (unsupported)  Sitting - Dynamic: Fair (occasional)  Standing: Impaired  Standing - Static: Fair;Constant support  Standing - Dynamic: Fair;Constant support  Ambulation/Gait Training:    Gait  Gait Training: Yes  Overall Level of Assistance: Contact guard assistance  Distance (ft): 10 Feet  Assistive Device: Gait belt;Walker, rolling  Interventions: Verbal cues;Safety awareness training  Base of Support: Narrowed  Speed/Gertrudis: Pace decreased (< 100 feet/min);Slow  Step Length: Left shortened;Right shortened  Gait Abnormalities: Antalgic;Decreased step clearance    Wheelchair Management  Wheelchair Management: No                                                                     
on functional status and mobility recommendations, and physician at bedside assessing pt    COMMUNICATION/EDUCATION:   The patient's plan of care was discussed with: registered nurse and patient, pts daughter/granddaughter and physican    Patient Education  Education Given To: Patient;Family  Education Provided: Role of Therapy;Plan of Care;ADL Adaptive Strategies;Family Education;Mobility Training;Fall Prevention Strategies;Transfer Training  Education Method: Verbal;Demonstration  Barriers to Learning: None  Education Outcome: Verbalized understanding;Continued education needed    Thank you for this referral.  Samantha Cohen OTR/L  Minutes: 34    Occupational Therapy Evaluation Charge Determination   History Examination Decision-Making   MEDIUM Complexity : Expanded review of history including physical, cognitive and psychocial history  MEDIUM Complexity: 3-5 Performance deficits relating to physical, cognitive, or psychosocial skills that result in activity limitations and/or participation restrictions LOW Complexity: No comorbidities that affect functional and  no verbal  or physical assist needed to complete eval tasks   Based on the above components, the patient evaluation is determined to be of the following complexity level: Low

## 2025-03-18 NOTE — DISCHARGE SUMMARY
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  female with PMHx significant as below was brought to the ed for an evaluation after she had a fall and injured rt side of her chest .   Patient did not remember the fall - but no clear h/o syncope / seizures .  Patient mention she was fearful to move after the fall.  She lives by herself and her neighbor helped her to come to the ED.  Patient does not complain of any head injury any neck pain.  No new complaints of back pain or any focal neurological deficit.  Patient mentioned that she had trouble moving the right side of her body due to the pain.  She also had trouble taking deep breath after the fall.     Review of systems-negative for abdominal pain no diarrhea no constipation no joint pain or rash.     In the ED CT of the chest x-rays of hip and knee were done and patient was given fentanyl for pain  We were asked to admit for work up and evaluation of the above problems.      Brief Hospital Course by Main Problems:   Traumatic fracture of right 6th-8th ribs  Chest pain due to above  Patient admitted to observation service  CT chest without contrast done on 3/15/2025-1. Mildly displaced, acute, right, lateral, sixth-eighth rib fractures. 2. 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  X-ray of hip with pelvis done on 3/15/2025-no acute

## 2025-03-18 NOTE — CARE COORDINATION
[facility], room 108  Call report to [phone]  [facility]  transport    Transition of Care Plan: SNF at [facility]    0941 - Met with pt at bedside to review plan for discharge to [facility] this morning, pt verbalized understanding, requested CM call pt's daughter Sita. Called Sita [phone] while at bedside, informed of discharge this morning with 1100 transport scheduled, Sita verbalized understanding and stated she will meet pt at facility. After ending call to Sita, CM informed pt of second IM letter, pt requested CM contact Sita to review any forms from Medicare.    Initial note - Called [facility] admissions, confirmed bed availability for this morning; admissions coordinator will provide bed assignment after 0900 bed board meeting. Care team updated. Transport set with [facility] .    Xenia Rosenberg [facility]  Care Management  [phone]

## (undated) DEVICE — CONTAINER SPEC 20 ML LID NEUT BUFF FORMALIN 10 % POLYPR STS

## (undated) DEVICE — SOLUTION IV 250ML 0.9% SOD CHL CLR INJ FLX BG CONT PRT CLSR

## (undated) DEVICE — STERILE POLYISOPRENE POWDER-FREE SURGICAL GLOVES: Brand: PROTEXIS

## (undated) DEVICE — BLOCK BITE ENDOSCP AD 21 MM W/ DIL BLU LF DISP

## (undated) DEVICE — PROVE COVER: Brand: UNBRANDED

## (undated) DEVICE — PADDING CST 4INX4YD --

## (undated) DEVICE — ZIMMER® STERILE DISPOSABLE TOURNIQUET CUFF WITH PROTECTIVE SLEEVE AND PLC, DUAL PORT, SINGLE BLADDER, 34 IN. (86 CM)

## (undated) DEVICE — SUT PLN 4-0 18IN PS2 YEL --

## (undated) DEVICE — SURGICAL PROCEDURE PACK CATRCT CUST

## (undated) DEVICE — SUTURE FIBERWIRE SZ 5 L38IN NONABSORBABLE BLU L48MM 1/2 AR7211

## (undated) DEVICE — SOL IRR STRL H2O 1000ML BTL --

## (undated) DEVICE — Device

## (undated) DEVICE — SOL IRR SOD CL 0.9% 1000ML BTL --

## (undated) DEVICE — GUIDEWIRE VASC L145CM 0.035IN J TIP L3MM PTFE FIX COR NAMIC

## (undated) DEVICE — 4-PORT MANIFOLD: Brand: NEPTUNE 2

## (undated) DEVICE — SYR 10ML LUER LOK 1/5ML GRAD --

## (undated) DEVICE — SET ADMIN 16ML TBNG L100IN 2 Y INJ SITE IV PIGGY BK DISP

## (undated) DEVICE — PREP SKN CHLRAPRP APL 26ML STR --

## (undated) DEVICE — Z DISCONTINUED PER MEDLINE LINE GAS SAMPLING O2/CO2 LNG AD 13 FT NSL W/ TBNG FILTERLINE

## (undated) DEVICE — RADIFOCUS GLIDEWIRE: Brand: GLIDEWIRE

## (undated) DEVICE — 1200 GUARD II KIT W/5MM TUBE W/O VAC TUBE: Brand: GUARDIAN

## (undated) DEVICE — INFECTION CONTROL KIT SYS

## (undated) DEVICE — TOWEL 4 PLY TISS 19X30 SUE WHT

## (undated) DEVICE — CATHETER ETER CARD MULTIPAK MULTIPAK 5FR PERFORMA

## (undated) DEVICE — CATH IV AUTOGRD BC PNK 20GA 25 -- INSYTE

## (undated) DEVICE — 3M™ TEGADERM™ TRANSPARENT FILM DRESSING FRAME STYLE, 1624W, 2-3/8 IN X 2-3/4 IN (6 CM X 7 CM), 100/CT 4CT/CASE: Brand: 3M™ TEGADERM™

## (undated) DEVICE — 3M(TM) MEDIPORE(TM) H SOFT CLOTH TAPE 2866: Brand: 3M™ MEDIPORE™

## (undated) DEVICE — BASIN EMSIS 16OZ GRAPHITE PLAS KID SHP MOLD GRAD FOR ORAL

## (undated) DEVICE — KENDALL DL ECG CABLE AND LEAD WIRE SYSTEM, 3-LEAD, SINGLE PATIENT USE: Brand: KENDALL

## (undated) DEVICE — (D)SYR 10ML 1/5ML GRAD NSAF -- PKGING CHANGE USE ITEM 338027

## (undated) DEVICE — NEONATAL-ADULT SPO2 SENSOR: Brand: NELLCOR

## (undated) DEVICE — STERILE POLYISOPRENE POWDER-FREE SURGICAL GLOVES WITH EMOLLIENT COATING: Brand: PROTEXIS

## (undated) DEVICE — SUTURE V-LOC 180 SZ 0 L12IN ABSRB GRN L37MM GS-21 1/2 CIR VLOCL0316

## (undated) DEVICE — IMMOBILIZER KNEE PREMIER PRO TRI PNL 22INCH FOAM TIETEX PAT

## (undated) DEVICE — SOLUTION IV STRL H2O 500 ML AQUALITE POUR BTL

## (undated) DEVICE — STRAP,POSITIONING,KNEE/BODY,FOAM,4X60": Brand: MEDLINE

## (undated) DEVICE — 3M™ TEGADERM™ TRANSPARENT FILM DRESSING FRAME STYLE, 1626, 4 IN X 4-3/4 IN (10 CM X 12 CM), 50/CT 4CT/CASE: Brand: 3M™ TEGADERM™

## (undated) DEVICE — GARMENT,MEDLINE,DVT,INT,CALF,MED, GEN2: Brand: MEDLINE

## (undated) DEVICE — DRESSING HEMSTAT W4XL4IN 4 PLY WHT IMPREG KAOLIN HYDRPHLC

## (undated) DEVICE — HIGH FLOW RATE EXTENSION SET, LUER LOCK ADAPTERS

## (undated) DEVICE — BAG SPEC BIOHZRD 10 X 10 IN --

## (undated) DEVICE — YANKAUER,TAPERED BULBOUS TIP,W/O VENT: Brand: MEDLINE

## (undated) DEVICE — SUTURE VCRL SZ 2-0 L36IN ABSRB VLT L36MM CT-1 1/2 CIR J345H

## (undated) DEVICE — SYR 5ML 1/5 GRAD LL NSAF LF --

## (undated) DEVICE — HEART CATH-MRMC: Brand: MEDLINE INDUSTRIES, INC.

## (undated) DEVICE — SYRINGE INSULIN 1ML LUERSLIP TIP W/O SFTY U100 BLISTER PK

## (undated) DEVICE — NEEDLE HYPO 18GA L1.5IN PNK S STL HUB POLYPR SHLD REG BVL

## (undated) DEVICE — SYR 3ML LL TIP 1/10ML GRAD --

## (undated) DEVICE — THE MONARCH® "D" CARTRIDGE IS A SINGLE-USE POLYPROPYLENE CARTRIDGE FOR POSTERIOR CHAMBER IOL DELIVERY: Brand: MONARCH® III

## (undated) DEVICE — FORCEPS BX L160CM DIA8MM GRSP DISECT CUP TIP NONLOCKING ROT

## (undated) DEVICE — DRESSING WND W4IN L4IN FOAM N ADH POLYUR SHT OPTIFOAM

## (undated) DEVICE — REM POLYHESIVE ADULT PATIENT RETURN ELECTRODE: Brand: VALLEYLAB

## (undated) DEVICE — NDL FLTR TIP 5 MIC 18GX1.5IN --

## (undated) DEVICE — BNDG ELAS HK LOOP 6X5YD NS -- MATRIX

## (undated) DEVICE — KENDALL RADIOLUCENT FOAM MONITORING ELECTRODE RECTANGULAR SHAPE: Brand: KENDALL

## (undated) DEVICE — PINNACLE INTRODUCER SHEATH: Brand: PINNACLE

## (undated) DEVICE — SOLIDIFIER MEDC 1200ML -- CONVERT TO 356117

## (undated) DEVICE — SUTURE VCRL SZ 1 L36IN ABSRB VLT L36MM CT-1 1/2 CIR J347H